# Patient Record
Sex: FEMALE | Race: BLACK OR AFRICAN AMERICAN | NOT HISPANIC OR LATINO | Employment: OTHER | URBAN - METROPOLITAN AREA
[De-identification: names, ages, dates, MRNs, and addresses within clinical notes are randomized per-mention and may not be internally consistent; named-entity substitution may affect disease eponyms.]

---

## 2017-05-03 ENCOUNTER — HOSPITAL ENCOUNTER (EMERGENCY)
Facility: HOSPITAL | Age: 22
Discharge: HOME | End: 2017-05-03
Attending: EMERGENCY MEDICINE | Admitting: EMERGENCY MEDICINE

## 2017-05-03 VITALS
BODY MASS INDEX: 23.37 KG/M2 | HEIGHT: 62 IN | SYSTOLIC BLOOD PRESSURE: 114 MMHG | DIASTOLIC BLOOD PRESSURE: 71 MMHG | HEART RATE: 80 BPM | OXYGEN SATURATION: 100 % | WEIGHT: 127 LBS | RESPIRATION RATE: 16 BRPM | TEMPERATURE: 98 F

## 2017-05-03 DIAGNOSIS — N63.10 LARGE MASS OF RIGHT BREAST: Primary | ICD-10-CM

## 2017-05-03 DIAGNOSIS — N64.4 BREAST TENDERNESS IN FEMALE: ICD-10-CM

## 2017-05-03 PROCEDURE — 99283 EMERGENCY DEPT VISIT LOW MDM: CPT

## 2017-05-08 ENCOUNTER — HOSPITAL ENCOUNTER (OUTPATIENT)
Dept: ULTRASOUND IMAGING | Facility: HOSPITAL | Age: 22
Discharge: HOME | End: 2017-05-08

## 2017-05-08 ENCOUNTER — HOSPITAL ENCOUNTER (OUTPATIENT)
Dept: MAMMOGRAPHY | Facility: HOSPITAL | Age: 22
Discharge: HOME | End: 2017-05-08

## 2017-05-08 ENCOUNTER — HOSPITAL ENCOUNTER (OUTPATIENT)
Dept: MAMMOGRAPHY | Facility: HOSPITAL | Age: 22
Discharge: HOME | End: 2017-05-08
Attending: SURGERY | Admitting: SURGERY

## 2017-05-08 ENCOUNTER — TRANSCRIBE ORDERS (OUTPATIENT)
Dept: MAMMOGRAPHY | Facility: HOSPITAL | Age: 22
End: 2017-05-08

## 2017-05-08 DIAGNOSIS — N63.10 BREAST MASS, RIGHT: ICD-10-CM

## 2017-05-08 DIAGNOSIS — N63.10 BREAST MASS, RIGHT: Primary | ICD-10-CM

## 2017-05-08 PROCEDURE — 19083 BX BREAST 1ST LESION US IMAG: CPT | Performed by: RADIOLOGY

## 2017-05-08 PROCEDURE — 76942 ECHO GUIDE FOR BIOPSY: CPT

## 2017-05-08 PROCEDURE — 88360 TUMOR IMMUNOHISTOCHEM/MANUAL: CPT | Performed by: SURGERY

## 2017-05-08 PROCEDURE — 38505 NEEDLE BIOPSY LYMPH NODES: CPT | Performed by: RADIOLOGY

## 2017-05-08 PROCEDURE — G0204 DX MAMMO INCL CAD BI: HCPCS

## 2017-05-08 PROCEDURE — 77062 BREAST TOMOSYNTHESIS BI: CPT | Performed by: RADIOLOGY

## 2017-05-08 PROCEDURE — G0279 TOMOSYNTHESIS, MAMMO: HCPCS

## 2017-05-08 PROCEDURE — 88305 TISSUE EXAM BY PATHOLOGIST: CPT | Performed by: SURGERY

## 2017-05-08 PROCEDURE — 88307 TISSUE EXAM BY PATHOLOGIST: CPT | Performed by: SURGERY

## 2017-05-08 PROCEDURE — 76641 ULTRASOUND BREAST COMPLETE: CPT

## 2017-05-08 PROCEDURE — 11100: CPT | Performed by: RADIOLOGY

## 2017-05-08 PROCEDURE — 76641 ULTRASOUND BREAST COMPLETE: CPT | Performed by: RADIOLOGY

## 2017-05-08 PROCEDURE — 88342 IMHCHEM/IMCYTCHM 1ST ANTB: CPT | Performed by: SURGERY

## 2017-05-08 PROCEDURE — 77066 DX MAMMO INCL CAD BI: CPT | Performed by: RADIOLOGY

## 2017-05-08 RX ORDER — LIDOCAINE HYDROCHLORIDE AND EPINEPHRINE 10; 10 MG/ML; UG/ML
5 INJECTION, SOLUTION INFILTRATION; PERINEURAL ONCE
Status: COMPLETED | OUTPATIENT
Start: 2017-05-08 | End: 2017-05-08

## 2017-05-08 RX ORDER — LIDOCAINE HYDROCHLORIDE 10 MG/ML
5 INJECTION, SOLUTION INFILTRATION; PERINEURAL ONCE
Status: COMPLETED | OUTPATIENT
Start: 2017-05-08 | End: 2017-05-08

## 2017-05-08 RX ADMIN — LIDOCAINE HYDROCHLORIDE 1 ML: 10 INJECTION, SOLUTION INFILTRATION; PERINEURAL at 16:57

## 2017-05-08 RX ADMIN — LIDOCAINE HYDROCHLORIDE,EPINEPHRINE BITARTRATE 4 ML: 10; .01 INJECTION, SOLUTION INFILTRATION; PERINEURAL at 16:53

## 2017-05-10 ENCOUNTER — TELEPHONE (OUTPATIENT)
Dept: MAMMOGRAPHY | Facility: HOSPITAL | Age: 22
End: 2017-05-10

## 2017-05-11 ENCOUNTER — TELEPHONE (OUTPATIENT)
Dept: MAMMOGRAPHY | Facility: HOSPITAL | Age: 22
End: 2017-05-11

## 2017-05-11 LAB
CYTO UR: NORMAL
LAB AP CASE REPORT: NORMAL
LAB AP CLINICAL INFORMATION: NORMAL
LAB AP DIAGNOSIS COMMENT: NORMAL
LAB AP SPECIAL STAINS: NORMAL
Lab: NORMAL
PATH REPORT.FINAL DX SPEC: NORMAL
PATH REPORT.GROSS SPEC: NORMAL

## 2017-05-12 PROBLEM — C50.911 MALIGNANT NEOPLASM OF RIGHT FEMALE BREAST (HCC): Status: ACTIVE | Noted: 2017-05-12

## 2017-05-15 ENCOUNTER — DOCUMENTATION (OUTPATIENT)
Dept: OTHER | Facility: HOSPITAL | Age: 22
End: 2017-05-15

## 2017-05-16 ENCOUNTER — DOCUMENTATION (OUTPATIENT)
Dept: SOCIAL WORK | Facility: HOSPITAL | Age: 22
End: 2017-05-16

## 2017-05-16 ENCOUNTER — EDUCATION (OUTPATIENT)
Dept: ONCOLOGY | Facility: HOSPITAL | Age: 22
End: 2017-05-16

## 2017-05-16 ENCOUNTER — TELEPHONE (OUTPATIENT)
Dept: ONCOLOGY | Facility: CLINIC | Age: 22
End: 2017-05-16

## 2017-05-16 ENCOUNTER — CONSULT (OUTPATIENT)
Dept: ONCOLOGY | Facility: CLINIC | Age: 22
End: 2017-05-16

## 2017-05-16 ENCOUNTER — TRANSCRIBE ORDERS (OUTPATIENT)
Dept: PREADMISSION TESTING | Facility: HOSPITAL | Age: 22
End: 2017-05-16

## 2017-05-16 ENCOUNTER — DOCUMENTATION (OUTPATIENT)
Dept: OTHER | Facility: HOSPITAL | Age: 22
End: 2017-05-16

## 2017-05-16 VITALS
HEART RATE: 87 BPM | HEIGHT: 62 IN | DIASTOLIC BLOOD PRESSURE: 68 MMHG | SYSTOLIC BLOOD PRESSURE: 113 MMHG | TEMPERATURE: 98.5 F | WEIGHT: 118.9 LBS | BODY MASS INDEX: 21.88 KG/M2 | RESPIRATION RATE: 16 BRPM

## 2017-05-16 DIAGNOSIS — C50.811 MALIGNANT NEOPLASM OF OVERLAPPING SITES OF RIGHT FEMALE BREAST (HCC): ICD-10-CM

## 2017-05-16 DIAGNOSIS — C50.811 MALIGNANT NEOPLASM OF OVERLAPPING SITES OF RIGHT FEMALE BREAST (HCC): Primary | ICD-10-CM

## 2017-05-16 DIAGNOSIS — I42.7 CHEMOTHERAPY INDUCED CARDIOMYOPATHY (HCC): Primary | ICD-10-CM

## 2017-05-16 DIAGNOSIS — T45.1X5A CHEMOTHERAPY INDUCED CARDIOMYOPATHY (HCC): Primary | ICD-10-CM

## 2017-05-16 PROCEDURE — 99205 OFFICE O/P NEW HI 60 MIN: CPT | Performed by: INTERNAL MEDICINE

## 2017-05-16 RX ORDER — LIDOCAINE AND PRILOCAINE 25; 25 MG/G; MG/G
CREAM TOPICAL AS NEEDED
Qty: 30 G | Refills: 2 | Status: SHIPPED | OUTPATIENT
Start: 2017-05-16 | End: 2017-10-31 | Stop reason: HOSPADM

## 2017-05-16 RX ORDER — PROCHLORPERAZINE MALEATE 10 MG
10 TABLET ORAL EVERY 6 HOURS PRN
Qty: 30 TABLET | Refills: 5 | Status: SHIPPED | OUTPATIENT
Start: 2017-05-16 | End: 2017-07-25 | Stop reason: ALTCHOICE

## 2017-05-16 RX ORDER — ONDANSETRON HYDROCHLORIDE 8 MG/1
8 TABLET, FILM COATED ORAL 3 TIMES DAILY PRN
Qty: 30 TABLET | Refills: 5 | Status: SHIPPED | OUTPATIENT
Start: 2017-05-16 | End: 2017-05-24 | Stop reason: CLARIF

## 2017-05-16 RX ORDER — DEXAMETHASONE 4 MG/1
TABLET ORAL
Qty: 12 TABLET | Refills: 5 | Status: SHIPPED | OUTPATIENT
Start: 2017-05-16 | End: 2017-10-13

## 2017-05-17 ENCOUNTER — TELEPHONE (OUTPATIENT)
Dept: ONCOLOGY | Facility: CLINIC | Age: 22
End: 2017-05-17

## 2017-05-17 ENCOUNTER — TRANSCRIBE ORDERS (OUTPATIENT)
Dept: ADMINISTRATIVE | Facility: HOSPITAL | Age: 22
End: 2017-05-17

## 2017-05-17 ENCOUNTER — TRANSCRIBE ORDERS (OUTPATIENT)
Dept: GENERAL RADIOLOGY | Facility: HOSPITAL | Age: 22
End: 2017-05-17

## 2017-05-17 DIAGNOSIS — C50.811 MALIGNANT NEOPLASM OF OVERLAPPING SITES OF RIGHT FEMALE BREAST (HCC): Primary | ICD-10-CM

## 2017-05-17 DIAGNOSIS — N63.0 LUMP OR MASS IN BREAST: Primary | ICD-10-CM

## 2017-05-18 ENCOUNTER — HOSPITAL ENCOUNTER (OUTPATIENT)
Dept: GENERAL RADIOLOGY | Facility: HOSPITAL | Age: 22
Discharge: HOME | End: 2017-05-18
Attending: SURGERY

## 2017-05-18 DIAGNOSIS — N63.0 LUMP OR MASS IN BREAST: ICD-10-CM

## 2017-05-18 PROCEDURE — 71010 HC CHEST PA OR AP: CPT

## 2017-05-22 ENCOUNTER — HOSPITAL ENCOUNTER (OUTPATIENT)
Dept: PET IMAGING | Facility: HOSPITAL | Age: 22
End: 2017-05-22
Attending: SURGERY

## 2017-05-22 ENCOUNTER — HOSPITAL ENCOUNTER (OUTPATIENT)
Dept: PET IMAGING | Facility: HOSPITAL | Age: 22
Discharge: HOME | End: 2017-05-22
Attending: SURGERY | Admitting: SURGERY

## 2017-05-22 ENCOUNTER — HOSPITAL ENCOUNTER (OUTPATIENT)
Dept: MRI IMAGING | Facility: HOSPITAL | Age: 22
Discharge: HOME | End: 2017-05-22
Attending: SURGERY

## 2017-05-22 DIAGNOSIS — C50.811 MALIGNANT NEOPLASM OF OVERLAPPING SITES OF RIGHT FEMALE BREAST (HCC): ICD-10-CM

## 2017-05-22 DIAGNOSIS — C50.811: ICD-10-CM

## 2017-05-22 PROCEDURE — A9577 INJ MULTIHANCE: HCPCS | Performed by: SURGERY

## 2017-05-22 PROCEDURE — C8908 MRI W/O FOL W/CONT, BREAST,: HCPCS

## 2017-05-22 PROCEDURE — 0159T HC MRI BREAST COMPUTER ANALYSIS: CPT

## 2017-05-22 PROCEDURE — 0 GADOBENATE DIMEGLUMINE 529 MG/ML SOLUTION: Performed by: SURGERY

## 2017-05-22 PROCEDURE — 0159T MRI BREAST BILATERAL W WO CONTRAST: CPT | Performed by: RADIOLOGY

## 2017-05-22 PROCEDURE — 77059 MRI BREAST BILATERAL W WO CONTRAST: CPT | Performed by: RADIOLOGY

## 2017-05-22 RX ADMIN — GADOBENATE DIMEGLUMINE 10 ML: 529 INJECTION, SOLUTION INTRAVENOUS at 12:41

## 2017-05-23 ENCOUNTER — HOSPITAL ENCOUNTER (OUTPATIENT)
Dept: PET IMAGING | Facility: HOSPITAL | Age: 22
End: 2017-05-23
Attending: SURGERY

## 2017-05-23 ENCOUNTER — APPOINTMENT (OUTPATIENT)
Dept: PET IMAGING | Facility: HOSPITAL | Age: 22
End: 2017-05-23
Attending: SURGERY

## 2017-05-23 ENCOUNTER — HOSPITAL ENCOUNTER (OUTPATIENT)
Dept: CARDIOLOGY | Facility: HOSPITAL | Age: 22
Discharge: HOME | End: 2017-05-23
Attending: INTERNAL MEDICINE | Admitting: INTERNAL MEDICINE

## 2017-05-23 VITALS
HEIGHT: 62 IN | DIASTOLIC BLOOD PRESSURE: 72 MMHG | BODY MASS INDEX: 21.71 KG/M2 | WEIGHT: 118 LBS | SYSTOLIC BLOOD PRESSURE: 123 MMHG

## 2017-05-23 DIAGNOSIS — I42.7 CHEMOTHERAPY INDUCED CARDIOMYOPATHY (HCC): ICD-10-CM

## 2017-05-23 DIAGNOSIS — T45.1X5A CHEMOTHERAPY INDUCED CARDIOMYOPATHY (HCC): ICD-10-CM

## 2017-05-23 LAB
BH CV ECHO MEAS - AO ROOT AREA (BSA CORRECTED): 1.4
BH CV ECHO MEAS - AO ROOT AREA: 3.8 CM^2
BH CV ECHO MEAS - AO ROOT DIAM: 2.2 CM
BH CV ECHO MEAS - BSA(HAYCOCK): 1.5 M^2
BH CV ECHO MEAS - BSA: 1.5 M^2
BH CV ECHO MEAS - BZI_BMI: 21.6 KILOGRAMS/M^2
BH CV ECHO MEAS - BZI_METRIC_HEIGHT: 157.5 CM
BH CV ECHO MEAS - BZI_METRIC_WEIGHT: 53.5 KG
BH CV ECHO MEAS - CONTRAST EF (2CH): 69.7 ML/M^2
BH CV ECHO MEAS - CONTRAST EF 4CH: 64.1 ML/M^2
BH CV ECHO MEAS - EDV(CUBED): 43.6 ML
BH CV ECHO MEAS - EDV(MOD-SP2): 33 ML
BH CV ECHO MEAS - EDV(MOD-SP4): 39 ML
BH CV ECHO MEAS - EDV(TEICH): 51.6 ML
BH CV ECHO MEAS - EF(CUBED): 72.5 %
BH CV ECHO MEAS - EF(MOD-SP2): 69.7 %
BH CV ECHO MEAS - EF(MOD-SP4): 64 %
BH CV ECHO MEAS - EF(TEICH): 65.2 %
BH CV ECHO MEAS - ESV(CUBED): 12 ML
BH CV ECHO MEAS - ESV(MOD-SP2): 10 ML
BH CV ECHO MEAS - ESV(MOD-SP4): 14 ML
BH CV ECHO MEAS - ESV(TEICH): 17.9 ML
BH CV ECHO MEAS - FS: 34.9 %
BH CV ECHO MEAS - IVS/LVPW: 0.99
BH CV ECHO MEAS - IVSD: 0.88 CM
BH CV ECHO MEAS - LA DIMENSION: 1.7 CM
BH CV ECHO MEAS - LA/AO: 0.77
BH CV ECHO MEAS - LAT PEAK E' VEL: 14.8 CM/SEC
BH CV ECHO MEAS - LV DIASTOLIC VOL/BSA (35-75): 25.5 ML/M^2
BH CV ECHO MEAS - LV MASS(C)D: 87.3 GRAMS
BH CV ECHO MEAS - LV MASS(C)DI: 57.1 GRAMS/M^2
BH CV ECHO MEAS - LV SYSTOLIC VOL/BSA (12-30): 9.2 ML/M^2
BH CV ECHO MEAS - LVIDD: 3.5 CM
BH CV ECHO MEAS - LVIDS: 2.3 CM
BH CV ECHO MEAS - LVLD AP2: 4.7 CM
BH CV ECHO MEAS - LVLD AP4: 5.3 CM
BH CV ECHO MEAS - LVLS AP2: 4.2 CM
BH CV ECHO MEAS - LVLS AP4: 5.1 CM
BH CV ECHO MEAS - LVPWD: 0.89 CM
BH CV ECHO MEAS - MED PEAK E' VEL: 9.6 CM/SEC
BH CV ECHO MEAS - MV A MAX VEL: 48.9 CM/SEC
BH CV ECHO MEAS - MV DEC TIME: 0.2 SEC
BH CV ECHO MEAS - MV E MAX VEL: 81.4 CM/SEC
BH CV ECHO MEAS - MV E/A: 1.7
BH CV ECHO MEAS - RAP SYSTOLE: 3 MMHG
BH CV ECHO MEAS - RVSP: 17.6 MMHG
BH CV ECHO MEAS - SI(CUBED): 20.7 ML/M^2
BH CV ECHO MEAS - SI(MOD-SP2): 15.1 ML/M^2
BH CV ECHO MEAS - SI(MOD-SP4): 16.4 ML/M^2
BH CV ECHO MEAS - SI(TEICH): 22 ML/M^2
BH CV ECHO MEAS - SV(CUBED): 31.6 ML
BH CV ECHO MEAS - SV(MOD-SP2): 23 ML
BH CV ECHO MEAS - SV(MOD-SP4): 25 ML
BH CV ECHO MEAS - SV(TEICH): 33.6 ML
BH CV ECHO MEAS - TAPSE (>1.6): 2.3 CM2
BH CV ECHO MEAS - TR MAX VEL: 191.3 CM/SEC
BH CV XLRA - TDI S': 12.9 CM/SEC
LV EF 2D ECHO EST: 60 %

## 2017-05-23 PROCEDURE — 93306 TTE W/DOPPLER COMPLETE: CPT | Performed by: INTERNAL MEDICINE

## 2017-05-23 PROCEDURE — 93306 TTE W/DOPPLER COMPLETE: CPT

## 2017-05-24 ENCOUNTER — TELEPHONE (OUTPATIENT)
Dept: MRI IMAGING | Facility: HOSPITAL | Age: 22
End: 2017-05-24

## 2017-05-24 ENCOUNTER — HOSPITAL ENCOUNTER (OUTPATIENT)
Dept: PET IMAGING | Facility: HOSPITAL | Age: 22
Discharge: HOME | End: 2017-05-24
Attending: SURGERY

## 2017-05-24 ENCOUNTER — HOSPITAL ENCOUNTER (OUTPATIENT)
Dept: PET IMAGING | Facility: HOSPITAL | Age: 22
Discharge: HOME | End: 2017-05-24
Attending: SURGERY | Admitting: SURGERY

## 2017-05-24 ENCOUNTER — DOCUMENTATION (OUTPATIENT)
Dept: OTHER | Facility: HOSPITAL | Age: 22
End: 2017-05-24

## 2017-05-24 ENCOUNTER — OFFICE VISIT (OUTPATIENT)
Dept: ONCOLOGY | Facility: CLINIC | Age: 22
End: 2017-05-24

## 2017-05-24 VITALS
WEIGHT: 120 LBS | BODY MASS INDEX: 22.08 KG/M2 | DIASTOLIC BLOOD PRESSURE: 69 MMHG | HEIGHT: 62 IN | RESPIRATION RATE: 16 BRPM | TEMPERATURE: 97.4 F | HEART RATE: 82 BPM | SYSTOLIC BLOOD PRESSURE: 110 MMHG

## 2017-05-24 DIAGNOSIS — C50.811 MALIGNANT NEOPLASM OF OVERLAPPING SITES OF RIGHT FEMALE BREAST (HCC): Primary | ICD-10-CM

## 2017-05-24 LAB — GLUCOSE BLDC GLUCOMTR-MCNC: 122 MG/DL (ref 70–130)

## 2017-05-24 PROCEDURE — 0 FLUDEOXYGLUCOSE F18 SOLUTION: Performed by: SURGERY

## 2017-05-24 PROCEDURE — 99214 OFFICE O/P EST MOD 30 MIN: CPT | Performed by: INTERNAL MEDICINE

## 2017-05-24 PROCEDURE — A9552 F18 FDG: HCPCS | Performed by: SURGERY

## 2017-05-24 PROCEDURE — 82962 GLUCOSE BLOOD TEST: CPT

## 2017-05-24 PROCEDURE — 78815 PET IMAGE W/CT SKULL-THIGH: CPT

## 2017-05-24 RX ORDER — PALONOSETRON 0.05 MG/ML
0.25 INJECTION, SOLUTION INTRAVENOUS ONCE
Status: CANCELLED | OUTPATIENT
Start: 2017-05-25

## 2017-05-24 RX ORDER — SODIUM CHLORIDE 9 MG/ML
250 INJECTION, SOLUTION INTRAVENOUS ONCE
Status: CANCELLED | OUTPATIENT
Start: 2017-05-25

## 2017-05-24 RX ORDER — ONDANSETRON 8 MG/1
8 TABLET, ORALLY DISINTEGRATING ORAL EVERY 8 HOURS PRN
Qty: 30 TABLET | Refills: 3 | Status: SHIPPED | OUTPATIENT
Start: 2017-05-24 | End: 2017-07-25 | Stop reason: SDUPTHER

## 2017-05-24 RX ADMIN — FLUDEOXYGLUCOSE F18 1 DOSE: 300 INJECTION INTRAVENOUS at 07:44

## 2017-05-25 ENCOUNTER — INFUSION (OUTPATIENT)
Dept: ONCOLOGY | Facility: HOSPITAL | Age: 22
End: 2017-05-25

## 2017-05-25 ENCOUNTER — DOCUMENTATION (OUTPATIENT)
Dept: SOCIAL WORK | Facility: HOSPITAL | Age: 22
End: 2017-05-25

## 2017-05-25 ENCOUNTER — DOCUMENTATION (OUTPATIENT)
Dept: NUTRITION | Facility: HOSPITAL | Age: 22
End: 2017-05-25

## 2017-05-25 ENCOUNTER — EDUCATION (OUTPATIENT)
Dept: ONCOLOGY | Facility: HOSPITAL | Age: 22
End: 2017-05-25

## 2017-05-25 VITALS
HEART RATE: 76 BPM | OXYGEN SATURATION: 95 % | DIASTOLIC BLOOD PRESSURE: 68 MMHG | BODY MASS INDEX: 22.08 KG/M2 | RESPIRATION RATE: 17 BRPM | SYSTOLIC BLOOD PRESSURE: 115 MMHG | WEIGHT: 120 LBS | TEMPERATURE: 98.2 F | HEIGHT: 62 IN

## 2017-05-25 DIAGNOSIS — C50.811 MALIGNANT NEOPLASM OF OVERLAPPING SITES OF RIGHT FEMALE BREAST (HCC): Primary | ICD-10-CM

## 2017-05-25 DIAGNOSIS — C50.811 MALIGNANT NEOPLASM OF OVERLAPPING SITES OF RIGHT FEMALE BREAST (HCC): ICD-10-CM

## 2017-05-25 LAB
ALBUMIN SERPL-MCNC: 4.2 G/DL (ref 3.2–4.8)
ALBUMIN/GLOB SERPL: 1.2 G/DL (ref 1.5–2.5)
ALP SERPL-CCNC: 80 U/L (ref 25–100)
ALT SERPL W P-5'-P-CCNC: 28 U/L (ref 7–40)
ANION GAP SERPL CALCULATED.3IONS-SCNC: 5 MMOL/L (ref 3–11)
AST SERPL-CCNC: 31 U/L (ref 0–33)
BILIRUB SERPL-MCNC: 0.4 MG/DL (ref 0.3–1.2)
BUN BLD-MCNC: 12 MG/DL (ref 9–23)
BUN/CREAT SERPL: 24 (ref 7–25)
CALCIUM SPEC-SCNC: 9.6 MG/DL (ref 8.7–10.4)
CHLORIDE SERPL-SCNC: 106 MMOL/L (ref 99–109)
CO2 SERPL-SCNC: 28 MMOL/L (ref 20–31)
CREAT BLD-MCNC: 0.5 MG/DL (ref 0.6–1.3)
ERYTHROCYTE [DISTWIDTH] IN BLOOD BY AUTOMATED COUNT: 12.9 % (ref 11.3–14.5)
GFR SERPL CREATININE-BSD FRML MDRD: >150 ML/MIN/1.73
GLOBULIN UR ELPH-MCNC: 3.6 GM/DL
GLUCOSE BLD-MCNC: 206 MG/DL (ref 70–100)
HCT VFR BLD AUTO: 40.6 % (ref 34.5–44)
HGB BLD-MCNC: 13.2 G/DL (ref 11.5–15.5)
LYMPHOCYTES # BLD AUTO: 1.1 10*3/MM3 (ref 0.6–4.8)
LYMPHOCYTES NFR BLD AUTO: 11.1 % (ref 24–44)
MCH RBC QN AUTO: 29 PG (ref 27–31)
MCHC RBC AUTO-ENTMCNC: 32.4 G/DL (ref 32–36)
MCV RBC AUTO: 89.5 FL (ref 80–99)
MONOCYTES # BLD AUTO: 0.3 10*3/MM3 (ref 0–1)
MONOCYTES NFR BLD AUTO: 3.2 % (ref 0–12)
NEUTROPHILS # BLD AUTO: 8.5 10*3/MM3 (ref 1.5–8.3)
NEUTROPHILS NFR BLD AUTO: 85.7 % (ref 41–71)
PLATELET # BLD AUTO: 241 10*3/MM3 (ref 150–450)
PMV BLD AUTO: 7.8 FL (ref 6–12)
POTASSIUM BLD-SCNC: 3.5 MMOL/L (ref 3.5–5.5)
PROT SERPL-MCNC: 7.8 G/DL (ref 5.7–8.2)
RBC # BLD AUTO: 4.53 10*6/MM3 (ref 3.89–5.14)
SODIUM BLD-SCNC: 139 MMOL/L (ref 132–146)
WBC NRBC COR # BLD: 9.9 10*3/MM3 (ref 3.5–10.8)

## 2017-05-25 PROCEDURE — 25010000002 PERTUZUMAB 420 MG/14ML SOLUTION 420 MG VIAL: Performed by: INTERNAL MEDICINE

## 2017-05-25 PROCEDURE — 25010000002 TRASTUZUMAB PER 10 MG: Performed by: INTERNAL MEDICINE

## 2017-05-25 PROCEDURE — 25010000002 PALONOSETRON PER 25 MCG: Performed by: INTERNAL MEDICINE

## 2017-05-25 PROCEDURE — 96375 TX/PRO/DX INJ NEW DRUG ADDON: CPT

## 2017-05-25 PROCEDURE — 96417 CHEMO IV INFUS EACH ADDL SEQ: CPT

## 2017-05-25 PROCEDURE — 96372 THER/PROPH/DIAG INJ SC/IM: CPT

## 2017-05-25 PROCEDURE — 80053 COMPREHEN METABOLIC PANEL: CPT

## 2017-05-25 PROCEDURE — 25010000002 HYDROMORPHONE PER 4 MG: Performed by: INTERNAL MEDICINE

## 2017-05-25 PROCEDURE — 25010000002 DIPHENHYDRAMINE PER 50 MG: Performed by: INTERNAL MEDICINE

## 2017-05-25 PROCEDURE — 25010000002 HEPARIN FLUSH (PORCINE) 100 UNIT/ML SOLUTION: Performed by: INTERNAL MEDICINE

## 2017-05-25 PROCEDURE — 36415 COLL VENOUS BLD VENIPUNCTURE: CPT

## 2017-05-25 PROCEDURE — 96377 APPLICATON ON-BODY INJECTOR: CPT

## 2017-05-25 PROCEDURE — 25010000002 DEXAMETHASONE PER 1 MG: Performed by: INTERNAL MEDICINE

## 2017-05-25 PROCEDURE — 85025 COMPLETE CBC W/AUTO DIFF WBC: CPT

## 2017-05-25 PROCEDURE — 25010000002 MORPHINE PER 10 MG: Performed by: INTERNAL MEDICINE

## 2017-05-25 PROCEDURE — 96415 CHEMO IV INFUSION ADDL HR: CPT

## 2017-05-25 PROCEDURE — 96413 CHEMO IV INFUSION 1 HR: CPT

## 2017-05-25 RX ORDER — DEXAMETHASONE SODIUM PHOSPHATE 4 MG/ML
10 INJECTION, SOLUTION INTRA-ARTICULAR; INTRALESIONAL; INTRAMUSCULAR; INTRAVENOUS; SOFT TISSUE ONCE
Status: COMPLETED | OUTPATIENT
Start: 2017-05-25 | End: 2017-05-25

## 2017-05-25 RX ORDER — SODIUM CHLORIDE 9 MG/ML
250 INJECTION, SOLUTION INTRAVENOUS ONCE
Status: COMPLETED | OUTPATIENT
Start: 2017-05-25 | End: 2017-05-25

## 2017-05-25 RX ORDER — SODIUM CHLORIDE 9 MG/ML
250 INJECTION, SOLUTION INTRAVENOUS ONCE
Status: CANCELLED | OUTPATIENT
Start: 2017-05-26

## 2017-05-25 RX ORDER — HYDROMORPHONE HCL 110MG/55ML
2 PATIENT CONTROLLED ANALGESIA SYRINGE INTRAVENOUS ONCE
Status: COMPLETED | OUTPATIENT
Start: 2017-05-25 | End: 2017-05-25

## 2017-05-25 RX ORDER — PALONOSETRON 0.05 MG/ML
0.25 INJECTION, SOLUTION INTRAVENOUS ONCE
Status: CANCELLED | OUTPATIENT
Start: 2017-05-26

## 2017-05-25 RX ORDER — PALONOSETRON 0.05 MG/ML
0.25 INJECTION, SOLUTION INTRAVENOUS ONCE
Status: COMPLETED | OUTPATIENT
Start: 2017-05-25 | End: 2017-05-25

## 2017-05-25 RX ORDER — DIPHENHYDRAMINE HYDROCHLORIDE 50 MG/ML
25 INJECTION INTRAMUSCULAR; INTRAVENOUS ONCE
Status: COMPLETED | OUTPATIENT
Start: 2017-05-25 | End: 2017-05-25

## 2017-05-25 RX ORDER — SODIUM CHLORIDE 0.9 % (FLUSH) 0.9 %
10 SYRINGE (ML) INJECTION AS NEEDED
Status: CANCELLED | OUTPATIENT
Start: 2017-05-25

## 2017-05-25 RX ORDER — MORPHINE SULFATE 4 MG/ML
4 INJECTION, SOLUTION INTRAMUSCULAR; INTRAVENOUS ONCE
Status: COMPLETED | OUTPATIENT
Start: 2017-05-25 | End: 2017-05-25

## 2017-05-25 RX ADMIN — DEXAMETHASONE SODIUM PHOSPHATE 10 MG: 4 INJECTION, SOLUTION INTRA-ARTICULAR; INTRALESIONAL; INTRAMUSCULAR; INTRAVENOUS; SOFT TISSUE at 14:50

## 2017-05-25 RX ADMIN — PERTUZUMAB 840 MG: 30 INJECTION, SOLUTION, CONCENTRATE INTRAVENOUS at 11:31

## 2017-05-25 RX ADMIN — TRASTUZUMAB 430 MG: KIT at 13:30

## 2017-05-25 RX ADMIN — SODIUM CHLORIDE 250 ML: 9 INJECTION, SOLUTION INTRAVENOUS at 11:25

## 2017-05-25 RX ADMIN — PALONOSETRON HYDROCHLORIDE 0.25 MG: 0.25 INJECTION INTRAVENOUS at 11:27

## 2017-05-25 RX ADMIN — DIPHENHYDRAMINE HYDROCHLORIDE 25 MG: 50 INJECTION INTRAMUSCULAR; INTRAVENOUS at 14:45

## 2017-05-25 RX ADMIN — HEPARIN 500 UNITS: 100 SYRINGE at 16:22

## 2017-05-25 RX ADMIN — MORPHINE SULFATE 4 MG: 4 INJECTION, SOLUTION INTRAMUSCULAR; INTRAVENOUS at 14:48

## 2017-05-25 RX ADMIN — HYDROMORPHONE HYDROCHLORIDE 2 MG: 2 INJECTION, SOLUTION INTRAMUSCULAR; INTRAVENOUS; SUBCUTANEOUS at 15:42

## 2017-05-26 ENCOUNTER — INFUSION (OUTPATIENT)
Dept: ONCOLOGY | Facility: HOSPITAL | Age: 22
End: 2017-05-26

## 2017-05-26 VITALS
WEIGHT: 121 LBS | HEART RATE: 76 BPM | DIASTOLIC BLOOD PRESSURE: 75 MMHG | RESPIRATION RATE: 16 BRPM | SYSTOLIC BLOOD PRESSURE: 118 MMHG | TEMPERATURE: 97.8 F | BODY MASS INDEX: 22.26 KG/M2 | HEIGHT: 62 IN

## 2017-05-26 DIAGNOSIS — C50.911 MALIGNANT NEOPLASM OF RIGHT FEMALE BREAST, UNSPECIFIED SITE OF BREAST: Primary | ICD-10-CM

## 2017-05-26 DIAGNOSIS — C50.811 MALIGNANT NEOPLASM OF OVERLAPPING SITES OF RIGHT FEMALE BREAST (HCC): Primary | ICD-10-CM

## 2017-05-26 PROCEDURE — 96402 CHEMO HORMON ANTINEOPL SQ/IM: CPT

## 2017-05-26 PROCEDURE — 96415 CHEMO IV INFUSION ADDL HR: CPT

## 2017-05-26 PROCEDURE — 96417 CHEMO IV INFUS EACH ADDL SEQ: CPT

## 2017-05-26 PROCEDURE — 25010000002 CARBOPLATIN PER 50 MG: Performed by: INTERNAL MEDICINE

## 2017-05-26 PROCEDURE — 96413 CHEMO IV INFUSION 1 HR: CPT

## 2017-05-26 PROCEDURE — 96375 TX/PRO/DX INJ NEW DRUG ADDON: CPT

## 2017-05-26 PROCEDURE — 96377 APPLICATON ON-BODY INJECTOR: CPT

## 2017-05-26 PROCEDURE — 25010000002 PEGFILGRASTIM 6 MG/0.6ML PREFILLED SYRINGE KIT: Performed by: INTERNAL MEDICINE

## 2017-05-26 PROCEDURE — 25010000002 DOCETAXEL 20 MG/ML CONCENTRATION 1 ML VIAL: Performed by: INTERNAL MEDICINE

## 2017-05-26 PROCEDURE — 25010000002 PALONOSETRON PER 25 MCG: Performed by: INTERNAL MEDICINE

## 2017-05-26 PROCEDURE — 25010000002 HEPARIN FLUSH (PORCINE) 100 UNIT/ML SOLUTION: Performed by: INTERNAL MEDICINE

## 2017-05-26 PROCEDURE — 25010000002 DOCETAXEL 20 MG/ML CONCENTRATION 4 ML VIAL: Performed by: INTERNAL MEDICINE

## 2017-05-26 PROCEDURE — 25010000002 LEUPROLIDE ACETATE (3 MONTH) PER 3.75 MG: Performed by: INTERNAL MEDICINE

## 2017-05-26 PROCEDURE — 96372 THER/PROPH/DIAG INJ SC/IM: CPT

## 2017-05-26 RX ORDER — SODIUM CHLORIDE 9 MG/ML
250 INJECTION, SOLUTION INTRAVENOUS ONCE
Status: COMPLETED | OUTPATIENT
Start: 2017-05-26 | End: 2017-05-26

## 2017-05-26 RX ORDER — ONDANSETRON 8 MG/1
8 TABLET, ORALLY DISINTEGRATING ORAL EVERY 8 HOURS PRN
Qty: 20 TABLET | Refills: 11 | Status: SHIPPED | OUTPATIENT
Start: 2017-05-26 | End: 2017-06-25

## 2017-05-26 RX ORDER — PALONOSETRON 0.05 MG/ML
0.25 INJECTION, SOLUTION INTRAVENOUS ONCE
Status: COMPLETED | OUTPATIENT
Start: 2017-05-26 | End: 2017-05-26

## 2017-05-26 RX ORDER — SODIUM CHLORIDE 0.9 % (FLUSH) 0.9 %
10 SYRINGE (ML) INJECTION AS NEEDED
Status: CANCELLED | OUTPATIENT
Start: 2017-05-26

## 2017-05-26 RX ADMIN — HEPARIN 500 UNITS: 100 SYRINGE at 12:29

## 2017-05-26 RX ADMIN — LEUPROLIDE ACETATE 11.25 MG: KIT at 10:17

## 2017-05-26 RX ADMIN — SODIUM CHLORIDE 250 ML: 9 INJECTION, SOLUTION INTRAVENOUS at 10:10

## 2017-05-26 RX ADMIN — DOCETAXEL 115 MG: 80 INJECTION, SOLUTION INTRAVENOUS at 10:33

## 2017-05-26 RX ADMIN — PEGFILGRASTIM 6 MG: KIT SUBCUTANEOUS at 12:28

## 2017-05-26 RX ADMIN — PALONOSETRON HYDROCHLORIDE 0.25 MG: 0.25 INJECTION INTRAVENOUS at 10:14

## 2017-05-26 RX ADMIN — CARBOPLATIN 900 MG: 10 INJECTION, SOLUTION INTRAVENOUS at 11:39

## 2017-05-29 ENCOUNTER — HOSPITAL ENCOUNTER (EMERGENCY)
Facility: HOSPITAL | Age: 22
Discharge: HOME | End: 2017-05-29
Attending: EMERGENCY MEDICINE | Admitting: EMERGENCY MEDICINE

## 2017-05-29 VITALS
SYSTOLIC BLOOD PRESSURE: 112 MMHG | HEART RATE: 76 BPM | WEIGHT: 120 LBS | HEIGHT: 62 IN | RESPIRATION RATE: 18 BRPM | OXYGEN SATURATION: 99 % | BODY MASS INDEX: 22.08 KG/M2 | DIASTOLIC BLOOD PRESSURE: 76 MMHG | TEMPERATURE: 98.1 F

## 2017-05-29 DIAGNOSIS — D72.829 LEUKOCYTOSIS, UNSPECIFIED TYPE: ICD-10-CM

## 2017-05-29 DIAGNOSIS — N39.0 URINARY TRACT INFECTION, SITE UNSPECIFIED: ICD-10-CM

## 2017-05-29 DIAGNOSIS — C50.911 MALIGNANT NEOPLASM OF RIGHT FEMALE BREAST, UNSPECIFIED SITE OF BREAST: ICD-10-CM

## 2017-05-29 DIAGNOSIS — R19.7 DIARRHEA, UNSPECIFIED TYPE: Primary | ICD-10-CM

## 2017-05-29 LAB
ALBUMIN SERPL-MCNC: 4.3 G/DL (ref 3.2–4.8)
ALBUMIN/GLOB SERPL: 1.2 G/DL (ref 1.5–2.5)
ALP SERPL-CCNC: 104 U/L (ref 25–100)
ALT SERPL W P-5'-P-CCNC: 51 U/L (ref 7–40)
ANION GAP SERPL CALCULATED.3IONS-SCNC: 5 MMOL/L (ref 3–11)
AST SERPL-CCNC: 38 U/L (ref 0–33)
B-HCG UR QL: NEGATIVE
BACTERIA UR QL AUTO: ABNORMAL /HPF
BASOPHILS # BLD MANUAL: 0 10*3/MM3 (ref 0–0.2)
BASOPHILS NFR BLD AUTO: 0 % (ref 0–1)
BILIRUB SERPL-MCNC: 0.5 MG/DL (ref 0.3–1.2)
BILIRUB UR QL STRIP: NEGATIVE
BUN BLD-MCNC: 8 MG/DL (ref 9–23)
BUN/CREAT SERPL: 16 (ref 7–25)
CALCIUM SPEC-SCNC: 9.3 MG/DL (ref 8.7–10.4)
CHLORIDE SERPL-SCNC: 103 MMOL/L (ref 99–109)
CLARITY UR: ABNORMAL
CO2 SERPL-SCNC: 29 MMOL/L (ref 20–31)
COLOR UR: YELLOW
CREAT BLD-MCNC: 0.5 MG/DL (ref 0.6–1.3)
D-LACTATE SERPL-SCNC: 1.1 MMOL/L (ref 0.5–2)
DEPRECATED RDW RBC AUTO: 42.4 FL (ref 37–54)
EOSINOPHIL # BLD MANUAL: 0 10*3/MM3 (ref 0.1–0.3)
EOSINOPHIL NFR BLD MANUAL: 0 % (ref 0–3)
ERYTHROCYTE [DISTWIDTH] IN BLOOD BY AUTOMATED COUNT: 12.5 % (ref 11.3–14.5)
GFR SERPL CREATININE-BSD FRML MDRD: >150 ML/MIN/1.73
GLOBULIN UR ELPH-MCNC: 3.7 GM/DL
GLUCOSE BLD-MCNC: 123 MG/DL (ref 70–100)
GLUCOSE UR STRIP-MCNC: NEGATIVE MG/DL
HCT VFR BLD AUTO: 47.1 % (ref 34.5–44)
HGB BLD-MCNC: 15.4 G/DL (ref 11.5–15.5)
HGB UR QL STRIP.AUTO: ABNORMAL
HOLD SPECIMEN: NORMAL
HOLD SPECIMEN: NORMAL
HYALINE CASTS UR QL AUTO: ABNORMAL /LPF
INTERNAL NEGATIVE CONTROL: NORMAL
INTERNAL POSITIVE CONTROL: NORMAL
KETONES UR QL STRIP: NEGATIVE
LEUKOCYTE ESTERASE UR QL STRIP.AUTO: ABNORMAL
LIPASE SERPL-CCNC: 52 U/L (ref 6–51)
LYMPHOCYTES # BLD MANUAL: 0.89 10*3/MM3 (ref 0.6–4.8)
LYMPHOCYTES NFR BLD MANUAL: 1 % (ref 0–12)
LYMPHOCYTES NFR BLD MANUAL: 3 % (ref 24–44)
Lab: NORMAL
MCH RBC QN AUTO: 30.2 PG (ref 27–31)
MCHC RBC AUTO-ENTMCNC: 32.7 G/DL (ref 32–36)
MCV RBC AUTO: 92.4 FL (ref 80–99)
METAMYELOCYTES NFR BLD MANUAL: 2 % (ref 0–0)
MONOCYTES # BLD AUTO: 0.3 10*3/MM3 (ref 0–1)
MYELOCYTES NFR BLD MANUAL: 2 % (ref 0–0)
NEUTROPHILS # BLD AUTO: 27.43 10*3/MM3 (ref 1.5–8.3)
NEUTROPHILS NFR BLD MANUAL: 83 % (ref 41–71)
NEUTS BAND NFR BLD MANUAL: 9 % (ref 0–5)
NITRITE UR QL STRIP: NEGATIVE
PH UR STRIP.AUTO: 7 [PH] (ref 5–8)
PLAT MORPH BLD: NORMAL
PLATELET # BLD AUTO: 193 10*3/MM3 (ref 150–450)
PMV BLD AUTO: 11.2 FL (ref 6–12)
POTASSIUM BLD-SCNC: 4 MMOL/L (ref 3.5–5.5)
PROCALCITONIN SERPL-MCNC: 0.13 NG/ML
PROT SERPL-MCNC: 8 G/DL (ref 5.7–8.2)
PROT UR QL STRIP: NEGATIVE
RBC # BLD AUTO: 5.1 10*6/MM3 (ref 3.89–5.14)
RBC # UR: ABNORMAL /HPF
RBC MORPH BLD: NORMAL
REF LAB TEST METHOD: ABNORMAL
SODIUM BLD-SCNC: 137 MMOL/L (ref 132–146)
SP GR UR STRIP: 1.02 (ref 1–1.03)
SQUAMOUS #/AREA URNS HPF: ABNORMAL /HPF
UROBILINOGEN UR QL STRIP: ABNORMAL
WBC MORPH BLD: NORMAL
WBC NRBC COR # BLD: 29.82 10*3/MM3 (ref 3.5–10.8)
WBC UR QL AUTO: ABNORMAL /HPF
WHOLE BLOOD HOLD SPECIMEN: NORMAL
WHOLE BLOOD HOLD SPECIMEN: NORMAL

## 2017-05-29 PROCEDURE — 25010000002 MORPHINE PER 10 MG: Performed by: EMERGENCY MEDICINE

## 2017-05-29 PROCEDURE — 83690 ASSAY OF LIPASE: CPT | Performed by: EMERGENCY MEDICINE

## 2017-05-29 PROCEDURE — 25010000003 CEFTRIAXONE PER 250 MG: Performed by: PHYSICIAN ASSISTANT

## 2017-05-29 PROCEDURE — 99284 EMERGENCY DEPT VISIT MOD MDM: CPT

## 2017-05-29 PROCEDURE — 81001 URINALYSIS AUTO W/SCOPE: CPT | Performed by: EMERGENCY MEDICINE

## 2017-05-29 PROCEDURE — 96375 TX/PRO/DX INJ NEW DRUG ADDON: CPT

## 2017-05-29 PROCEDURE — 80053 COMPREHEN METABOLIC PANEL: CPT | Performed by: EMERGENCY MEDICINE

## 2017-05-29 PROCEDURE — 96361 HYDRATE IV INFUSION ADD-ON: CPT

## 2017-05-29 PROCEDURE — 87086 URINE CULTURE/COLONY COUNT: CPT | Performed by: EMERGENCY MEDICINE

## 2017-05-29 PROCEDURE — 87040 BLOOD CULTURE FOR BACTERIA: CPT | Performed by: PHYSICIAN ASSISTANT

## 2017-05-29 PROCEDURE — 96365 THER/PROPH/DIAG IV INF INIT: CPT

## 2017-05-29 PROCEDURE — 36415 COLL VENOUS BLD VENIPUNCTURE: CPT

## 2017-05-29 PROCEDURE — 25010000002 ONDANSETRON PER 1 MG: Performed by: EMERGENCY MEDICINE

## 2017-05-29 PROCEDURE — 84145 PROCALCITONIN (PCT): CPT | Performed by: PHYSICIAN ASSISTANT

## 2017-05-29 PROCEDURE — 83605 ASSAY OF LACTIC ACID: CPT | Performed by: PHYSICIAN ASSISTANT

## 2017-05-29 PROCEDURE — 85025 COMPLETE CBC W/AUTO DIFF WBC: CPT | Performed by: EMERGENCY MEDICINE

## 2017-05-29 PROCEDURE — 85007 BL SMEAR W/DIFF WBC COUNT: CPT | Performed by: EMERGENCY MEDICINE

## 2017-05-29 RX ORDER — SODIUM CHLORIDE 0.9 % (FLUSH) 0.9 %
10 SYRINGE (ML) INJECTION AS NEEDED
Status: DISCONTINUED | OUTPATIENT
Start: 2017-05-29 | End: 2017-05-29 | Stop reason: HOSPADM

## 2017-05-29 RX ORDER — ONDANSETRON 2 MG/ML
4 INJECTION INTRAMUSCULAR; INTRAVENOUS ONCE
Status: COMPLETED | OUTPATIENT
Start: 2017-05-29 | End: 2017-05-29

## 2017-05-29 RX ORDER — CEFDINIR 300 MG/1
300 CAPSULE ORAL 2 TIMES DAILY
Qty: 20 CAPSULE | Refills: 0 | Status: SHIPPED | OUTPATIENT
Start: 2017-05-29 | End: 2017-06-08

## 2017-05-29 RX ORDER — MORPHINE SULFATE 4 MG/ML
4 INJECTION, SOLUTION INTRAMUSCULAR; INTRAVENOUS ONCE
Status: COMPLETED | OUTPATIENT
Start: 2017-05-29 | End: 2017-05-29

## 2017-05-29 RX ORDER — CEFTRIAXONE SODIUM 1 G/50ML
1 INJECTION, SOLUTION INTRAVENOUS ONCE
Status: COMPLETED | OUTPATIENT
Start: 2017-05-29 | End: 2017-05-29

## 2017-05-29 RX ADMIN — CEFTRIAXONE SODIUM 1 G: 1 INJECTION, SOLUTION INTRAVENOUS at 11:58

## 2017-05-29 RX ADMIN — SODIUM CHLORIDE 1000 ML: 9 INJECTION, SOLUTION INTRAVENOUS at 09:55

## 2017-05-29 RX ADMIN — ONDANSETRON 4 MG: 2 INJECTION INTRAMUSCULAR; INTRAVENOUS at 09:59

## 2017-05-29 RX ADMIN — SODIUM CHLORIDE 1000 ML: 9 INJECTION, SOLUTION INTRAVENOUS at 11:58

## 2017-05-29 RX ADMIN — MORPHINE SULFATE 4 MG: 4 INJECTION, SOLUTION INTRAMUSCULAR; INTRAVENOUS at 10:00

## 2017-05-31 ENCOUNTER — DOCUMENTATION (OUTPATIENT)
Dept: ONCOLOGY | Facility: CLINIC | Age: 22
End: 2017-05-31

## 2017-05-31 ENCOUNTER — TELEPHONE (OUTPATIENT)
Dept: ONCOLOGY | Facility: CLINIC | Age: 22
End: 2017-05-31

## 2017-05-31 ENCOUNTER — OFFICE VISIT (OUTPATIENT)
Dept: ONCOLOGY | Facility: CLINIC | Age: 22
End: 2017-05-31

## 2017-05-31 ENCOUNTER — INFUSION (OUTPATIENT)
Dept: ONCOLOGY | Facility: HOSPITAL | Age: 22
End: 2017-05-31

## 2017-05-31 VITALS
TEMPERATURE: 98.3 F | WEIGHT: 115 LBS | BODY MASS INDEX: 21.16 KG/M2 | HEIGHT: 62 IN | RESPIRATION RATE: 14 BRPM | DIASTOLIC BLOOD PRESSURE: 76 MMHG | SYSTOLIC BLOOD PRESSURE: 114 MMHG | HEART RATE: 96 BPM

## 2017-05-31 DIAGNOSIS — C50.811 MALIGNANT NEOPLASM OF OVERLAPPING SITES OF RIGHT FEMALE BREAST (HCC): ICD-10-CM

## 2017-05-31 DIAGNOSIS — C50.911 MALIGNANT NEOPLASM OF RIGHT FEMALE BREAST, UNSPECIFIED SITE OF BREAST: ICD-10-CM

## 2017-05-31 DIAGNOSIS — C50.911 MALIGNANT NEOPLASM OF RIGHT FEMALE BREAST, UNSPECIFIED SITE OF BREAST: Primary | ICD-10-CM

## 2017-05-31 LAB — BACTERIA SPEC AEROBE CULT: NORMAL

## 2017-05-31 PROCEDURE — 25010000002 DEXAMETHASONE PER 1 MG: Performed by: INTERNAL MEDICINE

## 2017-05-31 PROCEDURE — 96374 THER/PROPH/DIAG INJ IV PUSH: CPT

## 2017-05-31 PROCEDURE — 96361 HYDRATE IV INFUSION ADD-ON: CPT

## 2017-05-31 PROCEDURE — 96360 HYDRATION IV INFUSION INIT: CPT

## 2017-05-31 PROCEDURE — 25010000002 HEPARIN FLUSH (PORCINE) 100 UNIT/ML SOLUTION: Performed by: INTERNAL MEDICINE

## 2017-05-31 PROCEDURE — 96375 TX/PRO/DX INJ NEW DRUG ADDON: CPT

## 2017-05-31 PROCEDURE — 25010000002 ONDANSETRON PER 1 MG: Performed by: INTERNAL MEDICINE

## 2017-05-31 PROCEDURE — 99214 OFFICE O/P EST MOD 30 MIN: CPT | Performed by: INTERNAL MEDICINE

## 2017-05-31 RX ORDER — DIPHENHYDRAMINE, LIDOCAINE, NYSTATIN
KIT ORAL
Qty: 240 ML | Refills: 3 | Status: SHIPPED | OUTPATIENT
Start: 2017-05-31 | End: 2017-10-13

## 2017-05-31 RX ORDER — OLANZAPINE 10 MG/1
TABLET ORAL
Qty: 20 TABLET | Refills: 0 | Status: SHIPPED | OUTPATIENT
Start: 2017-05-31 | End: 2017-09-06 | Stop reason: SDUPTHER

## 2017-05-31 RX ORDER — SODIUM CHLORIDE 0.9 % (FLUSH) 0.9 %
10 SYRINGE (ML) INJECTION AS NEEDED
Status: CANCELLED | OUTPATIENT
Start: 2017-05-31

## 2017-05-31 RX ORDER — CEFDINIR 300 MG/1
300 CAPSULE ORAL 2 TIMES DAILY
Qty: 20 CAPSULE | Refills: 0 | Status: SHIPPED | OUTPATIENT
Start: 2017-05-31 | End: 2017-10-13

## 2017-05-31 RX ADMIN — SODIUM CHLORIDE 2000 ML: 9 INJECTION, SOLUTION INTRAVENOUS at 14:24

## 2017-05-31 RX ADMIN — DEXAMETHASONE SODIUM PHOSPHATE: 4 INJECTION, SOLUTION INTRAMUSCULAR; INTRAVENOUS at 14:54

## 2017-05-31 RX ADMIN — HEPARIN 500 UNITS: 100 SYRINGE at 16:43

## 2017-06-01 ENCOUNTER — TELEPHONE (OUTPATIENT)
Dept: ONCOLOGY | Facility: HOSPITAL | Age: 22
End: 2017-06-01

## 2017-06-01 NOTE — TELEPHONE ENCOUNTER
Attempted to reach patient today regarding distress screen completed on 5/25/17 in which patient reported distress of 8/10 with physical problems of appearance, diarrhea and eating marked. I reached the patients voicemail and left a message with my contact information.

## 2017-06-02 ENCOUNTER — TELEPHONE (OUTPATIENT)
Dept: ONCOLOGY | Facility: CLINIC | Age: 22
End: 2017-06-02

## 2017-06-02 ENCOUNTER — INFUSION (OUTPATIENT)
Dept: ONCOLOGY | Facility: HOSPITAL | Age: 22
End: 2017-06-02

## 2017-06-02 VITALS
WEIGHT: 112 LBS | SYSTOLIC BLOOD PRESSURE: 111 MMHG | HEIGHT: 62 IN | BODY MASS INDEX: 20.61 KG/M2 | DIASTOLIC BLOOD PRESSURE: 82 MMHG | RESPIRATION RATE: 16 BRPM | HEART RATE: 113 BPM | TEMPERATURE: 98 F

## 2017-06-02 DIAGNOSIS — C50.811 MALIGNANT NEOPLASM OF OVERLAPPING SITES OF RIGHT FEMALE BREAST (HCC): ICD-10-CM

## 2017-06-02 DIAGNOSIS — C50.911 MALIGNANT NEOPLASM OF RIGHT FEMALE BREAST, UNSPECIFIED SITE OF BREAST: Primary | ICD-10-CM

## 2017-06-02 DIAGNOSIS — C50.911 MALIGNANT NEOPLASM OF RIGHT FEMALE BREAST, UNSPECIFIED SITE OF BREAST: ICD-10-CM

## 2017-06-02 LAB
ALBUMIN SERPL-MCNC: 4.5 G/DL (ref 3.2–4.8)
ALBUMIN/GLOB SERPL: 1.2 G/DL (ref 1.5–2.5)
ALP SERPL-CCNC: 142 U/L (ref 25–100)
ALT SERPL W P-5'-P-CCNC: 35 U/L (ref 7–40)
ANION GAP SERPL CALCULATED.3IONS-SCNC: 5 MMOL/L (ref 3–11)
AST SERPL-CCNC: 32 U/L (ref 0–33)
BILIRUB SERPL-MCNC: 0.4 MG/DL (ref 0.3–1.2)
BUN BLD-MCNC: 13 MG/DL (ref 9–23)
BUN/CREAT SERPL: 18.6 (ref 7–25)
CALCIUM SPEC-SCNC: 9.6 MG/DL (ref 8.7–10.4)
CHLORIDE SERPL-SCNC: 100 MMOL/L (ref 99–109)
CO2 SERPL-SCNC: 32 MMOL/L (ref 20–31)
CREAT BLD-MCNC: 0.7 MG/DL (ref 0.6–1.3)
ERYTHROCYTE [DISTWIDTH] IN BLOOD BY AUTOMATED COUNT: 13.4 % (ref 11.3–14.5)
GFR SERPL CREATININE-BSD FRML MDRD: 127 ML/MIN/1.73
GLOBULIN UR ELPH-MCNC: 3.9 GM/DL
GLUCOSE BLD-MCNC: 99 MG/DL (ref 70–100)
HCT VFR BLD AUTO: 42.2 % (ref 34.5–44)
HGB BLD-MCNC: 13.6 G/DL (ref 11.5–15.5)
LYMPHOCYTES # BLD AUTO: 2 10*3/MM3 (ref 0.6–4.8)
LYMPHOCYTES NFR BLD AUTO: 16.2 % (ref 24–44)
MCH RBC QN AUTO: 28.9 PG (ref 27–31)
MCHC RBC AUTO-ENTMCNC: 32.2 G/DL (ref 32–36)
MCV RBC AUTO: 90 FL (ref 80–99)
MONOCYTES # BLD AUTO: 0.3 10*3/MM3 (ref 0–1)
MONOCYTES NFR BLD AUTO: 2.4 % (ref 0–12)
NEUTROPHILS # BLD AUTO: 9.9 10*3/MM3 (ref 1.5–8.3)
NEUTROPHILS NFR BLD AUTO: 81.4 % (ref 41–71)
PLATELET # BLD AUTO: 208 10*3/MM3 (ref 150–450)
PMV BLD AUTO: 8.5 FL (ref 6–12)
POTASSIUM BLD-SCNC: 3.6 MMOL/L (ref 3.5–5.5)
PROT SERPL-MCNC: 8.4 G/DL (ref 5.7–8.2)
RBC # BLD AUTO: 4.7 10*6/MM3 (ref 3.89–5.14)
SODIUM BLD-SCNC: 137 MMOL/L (ref 132–146)
WBC NRBC COR # BLD: 12.2 10*3/MM3 (ref 3.5–10.8)

## 2017-06-02 PROCEDURE — 25010000002 DEXAMETHASONE PER 1 MG: Performed by: INTERNAL MEDICINE

## 2017-06-02 PROCEDURE — 96361 HYDRATE IV INFUSION ADD-ON: CPT

## 2017-06-02 PROCEDURE — 25010000002 HEPARIN FLUSH (PORCINE) 100 UNIT/ML SOLUTION: Performed by: INTERNAL MEDICINE

## 2017-06-02 PROCEDURE — 85025 COMPLETE CBC W/AUTO DIFF WBC: CPT

## 2017-06-02 PROCEDURE — 25010000002 ONDANSETRON PER 1 MG: Performed by: INTERNAL MEDICINE

## 2017-06-02 PROCEDURE — 96374 THER/PROPH/DIAG INJ IV PUSH: CPT

## 2017-06-02 PROCEDURE — 80053 COMPREHEN METABOLIC PANEL: CPT

## 2017-06-02 RX ORDER — SODIUM CHLORIDE 0.9 % (FLUSH) 0.9 %
10 SYRINGE (ML) INJECTION AS NEEDED
Status: CANCELLED | OUTPATIENT
Start: 2017-06-02

## 2017-06-02 RX ORDER — DIPHENOXYLATE HYDROCHLORIDE AND ATROPINE SULFATE 2.5; .025 MG/1; MG/1
1 TABLET ORAL 4 TIMES DAILY PRN
Qty: 30 TABLET | Refills: 2 | OUTPATIENT
Start: 2017-06-02 | End: 2017-10-13

## 2017-06-02 RX ORDER — ACETAMINOPHEN 80 MG/1
650 TABLET, CHEWABLE ORAL ONCE
Status: CANCELLED
Start: 2017-06-02 | End: 2017-06-02

## 2017-06-02 RX ORDER — ACETAMINOPHEN 325 MG/1
650 TABLET ORAL ONCE
Status: COMPLETED | OUTPATIENT
Start: 2017-06-02 | End: 2017-06-02

## 2017-06-02 RX ADMIN — ACETAMINOPHEN 650 MG: 325 TABLET, FILM COATED ORAL at 13:28

## 2017-06-02 RX ADMIN — DEXAMETHASONE SODIUM PHOSPHATE: 4 INJECTION, SOLUTION INTRAMUSCULAR; INTRAVENOUS at 12:18

## 2017-06-02 RX ADMIN — SODIUM CHLORIDE 1000 ML: 9 INJECTION, SOLUTION INTRAVENOUS at 12:10

## 2017-06-02 RX ADMIN — HEPARIN 500 UNITS: 100 SYRINGE at 13:33

## 2017-06-02 NOTE — TELEPHONE ENCOUNTER
Patient is having heavy menstrual bleeding- reports 1 pad/her x8 days. I asked if she would be able to come into the infusion center for labs and IVF. She is going to call me back with a good time and I will schedule apt. She is still having nausea and diarrhea. She has been taking imodium the way I suggested. Gave instructions on lomotil and called it into her pharmacy.

## 2017-06-02 NOTE — TELEPHONE ENCOUNTER
----- Message from Fabiola Elliott sent at 6/2/2017  8:02 AM EDT -----  Regarding: WERNER-HEAVY BLEEDING  Contact: [phone]  Patient called she has been bleeding she thought it was a normal period but it has not stopped also has diarrhea. Please call.

## 2017-06-03 LAB
BACTERIA SPEC AEROBE CULT: NORMAL
BACTERIA SPEC AEROBE CULT: NORMAL

## 2017-06-09 ENCOUNTER — APPOINTMENT (OUTPATIENT)
Dept: CT IMAGING | Facility: HOSPITAL | Age: 22
End: 2017-06-09

## 2017-06-09 ENCOUNTER — HOSPITAL ENCOUNTER (EMERGENCY)
Facility: HOSPITAL | Age: 22
Discharge: HOME | End: 2017-06-10
Attending: EMERGENCY MEDICINE | Admitting: EMERGENCY MEDICINE

## 2017-06-09 DIAGNOSIS — N39.0 ACUTE UTI (URINARY TRACT INFECTION): ICD-10-CM

## 2017-06-09 DIAGNOSIS — C50.911 MALIGNANT NEOPLASM OF RIGHT FEMALE BREAST, UNSPECIFIED SITE OF BREAST: ICD-10-CM

## 2017-06-09 DIAGNOSIS — R11.2 NAUSEA, VOMITING, AND DIARRHEA: ICD-10-CM

## 2017-06-09 DIAGNOSIS — G43.009 MIGRAINE WITHOUT AURA AND WITHOUT STATUS MIGRAINOSUS, NOT INTRACTABLE: Primary | ICD-10-CM

## 2017-06-09 DIAGNOSIS — R73.03 PREDIABETES: Chronic | ICD-10-CM

## 2017-06-09 DIAGNOSIS — R19.7 NAUSEA, VOMITING, AND DIARRHEA: ICD-10-CM

## 2017-06-09 LAB
ALBUMIN SERPL-MCNC: 4.2 G/DL (ref 3.2–4.8)
ALBUMIN/GLOB SERPL: 1.3 G/DL (ref 1.5–2.5)
ALP SERPL-CCNC: 114 U/L (ref 25–100)
ALT SERPL W P-5'-P-CCNC: 27 U/L (ref 7–40)
ANION GAP SERPL CALCULATED.3IONS-SCNC: 6 MMOL/L (ref 3–11)
AST SERPL-CCNC: 31 U/L (ref 0–33)
B-HCG UR QL: NEGATIVE
BASOPHILS # BLD AUTO: 0.03 10*3/MM3 (ref 0–0.2)
BASOPHILS NFR BLD AUTO: 0.3 % (ref 0–1)
BILIRUB SERPL-MCNC: 0.2 MG/DL (ref 0.3–1.2)
BUN BLD-MCNC: 10 MG/DL (ref 9–23)
BUN/CREAT SERPL: 16.7 (ref 7–25)
CALCIUM SPEC-SCNC: 9.2 MG/DL (ref 8.7–10.4)
CHLORIDE SERPL-SCNC: 106 MMOL/L (ref 99–109)
CO2 SERPL-SCNC: 28 MMOL/L (ref 20–31)
CREAT BLD-MCNC: 0.6 MG/DL (ref 0.6–1.3)
DEPRECATED RDW RBC AUTO: 44.1 FL (ref 37–54)
EOSINOPHIL # BLD AUTO: 0.01 10*3/MM3 (ref 0.1–0.3)
EOSINOPHIL NFR BLD AUTO: 0.1 % (ref 0–3)
ERYTHROCYTE [DISTWIDTH] IN BLOOD BY AUTOMATED COUNT: 12.9 % (ref 11.3–14.5)
GFR SERPL CREATININE-BSD FRML MDRD: >150 ML/MIN/1.73
GLOBULIN UR ELPH-MCNC: 3.3 GM/DL
GLUCOSE BLD-MCNC: 129 MG/DL (ref 70–100)
HCT VFR BLD AUTO: 39.7 % (ref 34.5–44)
HGB BLD-MCNC: 12.6 G/DL (ref 11.5–15.5)
IMM GRANULOCYTES # BLD: 0.12 10*3/MM3 (ref 0–0.03)
IMM GRANULOCYTES NFR BLD: 1.2 % (ref 0–0.6)
INTERNAL NEGATIVE CONTROL: NORMAL
INTERNAL POSITIVE CONTROL: NORMAL
LIPASE SERPL-CCNC: 83 U/L (ref 6–51)
LYMPHOCYTES # BLD AUTO: 1.86 10*3/MM3 (ref 0.6–4.8)
LYMPHOCYTES NFR BLD AUTO: 18.2 % (ref 24–44)
Lab: NORMAL
MCH RBC QN AUTO: 29.7 PG (ref 27–31)
MCHC RBC AUTO-ENTMCNC: 31.7 G/DL (ref 32–36)
MCV RBC AUTO: 93.6 FL (ref 80–99)
MONOCYTES # BLD AUTO: 0.47 10*3/MM3 (ref 0–1)
MONOCYTES NFR BLD AUTO: 4.6 % (ref 0–12)
NEUTROPHILS # BLD AUTO: 7.75 10*3/MM3 (ref 1.5–8.3)
NEUTROPHILS NFR BLD AUTO: 75.6 % (ref 41–71)
PLATELET # BLD AUTO: 81 10*3/MM3 (ref 150–450)
PMV BLD AUTO: 9.2 FL (ref 6–12)
POTASSIUM BLD-SCNC: 3.6 MMOL/L (ref 3.5–5.5)
PROT SERPL-MCNC: 7.5 G/DL (ref 5.7–8.2)
RBC # BLD AUTO: 4.24 10*6/MM3 (ref 3.89–5.14)
SODIUM BLD-SCNC: 140 MMOL/L (ref 132–146)
WBC NRBC COR # BLD: 10.24 10*3/MM3 (ref 3.5–10.8)

## 2017-06-09 PROCEDURE — 83690 ASSAY OF LIPASE: CPT | Performed by: EMERGENCY MEDICINE

## 2017-06-09 PROCEDURE — 96361 HYDRATE IV INFUSION ADD-ON: CPT

## 2017-06-09 PROCEDURE — 87086 URINE CULTURE/COLONY COUNT: CPT | Performed by: EMERGENCY MEDICINE

## 2017-06-09 PROCEDURE — 99284 EMERGENCY DEPT VISIT MOD MDM: CPT

## 2017-06-09 PROCEDURE — 25010000002 DIPHENHYDRAMINE PER 50 MG: Performed by: EMERGENCY MEDICINE

## 2017-06-09 PROCEDURE — 81001 URINALYSIS AUTO W/SCOPE: CPT | Performed by: EMERGENCY MEDICINE

## 2017-06-09 PROCEDURE — 25010000002 KETOROLAC TROMETHAMINE PER 15 MG: Performed by: EMERGENCY MEDICINE

## 2017-06-09 PROCEDURE — 25010000002 METOCLOPRAMIDE PER 10 MG: Performed by: EMERGENCY MEDICINE

## 2017-06-09 PROCEDURE — 96367 TX/PROPH/DG ADDL SEQ IV INF: CPT

## 2017-06-09 PROCEDURE — 80053 COMPREHEN METABOLIC PANEL: CPT | Performed by: EMERGENCY MEDICINE

## 2017-06-09 PROCEDURE — 70450 CT HEAD/BRAIN W/O DYE: CPT

## 2017-06-09 PROCEDURE — 96375 TX/PRO/DX INJ NEW DRUG ADDON: CPT

## 2017-06-09 PROCEDURE — 96365 THER/PROPH/DIAG IV INF INIT: CPT

## 2017-06-09 PROCEDURE — 85025 COMPLETE CBC W/AUTO DIFF WBC: CPT | Performed by: EMERGENCY MEDICINE

## 2017-06-09 RX ORDER — KETOROLAC TROMETHAMINE 15 MG/ML
10 INJECTION, SOLUTION INTRAMUSCULAR; INTRAVENOUS ONCE
Status: COMPLETED | OUTPATIENT
Start: 2017-06-09 | End: 2017-06-09

## 2017-06-09 RX ORDER — VALPROATE SODIUM 100 MG/ML
2.5 INJECTION, SOLUTION INTRAVENOUS ONCE
Status: DISCONTINUED | OUTPATIENT
Start: 2017-06-09 | End: 2017-06-09 | Stop reason: SDUPTHER

## 2017-06-09 RX ORDER — DIPHENHYDRAMINE HYDROCHLORIDE 50 MG/ML
25 INJECTION INTRAMUSCULAR; INTRAVENOUS ONCE
Status: COMPLETED | OUTPATIENT
Start: 2017-06-09 | End: 2017-06-09

## 2017-06-09 RX ORDER — DICYCLOMINE HYDROCHLORIDE 10 MG/1
20 CAPSULE ORAL ONCE
Status: DISCONTINUED | OUTPATIENT
Start: 2017-06-09 | End: 2017-06-10 | Stop reason: HOSPADM

## 2017-06-09 RX ORDER — SODIUM CHLORIDE 0.9 % (FLUSH) 0.9 %
10 SYRINGE (ML) INJECTION AS NEEDED
Status: DISCONTINUED | OUTPATIENT
Start: 2017-06-09 | End: 2017-06-10 | Stop reason: HOSPADM

## 2017-06-09 RX ORDER — METOCLOPRAMIDE HYDROCHLORIDE 5 MG/ML
10 INJECTION INTRAMUSCULAR; INTRAVENOUS ONCE
Status: COMPLETED | OUTPATIENT
Start: 2017-06-09 | End: 2017-06-09

## 2017-06-09 RX ADMIN — METOCLOPRAMIDE 10 MG: 5 INJECTION, SOLUTION INTRAMUSCULAR; INTRAVENOUS at 22:54

## 2017-06-09 RX ADMIN — SODIUM CHLORIDE 1000 ML: 9 INJECTION, SOLUTION INTRAVENOUS at 22:51

## 2017-06-09 RX ADMIN — DIPHENHYDRAMINE HYDROCHLORIDE 25 MG: 50 INJECTION INTRAMUSCULAR; INTRAVENOUS at 22:52

## 2017-06-09 RX ADMIN — KETOROLAC TROMETHAMINE 10 MG: 15 INJECTION, SOLUTION INTRAMUSCULAR; INTRAVENOUS at 23:51

## 2017-06-10 VITALS
TEMPERATURE: 98 F | RESPIRATION RATE: 16 BRPM | SYSTOLIC BLOOD PRESSURE: 111 MMHG | DIASTOLIC BLOOD PRESSURE: 73 MMHG | HEART RATE: 80 BPM | OXYGEN SATURATION: 98 % | WEIGHT: 112 LBS | BODY MASS INDEX: 20.61 KG/M2 | HEIGHT: 62 IN

## 2017-06-10 LAB
BACTERIA UR QL AUTO: ABNORMAL /HPF
BILIRUB UR QL STRIP: NEGATIVE
CLARITY UR: ABNORMAL
COLOR UR: YELLOW
GLUCOSE UR STRIP-MCNC: NEGATIVE MG/DL
HGB UR QL STRIP.AUTO: NEGATIVE
HYALINE CASTS UR QL AUTO: ABNORMAL /LPF
KETONES UR QL STRIP: NEGATIVE
LEUKOCYTE ESTERASE UR QL STRIP.AUTO: ABNORMAL
NITRITE UR QL STRIP: NEGATIVE
PH UR STRIP.AUTO: 8 [PH] (ref 5–8)
PROT UR QL STRIP: NEGATIVE
RBC # UR: ABNORMAL /HPF
REF LAB TEST METHOD: ABNORMAL
SP GR UR STRIP: 1.02 (ref 1–1.03)
SQUAMOUS #/AREA URNS HPF: ABNORMAL /HPF
UROBILINOGEN UR QL STRIP: ABNORMAL
WBC UR QL AUTO: ABNORMAL /HPF

## 2017-06-10 PROCEDURE — 96367 TX/PROPH/DG ADDL SEQ IV INF: CPT

## 2017-06-10 PROCEDURE — 25010000003 CEFTRIAXONE PER 250 MG: Performed by: EMERGENCY MEDICINE

## 2017-06-10 PROCEDURE — 96365 THER/PROPH/DIAG IV INF INIT: CPT

## 2017-06-10 RX ORDER — CEFTRIAXONE SODIUM 1 G/50ML
1 INJECTION, SOLUTION INTRAVENOUS ONCE
Status: COMPLETED | OUTPATIENT
Start: 2017-06-10 | End: 2017-06-10

## 2017-06-10 RX ORDER — NITROFURANTOIN 25; 75 MG/1; MG/1
100 CAPSULE ORAL 2 TIMES DAILY
Qty: 14 CAPSULE | Refills: 0 | Status: SHIPPED | OUTPATIENT
Start: 2017-06-10 | End: 2017-10-13

## 2017-06-10 RX ADMIN — VALPROATE SODIUM 127 MG: 100 INJECTION, SOLUTION INTRAVENOUS at 00:17

## 2017-06-10 RX ADMIN — CEFTRIAXONE SODIUM 1 G: 1 INJECTION, SOLUTION INTRAVENOUS at 01:07

## 2017-06-10 NOTE — ED PROVIDER NOTES
Subjective   HPI Comments: Angie Sutherland is a 21 y.o. female with stage 3 breast cancer who presents to the ED w/ c/o HA. Less than 24 hours ago she suddenly developed a frontal region HA. She denies any radiation or tenderness with palpation but rates the pain at a 10/10. She has a hx of migraines but states her HA today is not similar to previous because it is more intense. She has had some N/V/D, but denies any abdominal, back, or neck pain, fever, blurred vision, or any other symptoms. She took a 5/325 Percocet today without improvement. She was recently diagnosed with breast CA and had her first chemotherapy session on 5/22. She reports nothing else acute at this time.    Patient is a 21 y.o. female presenting with headaches.   History provided by:  Patient and medical records  Headache   Pain location:  Frontal  Quality:  Unable to specify  Radiates to:  Does not radiate  Severity currently:  10/10  Severity at highest:  10/10  Onset quality:  Sudden  Timing:  Constant  Progression:  Worsening  Chronicity:  New  Similar to prior headaches: no    Relieved by:  Nothing  Worsened by:  Nothing  Ineffective treatments:  Prescription medications (Percocet)  Associated symptoms: diarrhea, nausea and vomiting    Associated symptoms: no back pain, no blurred vision, no fever, no neck pain and no visual change        Review of Systems   Constitutional: Negative for fever.   Eyes: Negative for blurred vision.   Gastrointestinal: Positive for diarrhea, nausea and vomiting.   Musculoskeletal: Negative for back pain and neck pain.   Neurological: Positive for headaches.   All other systems reviewed and are negative.      Past Medical History:   Diagnosis Date   • Cancer    • Gestational diabetes     diet controlled   • Migraine    • Pregnancy, incidental     3/2/2016: 14 weeks pregnant. She was seen in 2013 during pregnancy and received counselling and glucometer. She was lost to f/u but states baby had no complications -  weighed 7lb1oz       Allergies   Allergen Reactions   • No Known Drug Allergy        Past Surgical History:   Procedure Laterality Date   •  SECTION     •  SECTION N/A 2016    Procedure:  SECTION REPEAT;  Surgeon: Malika Munoz MD;  Location: [facility] LABOR DELIVERY;  Service:    • VENOUS ACCESS DEVICE (PORT) INSERTION  2017       Family History   Problem Relation Age of Onset   • Diabetes Mother    • Ovarian cancer Maternal Grandmother    • Diabetes Other        Social History     Social History   • Marital status: Single     Spouse name: N/A   • Number of children: N/A   • Years of education: N/A     Social History Main Topics   • Smoking status: Never Smoker   • Smokeless tobacco: Never Used   • Alcohol use Yes      Comment: occasional   • Drug use: No   • Sexual activity: Defer     Other Topics Concern   • None     Social History Narrative         Objective   Physical Exam   Constitutional: She is oriented to person, place, and time. She appears well-developed and well-nourished. No distress.   HENT:   Head: Normocephalic and atraumatic.   Nose: Nose normal.   Mouth/Throat: Oropharynx is clear and moist.   Eyes: Conjunctivae and EOM are normal. Pupils are equal, round, and reactive to light. No scleral icterus.   Neck: Normal range of motion. Neck supple.   Cardiovascular: Normal rate, regular rhythm, normal heart sounds and intact distal pulses.    Pulmonary/Chest: Effort normal and breath sounds normal. No respiratory distress. She has no wheezes.   Abdominal: Soft. Bowel sounds are normal. She exhibits no distension. There is no tenderness.   Musculoskeletal: Normal range of motion. She exhibits no edema.   Neurological: She is alert and oriented to person, place, and time.   Skin: Skin is warm and dry. She is not diaphoretic.   Psychiatric: She has a normal mood and affect. Her behavior is normal.   Nursing note and vitals reviewed.      Procedures         ED  Course  ED Course   Value Comment By Time    The patient reports her headache is much better and is resting completely.  Negative CT scan of the head.  Labs otherwise unremarkable thus far. Ricky Mercedes MD 06/10 0009   Leuk Esterase, UA: (!) Small (1+) (Reviewed) Ricky Mercedes MD 06/10 0010   WBC, UA: (!) Too Numerous to Count (Reviewed) Ricky Mercedes MD 06/10 0010                 Recent Results (from the past 24 hour(s))   Comprehensive Metabolic Panel    Collection Time: 06/09/17 10:48 PM   Result Value Ref Range    Glucose 129 (H) 70 - 100 mg/dL    BUN 10 9 - 23 mg/dL    Creatinine 0.60 0.60 - 1.30 mg/dL    Sodium 140 132 - 146 mmol/L    Potassium 3.6 3.5 - 5.5 mmol/L    Chloride 106 99 - 109 mmol/L    CO2 28.0 20.0 - 31.0 mmol/L    Calcium 9.2 8.7 - 10.4 mg/dL    Total Protein 7.5 5.7 - 8.2 g/dL    Albumin 4.20 3.20 - 4.80 g/dL    ALT (SGPT) 27 7 - 40 U/L    AST (SGOT) 31 0 - 33 U/L    Alkaline Phosphatase 114 (H) 25 - 100 U/L    Total Bilirubin 0.2 (L) 0.3 - 1.2 mg/dL    eGFR  African Amer >150 >60 mL/min/1.73    Globulin 3.3 gm/dL    A/G Ratio 1.3 (L) 1.5 - 2.5 g/dL    BUN/Creatinine Ratio 16.7 7.0 - 25.0    Anion Gap 6.0 3.0 - 11.0 mmol/L   Lipase    Collection Time: 06/09/17 10:48 PM   Result Value Ref Range    Lipase 83 (H) 6 - 51 U/L   CBC Auto Differential    Collection Time: 06/09/17 10:48 PM   Result Value Ref Range    WBC 10.24 3.50 - 10.80 10*3/mm3    RBC 4.24 3.89 - 5.14 10*6/mm3    Hemoglobin 12.6 11.5 - 15.5 g/dL    Hematocrit 39.7 34.5 - 44.0 %    MCV 93.6 80.0 - 99.0 fL    MCH 29.7 27.0 - 31.0 pg    MCHC 31.7 (L) 32.0 - 36.0 g/dL    RDW 12.9 11.3 - 14.5 %    RDW-SD 44.1 37.0 - 54.0 fl    MPV 9.2 6.0 - 12.0 fL    Platelets 81 (L) 150 - 450 10*3/mm3    Neutrophil % 75.6 (H) 41.0 - 71.0 %    Lymphocyte % 18.2 (L) 24.0 - 44.0 %    Monocyte % 4.6 0.0 - 12.0 %    Eosinophil % 0.1 0.0 - 3.0 %    Basophil % 0.3 0.0 - 1.0 %    Immature Grans % 1.2 (H) 0.0 - 0.6 %    Neutrophils,  Absolute 7.75 1.50 - 8.30 10*3/mm3    Lymphocytes, Absolute 1.86 0.60 - 4.80 10*3/mm3    Monocytes, Absolute 0.47 0.00 - 1.00 10*3/mm3    Eosinophils, Absolute 0.01 (L) 0.10 - 0.30 10*3/mm3    Basophils, Absolute 0.03 0.00 - 0.20 10*3/mm3    Immature Grans, Absolute 0.12 (H) 0.00 - 0.03 10*3/mm3   Urinalysis With / Culture If Indicated    Collection Time: 06/09/17 11:31 PM   Result Value Ref Range    Color, UA Yellow Yellow, Straw    Appearance, UA Cloudy (A) Clear    pH, UA 8.0 5.0 - 8.0    Specific Gravity, UA 1.023 1.001 - 1.030    Glucose, UA Negative Negative    Ketones, UA Negative Negative    Bilirubin, UA Negative Negative    Blood, UA Negative Negative    Protein, UA Negative Negative    Leuk Esterase, UA Small (1+) (A) Negative    Nitrite, UA Negative Negative    Urobilinogen, UA 0.2 E.U./dL 0.2 - 1.0 E.U./dL   Urinalysis, Microscopic Only    Collection Time: 06/09/17 11:31 PM   Result Value Ref Range    RBC, UA 0-2 None Seen, 0-2 /HPF    WBC, UA Too Numerous to Count (A) None Seen /HPF    Bacteria, UA None Seen None Seen, Trace /HPF    Squamous Epithelial Cells, UA 0-2 None Seen, 0-2 /HPF    Hyaline Casts, UA 21-30 0 - 6 /LPF    Methodology Automated Microscopy    POCT Pregnancy, urine    Collection Time: 06/09/17 11:35 PM   Result Value Ref Range    HCG, Urine, QL Negative Negative    Lot Number [id number]     Internal Positive Control Presumptive Positive     Internal Negative Control Presumptive Negative      Note: In addition to lab results from this visit, the labs listed above may include labs taken at another facility or during a different encounter within the last 24 hours. Please correlate lab times with ED admission and discharge times for further clarification of the services performed during this visit.    CT Head Without Contrast   Final Result   Abnormal      Normal head/brain CT.      THIS DOCUMENT HAS BEEN ELECTRONICALLY SIGNED BY ALIRIO MAZARIEGOS MD        Vitals:    06/09/17 7765 06/09/17  2330 06/10/17 0015 06/10/17 0145   BP: 117/63 113/66 113/76 111/73   Pulse:    80   Resp:    16   Temp:    98 °F (36.7 °C)   TempSrc:    Oral   SpO2: 99%   98%   Weight:       Height:         Medications   sodium chloride 0.9 % flush 10 mL (not administered)   dicyclomine (BENTYL) capsule 20 mg (20 mg Oral Not Given 6/9/17 2258)   sodium chloride 0.9 % bolus 1,000 mL (0 mL Intravenous Stopped 6/10/17 0107)   metoclopramide (REGLAN) injection 10 mg (10 mg Intravenous Given 6/9/17 2254)   diphenhydrAMINE (BENADRYL) injection 25 mg (25 mg Intravenous Given 6/9/17 2252)   ketorolac (TORADOL) injection 10 mg (10 mg Intravenous Given 6/9/17 2351)   valproate (DEPACON) 127 mg in sodium chloride 0.9 % 50 mL IVPB (0 mg Intravenous Stopped 6/10/17 0107)   cefTRIAXone (ROCEPHIN) IVPB 1 g (0 g Intravenous Stopped 6/10/17 0152)     ECG/EMG Results (last 24 hours)     ** No results found for the last 24 hours. **            MDM  Number of Diagnoses or Management Options  Acute UTI (urinary tract infection):   Malignant neoplasm of right female breast, unspecified site of breast:   Migraine without aura and without status migrainosus, not intractable:   Nausea, vomiting, and diarrhea:   Prediabetes:      Amount and/or Complexity of Data Reviewed  Clinical lab tests: reviewed  Tests in the radiology section of CPT®: reviewed  Review and summarize past medical records: yes  Independent visualization of images, tracings, or specimens: yes        Final diagnoses:   Migraine without aura and without status migrainosus, not intractable   Nausea, vomiting, and diarrhea   Acute UTI (urinary tract infection)   Malignant neoplasm of right female breast, unspecified site of breast   Prediabetes       Documentation assistance provided by lynette Castaneda.  Information recorded by the scribe was done at my direction and has been verified and validated by me.     Diandra Castaneda  06/09/17 2332       Ricky Mercedes MD  06/10/17  [id number]

## 2017-06-12 ENCOUNTER — TELEPHONE (OUTPATIENT)
Dept: ONCOLOGY | Facility: CLINIC | Age: 22
End: 2017-06-12

## 2017-06-12 LAB — BACTERIA SPEC AEROBE CULT: NORMAL

## 2017-06-12 NOTE — TELEPHONE ENCOUNTER
----- Message from Olinda Cazares sent at 6/12/2017 10:33 AM EDT -----  Regarding: WERNER      APPT DATE CORRECT?  Contact: [phone]  PLEASE CALL PATIENT AND LET HER KNOW IF THE 06 14 APPT'S ARE CORRECT.    SHE WAS TOLD TO DISREGARD THAT DATE.    PLEASE CALL ASAP...    I SEE 3 APPTS SCHEDULE FOR THAT DAY.

## 2017-06-12 NOTE — TELEPHONE ENCOUNTER
Spoke with patient. Reviewed all upcoming appointments. Also reminded her to start taking Zyprexa tomorrow.

## 2017-06-14 ENCOUNTER — OFFICE VISIT (OUTPATIENT)
Dept: ONCOLOGY | Facility: CLINIC | Age: 22
End: 2017-06-14

## 2017-06-14 ENCOUNTER — DOCUMENTATION (OUTPATIENT)
Dept: SOCIAL WORK | Facility: HOSPITAL | Age: 22
End: 2017-06-14

## 2017-06-14 ENCOUNTER — INFUSION (OUTPATIENT)
Dept: ONCOLOGY | Facility: HOSPITAL | Age: 22
End: 2017-06-14

## 2017-06-14 VITALS
DIASTOLIC BLOOD PRESSURE: 73 MMHG | TEMPERATURE: 97.3 F | RESPIRATION RATE: 16 BRPM | HEIGHT: 62 IN | WEIGHT: 120 LBS | HEART RATE: 80 BPM | BODY MASS INDEX: 22.08 KG/M2 | SYSTOLIC BLOOD PRESSURE: 109 MMHG

## 2017-06-14 DIAGNOSIS — C50.911 MALIGNANT NEOPLASM OF RIGHT FEMALE BREAST, UNSPECIFIED SITE OF BREAST: Primary | ICD-10-CM

## 2017-06-14 DIAGNOSIS — C50.811 MALIGNANT NEOPLASM OF OVERLAPPING SITES OF RIGHT FEMALE BREAST (HCC): Primary | ICD-10-CM

## 2017-06-14 DIAGNOSIS — C50.811 MALIGNANT NEOPLASM OF OVERLAPPING SITES OF RIGHT FEMALE BREAST (HCC): ICD-10-CM

## 2017-06-14 LAB
ALBUMIN SERPL-MCNC: 3.8 G/DL (ref 3.2–4.8)
ALBUMIN/GLOB SERPL: 1.3 G/DL (ref 1.5–2.5)
ALP SERPL-CCNC: 79 U/L (ref 25–100)
ALT SERPL W P-5'-P-CCNC: 30 U/L (ref 7–40)
ANION GAP SERPL CALCULATED.3IONS-SCNC: -1 MMOL/L (ref 3–11)
AST SERPL-CCNC: 25 U/L (ref 0–33)
BILIRUB SERPL-MCNC: 0.3 MG/DL (ref 0.3–1.2)
BUN BLD-MCNC: 11 MG/DL (ref 9–23)
BUN/CREAT SERPL: 18.3 (ref 7–25)
CALCIUM SPEC-SCNC: 9 MG/DL (ref 8.7–10.4)
CHLORIDE SERPL-SCNC: 110 MMOL/L (ref 99–109)
CO2 SERPL-SCNC: 33 MMOL/L (ref 20–31)
CREAT BLD-MCNC: 0.6 MG/DL (ref 0.6–1.3)
ERYTHROCYTE [DISTWIDTH] IN BLOOD BY AUTOMATED COUNT: 14.2 % (ref 11.3–14.5)
GFR SERPL CREATININE-BSD FRML MDRD: >150 ML/MIN/1.73
GLOBULIN UR ELPH-MCNC: 3 GM/DL
GLUCOSE BLD-MCNC: 113 MG/DL (ref 70–100)
HCT VFR BLD AUTO: 35.7 % (ref 34.5–44)
HGB BLD-MCNC: 11.4 G/DL (ref 11.5–15.5)
LYMPHOCYTES # BLD AUTO: 1.8 10*3/MM3 (ref 0.6–4.8)
LYMPHOCYTES NFR BLD AUTO: 51.9 % (ref 24–44)
MCH RBC QN AUTO: 28.7 PG (ref 27–31)
MCHC RBC AUTO-ENTMCNC: 31.8 G/DL (ref 32–36)
MCV RBC AUTO: 90.4 FL (ref 80–99)
MONOCYTES # BLD AUTO: 0.3 10*3/MM3 (ref 0–1)
MONOCYTES NFR BLD AUTO: 8.1 % (ref 0–12)
NEUTROPHILS # BLD AUTO: 1.4 10*3/MM3 (ref 1.5–8.3)
NEUTROPHILS NFR BLD AUTO: 40 % (ref 41–71)
PLATELET # BLD AUTO: 148 10*3/MM3 (ref 150–450)
PMV BLD AUTO: 6.9 FL (ref 6–12)
POTASSIUM BLD-SCNC: 3.9 MMOL/L (ref 3.5–5.5)
PROT SERPL-MCNC: 6.8 G/DL (ref 5.7–8.2)
RBC # BLD AUTO: 3.95 10*6/MM3 (ref 3.89–5.14)
SODIUM BLD-SCNC: 142 MMOL/L (ref 132–146)
WBC NRBC COR # BLD: 3.4 10*3/MM3 (ref 3.5–10.8)

## 2017-06-14 PROCEDURE — 25010000002 DEXAMETHASONE PER 1 MG: Performed by: INTERNAL MEDICINE

## 2017-06-14 PROCEDURE — 80053 COMPREHEN METABOLIC PANEL: CPT

## 2017-06-14 PROCEDURE — 25010000002 TRASTUZUMAB PER 10 MG: Performed by: INTERNAL MEDICINE

## 2017-06-14 PROCEDURE — 25010000002 HEPARIN FLUSH (PORCINE) 100 UNIT/ML SOLUTION: Performed by: INTERNAL MEDICINE

## 2017-06-14 PROCEDURE — 85025 COMPLETE CBC W/AUTO DIFF WBC: CPT

## 2017-06-14 PROCEDURE — 96413 CHEMO IV INFUSION 1 HR: CPT

## 2017-06-14 PROCEDURE — 96375 TX/PRO/DX INJ NEW DRUG ADDON: CPT

## 2017-06-14 PROCEDURE — 25010000002 DIPHENHYDRAMINE PER 50 MG: Performed by: INTERNAL MEDICINE

## 2017-06-14 PROCEDURE — 99214 OFFICE O/P EST MOD 30 MIN: CPT | Performed by: INTERNAL MEDICINE

## 2017-06-14 PROCEDURE — 36415 COLL VENOUS BLD VENIPUNCTURE: CPT

## 2017-06-14 PROCEDURE — 25010000002 LORAZEPAM PER 2 MG: Performed by: INTERNAL MEDICINE

## 2017-06-14 RX ORDER — SODIUM CHLORIDE 0.9 % (FLUSH) 0.9 %
10 SYRINGE (ML) INJECTION AS NEEDED
Status: DISCONTINUED | OUTPATIENT
Start: 2017-06-14 | End: 2017-06-14 | Stop reason: HOSPADM

## 2017-06-14 RX ORDER — DIPHENHYDRAMINE HYDROCHLORIDE 50 MG/ML
50 INJECTION INTRAMUSCULAR; INTRAVENOUS EVERY 6 HOURS PRN
Status: DISCONTINUED | OUTPATIENT
Start: 2017-06-14 | End: 2017-06-14 | Stop reason: HOSPADM

## 2017-06-14 RX ORDER — LORAZEPAM 2 MG/ML
1 INJECTION INTRAMUSCULAR ONCE
Status: CANCELLED | OUTPATIENT
Start: 2017-06-15 | End: 2017-06-15

## 2017-06-14 RX ORDER — DEXAMETHASONE SODIUM PHOSPHATE 4 MG/ML
20 INJECTION, SOLUTION INTRA-ARTICULAR; INTRALESIONAL; INTRAMUSCULAR; INTRAVENOUS; SOFT TISSUE ONCE
Status: CANCELLED
Start: 2017-06-15

## 2017-06-14 RX ORDER — SODIUM CHLORIDE 0.9 % (FLUSH) 0.9 %
10 SYRINGE (ML) INJECTION AS NEEDED
Status: CANCELLED | OUTPATIENT
Start: 2017-06-14

## 2017-06-14 RX ORDER — PALONOSETRON 0.05 MG/ML
0.25 INJECTION, SOLUTION INTRAVENOUS ONCE
Status: CANCELLED | OUTPATIENT
Start: 2017-06-16

## 2017-06-14 RX ORDER — LORAZEPAM 2 MG/ML
1 INJECTION INTRAMUSCULAR ONCE
Status: COMPLETED | OUTPATIENT
Start: 2017-06-14 | End: 2017-06-14

## 2017-06-14 RX ORDER — DIPHENHYDRAMINE HYDROCHLORIDE 50 MG/ML
50 INJECTION INTRAMUSCULAR; INTRAVENOUS EVERY 6 HOURS PRN
Status: CANCELLED | OUTPATIENT
Start: 2017-06-15

## 2017-06-14 RX ORDER — FAMOTIDINE 10 MG/ML
20 INJECTION, SOLUTION INTRAVENOUS ONCE AS NEEDED
Status: COMPLETED | OUTPATIENT
Start: 2017-06-14 | End: 2017-06-14

## 2017-06-14 RX ORDER — SODIUM CHLORIDE 9 MG/ML
250 INJECTION, SOLUTION INTRAVENOUS ONCE
Status: CANCELLED | OUTPATIENT
Start: 2017-06-16

## 2017-06-14 RX ADMIN — FAMOTIDINE 20 MG: 10 INJECTION, SOLUTION INTRAVENOUS at 11:15

## 2017-06-14 RX ADMIN — DIPHENHYDRAMINE HYDROCHLORIDE 50 MG: 50 INJECTION INTRAMUSCULAR; INTRAVENOUS at 11:28

## 2017-06-14 RX ADMIN — LORAZEPAM 1 MG: 2 INJECTION, SOLUTION INTRAMUSCULAR; INTRAVENOUS at 11:12

## 2017-06-14 RX ADMIN — DEXAMETHASONE SODIUM PHOSPHATE: 4 INJECTION, SOLUTION INTRAMUSCULAR; INTRAVENOUS at 11:35

## 2017-06-14 RX ADMIN — HEPARIN 500 UNITS: 100 SYRINGE at 12:35

## 2017-06-14 RX ADMIN — TRASTUZUMAB 320 MG: KIT at 11:56

## 2017-06-14 RX ADMIN — Medication 10 ML: at 12:35

## 2017-06-14 NOTE — PROGRESS NOTES
SW met with pt and her boyfriend during infusion to provide support and resources.  Pt states that she has been feeling fairly well over the past two weeks.  Pt is getting help with her children from her family.  SW completed pt application for [facility] financial assistance caty and faxed in on behalf of pt.  SW also invited pt to the next [employer] Look Good, Feel Better class.  SW provided support to pt and encouraged her to call SW with any concerns or needs.  SW available for ongoing support and resource needs.

## 2017-06-14 NOTE — PROGRESS NOTES
"      PROBLEM LIST:  1. [id number] (stage IIIB) invasive ductal carcinoma of the right breast, ER-, [place] weakly +, Her2 3+.  A) presented with pain and swelling of the right breast after childbirth. Biopsy 5/8/17 showed high grade IDC, Right axillary LN biopsy positive for metastatic node involvement. Skin biopsy showed involvement of the dermis with lymphatic space involvement.  PET/CT on 5/24/17 showed hypermetabolic activity in the breast and axillary nodes,  No distant spread of disease.  B) neoadjuvant chemotherapy with TCHP started on 5/25/17.  Infusion reaction consisting of severe leg pain with the first dose of herceptin.  2. Migraines  3. Pre-diabetes    Subjective     HISTORY OF PRESENT ILLNESS:   Angie Sutherland returns for follow-up.  She had a migraine over the weekend he went to the emergency room.  This is now improved but the medicines that she took made her feel drowsy.  The breast mass has continued to decrease in size.  Her major issues during the first round of treatment were nausea, vomiting, and diarrhea.  She also had the infusion reaction to Herceptin.  Today she will receive Herceptin only.    Past Medical History, Past Surgical History, Social History, Family History have been reviewed and are without significant changes except as mentioned.    Review of Systems   A comprehensive 14 point review of systems was performed and was negative except as mentioned.    Medications:  The current medication list was reviewed in the EMR    ALLERGIES:    Allergies   Allergen Reactions   • No Known Drug Allergy        Objective      /73 Comment: RUE  Pulse 80  Temp 97.3 °F (36.3 °C) (Temporal Artery )   Resp 16  Ht 62\" (157.5 cm)  Wt 120 lb (54.4 kg)  LMP 11/23/2015  BMI 21.95 kg/m2     Performance Status: 0    General: well appearing female in no acute distress  Neuro: alert and oriented  HEENT: sclera anicteric, oropharynx clear  Lymphatics: no cervical, supraclavicular Adenopathy  Breast: " The right breast mass is improved compared to the last visit it is approximately 5-6 cm across.  Right axillary adenopathy Is decreased to a single palpable node about 1.5 cm  Cardiovascular: regular rate and rhythm, no murmurs  Lungs: clear to auscultation bilaterally  Abdomen: soft, nontender, nondistended.  No palpable organomegaly  Extremeties: no lower extremity edema  Skin: no rashes, lesions, bruising, or petechiae  Psych: mood and affect appropriate          Assessment/Plan   Angie Sutherland is a 21 y.o. year old female with stage IIIB HER-2 positive breast cancer which is locally advanced.  She has received 1 cycle of TCH P.  She's had a dramatic improvement in the breast mass, however she had significant side effects from treatment.  Hopefully this cycle will be better tolerated with the addition of Zyprexa, Emend, and Lomotil.  We have set her up for IV fluids on day 3 and day 6 after treatment.    Infusion reaction: She will receive Herceptin only today, and the remainder of the treatment tomorrow.  We will start with a slower infusion rate and premedicate with steroids, antihistamines, and Ativan.    Follow up for cycle 3.                Visit time was 25 minutes, greater than 50% spent in counseling      Shadia Amos MD  [facility] Hematology and Oncology    6/14/2017          CC:

## 2017-06-15 ENCOUNTER — DOCUMENTATION (OUTPATIENT)
Dept: GENETICS | Facility: HOSPITAL | Age: 22
End: 2017-06-15

## 2017-06-15 ENCOUNTER — DOCUMENTATION (OUTPATIENT)
Dept: NUTRITION | Facility: HOSPITAL | Age: 22
End: 2017-06-15

## 2017-06-15 ENCOUNTER — INFUSION (OUTPATIENT)
Dept: ONCOLOGY | Facility: HOSPITAL | Age: 22
End: 2017-06-15

## 2017-06-15 VITALS
HEART RATE: 61 BPM | SYSTOLIC BLOOD PRESSURE: 148 MMHG | BODY MASS INDEX: 22.45 KG/M2 | HEIGHT: 62 IN | DIASTOLIC BLOOD PRESSURE: 94 MMHG | RESPIRATION RATE: 16 BRPM | TEMPERATURE: 97.8 F | WEIGHT: 122 LBS

## 2017-06-15 DIAGNOSIS — C50.811 MALIGNANT NEOPLASM OF OVERLAPPING SITES OF RIGHT FEMALE BREAST (HCC): Primary | ICD-10-CM

## 2017-06-15 DIAGNOSIS — C50.911 MALIGNANT NEOPLASM OF RIGHT FEMALE BREAST, UNSPECIFIED SITE OF BREAST: Primary | ICD-10-CM

## 2017-06-15 PROCEDURE — 25010000002 FOSAPREPITANT PER 1 MG: Performed by: INTERNAL MEDICINE

## 2017-06-15 PROCEDURE — 96377 APPLICATON ON-BODY INJECTOR: CPT

## 2017-06-15 PROCEDURE — 96413 CHEMO IV INFUSION 1 HR: CPT

## 2017-06-15 PROCEDURE — 96375 TX/PRO/DX INJ NEW DRUG ADDON: CPT

## 2017-06-15 PROCEDURE — 25010000002 PERTUZUMAB 420 MG/14ML SOLUTION 420 MG VIAL: Performed by: INTERNAL MEDICINE

## 2017-06-15 PROCEDURE — 25010000002 PALONOSETRON PER 25 MCG: Performed by: INTERNAL MEDICINE

## 2017-06-15 PROCEDURE — 25010000002 DOCETAXEL 20 MG/ML CONCENTRATION 1 ML VIAL: Performed by: INTERNAL MEDICINE

## 2017-06-15 PROCEDURE — 96417 CHEMO IV INFUS EACH ADDL SEQ: CPT

## 2017-06-15 PROCEDURE — 25010000002 HEPARIN FLUSH (PORCINE) 100 UNIT/ML SOLUTION: Performed by: INTERNAL MEDICINE

## 2017-06-15 PROCEDURE — 25010000002 CARBOPLATIN PER 50 MG: Performed by: INTERNAL MEDICINE

## 2017-06-15 PROCEDURE — 25010000002 DOCETAXEL 20 MG/ML CONCENTRATION 4 ML VIAL: Performed by: INTERNAL MEDICINE

## 2017-06-15 PROCEDURE — 96367 TX/PROPH/DG ADDL SEQ IV INF: CPT

## 2017-06-15 PROCEDURE — 25010000002 PEGFILGRASTIM 6 MG/0.6ML PREFILLED SYRINGE KIT: Performed by: INTERNAL MEDICINE

## 2017-06-15 RX ORDER — SODIUM CHLORIDE 0.9 % (FLUSH) 0.9 %
10 SYRINGE (ML) INJECTION AS NEEDED
Status: CANCELLED | OUTPATIENT
Start: 2017-06-15

## 2017-06-15 RX ORDER — MAGNESIUM HYDROXIDE/ALUMINUM HYDROXICE/SIMETHICONE 120; 1200; 1200 MG/30ML; MG/30ML; MG/30ML
20 SUSPENSION ORAL ONCE
Status: CANCELLED
Start: 2017-06-15 | End: 2017-06-15

## 2017-06-15 RX ORDER — PALONOSETRON 0.05 MG/ML
0.25 INJECTION, SOLUTION INTRAVENOUS ONCE
Status: COMPLETED | OUTPATIENT
Start: 2017-06-15 | End: 2017-06-15

## 2017-06-15 RX ORDER — SODIUM CHLORIDE 9 MG/ML
250 INJECTION, SOLUTION INTRAVENOUS ONCE
Status: COMPLETED | OUTPATIENT
Start: 2017-06-15 | End: 2017-06-15

## 2017-06-15 RX ADMIN — PALONOSETRON HYDROCHLORIDE 0.25 MG: 0.25 INJECTION INTRAVENOUS at 11:11

## 2017-06-15 RX ADMIN — SODIUM CHLORIDE 150 MG: 9 INJECTION, SOLUTION INTRAVENOUS at 10:38

## 2017-06-15 RX ADMIN — CARBOPLATIN 900 MG: 10 INJECTION, SOLUTION INTRAVENOUS at 13:20

## 2017-06-15 RX ADMIN — HEPARIN 500 UNITS: 100 SYRINGE at 15:10

## 2017-06-15 RX ADMIN — PERTUZUMAB 420 MG: 30 INJECTION, SOLUTION, CONCENTRATE INTRAVENOUS at 11:14

## 2017-06-15 RX ADMIN — PEGFILGRASTIM 6 MG: KIT SUBCUTANEOUS at 15:14

## 2017-06-15 RX ADMIN — SODIUM CHLORIDE 250 ML: 9 INJECTION, SOLUTION INTRAVENOUS at 10:38

## 2017-06-15 RX ADMIN — DOCETAXEL 115 MG: 20 INJECTION, SOLUTION INTRAVENOUS at 12:16

## 2017-06-15 NOTE — PROGRESS NOTES
"Oncology Nutrition Services    Patient Name:  Angie Sutherland  YOB: 1995  MRN: [id number]  Admit Date:  (Not on file)    Follow up with patient and her friend during her chemotherapy infusion appointment.  Patient complains of nausea, vomiting, diarrhea, mouth soreness and decreased appetite/oral intake after her 1st cycle of chemotherapy.  Note her weight has fluctuated throughout her 1st cycle.      Written diet materials provided (\"Managing Nausea and Vomiting\", \"Tips for Control of Diarrhea\", and \"Taste and Smell Changes\").  Discussed tips to aid with symptom management.  Encouraged her to be eating smaller more frequent snacks throughout the day and offered her some suggestions to try at home.  Also encouraged her to continue drinking at least 64 ounces of non-caffeinated clear liquids to aid with hydration.    Answered her questions and she voiced understanding of information discussed.  Encouraged her to call RD if further questions arise.  Will continue to monitor as needed during her treatment course.    Electronically signed by:  Migdalia Lemos RD  06/15/17 2:36 PM   "

## 2017-06-16 ENCOUNTER — INFUSION (OUTPATIENT)
Dept: ONCOLOGY | Facility: HOSPITAL | Age: 22
End: 2017-06-16

## 2017-06-16 VITALS
HEART RATE: 123 BPM | SYSTOLIC BLOOD PRESSURE: 113 MMHG | WEIGHT: 122 LBS | HEIGHT: 62 IN | RESPIRATION RATE: 16 BRPM | BODY MASS INDEX: 22.45 KG/M2 | DIASTOLIC BLOOD PRESSURE: 76 MMHG | TEMPERATURE: 98.7 F

## 2017-06-16 DIAGNOSIS — C50.811 MALIGNANT NEOPLASM OF OVERLAPPING SITES OF RIGHT FEMALE BREAST (HCC): Primary | ICD-10-CM

## 2017-06-16 DIAGNOSIS — C50.911 MALIGNANT NEOPLASM OF RIGHT FEMALE BREAST, UNSPECIFIED SITE OF BREAST: ICD-10-CM

## 2017-06-16 PROCEDURE — 96360 HYDRATION IV INFUSION INIT: CPT

## 2017-06-16 PROCEDURE — 25010000002 ONDANSETRON PER 1 MG: Performed by: INTERNAL MEDICINE

## 2017-06-16 PROCEDURE — 96375 TX/PRO/DX INJ NEW DRUG ADDON: CPT

## 2017-06-16 PROCEDURE — 96374 THER/PROPH/DIAG INJ IV PUSH: CPT

## 2017-06-16 PROCEDURE — 25010000002 HEPARIN FLUSH (PORCINE) 100 UNIT/ML SOLUTION: Performed by: INTERNAL MEDICINE

## 2017-06-16 PROCEDURE — 25010000002 DEXAMETHASONE PER 1 MG: Performed by: INTERNAL MEDICINE

## 2017-06-16 PROCEDURE — 96361 HYDRATE IV INFUSION ADD-ON: CPT

## 2017-06-16 RX ORDER — SODIUM CHLORIDE 0.9 % (FLUSH) 0.9 %
10 SYRINGE (ML) INJECTION AS NEEDED
Status: CANCELLED | OUTPATIENT
Start: 2017-06-16

## 2017-06-16 RX ADMIN — HEPARIN 500 UNITS: 100 SYRINGE at 17:21

## 2017-06-16 RX ADMIN — SODIUM CHLORIDE 2000 ML: 9 INJECTION, SOLUTION INTRAVENOUS at 15:20

## 2017-06-16 RX ADMIN — DEXAMETHASONE SODIUM PHOSPHATE: 4 INJECTION, SOLUTION INTRAMUSCULAR; INTRAVENOUS at 15:31

## 2017-06-16 NOTE — PROGRESS NOTES
Angie Sutherland is a 21-year old female who was seen for genetic counseling due to a personal history of breast cancer.  Ms. Sutherland was recently diagnosed with an ER negative, NV weakly positive, HER2/monty positive invasive ductal breast cancer at age 21.   She retains her uterus and ovaries.  Ms. Sutherland was interested in discussing her risk for a hereditary cancer syndrome, and decided to pursue genetic testing.   Ms. Sutherland opted to pursue comprehensive testing via the CancerNext panel ordered through [facility] which includes BRCA1/2 and 32 additional genes associated with an increased risk of breast cancer or other cancers (APC, KELLY, BARD1, BMPR1A, BRCA1, BRCA2, BRIP1, CDH1, CDK4, CDKN2A, CHEK2, EPCAM, GREM1, MLH1, MRE11A, MSH2, MSH6, MUTYH, NBN, NF1, PALB2, PMS2, POLD1, POLE, PTEN, RAD50, RAD51C, RAD51D, SMAD4, SMARCA4, STK11, and TP53). Results are expected in 2-3 weeks.    PERTINENT FAMILY HISTORY:  Mat. Grandmother: Colon cancer, 50s  Paternal family history is unknown    RISK ASSESSMENT:  Ms. Sutherland’s personal history of very early onset breast cancer raises the question of a hereditary cancer syndrome.  Ms. Sutherland meets NCCN guidelines criteria for BRCA1/2 testing based on her age at diagnosis.  We discussed BRCA1/2 testing as well as the option of pursuing a panel that would test for other genes known to impact cancer risk in addition to BRCA1/2.  This risk assessment is based on the family history information provided at the time of the appointment.  The assessment could change in the future should new information be obtained.    GENETIC COUNSELING: (25 minutes) We reviewed the family history information in detail.  Cases of breast cancer follow three general patterns: sporadic, familial, and hereditary.  While most cancer is sporadic, some cases appear to occur in family clusters.  These cases are said to be familial and account for 10-20% of breast cancer cases.  Familial cases may be  due to a combination of shared genes and environmental factors among family members.  In even fewer families, the cancer is said to be inherited, and the genes responsible for the cancer are known.      Family histories typical of hereditary cancer syndromes usually include multiple first- and second-degree relatives diagnosed with cancer types that define a syndrome.  These cases tend to be diagnosed at younger-than-expected ages and can be bilateral or multifocal.  The cancer in these families follows an autosomal dominant inheritance pattern, which indicates the likely presence of a mutation in a cancer susceptibility gene.  Children and siblings of an individual believed to carry this mutation have a 50% chance of inheriting that mutation, thereby inheriting the increased risk to develop cancer.  These mutations can be passed down from the maternal or the paternal lineage.    Hereditary breast cancer accounts for 5-10% of all cases of breast cancer.  A significant proportion of hereditary breast cancer can be attributed to mutations in the BRCA1 and BRCA2 genes.  Mutations in these genes confer an increased risk for breast cancer, ovarian cancer, male breast cancer, prostate cancer and pancreatic cancer.  Women with a BRCA1 or BRCA2 mutation who have already been diagnosed with breast cancer have a 40-60% lifetime risk of a second breast cancer, and up to a 44% risk of ovarian cancer.  We discussed the management recommendations for individuals found to carry a BRCA mutation.   We also specifically discussed Li-Fraumeni syndrome, which is a rare condition caused by mutations in the p53 gene.   These mutations can be de juan, so a negative family history does not rule out this condition.  Li-Fraumeni syndrome confers increased risk for breast cancer, soft tissue sarcoma, leukemia, osteosarcoma, melanoma, colon cancer, pancreatic cancer, adrenal cortex cancer and brain cancer.  44% of these patients have cancer by  age 30 and 78% of patients have cancer by age 50.  The National Comprehensive Cancer Network (NCCN) guidelines recommend that any woman diagnosed with breast cancer prior to age 35 undergo genetic testing for a p53 mutation.         There are other genes that are known to be associated with an increased risk for breast cancer.  Some of these genes have well defined cancer risks and established management guidelines.  Other genes that can be tested for have been more recently described, and there may be less data regarding the risks and therefore may not have established  management guidelines.  Ms. Sutherland opted to pursue testing through a panel that would evaluate multiple genes that have been associated with hereditary cancer risk.    GENETIC TESTING:  The risks, benefits and limitations of genetic testing and implications for clinical management following testing were reviewed.  DNA test results can influence decisions regarding screening, prevention and surgical management.  Genetic testing can have significant psychological implications for both individuals and families.  Also discussed was the possibility of employment and insurance discrimination based on genetic test results and the laws in place to prevent this.    We discussed panel testing, which would involve testing for BRCA1/2 in addition to 32 other cancer risk genes.   The benefits and limitations of genetic testing were discussed and Ms. Sutherland decided to pursue testing via the panel. The implications of a positive or negative test result were discussed. We discussed the possibility that, in some cases, genetic test results may be ambiguous due to the identification of a genetic variant. These variants may or may not be associated with an increased cancer risk.  Given her personal history, a negative test result would not eliminate all breast cancer risk to her relatives, although the risk would not be as high as it would with positive genetic  testing.      PLAN:  Results will be expected in 2-3 weeks and Ms. Sutherland will be contacted by telephone at that time.  Ms. Sutherland is welcome to contact us in the meantime at [phone] with any questions she may have.      Karyn Chin MS, [facility]  Certified Genetic Counselor

## 2017-06-19 ENCOUNTER — INFUSION (OUTPATIENT)
Dept: ONCOLOGY | Facility: HOSPITAL | Age: 22
End: 2017-06-19

## 2017-06-19 VITALS
HEIGHT: 62 IN | BODY MASS INDEX: 20.98 KG/M2 | RESPIRATION RATE: 16 BRPM | TEMPERATURE: 97.6 F | HEART RATE: 99 BPM | DIASTOLIC BLOOD PRESSURE: 82 MMHG | WEIGHT: 114 LBS | SYSTOLIC BLOOD PRESSURE: 114 MMHG

## 2017-06-19 DIAGNOSIS — C50.811 MALIGNANT NEOPLASM OF OVERLAPPING SITES OF RIGHT FEMALE BREAST (HCC): Primary | ICD-10-CM

## 2017-06-19 DIAGNOSIS — C50.911 MALIGNANT NEOPLASM OF RIGHT FEMALE BREAST, UNSPECIFIED SITE OF BREAST: ICD-10-CM

## 2017-06-19 PROCEDURE — 96374 THER/PROPH/DIAG INJ IV PUSH: CPT

## 2017-06-19 PROCEDURE — 96375 TX/PRO/DX INJ NEW DRUG ADDON: CPT

## 2017-06-19 PROCEDURE — 96361 HYDRATE IV INFUSION ADD-ON: CPT

## 2017-06-19 PROCEDURE — 25010000002 ONDANSETRON PER 1 MG: Performed by: INTERNAL MEDICINE

## 2017-06-19 PROCEDURE — 96360 HYDRATION IV INFUSION INIT: CPT

## 2017-06-19 PROCEDURE — 25010000002 HEPARIN FLUSH (PORCINE) 100 UNIT/ML SOLUTION: Performed by: INTERNAL MEDICINE

## 2017-06-19 PROCEDURE — 25010000002 DEXAMETHASONE PER 1 MG: Performed by: INTERNAL MEDICINE

## 2017-06-19 RX ORDER — SODIUM CHLORIDE 0.9 % (FLUSH) 0.9 %
10 SYRINGE (ML) INJECTION AS NEEDED
Status: CANCELLED | OUTPATIENT
Start: 2017-06-19

## 2017-06-19 RX ORDER — SODIUM CHLORIDE 0.9 % (FLUSH) 0.9 %
10 SYRINGE (ML) INJECTION AS NEEDED
Status: DISCONTINUED | OUTPATIENT
Start: 2017-06-19 | End: 2017-06-19 | Stop reason: HOSPADM

## 2017-06-19 RX ADMIN — Medication 10 ML: at 15:25

## 2017-06-19 RX ADMIN — HEPARIN 500 UNITS: 100 SYRINGE at 15:25

## 2017-06-19 RX ADMIN — DEXAMETHASONE SODIUM PHOSPHATE: 4 INJECTION, SOLUTION INTRAMUSCULAR; INTRAVENOUS at 13:23

## 2017-06-19 RX ADMIN — SODIUM CHLORIDE 2000 ML: 9 INJECTION, SOLUTION INTRAVENOUS at 13:14

## 2017-06-20 ENCOUNTER — TELEPHONE (OUTPATIENT)
Dept: ONCOLOGY | Facility: CLINIC | Age: 22
End: 2017-06-20

## 2017-06-20 RX ORDER — TRAMADOL HYDROCHLORIDE 50 MG/1
50 TABLET ORAL EVERY 6 HOURS PRN
Qty: 30 TABLET | Refills: 0 | OUTPATIENT
Start: 2017-06-20 | End: 2017-09-06 | Stop reason: SDUPTHER

## 2017-06-20 NOTE — TELEPHONE ENCOUNTER
Called and spoke with patient. Let her know I spoke with Dr Amos about her frequent headaches. Dr Amos would like for her to try tramadol 50mg q6 hours PRN headaches. This was phoned into [facility] pharmacy.

## 2017-06-27 ENCOUNTER — DOCUMENTATION (OUTPATIENT)
Dept: GENETICS | Facility: HOSPITAL | Age: 22
End: 2017-06-27

## 2017-06-30 ENCOUNTER — TELEPHONE (OUTPATIENT)
Dept: ONCOLOGY | Facility: CLINIC | Age: 22
End: 2017-06-30

## 2017-06-30 NOTE — TELEPHONE ENCOUNTER
Patient c/o cold symptoms. Suggested she try OTC decongestants and cold medications for now. Call me back if she develops a fever.

## 2017-06-30 NOTE — TELEPHONE ENCOUNTER
----- Message from Fabiola Elliott sent at 6/30/2017 10:41 AM EDT -----  Regarding: ROYAPT IS SICK  Contact: [phone]  Patient is sick very congested and and mucus is green what can she take over the counter or does she need an antibiotic? Please call.

## 2017-07-05 ENCOUNTER — OFFICE VISIT (OUTPATIENT)
Dept: ONCOLOGY | Facility: CLINIC | Age: 22
End: 2017-07-05

## 2017-07-05 ENCOUNTER — INFUSION (OUTPATIENT)
Dept: ONCOLOGY | Facility: HOSPITAL | Age: 22
End: 2017-07-05

## 2017-07-05 VITALS
TEMPERATURE: 97.8 F | RESPIRATION RATE: 16 BRPM | WEIGHT: 114 LBS | HEIGHT: 62 IN | HEART RATE: 141 BPM | DIASTOLIC BLOOD PRESSURE: 85 MMHG | SYSTOLIC BLOOD PRESSURE: 117 MMHG | BODY MASS INDEX: 20.98 KG/M2

## 2017-07-05 DIAGNOSIS — C50.811 MALIGNANT NEOPLASM OF OVERLAPPING SITES OF RIGHT FEMALE BREAST (HCC): ICD-10-CM

## 2017-07-05 DIAGNOSIS — C50.811 MALIGNANT NEOPLASM OF OVERLAPPING SITES OF RIGHT FEMALE BREAST (HCC): Primary | ICD-10-CM

## 2017-07-05 PROCEDURE — 25010000002 HEPARIN FLUSH (PORCINE) 100 UNIT/ML SOLUTION: Performed by: INTERNAL MEDICINE

## 2017-07-05 PROCEDURE — 99214 OFFICE O/P EST MOD 30 MIN: CPT | Performed by: INTERNAL MEDICINE

## 2017-07-05 PROCEDURE — 25010000002 DEXAMETHASONE PER 1 MG: Performed by: INTERNAL MEDICINE

## 2017-07-05 PROCEDURE — 25010000002 LORAZEPAM PER 2 MG: Performed by: INTERNAL MEDICINE

## 2017-07-05 PROCEDURE — 96367 TX/PROPH/DG ADDL SEQ IV INF: CPT

## 2017-07-05 PROCEDURE — 25010000002 DIPHENHYDRAMINE PER 50 MG: Performed by: INTERNAL MEDICINE

## 2017-07-05 PROCEDURE — 25010000002 TRASTUZUMAB PER 10 MG: Performed by: INTERNAL MEDICINE

## 2017-07-05 PROCEDURE — 96413 CHEMO IV INFUSION 1 HR: CPT

## 2017-07-05 PROCEDURE — 96375 TX/PRO/DX INJ NEW DRUG ADDON: CPT

## 2017-07-05 RX ORDER — LORAZEPAM 2 MG/ML
1 INJECTION INTRAMUSCULAR ONCE
Status: COMPLETED | OUTPATIENT
Start: 2017-07-05 | End: 2017-07-05

## 2017-07-05 RX ORDER — DIPHENHYDRAMINE HYDROCHLORIDE 50 MG/ML
50 INJECTION INTRAMUSCULAR; INTRAVENOUS EVERY 6 HOURS PRN
Status: CANCELLED | OUTPATIENT
Start: 2017-07-05

## 2017-07-05 RX ORDER — LORAZEPAM 2 MG/ML
1 INJECTION INTRAMUSCULAR ONCE
Status: CANCELLED | OUTPATIENT
Start: 2017-07-05 | End: 2017-07-05

## 2017-07-05 RX ORDER — FAMOTIDINE 10 MG/ML
20 INJECTION, SOLUTION INTRAVENOUS ONCE AS NEEDED
Status: COMPLETED | OUTPATIENT
Start: 2017-07-05 | End: 2017-07-05

## 2017-07-05 RX ORDER — SODIUM CHLORIDE 9 MG/ML
250 INJECTION, SOLUTION INTRAVENOUS ONCE
Status: CANCELLED | OUTPATIENT
Start: 2017-07-06

## 2017-07-05 RX ORDER — DIPHENHYDRAMINE HYDROCHLORIDE 50 MG/ML
50 INJECTION INTRAMUSCULAR; INTRAVENOUS EVERY 6 HOURS PRN
Status: DISCONTINUED | OUTPATIENT
Start: 2017-07-05 | End: 2017-07-05 | Stop reason: HOSPADM

## 2017-07-05 RX ORDER — SODIUM CHLORIDE 0.9 % (FLUSH) 0.9 %
10 SYRINGE (ML) INJECTION AS NEEDED
Status: CANCELLED | OUTPATIENT
Start: 2017-07-05

## 2017-07-05 RX ORDER — PALONOSETRON 0.05 MG/ML
0.25 INJECTION, SOLUTION INTRAVENOUS ONCE
Status: CANCELLED | OUTPATIENT
Start: 2017-07-06

## 2017-07-05 RX ORDER — DEXAMETHASONE SODIUM PHOSPHATE 4 MG/ML
10 INJECTION, SOLUTION INTRA-ARTICULAR; INTRALESIONAL; INTRAMUSCULAR; INTRAVENOUS; SOFT TISSUE ONCE
Status: CANCELLED
Start: 2017-07-05

## 2017-07-05 RX ADMIN — DIPHENHYDRAMINE HYDROCHLORIDE 50 MG: 50 INJECTION INTRAMUSCULAR; INTRAVENOUS at 13:03

## 2017-07-05 RX ADMIN — HEPARIN 500 UNITS: 100 SYRINGE at 14:09

## 2017-07-05 RX ADMIN — LORAZEPAM 1 MG: 2 INJECTION INTRAMUSCULAR; INTRAVENOUS at 12:45

## 2017-07-05 RX ADMIN — DEXAMETHASONE SODIUM PHOSPHATE 10 MG: 10 INJECTION INTRAMUSCULAR; INTRAVENOUS at 12:51

## 2017-07-05 RX ADMIN — TRASTUZUMAB 320 MG: 150 INJECTION, POWDER, LYOPHILIZED, FOR SOLUTION INTRAVENOUS at 13:24

## 2017-07-05 RX ADMIN — FAMOTIDINE 20 MG: 10 INJECTION INTRAVENOUS at 12:49

## 2017-07-05 NOTE — PROGRESS NOTES
PROBLEM LIST:  1. [id number] (stage IIIB) invasive ductal carcinoma of the right breast, ER-, [place] weakly +, Her2 3+.  A) presented with pain and swelling of the right breast after childbirth. Biopsy 5/8/17 showed high grade IDC, Right axillary LN biopsy positive for metastatic node involvement. Skin biopsy showed involvement of the dermis with lymphatic space involvement.  PET/CT on 5/24/17 showed hypermetabolic activity in the breast and axillary nodes,  No distant spread of disease.  B) neoadjuvant chemotherapy with TCHP started on 5/25/17.  Infusion reaction consisting of severe leg pain with the first dose of herceptin.  2. Migraines  3. Pre-diabetes    Subjective     HISTORY OF PRESENT ILLNESS:   Angie Sutherland returns for follow-up.  She says she had similar side effects with the second cycle.  Getting fluids a few days after treatment helped.  She is still having significant diarrhea.  She is taking Imodium but has not taken Lomotil.  She did not vomit but did have some nausea.  She also reports mouth sores and sensitivity of her tongue.  She has Magic mouthwash but doesn't really like using it.  Her breast mass is continuing to decrease in size.    Past Medical History, Past Surgical History, Social History, Family History have been reviewed and are without significant changes except as mentioned.    Review of Systems   A comprehensive 14 point review of systems was performed and was negative except as mentioned.    Medications:  The current medication list was reviewed in the EMR    ALLERGIES:    Allergies   Allergen Reactions   • No Known Drug Allergy        Objective      [facility] 11/23/2015     Performance Status: 0    General: well appearing female in no acute distress  Neuro: alert and oriented  HEENT: sclera anicteric, oropharynx clear  Lymphatics: no cervical, supraclavicular Adenopathy  Right axillary adenopathy Is decreased to a single palpable node about 1.0 cm  Cardiovascular: regular rate and  rhythm, no murmurs  Lungs: clear to auscultation bilaterally  Abdomen: soft, nontender, nondistended.  No palpable organomegaly  Extremeties: no lower extremity edema  Skin: no rashes, lesions, bruising, or petechiae  Psych: mood and affect appropriate          Assessment/Plan   Angie Sutherland is a 21 y.o. year old female with stage IIIB HER-2 positive breast cancer which is locally advanced.  She has received 2 cycle of TCH P. she continues to have significant improvement in her breast mass.  Her side effects continue to be significant as well.  Her vomiting has been controlled that she continues to have nausea.  For her diarrhea I recommended that she use both Imodium and Lomotil.  I also recommended saline and baking soda mouth rinses for oral mucositis.  We will see her back again in 3 weeks prior to cycle 4.  With additional premedication she has not had any further reactions to Herceptin.                  Visit time was 25 minutes, greater than 50% spent in counseling      Shadia Amos MD  [facility] Hematology and Oncology    7/5/2017          CC:

## 2017-07-06 ENCOUNTER — INFUSION (OUTPATIENT)
Dept: ONCOLOGY | Facility: HOSPITAL | Age: 22
End: 2017-07-06

## 2017-07-06 ENCOUNTER — DOCUMENTATION (OUTPATIENT)
Dept: SOCIAL WORK | Facility: HOSPITAL | Age: 22
End: 2017-07-06

## 2017-07-06 ENCOUNTER — DOCUMENTATION (OUTPATIENT)
Dept: NUTRITION | Facility: HOSPITAL | Age: 22
End: 2017-07-06

## 2017-07-06 VITALS
HEIGHT: 62 IN | SYSTOLIC BLOOD PRESSURE: 120 MMHG | WEIGHT: 115 LBS | BODY MASS INDEX: 21.16 KG/M2 | DIASTOLIC BLOOD PRESSURE: 75 MMHG | HEART RATE: 115 BPM | RESPIRATION RATE: 16 BRPM | TEMPERATURE: 97.8 F

## 2017-07-06 DIAGNOSIS — C50.811 MALIGNANT NEOPLASM OF OVERLAPPING SITES OF RIGHT FEMALE BREAST (HCC): Primary | ICD-10-CM

## 2017-07-06 DIAGNOSIS — C50.911 MALIGNANT NEOPLASM OF RIGHT FEMALE BREAST, UNSPECIFIED SITE OF BREAST: ICD-10-CM

## 2017-07-06 LAB
ALBUMIN SERPL-MCNC: 4 G/DL (ref 3.2–4.8)
ALBUMIN/GLOB SERPL: 1.3 G/DL (ref 1.5–2.5)
ALP SERPL-CCNC: 83 U/L (ref 25–100)
ALT SERPL W P-5'-P-CCNC: 32 U/L (ref 7–40)
ANION GAP SERPL CALCULATED.3IONS-SCNC: 13 MMOL/L (ref 3–11)
AST SERPL-CCNC: 24 U/L (ref 0–33)
BILIRUB SERPL-MCNC: 0.3 MG/DL (ref 0.3–1.2)
BUN BLD-MCNC: 16 MG/DL (ref 9–23)
BUN/CREAT SERPL: 26.7 (ref 7–25)
CALCIUM SPEC-SCNC: 9.2 MG/DL (ref 8.7–10.4)
CHLORIDE SERPL-SCNC: 104 MMOL/L (ref 99–109)
CO2 SERPL-SCNC: 24 MMOL/L (ref 20–31)
CREAT BLD-MCNC: 0.6 MG/DL (ref 0.6–1.3)
CREAT BLDA-MCNC: 0.6 MG/DL (ref 0.6–1.3)
ERYTHROCYTE [DISTWIDTH] IN BLOOD BY AUTOMATED COUNT: 17.3 % (ref 11.3–14.5)
GFR SERPL CREATININE-BSD FRML MDRD: >150 ML/MIN/1.73
GLOBULIN UR ELPH-MCNC: 3 GM/DL
GLUCOSE BLD-MCNC: 101 MG/DL (ref 70–100)
HCT VFR BLD AUTO: 31.8 % (ref 34.5–44)
HGB BLD-MCNC: 10.1 G/DL (ref 11.5–15.5)
LYMPHOCYTES # BLD AUTO: 2.4 10*3/MM3 (ref 0.6–4.8)
LYMPHOCYTES NFR BLD AUTO: 44.1 % (ref 24–44)
MCH RBC QN AUTO: 29.1 PG (ref 27–31)
MCHC RBC AUTO-ENTMCNC: 31.6 G/DL (ref 32–36)
MCV RBC AUTO: 91.9 FL (ref 80–99)
MONOCYTES # BLD AUTO: 0.3 10*3/MM3 (ref 0–1)
MONOCYTES NFR BLD AUTO: 5.3 % (ref 0–12)
NEUTROPHILS # BLD AUTO: 2.8 10*3/MM3 (ref 1.5–8.3)
NEUTROPHILS NFR BLD AUTO: 50.6 % (ref 41–71)
PLATELET # BLD AUTO: 109 10*3/MM3 (ref 150–450)
PMV BLD AUTO: 6.9 FL (ref 6–12)
POTASSIUM BLD-SCNC: 3.7 MMOL/L (ref 3.5–5.5)
PROT SERPL-MCNC: 7 G/DL (ref 5.7–8.2)
RBC # BLD AUTO: 3.46 10*6/MM3 (ref 3.89–5.14)
SODIUM BLD-SCNC: 141 MMOL/L (ref 132–146)
WBC NRBC COR # BLD: 5.5 10*3/MM3 (ref 3.5–10.8)

## 2017-07-06 PROCEDURE — 82565 ASSAY OF CREATININE: CPT

## 2017-07-06 PROCEDURE — 85025 COMPLETE CBC W/AUTO DIFF WBC: CPT | Performed by: INTERNAL MEDICINE

## 2017-07-06 PROCEDURE — 80053 COMPREHEN METABOLIC PANEL: CPT | Performed by: INTERNAL MEDICINE

## 2017-07-06 RX ORDER — SODIUM CHLORIDE 0.9 % (FLUSH) 0.9 %
10 SYRINGE (ML) INJECTION AS NEEDED
Status: DISCONTINUED | OUTPATIENT
Start: 2017-07-06 | End: 2017-07-06 | Stop reason: HOSPADM

## 2017-07-06 RX ORDER — SODIUM CHLORIDE 9 MG/ML
250 INJECTION, SOLUTION INTRAVENOUS ONCE
Status: COMPLETED | OUTPATIENT
Start: 2017-07-06 | End: 2017-07-06

## 2017-07-06 RX ORDER — SODIUM CHLORIDE 0.9 % (FLUSH) 0.9 %
10 SYRINGE (ML) INJECTION AS NEEDED
Status: CANCELLED | OUTPATIENT
Start: 2017-07-06

## 2017-07-06 RX ORDER — PALONOSETRON 0.05 MG/ML
0.25 INJECTION, SOLUTION INTRAVENOUS ONCE
Status: COMPLETED | OUTPATIENT
Start: 2017-07-06 | End: 2017-07-06

## 2017-07-06 RX ADMIN — PERTUZUMAB 420 MG: 30 INJECTION, SOLUTION, CONCENTRATE INTRAVENOUS at 11:53

## 2017-07-06 RX ADMIN — PEGFILGRASTIM 6 MG: KIT SUBCUTANEOUS at 14:05

## 2017-07-06 RX ADMIN — CARBOPLATIN 880 MG: 10 INJECTION, SOLUTION INTRAVENOUS at 14:02

## 2017-07-06 RX ADMIN — Medication 10 ML: at 14:44

## 2017-07-06 RX ADMIN — DOCETAXEL 115 MG: 80 INJECTION, SOLUTION INTRAVENOUS at 12:55

## 2017-07-06 RX ADMIN — HEPARIN 500 UNITS: 100 SYRINGE at 14:44

## 2017-07-06 RX ADMIN — PALONOSETRON HYDROCHLORIDE 0.25 MG: 0.25 INJECTION INTRAVENOUS at 11:25

## 2017-07-06 RX ADMIN — SODIUM CHLORIDE 150 MG: 9 INJECTION, SOLUTION INTRAVENOUS at 11:27

## 2017-07-06 RX ADMIN — SODIUM CHLORIDE 250 ML: 9 INJECTION, SOLUTION INTRAVENOUS at 11:26

## 2017-07-06 NOTE — PROGRESS NOTES
"Onc Nutrition    Patient Name:  Angie Sutherland  YOB: 1995  MRN: [id number]  Admit Date:  (Not on file)    Follow up visit with patient and her sister during her chemotherapy infusion appointment.  Patient reports having a difficult time with oral intake of foods.  She complains of nausea, diarrhea (note she is to add lomotil and continue with imodium), taste changes, and mouth/tongue soreness/sensitivity.  She tried Ensure Enlive and Boost Plus but did not like them.  Note her weight remained stable between cycle 2 and 3 at 114#.    Provided and reviewed written diet materials \"Taste and Smell Changes\" and \"Tips for Control of Diarrhea\".  Instructed her to use the baking soda/salt solution before meals and several times throughout the day to aid with taste changes and overall oral care.  Discussed different nutritional supplement options and their role in the diet and provided her with samples of Enu, Boost Breeze, and Ensure Clear for her to try at home.    Answered her questions and she voiced understanding of information discussed.  RD's contact information provided and encouraged her to call if further nutritional questions or concerns arise.  Will continue to monitor during her treatment course.  RD available to assist prn.    Electronically signed by:  Migdalia Lemos RD  07/06/17 2:42 PM   "

## 2017-07-06 NOTE — PROGRESS NOTES
MAVIS met with pt and her sister during infusion to provide support and assistance with her Social Security disability application.  Pt has an appointment on 7/11 at the Social Security office.  SW will help pt by completing the necessary paperwork and requesting pt medical records to accompany her application.  Pt states that she is doing well at this time.  Her sister has been helping take care of her children.  MAVIS will research other possible options for financial assistance for pt.  SW provided support and will continue to assist with support and resource needs.

## 2017-07-07 ENCOUNTER — INFUSION (OUTPATIENT)
Dept: ONCOLOGY | Facility: HOSPITAL | Age: 22
End: 2017-07-07

## 2017-07-07 VITALS
BODY MASS INDEX: 21.9 KG/M2 | WEIGHT: 119 LBS | SYSTOLIC BLOOD PRESSURE: 105 MMHG | HEIGHT: 62 IN | HEART RATE: 117 BPM | RESPIRATION RATE: 16 BRPM | DIASTOLIC BLOOD PRESSURE: 66 MMHG | TEMPERATURE: 97.7 F

## 2017-07-07 DIAGNOSIS — C50.911 MALIGNANT NEOPLASM OF RIGHT FEMALE BREAST, UNSPECIFIED SITE OF BREAST: Primary | ICD-10-CM

## 2017-07-07 DIAGNOSIS — C50.811 MALIGNANT NEOPLASM OF OVERLAPPING SITES OF RIGHT FEMALE BREAST (HCC): ICD-10-CM

## 2017-07-07 PROCEDURE — 25010000002 DEXAMETHASONE PER 1 MG: Performed by: INTERNAL MEDICINE

## 2017-07-07 PROCEDURE — 96375 TX/PRO/DX INJ NEW DRUG ADDON: CPT

## 2017-07-07 PROCEDURE — 96360 HYDRATION IV INFUSION INIT: CPT

## 2017-07-07 PROCEDURE — 96361 HYDRATE IV INFUSION ADD-ON: CPT

## 2017-07-07 PROCEDURE — 25010000002 HEPARIN FLUSH (PORCINE) 100 UNIT/ML SOLUTION: Performed by: INTERNAL MEDICINE

## 2017-07-07 PROCEDURE — 25010000002 ONDANSETRON PER 1 MG: Performed by: INTERNAL MEDICINE

## 2017-07-07 RX ORDER — SODIUM CHLORIDE 0.9 % (FLUSH) 0.9 %
10 SYRINGE (ML) INJECTION AS NEEDED
Status: DISCONTINUED | OUTPATIENT
Start: 2017-07-07 | End: 2017-07-07 | Stop reason: HOSPADM

## 2017-07-07 RX ORDER — SODIUM CHLORIDE 0.9 % (FLUSH) 0.9 %
10 SYRINGE (ML) INJECTION AS NEEDED
Status: CANCELLED | OUTPATIENT
Start: 2017-07-07

## 2017-07-07 RX ADMIN — Medication 10 ML: at 17:29

## 2017-07-07 RX ADMIN — HEPARIN 500 UNITS: 100 SYRINGE at 17:29

## 2017-07-07 RX ADMIN — SODIUM CHLORIDE 2000 ML: 9 INJECTION, SOLUTION INTRAVENOUS at 15:27

## 2017-07-07 RX ADMIN — DEXAMETHASONE SODIUM PHOSPHATE: 4 INJECTION, SOLUTION INTRAMUSCULAR; INTRAVENOUS at 15:37

## 2017-07-10 ENCOUNTER — APPOINTMENT (OUTPATIENT)
Dept: ONCOLOGY | Facility: HOSPITAL | Age: 22
End: 2017-07-10

## 2017-07-10 ENCOUNTER — TELEPHONE (OUTPATIENT)
Dept: ONCOLOGY | Facility: CLINIC | Age: 22
End: 2017-07-10

## 2017-07-10 ENCOUNTER — INFUSION (OUTPATIENT)
Dept: ONCOLOGY | Facility: HOSPITAL | Age: 22
End: 2017-07-10

## 2017-07-10 VITALS
DIASTOLIC BLOOD PRESSURE: 69 MMHG | WEIGHT: 113 LBS | RESPIRATION RATE: 16 BRPM | TEMPERATURE: 97.4 F | BODY MASS INDEX: 20.8 KG/M2 | SYSTOLIC BLOOD PRESSURE: 116 MMHG | HEIGHT: 62 IN | HEART RATE: 144 BPM

## 2017-07-10 DIAGNOSIS — C50.911 MALIGNANT NEOPLASM OF RIGHT FEMALE BREAST, UNSPECIFIED SITE OF BREAST: ICD-10-CM

## 2017-07-10 DIAGNOSIS — C50.811 MALIGNANT NEOPLASM OF OVERLAPPING SITES OF RIGHT FEMALE BREAST (HCC): Primary | ICD-10-CM

## 2017-07-10 PROCEDURE — 25010000002 HEPARIN FLUSH (PORCINE) 100 UNIT/ML SOLUTION: Performed by: INTERNAL MEDICINE

## 2017-07-10 PROCEDURE — 96360 HYDRATION IV INFUSION INIT: CPT

## 2017-07-10 PROCEDURE — 96361 HYDRATE IV INFUSION ADD-ON: CPT

## 2017-07-10 PROCEDURE — 25010000002 DEXAMETHASONE PER 1 MG: Performed by: INTERNAL MEDICINE

## 2017-07-10 PROCEDURE — 25010000002 ONDANSETRON PER 1 MG: Performed by: INTERNAL MEDICINE

## 2017-07-10 PROCEDURE — 96374 THER/PROPH/DIAG INJ IV PUSH: CPT

## 2017-07-10 RX ORDER — SODIUM CHLORIDE 0.9 % (FLUSH) 0.9 %
10 SYRINGE (ML) INJECTION AS NEEDED
Status: CANCELLED | OUTPATIENT
Start: 2017-07-10

## 2017-07-10 RX ORDER — SODIUM CHLORIDE 0.9 % (FLUSH) 0.9 %
10 SYRINGE (ML) INJECTION AS NEEDED
Status: DISCONTINUED | OUTPATIENT
Start: 2017-07-10 | End: 2017-07-10 | Stop reason: HOSPADM

## 2017-07-10 RX ADMIN — HEPARIN 500 UNITS: 100 SYRINGE at 14:36

## 2017-07-10 RX ADMIN — DEXAMETHASONE SODIUM PHOSPHATE: 4 INJECTION, SOLUTION INTRAMUSCULAR; INTRAVENOUS at 12:32

## 2017-07-10 RX ADMIN — SODIUM CHLORIDE 2000 ML: 9 INJECTION, SOLUTION INTRAVENOUS at 13:31

## 2017-07-10 RX ADMIN — SODIUM CHLORIDE 2000 ML: 9 INJECTION, SOLUTION INTRAVENOUS at 12:30

## 2017-07-10 RX ADMIN — Medication 10 ML: at 14:35

## 2017-07-10 NOTE — TELEPHONE ENCOUNTER
Patient told her infusion nurse PRN zofran wasn't helping with nausea. I tried to call her and left a VM. Wanted to remind patient to use compazine PRN for nausea and alternate that with the zofran. I also wanted to make sure she was taking decadron appropriately still. Asked that she call me back.

## 2017-07-24 DIAGNOSIS — C50.911 MALIGNANT NEOPLASM OF RIGHT FEMALE BREAST, UNSPECIFIED SITE OF BREAST: ICD-10-CM

## 2017-07-25 ENCOUNTER — OFFICE VISIT (OUTPATIENT)
Dept: ONCOLOGY | Facility: CLINIC | Age: 22
End: 2017-07-25

## 2017-07-25 ENCOUNTER — INFUSION (OUTPATIENT)
Dept: ONCOLOGY | Facility: HOSPITAL | Age: 22
End: 2017-07-25

## 2017-07-25 ENCOUNTER — DOCUMENTATION (OUTPATIENT)
Dept: SOCIAL WORK | Facility: HOSPITAL | Age: 22
End: 2017-07-25

## 2017-07-25 ENCOUNTER — DOCUMENTATION (OUTPATIENT)
Dept: NUTRITION | Facility: HOSPITAL | Age: 22
End: 2017-07-25

## 2017-07-25 VITALS
WEIGHT: 113 LBS | DIASTOLIC BLOOD PRESSURE: 76 MMHG | SYSTOLIC BLOOD PRESSURE: 116 MMHG | TEMPERATURE: 97.8 F | RESPIRATION RATE: 14 BRPM | BODY MASS INDEX: 20.8 KG/M2 | HEIGHT: 62 IN | HEART RATE: 88 BPM

## 2017-07-25 DIAGNOSIS — C50.811 MALIGNANT NEOPLASM OF OVERLAPPING SITES OF RIGHT FEMALE BREAST (HCC): Primary | ICD-10-CM

## 2017-07-25 DIAGNOSIS — C50.811 MALIGNANT NEOPLASM OF OVERLAPPING SITES OF RIGHT FEMALE BREAST (HCC): ICD-10-CM

## 2017-07-25 LAB
ALBUMIN SERPL-MCNC: 4.1 G/DL (ref 3.2–4.8)
ALBUMIN/GLOB SERPL: 1.2 G/DL (ref 1.5–2.5)
ALP SERPL-CCNC: 94 U/L (ref 25–100)
ALT SERPL W P-5'-P-CCNC: 29 U/L (ref 7–40)
ANION GAP SERPL CALCULATED.3IONS-SCNC: 5 MMOL/L (ref 3–11)
AST SERPL-CCNC: 26 U/L (ref 0–33)
BILIRUB SERPL-MCNC: 0.4 MG/DL (ref 0.3–1.2)
BUN BLD-MCNC: <5 MG/DL (ref 9–23)
BUN/CREAT SERPL: ABNORMAL (ref 7–25)
CALCIUM SPEC-SCNC: 9.3 MG/DL (ref 8.7–10.4)
CHLORIDE SERPL-SCNC: 103 MMOL/L (ref 99–109)
CO2 SERPL-SCNC: 30 MMOL/L (ref 20–31)
CREAT BLD-MCNC: 0.6 MG/DL (ref 0.6–1.3)
ERYTHROCYTE [DISTWIDTH] IN BLOOD BY AUTOMATED COUNT: 19.8 % (ref 11.3–14.5)
GFR SERPL CREATININE-BSD FRML MDRD: >150 ML/MIN/1.73
GLOBULIN UR ELPH-MCNC: 3.5 GM/DL
GLUCOSE BLD-MCNC: 118 MG/DL (ref 70–100)
HCT VFR BLD AUTO: 37.5 % (ref 34.5–44)
HGB BLD-MCNC: 12 G/DL (ref 11.5–15.5)
LYMPHOCYTES # BLD AUTO: 1.6 10*3/MM3 (ref 0.6–4.8)
LYMPHOCYTES NFR BLD AUTO: 51.6 % (ref 24–44)
MCH RBC QN AUTO: 30.5 PG (ref 27–31)
MCHC RBC AUTO-ENTMCNC: 31.9 G/DL (ref 32–36)
MCV RBC AUTO: 95.6 FL (ref 80–99)
MONOCYTES # BLD AUTO: 0.2 10*3/MM3 (ref 0–1)
MONOCYTES NFR BLD AUTO: 6.5 % (ref 0–12)
NEUTROPHILS # BLD AUTO: 1.3 10*3/MM3 (ref 1.5–8.3)
NEUTROPHILS NFR BLD AUTO: 41.9 % (ref 41–71)
PLATELET # BLD AUTO: 189 10*3/MM3 (ref 150–450)
PMV BLD AUTO: 6.4 FL (ref 6–12)
POTASSIUM BLD-SCNC: 3.8 MMOL/L (ref 3.5–5.5)
PROT SERPL-MCNC: 7.6 G/DL (ref 5.7–8.2)
RBC # BLD AUTO: 3.93 10*6/MM3 (ref 3.89–5.14)
SODIUM BLD-SCNC: 138 MMOL/L (ref 132–146)
WBC NRBC COR # BLD: 3.1 10*3/MM3 (ref 3.5–10.8)

## 2017-07-25 PROCEDURE — 25010000002 TRASTUZUMAB PER 10 MG: Performed by: NURSE PRACTITIONER

## 2017-07-25 PROCEDURE — 96375 TX/PRO/DX INJ NEW DRUG ADDON: CPT

## 2017-07-25 PROCEDURE — 80053 COMPREHEN METABOLIC PANEL: CPT

## 2017-07-25 PROCEDURE — 85025 COMPLETE CBC W/AUTO DIFF WBC: CPT

## 2017-07-25 PROCEDURE — 99214 OFFICE O/P EST MOD 30 MIN: CPT | Performed by: NURSE PRACTITIONER

## 2017-07-25 PROCEDURE — 96413 CHEMO IV INFUSION 1 HR: CPT

## 2017-07-25 PROCEDURE — 25010000002 DIPHENHYDRAMINE PER 50 MG: Performed by: NURSE PRACTITIONER

## 2017-07-25 PROCEDURE — 25010000002 DEXAMETHASONE PER 1 MG: Performed by: NURSE PRACTITIONER

## 2017-07-25 PROCEDURE — 25010000002 HEPARIN FLUSH (PORCINE) 100 UNIT/ML SOLUTION: Performed by: INTERNAL MEDICINE

## 2017-07-25 RX ORDER — SODIUM CHLORIDE 0.9 % (FLUSH) 0.9 %
10 SYRINGE (ML) INJECTION AS NEEDED
Status: DISCONTINUED | OUTPATIENT
Start: 2017-07-25 | End: 2017-07-25 | Stop reason: HOSPADM

## 2017-07-25 RX ORDER — DEXAMETHASONE SODIUM PHOSPHATE 4 MG/ML
10 INJECTION, SOLUTION INTRA-ARTICULAR; INTRALESIONAL; INTRAMUSCULAR; INTRAVENOUS; SOFT TISSUE ONCE
Status: CANCELLED
Start: 2017-07-25

## 2017-07-25 RX ORDER — PALONOSETRON 0.05 MG/ML
0.25 INJECTION, SOLUTION INTRAVENOUS ONCE
Status: CANCELLED | OUTPATIENT
Start: 2017-07-26

## 2017-07-25 RX ORDER — PROMETHAZINE HYDROCHLORIDE 25 MG/1
25 TABLET ORAL EVERY 6 HOURS PRN
Qty: 45 TABLET | Refills: 5 | Status: SHIPPED | OUTPATIENT
Start: 2017-07-25 | End: 2017-10-13

## 2017-07-25 RX ORDER — SODIUM CHLORIDE 9 MG/ML
250 INJECTION, SOLUTION INTRAVENOUS ONCE
Status: CANCELLED | OUTPATIENT
Start: 2017-07-26

## 2017-07-25 RX ORDER — DIPHENHYDRAMINE HYDROCHLORIDE 50 MG/ML
50 INJECTION INTRAMUSCULAR; INTRAVENOUS EVERY 6 HOURS PRN
Status: CANCELLED | OUTPATIENT
Start: 2017-07-25

## 2017-07-25 RX ORDER — SODIUM CHLORIDE 0.9 % (FLUSH) 0.9 %
10 SYRINGE (ML) INJECTION AS NEEDED
Status: CANCELLED | OUTPATIENT
Start: 2017-07-25

## 2017-07-25 RX ORDER — ONDANSETRON 8 MG/1
8 TABLET, ORALLY DISINTEGRATING ORAL EVERY 8 HOURS PRN
Qty: 30 TABLET | Refills: 3 | Status: SHIPPED | OUTPATIENT
Start: 2017-07-25 | End: 2017-10-13

## 2017-07-25 RX ADMIN — DIPHENHYDRAMINE HYDROCHLORIDE 50 MG: 50 INJECTION INTRAMUSCULAR; INTRAVENOUS at 11:08

## 2017-07-25 RX ADMIN — HEPARIN 500 UNITS: 100 SYRINGE at 12:24

## 2017-07-25 RX ADMIN — FAMOTIDINE 20 MG: 20 INJECTION, SOLUTION INTRAVENOUS at 11:00

## 2017-07-25 RX ADMIN — TRASTUZUMAB 320 MG: 150 INJECTION, POWDER, LYOPHILIZED, FOR SOLUTION INTRAVENOUS at 11:28

## 2017-07-25 RX ADMIN — Medication 10 ML: at 12:24

## 2017-07-25 RX ADMIN — DEXAMETHASONE SODIUM PHOSPHATE 10 MG: 10 INJECTION INTRAMUSCULAR; INTRAVENOUS at 11:09

## 2017-07-25 NOTE — PROGRESS NOTES
"      PROBLEM LIST:  1. [id number] (stage IIIB) invasive ductal carcinoma of the right breast, ER-, [place] weakly +, Her2 3+.  A) presented with pain and swelling of the right breast after childbirth. Biopsy 5/8/17 showed high grade IDC, Right axillary LN biopsy positive for metastatic node involvement. Skin biopsy showed involvement of the dermis with lymphatic space involvement.  PET/CT on 5/24/17 showed hypermetabolic activity in the breast and axillary nodes,  No distant spread of disease.  B) neoadjuvant chemotherapy with TCHP started on 5/25/17.  Infusion reaction consisting of severe leg pain with the first dose of herceptin.  2. Migraines  3. Pre-diabetes    Subjective     HISTORY OF PRESENT ILLNESS:   Angie Sutherland returns for follow-up.  She says she had similar side effects with the second cycle.  Getting fluids a few days after treatment helped.  She is still having diarrhea but  has improved somewhat since taking the lomotil and Imodium.  She did not vomit but continues to have significant nausea and is requesting an alternative.  Her throat has improved but she is still have some sensitivity of her tongue.  She has Magic mouthwash but doesn't really like using it.  She did not realize she could use it for her sore tongue.   Her breast mass is continuing to decrease in size.    Past Medical History, Past Surgical History, Social History, Family History have been reviewed and are without significant changes except as mentioned.    Review of Systems   A comprehensive 14 point review of systems was performed and was negative except as mentioned.    Medications:  The current medication list was reviewed in the EMR    ALLERGIES:    Allergies   Allergen Reactions   • No Known Drug Allergy        Objective      /76  Pulse 88  Temp 97.8 °F (36.6 °C)  Resp 14  Ht 62\" (157.5 cm)  Wt 113 lb (51.3 kg)  LMP 11/23/2015  BMI 20.67 kg/m2     Performance Status: 0    General: well appearing female in no " acute distress  Neuro: alert and oriented  HEENT: sclera anicteric, oropharynx clear  Lymphatics: no cervical, supraclavicular Adenopathy  Right axillary adenopathy Is decreased to a single palpable node about 1.0 cm  Cardiovascular: regular rate and rhythm, no murmurs  Lungs: clear to auscultation bilaterally  Abdomen: soft, nontender, nondistended.  No palpable organomegaly  Extremeties: no lower extremity edema  Skin: no rashes, lesions, bruising, or petechiae  Psych: mood and affect appropriate          Assessment/Plan   Angie Sutherland is a 21 y.o. year old female with stage IIIB HER-2 positive breast cancer which is locally advanced.  She has received 3 cycles of TCH P. She continues to have significant improvement in her breast mass.  Her side effects continue to be significant as well.  Her vomiting has been controlled but she continues to have nausea.  For her diarrhea she will continue to both Imodium and Lomotil.  I also recommended saline and baking soda mouth rinses for oral mucositis.  She will also try to use her magic mouthwash in a swish and spit to help with her sore tongue. For her nausea we will stop her compazine and try phenergan.  She has used that previously and it has helped.  She will alternate phenergan and Zofran.  I refilled her Zofran today.   We will see her back again in 3 weeks prior to cycle 5.  With additional premedication she has not had any further reactions to Herceptin.            Visit time was 25 minutes, greater than 50% spent in counseling      UYEN Dumas  [facility] Hematology and Oncology    7/25/2017          CC:

## 2017-07-25 NOTE — PROGRESS NOTES
Onc Nutrition    Patient Name:  Angie Sutherland  YOB: 1995  MRN: [id number]  Admit Date:  (Not on file)    Follow up visit with patient and her friend during her chemotherapy infusion appointment.  She reports her oral intake has improved recently stating she is eating more because she knows she needs the nutrition.  She denies new nutritional complaints and reports her nausea medicines have been changed and she will start using the MMW as a swish and spit to aid with mouth tenderness.  Note her weight remains stable at 113#.    Encouraged her to continue with her increased oral intake.  She denies other nutritional questions or concerns at this time.  Encouraged her to call RD if questions arise.  She voiced understanding of information discussed.  Will monitor as needed for nutrition education/intervention.    Electronically signed by:  Migdalia Lemos RD  07/25/17 3:28 PM

## 2017-07-25 NOTE — PROGRESS NOTES
SW met with pt during infusion to provide support and resources.  Pt brought in several financial assistance applications for SW to complete and process.  SW will complete these today.  Pt states that she is doing fairly well, but that she is not able to get as much rest as she needs.  Pt states that she is still being supported and that her kids are currently in .  SW encouraged pt to call with future needs or concerns.  SW available for ongoing support and resource needs.  Pt states that she is long-term through her treatments and she is happy about this.  SW available to pt as needed.

## 2017-07-26 ENCOUNTER — INFUSION (OUTPATIENT)
Dept: ONCOLOGY | Facility: HOSPITAL | Age: 22
End: 2017-07-26

## 2017-07-26 VITALS
HEIGHT: 62 IN | TEMPERATURE: 97.3 F | SYSTOLIC BLOOD PRESSURE: 115 MMHG | BODY MASS INDEX: 20.98 KG/M2 | HEART RATE: 113 BPM | DIASTOLIC BLOOD PRESSURE: 61 MMHG | RESPIRATION RATE: 14 BRPM | WEIGHT: 114 LBS

## 2017-07-26 DIAGNOSIS — C50.811 MALIGNANT NEOPLASM OF OVERLAPPING SITES OF RIGHT FEMALE BREAST (HCC): Primary | ICD-10-CM

## 2017-07-26 PROCEDURE — 25010000002 HEPARIN FLUSH (PORCINE) 100 UNIT/ML SOLUTION: Performed by: INTERNAL MEDICINE

## 2017-07-26 PROCEDURE — 25010000002 DOCETAXEL 20 MG/ML CONCENTRATION 4 ML VIAL: Performed by: NURSE PRACTITIONER

## 2017-07-26 PROCEDURE — 96375 TX/PRO/DX INJ NEW DRUG ADDON: CPT

## 2017-07-26 PROCEDURE — 96377 APPLICATON ON-BODY INJECTOR: CPT

## 2017-07-26 PROCEDURE — 25010000002 PALONOSETRON PER 25 MCG: Performed by: NURSE PRACTITIONER

## 2017-07-26 PROCEDURE — 96367 TX/PROPH/DG ADDL SEQ IV INF: CPT

## 2017-07-26 PROCEDURE — 25010000002 FOSAPREPITANT PER 1 MG: Performed by: NURSE PRACTITIONER

## 2017-07-26 PROCEDURE — 96417 CHEMO IV INFUS EACH ADDL SEQ: CPT

## 2017-07-26 PROCEDURE — 96415 CHEMO IV INFUSION ADDL HR: CPT

## 2017-07-26 PROCEDURE — 25010000002 PEGFILGRASTIM 6 MG/0.6ML PREFILLED SYRINGE KIT: Performed by: NURSE PRACTITIONER

## 2017-07-26 PROCEDURE — 96413 CHEMO IV INFUSION 1 HR: CPT

## 2017-07-26 PROCEDURE — 25010000002 PERTUZUMAB 420 MG/14ML SOLUTION 420 MG VIAL: Performed by: NURSE PRACTITIONER

## 2017-07-26 PROCEDURE — 25010000002 CARBOPLATIN PER 50 MG: Performed by: NURSE PRACTITIONER

## 2017-07-26 PROCEDURE — 25010000002 DOCETAXEL 20 MG/ML CONCENTRATION 1 ML VIAL: Performed by: NURSE PRACTITIONER

## 2017-07-26 RX ORDER — SODIUM CHLORIDE 9 MG/ML
250 INJECTION, SOLUTION INTRAVENOUS ONCE
Status: COMPLETED | OUTPATIENT
Start: 2017-07-26 | End: 2017-07-26

## 2017-07-26 RX ORDER — SODIUM CHLORIDE 0.9 % (FLUSH) 0.9 %
10 SYRINGE (ML) INJECTION AS NEEDED
Status: CANCELLED | OUTPATIENT
Start: 2017-07-26

## 2017-07-26 RX ORDER — PALONOSETRON 0.05 MG/ML
0.25 INJECTION, SOLUTION INTRAVENOUS ONCE
Status: COMPLETED | OUTPATIENT
Start: 2017-07-26 | End: 2017-07-26

## 2017-07-26 RX ORDER — SODIUM CHLORIDE 0.9 % (FLUSH) 0.9 %
10 SYRINGE (ML) INJECTION AS NEEDED
Status: DISCONTINUED | OUTPATIENT
Start: 2017-07-26 | End: 2017-07-26 | Stop reason: HOSPADM

## 2017-07-26 RX ADMIN — HEPARIN 500 UNITS: 100 SYRINGE at 14:22

## 2017-07-26 RX ADMIN — Medication 10 ML: at 14:22

## 2017-07-26 RX ADMIN — PERTUZUMAB 420 MG: 30 INJECTION, SOLUTION, CONCENTRATE INTRAVENOUS at 11:23

## 2017-07-26 RX ADMIN — SODIUM CHLORIDE 250 ML: 9 INJECTION, SOLUTION INTRAVENOUS at 11:22

## 2017-07-26 RX ADMIN — PEGFILGRASTIM 6 MG: KIT SUBCUTANEOUS at 13:40

## 2017-07-26 RX ADMIN — CARBOPLATIN 650 MG: 10 INJECTION, SOLUTION INTRAVENOUS at 13:35

## 2017-07-26 RX ADMIN — SODIUM CHLORIDE 150 MG: 9 INJECTION, SOLUTION INTRAVENOUS at 10:55

## 2017-07-26 RX ADMIN — DOCETAXEL 115 MG: 80 INJECTION, SOLUTION INTRAVENOUS at 12:30

## 2017-07-26 RX ADMIN — PALONOSETRON HYDROCHLORIDE 0.25 MG: 0.25 INJECTION INTRAVENOUS at 10:55

## 2017-07-26 NOTE — PROGRESS NOTES
7/25/17 result ANC = 1.3; Telephone order from Dr. Myers to decrease carboplatin by 25% for 7/26/17    Thanks.    Edgar Maddox [facility]  7/26/2017  10:52 AM

## 2017-07-27 ENCOUNTER — APPOINTMENT (OUTPATIENT)
Dept: ONCOLOGY | Facility: HOSPITAL | Age: 22
End: 2017-07-27

## 2017-07-28 ENCOUNTER — INFUSION (OUTPATIENT)
Dept: ONCOLOGY | Facility: HOSPITAL | Age: 22
End: 2017-07-28

## 2017-07-28 VITALS
HEART RATE: 143 BPM | RESPIRATION RATE: 14 BRPM | TEMPERATURE: 98 F | DIASTOLIC BLOOD PRESSURE: 58 MMHG | HEIGHT: 62 IN | WEIGHT: 115 LBS | BODY MASS INDEX: 21.16 KG/M2 | SYSTOLIC BLOOD PRESSURE: 123 MMHG

## 2017-07-28 DIAGNOSIS — C50.911 MALIGNANT NEOPLASM OF RIGHT FEMALE BREAST, UNSPECIFIED SITE OF BREAST: Primary | ICD-10-CM

## 2017-07-28 DIAGNOSIS — C50.811 MALIGNANT NEOPLASM OF OVERLAPPING SITES OF RIGHT FEMALE BREAST (HCC): ICD-10-CM

## 2017-07-28 PROCEDURE — 25010000002 ONDANSETRON PER 1 MG: Performed by: INTERNAL MEDICINE

## 2017-07-28 PROCEDURE — 25010000002 DEXAMETHASONE PER 1 MG: Performed by: INTERNAL MEDICINE

## 2017-07-28 PROCEDURE — 96368 THER/DIAG CONCURRENT INF: CPT

## 2017-07-28 PROCEDURE — 96361 HYDRATE IV INFUSION ADD-ON: CPT

## 2017-07-28 PROCEDURE — 25010000002 HEPARIN FLUSH (PORCINE) 100 UNIT/ML SOLUTION: Performed by: INTERNAL MEDICINE

## 2017-07-28 PROCEDURE — 96360 HYDRATION IV INFUSION INIT: CPT

## 2017-07-28 PROCEDURE — 96374 THER/PROPH/DIAG INJ IV PUSH: CPT

## 2017-07-28 RX ORDER — SODIUM CHLORIDE 0.9 % (FLUSH) 0.9 %
10 SYRINGE (ML) INJECTION AS NEEDED
Status: CANCELLED | OUTPATIENT
Start: 2017-07-28

## 2017-07-28 RX ADMIN — SODIUM CHLORIDE 1000 ML: 9 INJECTION, SOLUTION INTRAVENOUS at 15:44

## 2017-07-28 RX ADMIN — HEPARIN 500 UNITS: 100 SYRINGE at 17:53

## 2017-07-28 RX ADMIN — DEXAMETHASONE SODIUM PHOSPHATE: 4 INJECTION, SOLUTION INTRAMUSCULAR; INTRAVENOUS at 15:44

## 2017-07-31 ENCOUNTER — INFUSION (OUTPATIENT)
Dept: ONCOLOGY | Facility: HOSPITAL | Age: 22
End: 2017-07-31

## 2017-07-31 VITALS
WEIGHT: 112 LBS | DIASTOLIC BLOOD PRESSURE: 73 MMHG | HEART RATE: 123 BPM | BODY MASS INDEX: 20.49 KG/M2 | TEMPERATURE: 97.4 F | RESPIRATION RATE: 16 BRPM | SYSTOLIC BLOOD PRESSURE: 106 MMHG

## 2017-07-31 DIAGNOSIS — C50.911 MALIGNANT NEOPLASM OF RIGHT FEMALE BREAST, UNSPECIFIED SITE OF BREAST: Primary | ICD-10-CM

## 2017-07-31 DIAGNOSIS — C50.811 MALIGNANT NEOPLASM OF OVERLAPPING SITES OF RIGHT FEMALE BREAST (HCC): ICD-10-CM

## 2017-07-31 PROCEDURE — 25010000002 DEXAMETHASONE PER 1 MG: Performed by: INTERNAL MEDICINE

## 2017-07-31 PROCEDURE — 96366 THER/PROPH/DIAG IV INF ADDON: CPT

## 2017-07-31 PROCEDURE — 25010000002 HEPARIN FLUSH (PORCINE) 100 UNIT/ML SOLUTION: Performed by: INTERNAL MEDICINE

## 2017-07-31 PROCEDURE — 96365 THER/PROPH/DIAG IV INF INIT: CPT

## 2017-07-31 PROCEDURE — 25010000002 ONDANSETRON PER 1 MG: Performed by: INTERNAL MEDICINE

## 2017-07-31 PROCEDURE — 96374 THER/PROPH/DIAG INJ IV PUSH: CPT

## 2017-07-31 PROCEDURE — 96361 HYDRATE IV INFUSION ADD-ON: CPT

## 2017-07-31 PROCEDURE — 96375 TX/PRO/DX INJ NEW DRUG ADDON: CPT

## 2017-07-31 RX ORDER — SODIUM CHLORIDE 0.9 % (FLUSH) 0.9 %
10 SYRINGE (ML) INJECTION AS NEEDED
Status: CANCELLED | OUTPATIENT
Start: 2017-07-31

## 2017-07-31 RX ADMIN — DEXAMETHASONE SODIUM PHOSPHATE: 4 INJECTION, SOLUTION INTRAMUSCULAR; INTRAVENOUS at 14:03

## 2017-07-31 RX ADMIN — HEPARIN 500 UNITS: 100 SYRINGE at 16:04

## 2017-07-31 RX ADMIN — SODIUM CHLORIDE 2000 ML: 9 INJECTION, SOLUTION INTRAVENOUS at 13:59

## 2017-08-02 ENCOUNTER — TELEPHONE (OUTPATIENT)
Dept: ONCOLOGY | Facility: CLINIC | Age: 22
End: 2017-08-02

## 2017-08-02 NOTE — TELEPHONE ENCOUNTER
Spoke with patient. She c/o pain in her low back that wraps around her hips. The pain does not shoot anywhere and she does not have any neurological issues. Recommended she try 600-800mg ibuprofen every 8 hours PRN. Let me know if this does not help.

## 2017-08-02 NOTE — TELEPHONE ENCOUNTER
"----- Message from Marialuisa Culver MA sent at 8/2/2017  8:14 AM EDT -----  Regarding: Bette- back and hip pain  Contact: [phone]  Pt c.o severe lower back and hip pain that began last night and is still present this morning. Pt states when she is laying down she feels a throbbing pain in her lower back that radiates to both hips. When pt is sitting upright, or walking around she feels a severe cramping pain. She ranks the pain as \"6 or 7\" on a scale of 1-10.     Please call pt to discuss.   "

## 2017-08-09 ENCOUNTER — TELEPHONE (OUTPATIENT)
Dept: SOCIAL WORK | Facility: HOSPITAL | Age: 22
End: 2017-08-09

## 2017-08-09 NOTE — TELEPHONE ENCOUNTER
SW completed and faxed in application to [employer], an organization that provides goody bags for cancer patients.  SW available for ongoing support and resource needs.

## 2017-08-16 ENCOUNTER — OFFICE VISIT (OUTPATIENT)
Dept: ONCOLOGY | Facility: CLINIC | Age: 22
End: 2017-08-16

## 2017-08-16 ENCOUNTER — DOCUMENTATION (OUTPATIENT)
Dept: NUTRITION | Facility: HOSPITAL | Age: 22
End: 2017-08-16

## 2017-08-16 ENCOUNTER — INFUSION (OUTPATIENT)
Dept: ONCOLOGY | Facility: HOSPITAL | Age: 22
End: 2017-08-16

## 2017-08-16 VITALS
WEIGHT: 116 LBS | HEIGHT: 62 IN | RESPIRATION RATE: 16 BRPM | SYSTOLIC BLOOD PRESSURE: 111 MMHG | BODY MASS INDEX: 21.35 KG/M2 | DIASTOLIC BLOOD PRESSURE: 70 MMHG | TEMPERATURE: 97.2 F | HEART RATE: 88 BPM

## 2017-08-16 DIAGNOSIS — C50.911 MALIGNANT NEOPLASM OF RIGHT FEMALE BREAST, UNSPECIFIED SITE OF BREAST: Primary | ICD-10-CM

## 2017-08-16 DIAGNOSIS — C50.811 MALIGNANT NEOPLASM OF OVERLAPPING SITES OF RIGHT FEMALE BREAST (HCC): ICD-10-CM

## 2017-08-16 DIAGNOSIS — C50.811 MALIGNANT NEOPLASM OF OVERLAPPING SITES OF RIGHT FEMALE BREAST (HCC): Primary | ICD-10-CM

## 2017-08-16 LAB
ALBUMIN SERPL-MCNC: 4 G/DL (ref 3.2–4.8)
ALBUMIN/GLOB SERPL: 1.3 G/DL (ref 1.5–2.5)
ALP SERPL-CCNC: 83 U/L (ref 25–100)
ALT SERPL W P-5'-P-CCNC: 26 U/L (ref 7–40)
ANION GAP SERPL CALCULATED.3IONS-SCNC: 3 MMOL/L (ref 3–11)
AST SERPL-CCNC: 24 U/L (ref 0–33)
BILIRUB SERPL-MCNC: 0.4 MG/DL (ref 0.3–1.2)
BUN BLD-MCNC: 13 MG/DL (ref 9–23)
BUN/CREAT SERPL: 21.7 (ref 7–25)
CALCIUM SPEC-SCNC: 8.9 MG/DL (ref 8.7–10.4)
CHLORIDE SERPL-SCNC: 105 MMOL/L (ref 99–109)
CO2 SERPL-SCNC: 29 MMOL/L (ref 20–31)
CREAT BLD-MCNC: 0.6 MG/DL (ref 0.6–1.3)
ERYTHROCYTE [DISTWIDTH] IN BLOOD BY AUTOMATED COUNT: 19.7 % (ref 11.3–14.5)
GFR SERPL CREATININE-BSD FRML MDRD: 150 ML/MIN/1.73
GLOBULIN UR ELPH-MCNC: 3.2 GM/DL
GLUCOSE BLD-MCNC: 128 MG/DL (ref 70–100)
HCT VFR BLD AUTO: 35.1 % (ref 34.5–44)
HGB BLD-MCNC: 11.3 G/DL (ref 11.5–15.5)
LYMPHOCYTES # BLD AUTO: 1.3 10*3/MM3 (ref 0.6–4.8)
LYMPHOCYTES NFR BLD AUTO: 42.1 % (ref 24–44)
MCH RBC QN AUTO: 31.4 PG (ref 27–31)
MCHC RBC AUTO-ENTMCNC: 32.1 G/DL (ref 32–36)
MCV RBC AUTO: 97.8 FL (ref 80–99)
MONOCYTES # BLD AUTO: 0.2 10*3/MM3 (ref 0–1)
MONOCYTES NFR BLD AUTO: 5.7 % (ref 0–12)
NEUTROPHILS # BLD AUTO: 1.6 10*3/MM3 (ref 1.5–8.3)
NEUTROPHILS NFR BLD AUTO: 52.2 % (ref 41–71)
PLATELET # BLD AUTO: 116 10*3/MM3 (ref 150–450)
PMV BLD AUTO: 6.7 FL (ref 6–12)
POTASSIUM BLD-SCNC: 3.6 MMOL/L (ref 3.5–5.5)
PROT SERPL-MCNC: 7.2 G/DL (ref 5.7–8.2)
RBC # BLD AUTO: 3.59 10*6/MM3 (ref 3.89–5.14)
SODIUM BLD-SCNC: 137 MMOL/L (ref 132–146)
WBC NRBC COR # BLD: 3.1 10*3/MM3 (ref 3.5–10.8)

## 2017-08-16 PROCEDURE — 85025 COMPLETE CBC W/AUTO DIFF WBC: CPT

## 2017-08-16 PROCEDURE — 96413 CHEMO IV INFUSION 1 HR: CPT

## 2017-08-16 PROCEDURE — 25010000002 HEPARIN FLUSH (PORCINE) 100 UNIT/ML SOLUTION: Performed by: INTERNAL MEDICINE

## 2017-08-16 PROCEDURE — 80053 COMPREHEN METABOLIC PANEL: CPT

## 2017-08-16 PROCEDURE — 25010000002 LORAZEPAM PER 2 MG: Performed by: INTERNAL MEDICINE

## 2017-08-16 PROCEDURE — 25010000002 DIPHENHYDRAMINE PER 50 MG: Performed by: INTERNAL MEDICINE

## 2017-08-16 PROCEDURE — 25010000002 DEXAMETHASONE PER 1 MG: Performed by: INTERNAL MEDICINE

## 2017-08-16 PROCEDURE — 99214 OFFICE O/P EST MOD 30 MIN: CPT | Performed by: INTERNAL MEDICINE

## 2017-08-16 PROCEDURE — 25010000002 TRASTUZUMAB PER 10 MG: Performed by: INTERNAL MEDICINE

## 2017-08-16 PROCEDURE — 96372 THER/PROPH/DIAG INJ SC/IM: CPT

## 2017-08-16 PROCEDURE — 96375 TX/PRO/DX INJ NEW DRUG ADDON: CPT

## 2017-08-16 RX ORDER — DEXAMETHASONE SODIUM PHOSPHATE 4 MG/ML
10 INJECTION, SOLUTION INTRA-ARTICULAR; INTRALESIONAL; INTRAMUSCULAR; INTRAVENOUS; SOFT TISSUE ONCE
Status: COMPLETED | OUTPATIENT
Start: 2017-08-16 | End: 2017-08-16

## 2017-08-16 RX ORDER — LORAZEPAM 2 MG/ML
1 INJECTION INTRAMUSCULAR ONCE
Status: CANCELLED | OUTPATIENT
Start: 2017-08-16 | End: 2017-08-16

## 2017-08-16 RX ORDER — PALONOSETRON 0.05 MG/ML
0.25 INJECTION, SOLUTION INTRAVENOUS ONCE
Status: CANCELLED | OUTPATIENT
Start: 2017-08-17

## 2017-08-16 RX ORDER — FAMOTIDINE 10 MG/ML
20 INJECTION, SOLUTION INTRAVENOUS ONCE AS NEEDED
Status: COMPLETED | OUTPATIENT
Start: 2017-08-16 | End: 2017-08-16

## 2017-08-16 RX ORDER — SODIUM CHLORIDE 9 MG/ML
250 INJECTION, SOLUTION INTRAVENOUS ONCE
Status: CANCELLED | OUTPATIENT
Start: 2017-08-17

## 2017-08-16 RX ORDER — DIPHENHYDRAMINE HYDROCHLORIDE 50 MG/ML
50 INJECTION INTRAMUSCULAR; INTRAVENOUS EVERY 6 HOURS PRN
Status: CANCELLED | OUTPATIENT
Start: 2017-08-16

## 2017-08-16 RX ORDER — LORAZEPAM 2 MG/ML
1 INJECTION INTRAMUSCULAR ONCE
Status: DISCONTINUED | OUTPATIENT
Start: 2017-08-16 | End: 2017-08-16 | Stop reason: HOSPADM

## 2017-08-16 RX ORDER — SODIUM CHLORIDE 0.9 % (FLUSH) 0.9 %
10 SYRINGE (ML) INJECTION AS NEEDED
Status: DISCONTINUED | OUTPATIENT
Start: 2017-08-16 | End: 2017-08-16 | Stop reason: HOSPADM

## 2017-08-16 RX ORDER — SODIUM CHLORIDE 0.9 % (FLUSH) 0.9 %
10 SYRINGE (ML) INJECTION AS NEEDED
Status: CANCELLED | OUTPATIENT
Start: 2017-08-16

## 2017-08-16 RX ORDER — DEXAMETHASONE SODIUM PHOSPHATE 4 MG/ML
10 INJECTION, SOLUTION INTRA-ARTICULAR; INTRALESIONAL; INTRAMUSCULAR; INTRAVENOUS; SOFT TISSUE ONCE
Status: CANCELLED
Start: 2017-08-16

## 2017-08-16 RX ADMIN — FAMOTIDINE 20 MG: 10 INJECTION, SOLUTION INTRAVENOUS at 09:59

## 2017-08-16 RX ADMIN — Medication 10 ML: at 11:21

## 2017-08-16 RX ADMIN — TRASTUZUMAB 320 MG: 150 INJECTION, POWDER, LYOPHILIZED, FOR SOLUTION INTRAVENOUS at 10:31

## 2017-08-16 RX ADMIN — DIPHENHYDRAMINE HYDROCHLORIDE: 50 INJECTION INTRAMUSCULAR; INTRAVENOUS at 10:14

## 2017-08-16 RX ADMIN — HEPARIN 500 UNITS: 100 SYRINGE at 11:21

## 2017-08-16 RX ADMIN — DEXAMETHASONE SODIUM PHOSPHATE 10 MG: 4 INJECTION, SOLUTION INTRA-ARTICULAR; INTRALESIONAL; INTRAMUSCULAR; INTRAVENOUS; SOFT TISSUE at 10:02

## 2017-08-16 NOTE — PROGRESS NOTES
"      PROBLEM LIST:  1. [id number] (stage IIIB) invasive ductal carcinoma of the right breast, ER-, [place] weakly +, Her2 3+.  A) presented with pain and swelling of the right breast after childbirth. Biopsy 5/8/17 showed high grade IDC, Right axillary LN biopsy positive for metastatic node involvement. Skin biopsy showed involvement of the dermis with lymphatic space involvement.  PET/CT on 5/24/17 showed hypermetabolic activity in the breast and axillary nodes,  No distant spread of disease.  B) neoadjuvant chemotherapy with TCHP started on 5/25/17.  Infusion reaction consisting of severe leg pain with the first dose of herceptin.  TAxotere dose reduced to 80% on cycle 5 for neuropathy.  2. Migraines  3. Pre-diabetes    Subjective     HISTORY OF PRESENT ILLNESS:   Angie Sutherland returns for follow-up after 4 cycles of chemotherapy.  She says that the Phenergan is working for her nausea.  She is usually able to manage the diarrhea with Imodium and Lomotil.  She is not currently having any back pain.  She has noticed some darkening of the skin around the bottoms of her feet, and the bottom of her feet feel sore.  She has had some increased pigmentation on her palms and some pain in her fingertips.    Past Medical History, Past Surgical History, Social History, Family History have been reviewed and are without significant changes except as mentioned.    Review of Systems   A comprehensive 14 point review of systems was performed and was negative except as mentioned.    Medications:  The current medication list was reviewed in the EMR    ALLERGIES:    Allergies   Allergen Reactions   • No Known Drug Allergy        Objective      /70 Comment: RUE  Pulse 88  Temp 97.2 °F (36.2 °C) (Temporal Artery )   Resp 16  Ht 62\" (157.5 cm)  Wt 116 lb (52.6 kg)  LMP 11/23/2015  BMI 21.22 kg/m2     Performance Status: 0    General: well appearing female in no acute distress  Neuro: alert and oriented  HEENT: sclera " anicteric, oropharynx clear  Lymphatics: no cervical, supraclavicular Adenopathy  No palpable axillary adenopathy  Breast: Right breast is examined.  There is no palpable mass remaining.  Cardiovascular: regular rate and rhythm, no murmurs  Lungs: clear to auscultation bilaterally  Abdomen: soft, nontender, nondistended.  No palpable organomegaly  Extremeties: no lower extremity edema  Skin: no rashes, lesions, bruising, or petechiae  Psych: mood and affect appropriate          Assessment/Plan   Angie Sutherland is a 22 y.o. year old female with stage IIIB HER-2 positive breast cancer who returns for follow-up on [facility] P.  She will receive cycle 5 out of 6 planned cycles today.  She is having expected side effects of nausea and diarrhea which she is managing fairly well.  She has had an excellent response with no remaining palpable tumor in the breast.  She has also developed some hyperpigmentation of the hands and feet with some peeling of the skin.  Along with this she has some symptoms concerning for progressive neuropathy.  Because of this I'm going to reduce her Taxotere dose by 20% today.  We will see how the symptoms are when she returns for the next visit.    We discussed that Dr. Pederson will not be at the hospital in when she is ready to have surgery.  We will need to make sure she has follow-up with a new surgeon to discuss her surgical options.  She did say today she is interested in seeing if it is possible to have something less than a mastectomy.    Follow-up in 3 weeks for cycle #6.        Visit time was 25 minutes, greater than 50% spent in counseling      Shadia Amos MD  [facility] Hematology and Oncology    8/16/2017          CC:

## 2017-08-17 ENCOUNTER — INFUSION (OUTPATIENT)
Dept: ONCOLOGY | Facility: HOSPITAL | Age: 22
End: 2017-08-17

## 2017-08-17 VITALS
TEMPERATURE: 97.7 F | HEART RATE: 101 BPM | WEIGHT: 118 LBS | HEIGHT: 62 IN | RESPIRATION RATE: 16 BRPM | SYSTOLIC BLOOD PRESSURE: 105 MMHG | DIASTOLIC BLOOD PRESSURE: 68 MMHG | BODY MASS INDEX: 21.71 KG/M2

## 2017-08-17 DIAGNOSIS — C50.811 MALIGNANT NEOPLASM OF OVERLAPPING SITES OF RIGHT FEMALE BREAST (HCC): Primary | ICD-10-CM

## 2017-08-17 PROCEDURE — 25010000002 PEGFILGRASTIM 6 MG/0.6ML PREFILLED SYRINGE KIT: Performed by: INTERNAL MEDICINE

## 2017-08-17 PROCEDURE — 25010000002 FOSAPREPITANT PER 1 MG: Performed by: INTERNAL MEDICINE

## 2017-08-17 PROCEDURE — 25010000002 PALONOSETRON PER 25 MCG: Performed by: INTERNAL MEDICINE

## 2017-08-17 PROCEDURE — 25010000002 PERTUZUMAB 420 MG/14ML SOLUTION 420 MG VIAL: Performed by: INTERNAL MEDICINE

## 2017-08-17 PROCEDURE — 25010000002 CARBOPLATIN PER 50 MG: Performed by: INTERNAL MEDICINE

## 2017-08-17 PROCEDURE — 96402 CHEMO HORMON ANTINEOPL SQ/IM: CPT

## 2017-08-17 PROCEDURE — 25010000002 LEUPROLIDE ACETATE (3 MONTH) PER 7.5 MG: Performed by: INTERNAL MEDICINE

## 2017-08-17 PROCEDURE — 25010000002 DOCETAXEL 20 MG/ML CONCENTRATION 4 ML VIAL: Performed by: INTERNAL MEDICINE

## 2017-08-17 PROCEDURE — 96413 CHEMO IV INFUSION 1 HR: CPT

## 2017-08-17 PROCEDURE — 25010000002 DOCETAXEL 20 MG/ML CONCENTRATION 1 ML VIAL: Performed by: INTERNAL MEDICINE

## 2017-08-17 PROCEDURE — 96417 CHEMO IV INFUS EACH ADDL SEQ: CPT

## 2017-08-17 PROCEDURE — 96367 TX/PROPH/DG ADDL SEQ IV INF: CPT

## 2017-08-17 PROCEDURE — 96377 APPLICATON ON-BODY INJECTOR: CPT

## 2017-08-17 PROCEDURE — 96415 CHEMO IV INFUSION ADDL HR: CPT

## 2017-08-17 PROCEDURE — 96375 TX/PRO/DX INJ NEW DRUG ADDON: CPT

## 2017-08-17 PROCEDURE — 25010000002 HEPARIN FLUSH (PORCINE) 100 UNIT/ML SOLUTION: Performed by: INTERNAL MEDICINE

## 2017-08-17 RX ORDER — SODIUM CHLORIDE 0.9 % (FLUSH) 0.9 %
10 SYRINGE (ML) INJECTION AS NEEDED
Status: DISCONTINUED | OUTPATIENT
Start: 2017-08-17 | End: 2017-08-17 | Stop reason: HOSPADM

## 2017-08-17 RX ORDER — PALONOSETRON 0.05 MG/ML
0.25 INJECTION, SOLUTION INTRAVENOUS ONCE
Status: COMPLETED | OUTPATIENT
Start: 2017-08-17 | End: 2017-08-17

## 2017-08-17 RX ORDER — SODIUM CHLORIDE 9 MG/ML
250 INJECTION, SOLUTION INTRAVENOUS ONCE
Status: COMPLETED | OUTPATIENT
Start: 2017-08-17 | End: 2017-08-17

## 2017-08-17 RX ORDER — SODIUM CHLORIDE 0.9 % (FLUSH) 0.9 %
10 SYRINGE (ML) INJECTION AS NEEDED
Status: CANCELLED | OUTPATIENT
Start: 2017-08-17

## 2017-08-17 RX ADMIN — PERTUZUMAB 420 MG: 30 INJECTION, SOLUTION, CONCENTRATE INTRAVENOUS at 09:56

## 2017-08-17 RX ADMIN — CARBOPLATIN 890 MG: 10 INJECTION, SOLUTION INTRAVENOUS at 12:20

## 2017-08-17 RX ADMIN — DOCETAXEL 90 MG: 80 INJECTION, SOLUTION INTRAVENOUS at 11:06

## 2017-08-17 RX ADMIN — PALONOSETRON HYDROCHLORIDE 0.25 MG: 0.25 INJECTION INTRAVENOUS at 09:20

## 2017-08-17 RX ADMIN — LEUPROLIDE ACETATE 11.25 MG: KIT at 13:11

## 2017-08-17 RX ADMIN — SODIUM CHLORIDE 150 MG: 9 INJECTION, SOLUTION INTRAVENOUS at 09:23

## 2017-08-17 RX ADMIN — SODIUM CHLORIDE 250 ML: 9 INJECTION, SOLUTION INTRAVENOUS at 09:18

## 2017-08-17 RX ADMIN — Medication 10 ML: at 13:02

## 2017-08-17 RX ADMIN — HEPARIN 500 UNITS: 100 SYRINGE at 13:03

## 2017-08-17 RX ADMIN — PEGFILGRASTIM 6 MG: KIT SUBCUTANEOUS at 13:05

## 2017-08-17 NOTE — PROGRESS NOTES
Oncology Nutrition    Patient Name:  Angie Sutherland  YOB: 1995  MRN: [id number]  Admit Date:  (Not on file)    Follow up with patient during her chemotherapy infusion appointment.  She reports her nutrition impact symptoms have been well managed and states she feels like her appetite/oral intake has been better recently.  She denies nutritional questions or concerns at this time.  Encouraged her to call RD if questions arise.  She voiced understanding of information discussed.  Will follow up as indicated.  RD available to assist prn.    Electronically signed by:  Migdalia Lemos RD  08/17/17 9:52 AM

## 2017-08-18 ENCOUNTER — INFUSION (OUTPATIENT)
Dept: ONCOLOGY | Facility: HOSPITAL | Age: 22
End: 2017-08-18

## 2017-08-18 VITALS
TEMPERATURE: 97.8 F | SYSTOLIC BLOOD PRESSURE: 108 MMHG | HEART RATE: 118 BPM | RESPIRATION RATE: 16 BRPM | DIASTOLIC BLOOD PRESSURE: 70 MMHG

## 2017-08-18 DIAGNOSIS — C50.911 MALIGNANT NEOPLASM OF RIGHT FEMALE BREAST, UNSPECIFIED SITE OF BREAST: ICD-10-CM

## 2017-08-18 DIAGNOSIS — C50.911 MALIGNANT NEOPLASM OF RIGHT FEMALE BREAST, UNSPECIFIED SITE OF BREAST: Primary | ICD-10-CM

## 2017-08-18 DIAGNOSIS — C50.811 MALIGNANT NEOPLASM OF OVERLAPPING SITES OF RIGHT FEMALE BREAST (HCC): ICD-10-CM

## 2017-08-18 PROCEDURE — 96376 TX/PRO/DX INJ SAME DRUG ADON: CPT

## 2017-08-18 PROCEDURE — 25010000002 HEPARIN FLUSH (PORCINE) 100 UNIT/ML SOLUTION: Performed by: INTERNAL MEDICINE

## 2017-08-18 PROCEDURE — 96374 THER/PROPH/DIAG INJ IV PUSH: CPT

## 2017-08-18 PROCEDURE — 96360 HYDRATION IV INFUSION INIT: CPT

## 2017-08-18 PROCEDURE — 96361 HYDRATE IV INFUSION ADD-ON: CPT

## 2017-08-18 PROCEDURE — 25010000002 DEXAMETHASONE PER 1 MG: Performed by: INTERNAL MEDICINE

## 2017-08-18 PROCEDURE — 25010000002 ONDANSETRON PER 1 MG: Performed by: INTERNAL MEDICINE

## 2017-08-18 PROCEDURE — 96375 TX/PRO/DX INJ NEW DRUG ADDON: CPT

## 2017-08-18 RX ORDER — SODIUM CHLORIDE 9 MG/ML
2000 INJECTION, SOLUTION INTRAVENOUS ONCE
Status: CANCELLED | OUTPATIENT
Start: 2017-09-22

## 2017-08-18 RX ORDER — SODIUM CHLORIDE 9 MG/ML
2000 INJECTION, SOLUTION INTRAVENOUS ONCE
Status: CANCELLED
Start: 2017-08-18 | End: 2017-08-18

## 2017-08-18 RX ORDER — SODIUM CHLORIDE 0.9 % (FLUSH) 0.9 %
10 SYRINGE (ML) INJECTION AS NEEDED
Status: CANCELLED | OUTPATIENT
Start: 2017-08-18

## 2017-08-18 RX ORDER — SODIUM CHLORIDE 9 MG/ML
2000 INJECTION, SOLUTION INTRAVENOUS ONCE
Status: CANCELLED | OUTPATIENT
Start: 2017-09-15

## 2017-08-18 RX ORDER — SODIUM CHLORIDE 9 MG/ML
2000 INJECTION, SOLUTION INTRAVENOUS ONCE
Status: CANCELLED | OUTPATIENT
Start: 2017-08-21

## 2017-08-18 RX ORDER — SODIUM CHLORIDE 9 MG/ML
2000 INJECTION, SOLUTION INTRAVENOUS ONCE
Status: COMPLETED | OUTPATIENT
Start: 2017-08-18 | End: 2017-08-18

## 2017-08-18 RX ORDER — SODIUM CHLORIDE 0.9 % (FLUSH) 0.9 %
10 SYRINGE (ML) INJECTION AS NEEDED
Status: DISCONTINUED | OUTPATIENT
Start: 2017-08-18 | End: 2017-08-18 | Stop reason: HOSPADM

## 2017-08-18 RX ADMIN — Medication 10 ML: at 12:08

## 2017-08-18 RX ADMIN — DEXAMETHASONE SODIUM PHOSPHATE: 4 INJECTION, SOLUTION INTRAMUSCULAR; INTRAVENOUS at 10:46

## 2017-08-18 RX ADMIN — HEPARIN 500 UNITS: 100 SYRINGE at 12:08

## 2017-08-18 RX ADMIN — SODIUM CHLORIDE 2000 ML: 9 INJECTION, SOLUTION INTRAVENOUS at 10:08

## 2017-08-21 ENCOUNTER — INFUSION (OUTPATIENT)
Dept: ONCOLOGY | Facility: HOSPITAL | Age: 22
End: 2017-08-21

## 2017-08-21 VITALS
TEMPERATURE: 98 F | HEART RATE: 131 BPM | RESPIRATION RATE: 16 BRPM | DIASTOLIC BLOOD PRESSURE: 71 MMHG | SYSTOLIC BLOOD PRESSURE: 115 MMHG

## 2017-08-21 DIAGNOSIS — C50.911 MALIGNANT NEOPLASM OF RIGHT FEMALE BREAST, UNSPECIFIED SITE OF BREAST: Primary | ICD-10-CM

## 2017-08-21 DIAGNOSIS — C50.811 MALIGNANT NEOPLASM OF OVERLAPPING SITES OF RIGHT FEMALE BREAST (HCC): ICD-10-CM

## 2017-08-21 PROCEDURE — 96360 HYDRATION IV INFUSION INIT: CPT

## 2017-08-21 PROCEDURE — 96365 THER/PROPH/DIAG IV INF INIT: CPT

## 2017-08-21 PROCEDURE — 25010000002 HEPARIN FLUSH (PORCINE) 100 UNIT/ML SOLUTION: Performed by: INTERNAL MEDICINE

## 2017-08-21 PROCEDURE — 25010000002 DEXAMETHASONE PER 1 MG: Performed by: INTERNAL MEDICINE

## 2017-08-21 PROCEDURE — 25010000002 ONDANSETRON PER 1 MG: Performed by: INTERNAL MEDICINE

## 2017-08-21 PROCEDURE — 96361 HYDRATE IV INFUSION ADD-ON: CPT

## 2017-08-21 PROCEDURE — 96375 TX/PRO/DX INJ NEW DRUG ADDON: CPT

## 2017-08-21 RX ORDER — SODIUM CHLORIDE 9 MG/ML
2000 INJECTION, SOLUTION INTRAVENOUS ONCE
Status: COMPLETED | OUTPATIENT
Start: 2017-08-21 | End: 2017-08-21

## 2017-08-21 RX ORDER — SODIUM CHLORIDE 0.9 % (FLUSH) 0.9 %
10 SYRINGE (ML) INJECTION AS NEEDED
Status: CANCELLED | OUTPATIENT
Start: 2017-08-21

## 2017-08-21 RX ADMIN — DEXAMETHASONE SODIUM PHOSPHATE: 4 INJECTION, SOLUTION INTRAMUSCULAR; INTRAVENOUS at 10:43

## 2017-08-21 RX ADMIN — SODIUM CHLORIDE 2000 ML: 9 INJECTION, SOLUTION INTRAVENOUS at 10:38

## 2017-08-21 RX ADMIN — HEPARIN 500 UNITS: 100 SYRINGE at 13:05

## 2017-08-30 ENCOUNTER — HOSPITAL ENCOUNTER (OUTPATIENT)
Dept: CARDIOLOGY | Facility: HOSPITAL | Age: 22
Discharge: HOME | End: 2017-08-30
Attending: INTERNAL MEDICINE | Admitting: INTERNAL MEDICINE

## 2017-08-30 DIAGNOSIS — C50.911 MALIGNANT NEOPLASM OF RIGHT FEMALE BREAST, UNSPECIFIED SITE OF BREAST: ICD-10-CM

## 2017-08-30 LAB
BH CV ECHO MEAS - AO ROOT AREA (BSA CORRECTED): 1.5
BH CV ECHO MEAS - AO ROOT AREA: 4 CM^2
BH CV ECHO MEAS - AO ROOT DIAM: 2.2 CM
BH CV ECHO MEAS - BSA(HAYCOCK): 1.5 M^2
BH CV ECHO MEAS - BSA: 1.5 M^2
BH CV ECHO MEAS - BZI_BMI: 21.6 KILOGRAMS/M^2
BH CV ECHO MEAS - BZI_METRIC_HEIGHT: 157.5 CM
BH CV ECHO MEAS - BZI_METRIC_WEIGHT: 53.5 KG
BH CV ECHO MEAS - CONTRAST EF (2CH): 62.9 ML/M^2
BH CV ECHO MEAS - CONTRAST EF 4CH: 58.2 ML/M^2
BH CV ECHO MEAS - EDV(CUBED): 51 ML
BH CV ECHO MEAS - EDV(MOD-SP2): 97 ML
BH CV ECHO MEAS - EDV(MOD-SP4): 55 ML
BH CV ECHO MEAS - EDV(TEICH): 58.5 ML
BH CV ECHO MEAS - EF(CUBED): 53.5 %
BH CV ECHO MEAS - EF(MOD-SP2): 62.9 %
BH CV ECHO MEAS - EF(MOD-SP4): 58.2 %
BH CV ECHO MEAS - EF(TEICH): 46.1 %
BH CV ECHO MEAS - ESV(CUBED): 23.7 ML
BH CV ECHO MEAS - ESV(MOD-SP2): 36 ML
BH CV ECHO MEAS - ESV(MOD-SP4): 23 ML
BH CV ECHO MEAS - ESV(TEICH): 31.5 ML
BH CV ECHO MEAS - FS: 22.5 %
BH CV ECHO MEAS - IVS/LVPW: 0.86
BH CV ECHO MEAS - IVSD: 0.7 CM
BH CV ECHO MEAS - LA DIMENSION: 2.1 CM
BH CV ECHO MEAS - LA/AO: 0.95
BH CV ECHO MEAS - LAT PEAK E' VEL: 21.3 CM/SEC
BH CV ECHO MEAS - LV DIASTOLIC VOL/BSA (35-75): 36 ML/M^2
BH CV ECHO MEAS - LV MASS(C)D: 77 GRAMS
BH CV ECHO MEAS - LV MASS(C)DI: 50.4 GRAMS/M^2
BH CV ECHO MEAS - LV MAX PG: 2.1 MMHG
BH CV ECHO MEAS - LV MEAN PG: 1 MMHG
BH CV ECHO MEAS - LV SYSTOLIC VOL/BSA (12-30): 15.1 ML/M^2
BH CV ECHO MEAS - LV V1 MAX: 73.1 CM/SEC
BH CV ECHO MEAS - LV V1 MEAN: 46.6 CM/SEC
BH CV ECHO MEAS - LV V1 VTI: 15.5 CM
BH CV ECHO MEAS - LVIDD: 3.7 CM
BH CV ECHO MEAS - LVIDS: 2.9 CM
BH CV ECHO MEAS - LVLD AP2: 8.3 CM
BH CV ECHO MEAS - LVLD AP4: 6.9 CM
BH CV ECHO MEAS - LVLS AP2: 6.7 CM
BH CV ECHO MEAS - LVLS AP4: 5.6 CM
BH CV ECHO MEAS - LVOT AREA (M): 2.8 CM^2
BH CV ECHO MEAS - LVOT AREA: 2.9 CM^2
BH CV ECHO MEAS - LVOT DIAM: 1.9 CM
BH CV ECHO MEAS - LVPWD: 0.81 CM
BH CV ECHO MEAS - MED PEAK E' VEL: 10.8 CM/SEC
BH CV ECHO MEAS - MV A MAX VEL: 58.2 CM/SEC
BH CV ECHO MEAS - MV E MAX VEL: 83.4 CM/SEC
BH CV ECHO MEAS - MV E/A: 1.4
BH CV ECHO MEAS - PA ACC SLOPE: 405 CM/SEC^2
BH CV ECHO MEAS - PA ACC TIME: 0.11 SEC
BH CV ECHO MEAS - PA PR(ACCEL): 29.1 MMHG
BH CV ECHO MEAS - RAP SYSTOLE: 3 MMHG
BH CV ECHO MEAS - RVSP: 18.5 MMHG
BH CV ECHO MEAS - SI(CUBED): 17.9 ML/M^2
BH CV ECHO MEAS - SI(LVOT): 28.9 ML/M^2
BH CV ECHO MEAS - SI(MOD-SP2): 39.9 ML/M^2
BH CV ECHO MEAS - SI(MOD-SP4): 20.9 ML/M^2
BH CV ECHO MEAS - SI(TEICH): 17.7 ML/M^2
BH CV ECHO MEAS - SV(CUBED): 27.3 ML
BH CV ECHO MEAS - SV(LVOT): 44.2 ML
BH CV ECHO MEAS - SV(MOD-SP2): 61 ML
BH CV ECHO MEAS - SV(MOD-SP4): 32 ML
BH CV ECHO MEAS - SV(TEICH): 27 ML
BH CV ECHO MEAS - TAPSE (>1.6): 1.3 CM2
BH CV ECHO MEAS - TR MAX VEL: 196.5 CM/SEC
BH CV VAS BP LEFT ARM: NORMAL MMHG
BH CV XLRA - RV BASE: 3.1 CM
BH CV XLRA - RV LENGTH: 6.2 CM
BH CV XLRA - RV MID: 2.3 CM
BH CV XLRA - TDI S': 9.6 CM/SEC
E/E' RATIO: 6
LEFT ATRIUM VOLUME INDEX: 7.8 ML/M2
LV EF 2D ECHO EST: 60 %

## 2017-08-30 PROCEDURE — 93306 TTE W/DOPPLER COMPLETE: CPT

## 2017-08-30 PROCEDURE — 93306 TTE W/DOPPLER COMPLETE: CPT | Performed by: INTERNAL MEDICINE

## 2017-08-30 PROCEDURE — 0399T HC MYOCARDL STRAIN IMAG QUAN ASSMT PER SESS: CPT

## 2017-09-06 ENCOUNTER — INFUSION (OUTPATIENT)
Dept: ONCOLOGY | Facility: HOSPITAL | Age: 22
End: 2017-09-06

## 2017-09-06 ENCOUNTER — OFFICE VISIT (OUTPATIENT)
Dept: ONCOLOGY | Facility: CLINIC | Age: 22
End: 2017-09-06

## 2017-09-06 ENCOUNTER — DOCUMENTATION (OUTPATIENT)
Dept: ONCOLOGY | Facility: CLINIC | Age: 22
End: 2017-09-06

## 2017-09-06 ENCOUNTER — DOCUMENTATION (OUTPATIENT)
Dept: SOCIAL WORK | Facility: HOSPITAL | Age: 22
End: 2017-09-06

## 2017-09-06 VITALS
HEART RATE: 86 BPM | SYSTOLIC BLOOD PRESSURE: 107 MMHG | TEMPERATURE: 97.7 F | DIASTOLIC BLOOD PRESSURE: 76 MMHG | RESPIRATION RATE: 16 BRPM | WEIGHT: 117 LBS | HEIGHT: 62 IN | BODY MASS INDEX: 21.53 KG/M2

## 2017-09-06 DIAGNOSIS — C50.911 MALIGNANT NEOPLASM OF RIGHT FEMALE BREAST, UNSPECIFIED SITE OF BREAST: ICD-10-CM

## 2017-09-06 DIAGNOSIS — C50.911 MALIGNANT NEOPLASM OF RIGHT FEMALE BREAST, UNSPECIFIED SITE OF BREAST: Primary | ICD-10-CM

## 2017-09-06 DIAGNOSIS — C50.811 MALIGNANT NEOPLASM OF OVERLAPPING SITES OF RIGHT FEMALE BREAST (HCC): Primary | ICD-10-CM

## 2017-09-06 DIAGNOSIS — C50.811 MALIGNANT NEOPLASM OF OVERLAPPING SITES OF RIGHT FEMALE BREAST (HCC): ICD-10-CM

## 2017-09-06 LAB
ALBUMIN SERPL-MCNC: 3.9 G/DL (ref 3.2–4.8)
ALBUMIN/GLOB SERPL: 1.3 G/DL (ref 1.5–2.5)
ALP SERPL-CCNC: 87 U/L (ref 25–100)
ALT SERPL W P-5'-P-CCNC: 34 U/L (ref 7–40)
ANION GAP SERPL CALCULATED.3IONS-SCNC: 2 MMOL/L (ref 3–11)
AST SERPL-CCNC: 31 U/L (ref 0–33)
BILIRUB SERPL-MCNC: 0.5 MG/DL (ref 0.3–1.2)
BUN BLD-MCNC: 9 MG/DL (ref 9–23)
BUN/CREAT SERPL: 15 (ref 7–25)
CALCIUM SPEC-SCNC: 8.8 MG/DL (ref 8.7–10.4)
CHLORIDE SERPL-SCNC: 108 MMOL/L (ref 99–109)
CO2 SERPL-SCNC: 29 MMOL/L (ref 20–31)
CREAT BLD-MCNC: 0.6 MG/DL (ref 0.6–1.3)
ERYTHROCYTE [DISTWIDTH] IN BLOOD BY AUTOMATED COUNT: 16.5 % (ref 11.3–14.5)
ERYTHROCYTE [DISTWIDTH] IN BLOOD BY AUTOMATED COUNT: 16.8 % (ref 11.3–14.5)
GFR SERPL CREATININE-BSD FRML MDRD: 150 ML/MIN/1.73
GLOBULIN UR ELPH-MCNC: 3 GM/DL
GLUCOSE BLD-MCNC: 112 MG/DL (ref 70–100)
HCT VFR BLD AUTO: 31.9 % (ref 34.5–44)
HCT VFR BLD AUTO: 32.4 % (ref 34.5–44)
HGB BLD-MCNC: 10.4 G/DL (ref 11.5–15.5)
HGB BLD-MCNC: 10.5 G/DL (ref 11.5–15.5)
LYMPHOCYTES # BLD AUTO: 1.1 10*3/MM3 (ref 0.6–4.8)
LYMPHOCYTES # BLD AUTO: 1.2 10*3/MM3 (ref 0.6–4.8)
LYMPHOCYTES NFR BLD AUTO: 45.6 % (ref 24–44)
LYMPHOCYTES NFR BLD AUTO: 46.6 % (ref 24–44)
MCH RBC QN AUTO: 33.1 PG (ref 27–31)
MCH RBC QN AUTO: 33.5 PG (ref 27–31)
MCHC RBC AUTO-ENTMCNC: 32.2 G/DL (ref 32–36)
MCHC RBC AUTO-ENTMCNC: 32.9 G/DL (ref 32–36)
MCV RBC AUTO: 101.8 FL (ref 80–99)
MCV RBC AUTO: 102.8 FL (ref 80–99)
MONOCYTES # BLD AUTO: 0.1 10*3/MM3 (ref 0–1)
MONOCYTES # BLD AUTO: 0.1 10*3/MM3 (ref 0–1)
MONOCYTES NFR BLD AUTO: 3.1 % (ref 0–12)
MONOCYTES NFR BLD AUTO: 3.8 % (ref 0–12)
NEUTROPHILS # BLD AUTO: 1.2 10*3/MM3 (ref 1.5–8.3)
NEUTROPHILS # BLD AUTO: 1.4 10*3/MM3 (ref 1.5–8.3)
NEUTROPHILS NFR BLD AUTO: 50.3 % (ref 41–71)
NEUTROPHILS NFR BLD AUTO: 50.6 % (ref 41–71)
PLATELET # BLD AUTO: 17 10*3/MM3 (ref 150–450)
PLATELET # BLD AUTO: 18 10*3/MM3 (ref 150–450)
PMV BLD AUTO: 7.4 FL (ref 6–12)
PMV BLD AUTO: 7.8 FL (ref 6–12)
POTASSIUM BLD-SCNC: 3.7 MMOL/L (ref 3.5–5.5)
PROT SERPL-MCNC: 6.9 G/DL (ref 5.7–8.2)
RBC # BLD AUTO: 3.13 10*6/MM3 (ref 3.89–5.14)
RBC # BLD AUTO: 3.16 10*6/MM3 (ref 3.89–5.14)
SODIUM BLD-SCNC: 139 MMOL/L (ref 132–146)
WBC NRBC COR # BLD: 2.3 10*3/MM3 (ref 3.5–10.8)
WBC NRBC COR # BLD: 2.7 10*3/MM3 (ref 3.5–10.8)

## 2017-09-06 PROCEDURE — 96523 IRRIG DRUG DELIVERY DEVICE: CPT

## 2017-09-06 PROCEDURE — 85025 COMPLETE CBC W/AUTO DIFF WBC: CPT

## 2017-09-06 PROCEDURE — 36591 DRAW BLOOD OFF VENOUS DEVICE: CPT

## 2017-09-06 PROCEDURE — 80053 COMPREHEN METABOLIC PANEL: CPT

## 2017-09-06 PROCEDURE — 25010000002 HEPARIN FLUSH (PORCINE) 100 UNIT/ML SOLUTION: Performed by: INTERNAL MEDICINE

## 2017-09-06 PROCEDURE — 99214 OFFICE O/P EST MOD 30 MIN: CPT | Performed by: INTERNAL MEDICINE

## 2017-09-06 RX ORDER — TRAMADOL HYDROCHLORIDE 50 MG/1
50 TABLET ORAL EVERY 6 HOURS PRN
Qty: 30 TABLET | Refills: 0 | OUTPATIENT
Start: 2017-09-06 | End: 2017-10-13

## 2017-09-06 RX ORDER — LORAZEPAM 2 MG/ML
1 INJECTION INTRAMUSCULAR ONCE
Status: DISCONTINUED | OUTPATIENT
Start: 2017-09-06 | End: 2017-09-06 | Stop reason: HOSPADM

## 2017-09-06 RX ORDER — DEXAMETHASONE SODIUM PHOSPHATE 4 MG/ML
10 INJECTION, SOLUTION INTRA-ARTICULAR; INTRALESIONAL; INTRAMUSCULAR; INTRAVENOUS; SOFT TISSUE ONCE
Status: CANCELLED
Start: 2017-09-06

## 2017-09-06 RX ORDER — PALONOSETRON 0.05 MG/ML
0.25 INJECTION, SOLUTION INTRAVENOUS ONCE
Status: CANCELLED | OUTPATIENT
Start: 2017-09-14

## 2017-09-06 RX ORDER — DIPHENHYDRAMINE HYDROCHLORIDE 50 MG/ML
50 INJECTION INTRAMUSCULAR; INTRAVENOUS EVERY 6 HOURS PRN
Status: DISCONTINUED | OUTPATIENT
Start: 2017-09-06 | End: 2017-09-06 | Stop reason: HOSPADM

## 2017-09-06 RX ORDER — SODIUM CHLORIDE 9 MG/ML
250 INJECTION, SOLUTION INTRAVENOUS ONCE
Status: CANCELLED | OUTPATIENT
Start: 2017-09-14

## 2017-09-06 RX ORDER — OLANZAPINE 10 MG/1
TABLET ORAL
Qty: 20 TABLET | Refills: 0 | Status: SHIPPED | OUTPATIENT
Start: 2017-09-06 | End: 2017-10-13

## 2017-09-06 RX ORDER — LORAZEPAM 2 MG/ML
1 INJECTION INTRAMUSCULAR ONCE
Status: CANCELLED | OUTPATIENT
Start: 2017-09-13

## 2017-09-06 RX ORDER — DIPHENHYDRAMINE HYDROCHLORIDE 50 MG/ML
50 INJECTION INTRAMUSCULAR; INTRAVENOUS EVERY 6 HOURS PRN
Status: CANCELLED | OUTPATIENT
Start: 2017-09-13

## 2017-09-06 RX ORDER — DIPHENHYDRAMINE HYDROCHLORIDE 50 MG/ML
50 INJECTION INTRAMUSCULAR; INTRAVENOUS EVERY 6 HOURS PRN
Status: CANCELLED | OUTPATIENT
Start: 2017-09-06

## 2017-09-06 RX ORDER — SODIUM CHLORIDE 0.9 % (FLUSH) 0.9 %
10 SYRINGE (ML) INJECTION AS NEEDED
Status: DISCONTINUED | OUTPATIENT
Start: 2017-09-06 | End: 2017-09-06 | Stop reason: HOSPADM

## 2017-09-06 RX ORDER — LORAZEPAM 2 MG/ML
1 INJECTION INTRAMUSCULAR ONCE
Status: CANCELLED | OUTPATIENT
Start: 2017-09-06 | End: 2017-09-06

## 2017-09-06 RX ORDER — SODIUM CHLORIDE 0.9 % (FLUSH) 0.9 %
10 SYRINGE (ML) INJECTION AS NEEDED
Status: CANCELLED | OUTPATIENT
Start: 2017-09-06

## 2017-09-06 RX ADMIN — Medication 10 ML: at 12:27

## 2017-09-06 RX ADMIN — HEPARIN 500 UNITS: 100 SYRINGE at 12:29

## 2017-09-06 NOTE — PROGRESS NOTES
Met with patient in infusion today. Told her that her platelet count is low at 18K today, and Dr. Amos would like to postpone treatment for one week to give those counts a chance to recover. Explained that platelets help your blood to clot, so a low plt count puts her at increased risk for bleeding. I asked that she give me a call if she noticed any petechia, bruising, bleeding, black/ tarry stools, blood in stool or urine. Also suggested that she avoid NSAIDs and blood thinners. Patient verbalized understanding.     I also asked her to give me a call if she needed help with a ride for a next appt and will make sure she get appropriate resources.

## 2017-09-06 NOTE — PROGRESS NOTES
SW met with pt during infusion to provide support and resources.  SW mentioned resources for transportation and  needs and offered to refer pt if interested.  Pt is interested in transportation.  SW referred pt to [facility] for future appointment needs.  SW provided support to pt and encouraged her to call with any future needs or concerns.  SW available for ongoing support and resource needs.

## 2017-09-06 NOTE — PROGRESS NOTES
"      PROBLEM LIST:  1. [id number] (stage IIIB) invasive ductal carcinoma of the right breast, ER-, [place] weakly +, Her2 3+.  A) presented with pain and swelling of the right breast after childbirth. Biopsy 5/8/17 showed high grade IDC, Right axillary LN biopsy positive for metastatic node involvement. Skin biopsy showed involvement of the dermis with lymphatic space involvement.  PET/CT on 5/24/17 showed hypermetabolic activity in the breast and axillary nodes,  No distant spread of disease.  B) neoadjuvant chemotherapy with TCHP started on 5/25/17.  Infusion reaction consisting of severe leg pain with the first dose of herceptin.  Taxotere dose reduced to 80% on cycle 5 for neuropathy.  2. Migraines  3. Pre-diabetes    Subjective     HISTORY OF PRESENT ILLNESS:   Angie Sutherland returns for follow-up after 5 cycles of chemotherapy.  She says she's feeling okay.  She continues to have nausea and diarrhea but is able to manage these pretty well with her medications.  She reports some increased pain in her fingers and the bottoms of her feet.  She also has an occasional cramp in her upper abdomen that seems to be brought on by bending over.  She can relieve it by repositioning.    Past Medical History, Past Surgical History, Social History, Family History have been reviewed and are without significant changes except as mentioned.    Review of Systems   A comprehensive 14 point review of systems was performed and was negative except as mentioned.    Medications:  The current medication list was reviewed in the EMR    ALLERGIES:    Allergies   Allergen Reactions   • No Known Drug Allergy        Objective      /76 Comment: RUE  Pulse 86  Temp 97.7 °F (36.5 °C) (Temporal Artery )   Resp 16  Ht 62\" (157.5 cm)  Wt 117 lb (53.1 kg)  LMP 11/23/2015  BMI 21.4 kg/m2     Performance Status: 0    General: well appearing female in no acute distress  Neuro: alert and oriented  HEENT: sclera anicteric, oropharynx " clear  Lymphatics: no cervical, supraclavicular Adenopathy  No palpable axillary adenopathy  Cardiovascular: regular rate and rhythm, no murmurs  Lungs: clear to auscultation bilaterally  Abdomen: soft, nontender, nondistended.  No palpable organomegaly  Extremeties: no lower extremity edema  Skin: no rashes, lesions, bruising, or petechiae  Psych: mood and affect appropriate    Echo on 8/30/17 showed an EF of 60%.  No other abnormalities      Assessment/Plan   Angie Sutherland is a 22 y.o. year old female with stage IIIB HER-2 positive breast cancer who returns for follow-up on [facility] P.  She will receive cycle 6 out of 6 planned cycles today.  Her taxotere was reduced by 20% with the last treatment but she has had some worsening of her hand and foot pain consistent with progressive neuropathy.  I will reduce her Taxotere to 50% for this final cycle.  We will plan to continue with targeted therapy alone with both Herceptin and Perjeta for the completion of one year of treatment.    We will set her up to see Dr. Carlo Paulino to discuss surgery.  We discussed that mastectomy would be the typical surgery in this situation.  She will need adjuvant radiation after surgery as well.            Visit time was 25 minutes, greater than 50% spent in counseling      Shadia Amos MD  [facility] Hematology and Oncology    9/6/2017          CC:

## 2017-09-13 ENCOUNTER — INFUSION (OUTPATIENT)
Dept: ONCOLOGY | Facility: HOSPITAL | Age: 22
End: 2017-09-13

## 2017-09-13 VITALS
WEIGHT: 118 LBS | TEMPERATURE: 97.8 F | BODY MASS INDEX: 21.71 KG/M2 | DIASTOLIC BLOOD PRESSURE: 73 MMHG | HEART RATE: 126 BPM | HEIGHT: 62 IN | RESPIRATION RATE: 16 BRPM | SYSTOLIC BLOOD PRESSURE: 118 MMHG

## 2017-09-13 DIAGNOSIS — C50.811 MALIGNANT NEOPLASM OF OVERLAPPING SITES OF RIGHT FEMALE BREAST (HCC): ICD-10-CM

## 2017-09-13 DIAGNOSIS — C50.911 MALIGNANT NEOPLASM OF RIGHT FEMALE BREAST, UNSPECIFIED SITE OF BREAST: Primary | ICD-10-CM

## 2017-09-13 LAB
ERYTHROCYTE [DISTWIDTH] IN BLOOD BY AUTOMATED COUNT: 16.2 % (ref 11.3–14.5)
HCT VFR BLD AUTO: 32.1 % (ref 34.5–44)
HGB BLD-MCNC: 10.5 G/DL (ref 11.5–15.5)
LYMPHOCYTES # BLD AUTO: 1.5 10*3/MM3 (ref 0.6–4.8)
LYMPHOCYTES NFR BLD AUTO: 50.2 % (ref 24–44)
MCH RBC QN AUTO: 34.2 PG (ref 27–31)
MCHC RBC AUTO-ENTMCNC: 32.7 G/DL (ref 32–36)
MCV RBC AUTO: 104.5 FL (ref 80–99)
MONOCYTES # BLD AUTO: 0.2 10*3/MM3 (ref 0–1)
MONOCYTES NFR BLD AUTO: 5.3 % (ref 0–12)
NEUTROPHILS # BLD AUTO: 1.3 10*3/MM3 (ref 1.5–8.3)
NEUTROPHILS NFR BLD AUTO: 44.5 % (ref 41–71)
PLATELET # BLD AUTO: 112 10*3/MM3 (ref 150–450)
PMV BLD AUTO: 6.8 FL (ref 6–12)
RBC # BLD AUTO: 3.07 10*6/MM3 (ref 3.89–5.14)
WBC NRBC COR # BLD: 3 10*3/MM3 (ref 3.5–10.8)

## 2017-09-13 PROCEDURE — 96413 CHEMO IV INFUSION 1 HR: CPT

## 2017-09-13 PROCEDURE — 25010000002 DIPHENHYDRAMINE PER 50 MG: Performed by: INTERNAL MEDICINE

## 2017-09-13 PROCEDURE — 25010000002 TRASTUZUMAB PER 10 MG: Performed by: INTERNAL MEDICINE

## 2017-09-13 PROCEDURE — 96366 THER/PROPH/DIAG IV INF ADDON: CPT

## 2017-09-13 PROCEDURE — 96367 TX/PROPH/DG ADDL SEQ IV INF: CPT

## 2017-09-13 PROCEDURE — 25010000002 HEPARIN FLUSH (PORCINE) 100 UNIT/ML SOLUTION: Performed by: INTERNAL MEDICINE

## 2017-09-13 PROCEDURE — 96375 TX/PRO/DX INJ NEW DRUG ADDON: CPT

## 2017-09-13 PROCEDURE — 85025 COMPLETE CBC W/AUTO DIFF WBC: CPT

## 2017-09-13 PROCEDURE — 25010000002 DEXAMETHASONE PER 1 MG: Performed by: INTERNAL MEDICINE

## 2017-09-13 RX ORDER — DIPHENHYDRAMINE HYDROCHLORIDE 50 MG/ML
50 INJECTION INTRAMUSCULAR; INTRAVENOUS EVERY 6 HOURS PRN
Status: DISCONTINUED | OUTPATIENT
Start: 2017-09-13 | End: 2017-09-13 | Stop reason: HOSPADM

## 2017-09-13 RX ORDER — SODIUM CHLORIDE 0.9 % (FLUSH) 0.9 %
10 SYRINGE (ML) INJECTION AS NEEDED
Status: CANCELLED | OUTPATIENT
Start: 2017-09-13

## 2017-09-13 RX ORDER — LORAZEPAM 2 MG/ML
1 INJECTION INTRAMUSCULAR ONCE
Status: DISCONTINUED | OUTPATIENT
Start: 2017-09-13 | End: 2017-09-13 | Stop reason: HOSPADM

## 2017-09-13 RX ADMIN — TRASTUZUMAB 320 MG: 150 INJECTION, POWDER, LYOPHILIZED, FOR SOLUTION INTRAVENOUS at 14:49

## 2017-09-13 RX ADMIN — DEXAMETHASONE SODIUM PHOSPHATE: 10 INJECTION INTRAMUSCULAR; INTRAVENOUS at 14:24

## 2017-09-13 RX ADMIN — FAMOTIDINE 20 MG: 10 INJECTION, SOLUTION INTRAVENOUS at 14:22

## 2017-09-13 RX ADMIN — DIPHENHYDRAMINE HYDROCHLORIDE 50 MG: 50 INJECTION INTRAMUSCULAR; INTRAVENOUS at 14:20

## 2017-09-13 RX ADMIN — HEPARIN 500 UNITS: 100 SYRINGE at 15:38

## 2017-09-14 ENCOUNTER — INFUSION (OUTPATIENT)
Dept: ONCOLOGY | Facility: HOSPITAL | Age: 22
End: 2017-09-14

## 2017-09-14 VITALS
TEMPERATURE: 97.8 F | WEIGHT: 119 LBS | SYSTOLIC BLOOD PRESSURE: 117 MMHG | DIASTOLIC BLOOD PRESSURE: 88 MMHG | RESPIRATION RATE: 16 BRPM | HEART RATE: 110 BPM | HEIGHT: 62 IN | BODY MASS INDEX: 21.9 KG/M2

## 2017-09-14 DIAGNOSIS — C50.811 MALIGNANT NEOPLASM OF OVERLAPPING SITES OF RIGHT FEMALE BREAST (HCC): ICD-10-CM

## 2017-09-14 DIAGNOSIS — C50.911 MALIGNANT NEOPLASM OF RIGHT FEMALE BREAST, UNSPECIFIED SITE OF BREAST: Primary | ICD-10-CM

## 2017-09-14 LAB
ALBUMIN SERPL-MCNC: 4.1 G/DL (ref 3.2–4.8)
ALBUMIN/GLOB SERPL: 1.3 G/DL (ref 1.5–2.5)
ALP SERPL-CCNC: 85 U/L (ref 25–100)
ALT SERPL W P-5'-P-CCNC: 28 U/L (ref 7–40)
ANION GAP SERPL CALCULATED.3IONS-SCNC: -1 MMOL/L (ref 3–11)
AST SERPL-CCNC: 28 U/L (ref 0–33)
BILIRUB SERPL-MCNC: 0.3 MG/DL (ref 0.3–1.2)
BUN BLD-MCNC: 10 MG/DL (ref 9–23)
BUN/CREAT SERPL: 16.7 (ref 7–25)
CALCIUM SPEC-SCNC: 8.6 MG/DL (ref 8.7–10.4)
CHLORIDE SERPL-SCNC: 107 MMOL/L (ref 99–109)
CO2 SERPL-SCNC: 32 MMOL/L (ref 20–31)
CREAT BLD-MCNC: 0.6 MG/DL (ref 0.6–1.3)
CREAT BLDA-MCNC: 0.6 MG/DL (ref 0.6–1.3)
ERYTHROCYTE [DISTWIDTH] IN BLOOD BY AUTOMATED COUNT: 16.2 % (ref 11.3–14.5)
GFR SERPL CREATININE-BSD FRML MDRD: 150 ML/MIN/1.73
GLOBULIN UR ELPH-MCNC: 3.2 GM/DL
GLUCOSE BLD-MCNC: 105 MG/DL (ref 70–100)
HCT VFR BLD AUTO: 32.3 % (ref 34.5–44)
HGB BLD-MCNC: 10.4 G/DL (ref 11.5–15.5)
LYMPHOCYTES # BLD AUTO: 1.7 10*3/MM3 (ref 0.6–4.8)
LYMPHOCYTES NFR BLD AUTO: 49.5 % (ref 24–44)
MCH RBC QN AUTO: 33.5 PG (ref 27–31)
MCHC RBC AUTO-ENTMCNC: 32.2 G/DL (ref 32–36)
MCV RBC AUTO: 104.2 FL (ref 80–99)
MONOCYTES # BLD AUTO: 0.3 10*3/MM3 (ref 0–1)
MONOCYTES NFR BLD AUTO: 7.6 % (ref 0–12)
NEUTROPHILS # BLD AUTO: 1.5 10*3/MM3 (ref 1.5–8.3)
NEUTROPHILS NFR BLD AUTO: 42.9 % (ref 41–71)
PLATELET # BLD AUTO: 151 10*3/MM3 (ref 150–450)
PMV BLD AUTO: 6.6 FL (ref 6–12)
POTASSIUM BLD-SCNC: 3.4 MMOL/L (ref 3.5–5.5)
PROT SERPL-MCNC: 7.3 G/DL (ref 5.7–8.2)
RBC # BLD AUTO: 3.1 10*6/MM3 (ref 3.89–5.14)
SODIUM BLD-SCNC: 138 MMOL/L (ref 132–146)
WBC NRBC COR # BLD: 3.4 10*3/MM3 (ref 3.5–10.8)

## 2017-09-14 PROCEDURE — 96377 APPLICATON ON-BODY INJECTOR: CPT

## 2017-09-14 PROCEDURE — 25010000002 HEPARIN FLUSH (PORCINE) 100 UNIT/ML SOLUTION: Performed by: INTERNAL MEDICINE

## 2017-09-14 PROCEDURE — 82565 ASSAY OF CREATININE: CPT

## 2017-09-14 PROCEDURE — 80053 COMPREHEN METABOLIC PANEL: CPT

## 2017-09-14 PROCEDURE — 36415 COLL VENOUS BLD VENIPUNCTURE: CPT

## 2017-09-14 PROCEDURE — 96413 CHEMO IV INFUSION 1 HR: CPT

## 2017-09-14 PROCEDURE — 85025 COMPLETE CBC W/AUTO DIFF WBC: CPT

## 2017-09-14 PROCEDURE — 25010000002 FOSAPREPITANT PER 1 MG: Performed by: INTERNAL MEDICINE

## 2017-09-14 PROCEDURE — 96417 CHEMO IV INFUS EACH ADDL SEQ: CPT

## 2017-09-14 PROCEDURE — 25010000002 PEGFILGRASTIM 6 MG/0.6ML PREFILLED SYRINGE KIT: Performed by: INTERNAL MEDICINE

## 2017-09-14 PROCEDURE — 96375 TX/PRO/DX INJ NEW DRUG ADDON: CPT

## 2017-09-14 PROCEDURE — 25010000002 PERTUZUMAB 420 MG/14ML SOLUTION 420 MG VIAL: Performed by: INTERNAL MEDICINE

## 2017-09-14 PROCEDURE — 25010000002 CARBOPLATIN PER 50 MG: Performed by: INTERNAL MEDICINE

## 2017-09-14 PROCEDURE — 96367 TX/PROPH/DG ADDL SEQ IV INF: CPT

## 2017-09-14 PROCEDURE — 25010000002 PALONOSETRON PER 25 MCG: Performed by: INTERNAL MEDICINE

## 2017-09-14 PROCEDURE — 25010000002 DOCETAXEL 20 MG/ML CONCENTRATION 1 ML VIAL: Performed by: INTERNAL MEDICINE

## 2017-09-14 PROCEDURE — 96411 CHEMO IV PUSH ADDL DRUG: CPT

## 2017-09-14 RX ORDER — PALONOSETRON 0.05 MG/ML
0.25 INJECTION, SOLUTION INTRAVENOUS ONCE
Status: COMPLETED | OUTPATIENT
Start: 2017-09-14 | End: 2017-09-14

## 2017-09-14 RX ORDER — SODIUM CHLORIDE 0.9 % (FLUSH) 0.9 %
10 SYRINGE (ML) INJECTION AS NEEDED
Status: CANCELLED | OUTPATIENT
Start: 2017-09-14

## 2017-09-14 RX ORDER — SODIUM CHLORIDE 0.9 % (FLUSH) 0.9 %
10 SYRINGE (ML) INJECTION AS NEEDED
Status: DISCONTINUED | OUTPATIENT
Start: 2017-09-14 | End: 2017-09-14 | Stop reason: HOSPADM

## 2017-09-14 RX ORDER — SODIUM CHLORIDE 9 MG/ML
250 INJECTION, SOLUTION INTRAVENOUS ONCE
Status: COMPLETED | OUTPATIENT
Start: 2017-09-14 | End: 2017-09-14

## 2017-09-14 RX ADMIN — PALONOSETRON HYDROCHLORIDE 0.25 MG: 0.25 INJECTION INTRAVENOUS at 10:36

## 2017-09-14 RX ADMIN — SODIUM CHLORIDE 150 MG: 9 INJECTION, SOLUTION INTRAVENOUS at 10:39

## 2017-09-14 RX ADMIN — DOCETAXEL 55 MG: 20 INJECTION, SOLUTION INTRAVENOUS at 12:26

## 2017-09-14 RX ADMIN — HEPARIN 500 UNITS: 100 SYRINGE at 14:42

## 2017-09-14 RX ADMIN — CARBOPLATIN 900 MG: 10 INJECTION, SOLUTION INTRAVENOUS at 13:34

## 2017-09-14 RX ADMIN — PEGFILGRASTIM 6 MG: KIT SUBCUTANEOUS at 14:23

## 2017-09-14 RX ADMIN — PERTUZUMAB 420 MG: 30 INJECTION, SOLUTION, CONCENTRATE INTRAVENOUS at 11:14

## 2017-09-14 RX ADMIN — Medication 10 ML: at 14:42

## 2017-09-14 RX ADMIN — SODIUM CHLORIDE 250 ML: 9 INJECTION, SOLUTION INTRAVENOUS at 10:33

## 2017-09-15 ENCOUNTER — INFUSION (OUTPATIENT)
Dept: ONCOLOGY | Facility: HOSPITAL | Age: 22
End: 2017-09-15

## 2017-09-15 VITALS
DIASTOLIC BLOOD PRESSURE: 73 MMHG | HEIGHT: 62 IN | BODY MASS INDEX: 22.26 KG/M2 | TEMPERATURE: 98.2 F | SYSTOLIC BLOOD PRESSURE: 114 MMHG | WEIGHT: 121 LBS | HEART RATE: 108 BPM | RESPIRATION RATE: 16 BRPM

## 2017-09-15 DIAGNOSIS — C50.911 MALIGNANT NEOPLASM OF RIGHT FEMALE BREAST, UNSPECIFIED SITE OF BREAST: Primary | ICD-10-CM

## 2017-09-15 DIAGNOSIS — C50.811 MALIGNANT NEOPLASM OF OVERLAPPING SITES OF RIGHT FEMALE BREAST (HCC): ICD-10-CM

## 2017-09-15 PROCEDURE — 25010000002 HEPARIN FLUSH (PORCINE) 100 UNIT/ML SOLUTION: Performed by: INTERNAL MEDICINE

## 2017-09-15 PROCEDURE — 96360 HYDRATION IV INFUSION INIT: CPT

## 2017-09-15 PROCEDURE — 96361 HYDRATE IV INFUSION ADD-ON: CPT

## 2017-09-15 RX ORDER — SODIUM CHLORIDE 0.9 % (FLUSH) 0.9 %
10 SYRINGE (ML) INJECTION AS NEEDED
Status: CANCELLED | OUTPATIENT
Start: 2017-09-15

## 2017-09-15 RX ORDER — SODIUM CHLORIDE 9 MG/ML
2000 INJECTION, SOLUTION INTRAVENOUS ONCE
Status: COMPLETED | OUTPATIENT
Start: 2017-09-15 | End: 2017-09-15

## 2017-09-15 RX ORDER — SODIUM CHLORIDE 0.9 % (FLUSH) 0.9 %
10 SYRINGE (ML) INJECTION AS NEEDED
Status: DISCONTINUED | OUTPATIENT
Start: 2017-09-15 | End: 2017-09-15 | Stop reason: HOSPADM

## 2017-09-15 RX ADMIN — Medication 10 ML: at 15:10

## 2017-09-15 RX ADMIN — HEPARIN 500 UNITS: 100 SYRINGE at 15:10

## 2017-09-15 RX ADMIN — SODIUM CHLORIDE 2000 ML: 9 INJECTION, SOLUTION INTRAVENOUS at 13:03

## 2017-09-20 ENCOUNTER — DOCUMENTATION (OUTPATIENT)
Dept: OTHER | Facility: HOSPITAL | Age: 22
End: 2017-09-20

## 2017-09-20 NOTE — PROGRESS NOTES
Saw patient today with Dr. Paulino at the office r/t her surgical decision. Patient has decided to have a double mastectomy with reconstruction. The procedure was explained thoroughly to pt and her mother. Both pt and her mom expressed gratitude that Dr. Paulino explained everything so well. Patient will see Dr. Delia Covarrubias on the October 26 th about reconstruction. Patient will move her appointment that day to have her wisdom teethe extracted. Dr. Paulino advised patient to still have the teeth removed before her mastectomy. Pt verbalizes understanding. Patient will call KONSTANTIN Contreras RN with any additional questions or concerns. SC

## 2017-09-29 ENCOUNTER — TELEPHONE (OUTPATIENT)
Dept: ONCOLOGY | Facility: CLINIC | Age: 22
End: 2017-09-29

## 2017-09-29 NOTE — TELEPHONE ENCOUNTER
----- Message from Keith Banegas sent at 9/29/2017 11:53 AM EDT -----  Regarding: Bette, wisdom teeth surgery approval needed  Contact: [phone]  Angie called stating that she needs you to call [facility] in [facility] and tell them that the patient can get oral surgery.     [phone]

## 2017-09-29 NOTE — TELEPHONE ENCOUNTER
I called [facility] for [facility]. Spoke with Vanessa who connected me to two different lines, the second was the nurses station where I left a VM giving clearance for Angie to have wisdom teeth extracted.

## 2017-10-03 ENCOUNTER — TELEPHONE (OUTPATIENT)
Dept: ONCOLOGY | Facility: CLINIC | Age: 22
End: 2017-10-03

## 2017-10-03 NOTE — TELEPHONE ENCOUNTER
1) I called [facility] ([phone])- spoke with medical records, and let them know we have not yet received the clearance form. They will be faxing another to us.     2) Spoke with Angie. Let her know I have adjusted her apt calender. She will start herceptin-perjeta this Thursday Oct 5th at 8:30am. Her next treatment would be due Oct 26th. She will be fine to have double mastectomy on 10/16.

## 2017-10-03 NOTE — TELEPHONE ENCOUNTER
----- Message from Abril Serrano sent at 10/3/2017  8:31 AM EDT -----  Regarding: [facility] - DENTAL SURGERY FORM  Contact: [phone]  PATIENT IS HAVING HER WISDOM TAKEN OUT, AND SHE IS NEEDING A FORM COMPLETED TO HAVE THE SURGERY.     HER DENTIST [facility] HAD SENT OVER A FORM TO BE COMPLETED LAST THURSDAY AND THEY NEED IT BACK AS SOON AS POSSIBLE. SHE WANTED TO HAVE THIS DONE BEFORE HER MASTECTOMY ON 10/18/17.

## 2017-10-04 DIAGNOSIS — C50.911 MALIGNANT NEOPLASM OF RIGHT FEMALE BREAST, UNSPECIFIED ESTROGEN RECEPTOR STATUS, UNSPECIFIED SITE OF BREAST (HCC): ICD-10-CM

## 2017-10-04 RX ORDER — SODIUM CHLORIDE 9 MG/ML
250 INJECTION, SOLUTION INTRAVENOUS ONCE
Status: CANCELLED | OUTPATIENT
Start: 2017-10-19

## 2017-10-04 RX ORDER — FAMOTIDINE 10 MG/ML
20 INJECTION, SOLUTION INTRAVENOUS ONCE
Status: CANCELLED
Start: 2017-10-19 | End: 2017-10-04

## 2017-10-04 RX ORDER — LORAZEPAM 2 MG/ML
1 INJECTION INTRAMUSCULAR ONCE
Status: CANCELLED | OUTPATIENT
Start: 2017-10-19 | End: 2017-10-04

## 2017-10-05 ENCOUNTER — INFUSION (OUTPATIENT)
Dept: ONCOLOGY | Facility: HOSPITAL | Age: 22
End: 2017-10-05

## 2017-10-05 VITALS
TEMPERATURE: 97.3 F | HEART RATE: 113 BPM | BODY MASS INDEX: 22.26 KG/M2 | RESPIRATION RATE: 16 BRPM | DIASTOLIC BLOOD PRESSURE: 68 MMHG | HEIGHT: 62 IN | WEIGHT: 121 LBS | SYSTOLIC BLOOD PRESSURE: 111 MMHG

## 2017-10-05 DIAGNOSIS — C50.811 MALIGNANT NEOPLASM OF OVERLAPPING SITES OF RIGHT FEMALE BREAST, UNSPECIFIED ESTROGEN RECEPTOR STATUS (HCC): ICD-10-CM

## 2017-10-05 DIAGNOSIS — C50.911 MALIGNANT NEOPLASM OF RIGHT FEMALE BREAST, UNSPECIFIED ESTROGEN RECEPTOR STATUS, UNSPECIFIED SITE OF BREAST (HCC): Primary | ICD-10-CM

## 2017-10-05 LAB
ALBUMIN SERPL-MCNC: 4 G/DL (ref 3.2–4.8)
ALBUMIN/GLOB SERPL: 1.2 G/DL (ref 1.5–2.5)
ALP SERPL-CCNC: 89 U/L (ref 25–100)
ALT SERPL W P-5'-P-CCNC: 28 U/L (ref 7–40)
ANION GAP SERPL CALCULATED.3IONS-SCNC: 11 MMOL/L (ref 3–11)
AST SERPL-CCNC: 23 U/L (ref 0–33)
BILIRUB SERPL-MCNC: 0.4 MG/DL (ref 0.3–1.2)
BUN BLD-MCNC: 10 MG/DL (ref 9–23)
BUN/CREAT SERPL: 14.3 (ref 7–25)
CALCIUM SPEC-SCNC: 9.2 MG/DL (ref 8.7–10.4)
CHLORIDE SERPL-SCNC: 106 MMOL/L (ref 99–109)
CO2 SERPL-SCNC: 26 MMOL/L (ref 20–31)
CREAT BLD-MCNC: 0.7 MG/DL (ref 0.6–1.3)
ERYTHROCYTE [DISTWIDTH] IN BLOOD BY AUTOMATED COUNT: 16.1 % (ref 11.3–14.5)
GFR SERPL CREATININE-BSD FRML MDRD: 126 ML/MIN/1.73
GLOBULIN UR ELPH-MCNC: 3.3 GM/DL
GLUCOSE BLD-MCNC: 165 MG/DL (ref 70–100)
HCT VFR BLD AUTO: 35 % (ref 34.5–44)
HGB BLD-MCNC: 11.2 G/DL (ref 11.5–15.5)
LYMPHOCYTES # BLD AUTO: 1.4 10*3/MM3 (ref 0.6–4.8)
LYMPHOCYTES NFR BLD AUTO: 48.5 % (ref 24–44)
MCH RBC QN AUTO: 33.4 PG (ref 27–31)
MCHC RBC AUTO-ENTMCNC: 32 G/DL (ref 32–36)
MCV RBC AUTO: 104.4 FL (ref 80–99)
MONOCYTES # BLD AUTO: 0.1 10*3/MM3 (ref 0–1)
MONOCYTES NFR BLD AUTO: 5.1 % (ref 0–12)
NEUTROPHILS # BLD AUTO: 1.3 10*3/MM3 (ref 1.5–8.3)
NEUTROPHILS NFR BLD AUTO: 46.4 % (ref 41–71)
PLATELET # BLD AUTO: 41 10*3/MM3 (ref 150–450)
PMV BLD AUTO: 7 FL (ref 6–12)
POTASSIUM BLD-SCNC: 3.5 MMOL/L (ref 3.5–5.5)
PROT SERPL-MCNC: 7.3 G/DL (ref 5.7–8.2)
RBC # BLD AUTO: 3.35 10*6/MM3 (ref 3.89–5.14)
SODIUM BLD-SCNC: 143 MMOL/L (ref 132–146)
WBC NRBC COR # BLD: 2.8 10*3/MM3 (ref 3.5–10.8)

## 2017-10-05 PROCEDURE — 25010000002 HEPARIN FLUSH (PORCINE) 100 UNIT/ML SOLUTION: Performed by: INTERNAL MEDICINE

## 2017-10-05 PROCEDURE — 36591 DRAW BLOOD OFF VENOUS DEVICE: CPT

## 2017-10-05 PROCEDURE — 85025 COMPLETE CBC W/AUTO DIFF WBC: CPT

## 2017-10-05 PROCEDURE — 80053 COMPREHEN METABOLIC PANEL: CPT

## 2017-10-05 RX ORDER — SODIUM CHLORIDE 0.9 % (FLUSH) 0.9 %
10 SYRINGE (ML) INJECTION AS NEEDED
Status: DISCONTINUED | OUTPATIENT
Start: 2017-10-05 | End: 2017-10-05 | Stop reason: HOSPADM

## 2017-10-05 RX ORDER — SODIUM CHLORIDE 0.9 % (FLUSH) 0.9 %
10 SYRINGE (ML) INJECTION AS NEEDED
Status: CANCELLED | OUTPATIENT
Start: 2017-10-05

## 2017-10-05 RX ADMIN — HEPARIN 500 UNITS: 100 SYRINGE at 11:18

## 2017-10-05 RX ADMIN — Medication 10 ML: at 11:17

## 2017-10-11 ENCOUNTER — INFUSION (OUTPATIENT)
Dept: ONCOLOGY | Facility: HOSPITAL | Age: 22
End: 2017-10-11

## 2017-10-11 ENCOUNTER — DOCUMENTATION (OUTPATIENT)
Dept: SOCIAL WORK | Facility: HOSPITAL | Age: 22
End: 2017-10-11

## 2017-10-11 VITALS
DIASTOLIC BLOOD PRESSURE: 59 MMHG | RESPIRATION RATE: 16 BRPM | HEIGHT: 62 IN | WEIGHT: 118 LBS | BODY MASS INDEX: 21.71 KG/M2 | TEMPERATURE: 97.8 F | SYSTOLIC BLOOD PRESSURE: 108 MMHG

## 2017-10-11 DIAGNOSIS — C50.811 MALIGNANT NEOPLASM OF OVERLAPPING SITES OF RIGHT FEMALE BREAST, UNSPECIFIED ESTROGEN RECEPTOR STATUS (HCC): Primary | ICD-10-CM

## 2017-10-11 LAB
ERYTHROCYTE [DISTWIDTH] IN BLOOD BY AUTOMATED COUNT: 17.1 % (ref 11.3–14.5)
HCT VFR BLD AUTO: 35 % (ref 34.5–44)
HGB BLD-MCNC: 11 G/DL (ref 11.5–15.5)
LYMPHOCYTES # BLD AUTO: 1.5 10*3/MM3 (ref 0.6–4.8)
LYMPHOCYTES NFR BLD AUTO: 45.8 % (ref 24–44)
MCH RBC QN AUTO: 33.4 PG (ref 27–31)
MCHC RBC AUTO-ENTMCNC: 31.5 G/DL (ref 32–36)
MCV RBC AUTO: 105.7 FL (ref 80–99)
MONOCYTES # BLD AUTO: 0.3 10*3/MM3 (ref 0–1)
MONOCYTES NFR BLD AUTO: 8.3 % (ref 0–12)
NEUTROPHILS # BLD AUTO: 1.5 10*3/MM3 (ref 1.5–8.3)
NEUTROPHILS NFR BLD AUTO: 45.9 % (ref 41–71)
PLATELET # BLD AUTO: 46 10*3/MM3 (ref 150–450)
PMV BLD AUTO: 8 FL (ref 6–12)
RBC # BLD AUTO: 3.31 10*6/MM3 (ref 3.89–5.14)
WBC NRBC COR # BLD: 3.2 10*3/MM3 (ref 3.5–10.8)

## 2017-10-11 PROCEDURE — 36591 DRAW BLOOD OFF VENOUS DEVICE: CPT

## 2017-10-11 PROCEDURE — 85025 COMPLETE CBC W/AUTO DIFF WBC: CPT

## 2017-10-11 PROCEDURE — 25010000002 HEPARIN FLUSH (PORCINE) 100 UNIT/ML SOLUTION: Performed by: INTERNAL MEDICINE

## 2017-10-11 RX ORDER — SODIUM CHLORIDE 0.9 % (FLUSH) 0.9 %
10 SYRINGE (ML) INJECTION AS NEEDED
Status: DISCONTINUED | OUTPATIENT
Start: 2017-10-11 | End: 2017-10-11 | Stop reason: HOSPADM

## 2017-10-11 RX ORDER — SODIUM CHLORIDE 0.9 % (FLUSH) 0.9 %
10 SYRINGE (ML) INJECTION AS NEEDED
Status: CANCELLED | OUTPATIENT
Start: 2017-10-11

## 2017-10-11 RX ADMIN — Medication 10 ML: at 13:28

## 2017-10-11 RX ADMIN — HEPARIN 500 UNITS: 100 SYRINGE at 13:28

## 2017-10-11 NOTE — PROGRESS NOTES
SW met with pt to provide support.  Pt states that she is doing well and that she is preparing to have her wisdom teeth removed tomorrow and also have her double mastectomy next week.  Pt has lined up childcare arrangements for her two children while she is recovering.  SW provided support to pt and will continue to provide support and resources as needed.

## 2017-10-13 ENCOUNTER — APPOINTMENT (OUTPATIENT)
Dept: PREADMISSION TESTING | Facility: HOSPITAL | Age: 22
End: 2017-10-13

## 2017-10-13 VITALS — WEIGHT: 122 LBS | HEIGHT: 62 IN | BODY MASS INDEX: 22.45 KG/M2

## 2017-10-13 LAB
ALBUMIN SERPL-MCNC: 4.2 G/DL (ref 3.2–4.8)
ALBUMIN/GLOB SERPL: 1.3 G/DL (ref 1.5–2.5)
ALP SERPL-CCNC: 86 U/L (ref 25–100)
ALT SERPL W P-5'-P-CCNC: 20 U/L (ref 7–40)
ANION GAP SERPL CALCULATED.3IONS-SCNC: 7 MMOL/L (ref 3–11)
AST SERPL-CCNC: 24 U/L (ref 0–33)
BILIRUB SERPL-MCNC: 0.5 MG/DL (ref 0.3–1.2)
BUN BLD-MCNC: 17 MG/DL (ref 9–23)
BUN/CREAT SERPL: 21.3 (ref 7–25)
CALCIUM SPEC-SCNC: 8.9 MG/DL (ref 8.7–10.4)
CHLORIDE SERPL-SCNC: 104 MMOL/L (ref 99–109)
CO2 SERPL-SCNC: 27 MMOL/L (ref 20–31)
CREAT BLD-MCNC: 0.8 MG/DL (ref 0.6–1.3)
DEPRECATED RDW RBC AUTO: 55.2 FL (ref 37–54)
ERYTHROCYTE [DISTWIDTH] IN BLOOD BY AUTOMATED COUNT: 14.3 % (ref 11.3–14.5)
GFR SERPL CREATININE-BSD FRML MDRD: 108 ML/MIN/1.73
GLOBULIN UR ELPH-MCNC: 3.3 GM/DL
GLUCOSE BLD-MCNC: 101 MG/DL (ref 70–100)
HCT VFR BLD AUTO: 38.1 % (ref 34.5–44)
HGB BLD-MCNC: 12.3 G/DL (ref 11.5–15.5)
MCH RBC QN AUTO: 34 PG (ref 27–31)
MCHC RBC AUTO-ENTMCNC: 32.3 G/DL (ref 32–36)
MCV RBC AUTO: 105.2 FL (ref 80–99)
PLATELET # BLD AUTO: 52 10*3/MM3 (ref 150–450)
PMV BLD AUTO: 10.1 FL (ref 6–12)
POTASSIUM BLD-SCNC: 3.5 MMOL/L (ref 3.5–5.5)
PROT SERPL-MCNC: 7.5 G/DL (ref 5.7–8.2)
RBC # BLD AUTO: 3.62 10*6/MM3 (ref 3.89–5.14)
SODIUM BLD-SCNC: 138 MMOL/L (ref 132–146)
WBC NRBC COR # BLD: 2.48 10*3/MM3 (ref 3.5–10.8)

## 2017-10-13 PROCEDURE — 80053 COMPREHEN METABOLIC PANEL: CPT | Performed by: SURGERY

## 2017-10-13 PROCEDURE — 85027 COMPLETE CBC AUTOMATED: CPT | Performed by: SURGERY

## 2017-10-13 PROCEDURE — 36415 COLL VENOUS BLD VENIPUNCTURE: CPT

## 2017-10-19 ENCOUNTER — INFUSION (OUTPATIENT)
Dept: ONCOLOGY | Facility: HOSPITAL | Age: 22
End: 2017-10-19

## 2017-10-19 VITALS
RESPIRATION RATE: 16 BRPM | TEMPERATURE: 97.2 F | DIASTOLIC BLOOD PRESSURE: 70 MMHG | SYSTOLIC BLOOD PRESSURE: 107 MMHG | HEART RATE: 110 BPM | HEIGHT: 62 IN | BODY MASS INDEX: 22.26 KG/M2 | WEIGHT: 121 LBS

## 2017-10-19 DIAGNOSIS — C50.011 MALIGNANT NEOPLASM INVOLVING BOTH NIPPLE AND AREOLA OF RIGHT BREAST IN FEMALE, UNSPECIFIED ESTROGEN RECEPTOR STATUS (HCC): ICD-10-CM

## 2017-10-19 DIAGNOSIS — C50.811 MALIGNANT NEOPLASM OF OVERLAPPING SITES OF RIGHT FEMALE BREAST, UNSPECIFIED ESTROGEN RECEPTOR STATUS (HCC): Primary | ICD-10-CM

## 2017-10-19 DIAGNOSIS — C50.911 MALIGNANT NEOPLASM OF RIGHT FEMALE BREAST, UNSPECIFIED ESTROGEN RECEPTOR STATUS, UNSPECIFIED SITE OF BREAST (HCC): ICD-10-CM

## 2017-10-19 LAB
ERYTHROCYTE [DISTWIDTH] IN BLOOD BY AUTOMATED COUNT: 14.7 % (ref 11.3–14.5)
HCT VFR BLD AUTO: 36.1 % (ref 34.5–44)
HGB BLD-MCNC: 11.4 G/DL (ref 11.5–15.5)
LYMPHOCYTES # BLD AUTO: 1.7 10*3/MM3 (ref 0.6–4.8)
LYMPHOCYTES NFR BLD AUTO: 58 % (ref 24–44)
MCH RBC QN AUTO: 33.2 PG (ref 27–31)
MCHC RBC AUTO-ENTMCNC: 31.6 G/DL (ref 32–36)
MCV RBC AUTO: 105.2 FL (ref 80–99)
MONOCYTES # BLD AUTO: 0.2 10*3/MM3 (ref 0–1)
MONOCYTES NFR BLD AUTO: 6.6 % (ref 0–12)
NEUTROPHILS # BLD AUTO: 1.1 10*3/MM3 (ref 1.5–8.3)
NEUTROPHILS NFR BLD AUTO: 35.4 % (ref 41–71)
PLATELET # BLD AUTO: 103 10*3/MM3 (ref 150–450)
PMV BLD AUTO: 6.2 FL (ref 6–12)
RBC # BLD AUTO: 3.43 10*6/MM3 (ref 3.89–5.14)
WBC NRBC COR # BLD: 3 10*3/MM3 (ref 3.5–10.8)

## 2017-10-19 PROCEDURE — 25010000002 TRASTUZUMAB PER 10 MG: Performed by: INTERNAL MEDICINE

## 2017-10-19 PROCEDURE — 85025 COMPLETE CBC W/AUTO DIFF WBC: CPT

## 2017-10-19 PROCEDURE — 25010000002 HEPARIN FLUSH (PORCINE) 100 UNIT/ML SOLUTION: Performed by: INTERNAL MEDICINE

## 2017-10-19 PROCEDURE — 25010000002 DEXAMETHASONE PER 1 MG: Performed by: INTERNAL MEDICINE

## 2017-10-19 PROCEDURE — 25010000002 PERTUZUMAB 420 MG/14ML SOLUTION 420 MG VIAL: Performed by: INTERNAL MEDICINE

## 2017-10-19 PROCEDURE — 25010000002 DIPHENHYDRAMINE PER 50 MG: Performed by: INTERNAL MEDICINE

## 2017-10-19 PROCEDURE — 96375 TX/PRO/DX INJ NEW DRUG ADDON: CPT

## 2017-10-19 PROCEDURE — 96413 CHEMO IV INFUSION 1 HR: CPT

## 2017-10-19 PROCEDURE — 96417 CHEMO IV INFUS EACH ADDL SEQ: CPT

## 2017-10-19 RX ORDER — SODIUM CHLORIDE 0.9 % (FLUSH) 0.9 %
10 SYRINGE (ML) INJECTION AS NEEDED
Status: CANCELLED | OUTPATIENT
Start: 2017-10-19

## 2017-10-19 RX ORDER — SODIUM CHLORIDE 9 MG/ML
250 INJECTION, SOLUTION INTRAVENOUS ONCE
Status: COMPLETED | OUTPATIENT
Start: 2017-10-19 | End: 2017-10-19

## 2017-10-19 RX ORDER — LORAZEPAM 2 MG/ML
1 INJECTION INTRAMUSCULAR ONCE
Status: DISCONTINUED | OUTPATIENT
Start: 2017-10-19 | End: 2017-10-19 | Stop reason: HOSPADM

## 2017-10-19 RX ORDER — SODIUM CHLORIDE 0.9 % (FLUSH) 0.9 %
10 SYRINGE (ML) INJECTION AS NEEDED
Status: DISCONTINUED | OUTPATIENT
Start: 2017-10-19 | End: 2017-10-19 | Stop reason: HOSPADM

## 2017-10-19 RX ORDER — FAMOTIDINE 10 MG/ML
20 INJECTION, SOLUTION INTRAVENOUS ONCE
Status: COMPLETED | OUTPATIENT
Start: 2017-10-19 | End: 2017-10-19

## 2017-10-19 RX ADMIN — PERTUZUMAB 420 MG: 30 INJECTION, SOLUTION, CONCENTRATE INTRAVENOUS at 12:57

## 2017-10-19 RX ADMIN — FAMOTIDINE 20 MG: 10 INJECTION, SOLUTION INTRAVENOUS at 12:24

## 2017-10-19 RX ADMIN — HEPARIN 500 UNITS: 100 SYRINGE at 15:19

## 2017-10-19 RX ADMIN — TRASTUZUMAB 320 MG: 150 INJECTION, POWDER, LYOPHILIZED, FOR SOLUTION INTRAVENOUS at 14:39

## 2017-10-19 RX ADMIN — DEXAMETHASONE SODIUM PHOSPHATE 10 MG: 10 INJECTION INTRAMUSCULAR; INTRAVENOUS at 12:31

## 2017-10-19 RX ADMIN — DIPHENHYDRAMINE HYDROCHLORIDE 50 MG: 50 INJECTION INTRAMUSCULAR; INTRAVENOUS at 12:31

## 2017-10-19 RX ADMIN — Medication 10 ML: at 15:19

## 2017-10-19 RX ADMIN — SODIUM CHLORIDE 250 ML: 9 INJECTION, SOLUTION INTRAVENOUS at 12:57

## 2017-10-24 ENCOUNTER — TRANSCRIBE ORDERS (OUTPATIENT)
Dept: ADMINISTRATIVE | Facility: HOSPITAL | Age: 22
End: 2017-10-24

## 2017-10-24 DIAGNOSIS — C50.811 MALIGNANT NEOPLASM OF OVERLAPPING SITES OF RIGHT FEMALE BREAST, UNSPECIFIED ESTROGEN RECEPTOR STATUS (HCC): Primary | ICD-10-CM

## 2017-10-30 ENCOUNTER — HOSPITAL ENCOUNTER (OUTPATIENT)
Dept: NUCLEAR MEDICINE | Facility: HOSPITAL | Age: 22
Discharge: HOME | End: 2017-10-30
Attending: SURGERY

## 2017-10-30 ENCOUNTER — HOSPITAL ENCOUNTER (OUTPATIENT)
Facility: HOSPITAL | Age: 22
Setting detail: OBSERVATION
Discharge: HOME | End: 2017-10-31
Attending: SURGERY | Admitting: SURGERY

## 2017-10-30 ENCOUNTER — ANESTHESIA EVENT (OUTPATIENT)
Dept: PERIOP | Facility: HOSPITAL | Age: 22
End: 2017-10-30

## 2017-10-30 ENCOUNTER — ANESTHESIA (OUTPATIENT)
Dept: PERIOP | Facility: HOSPITAL | Age: 22
End: 2017-10-30

## 2017-10-30 DIAGNOSIS — Z85.3 HISTORY OF RIGHT BREAST CANCER: ICD-10-CM

## 2017-10-30 DIAGNOSIS — C50.811 MALIGNANT NEOPLASM OF OVERLAPPING SITES OF RIGHT FEMALE BREAST, UNSPECIFIED ESTROGEN RECEPTOR STATUS (HCC): ICD-10-CM

## 2017-10-30 PROBLEM — C50.919 BREAST CANCER (HCC): Status: ACTIVE | Noted: 2017-10-30

## 2017-10-30 LAB
DEPRECATED RDW RBC AUTO: 48.2 FL (ref 37–54)
ERYTHROCYTE [DISTWIDTH] IN BLOOD BY AUTOMATED COUNT: 12.8 % (ref 11.3–14.5)
HCT VFR BLD AUTO: 38.9 % (ref 34.5–44)
HGB BLD-MCNC: 12.8 G/DL (ref 11.5–15.5)
MCH RBC QN AUTO: 34 PG (ref 27–31)
MCHC RBC AUTO-ENTMCNC: 32.9 G/DL (ref 32–36)
MCV RBC AUTO: 103.5 FL (ref 80–99)
PLATELET # BLD AUTO: 165 10*3/MM3 (ref 150–450)
PMV BLD AUTO: 9.5 FL (ref 6–12)
RBC # BLD AUTO: 3.76 10*6/MM3 (ref 3.89–5.14)
WBC NRBC COR # BLD: 3.48 10*3/MM3 (ref 3.5–10.8)

## 2017-10-30 PROCEDURE — 25010000003 CEFAZOLIN IN DEXTROSE 2-4 GM/100ML-% SOLUTION: Performed by: SURGERY

## 2017-10-30 PROCEDURE — 88307 TISSUE EXAM BY PATHOLOGIST: CPT | Performed by: SURGERY

## 2017-10-30 PROCEDURE — 25010000002 ONDANSETRON PER 1 MG: Performed by: NURSE ANESTHETIST, CERTIFIED REGISTERED

## 2017-10-30 PROCEDURE — G0378 HOSPITAL OBSERVATION PER HR: HCPCS

## 2017-10-30 PROCEDURE — 85027 COMPLETE CBC AUTOMATED: CPT | Performed by: FAMILY MEDICINE

## 2017-10-30 PROCEDURE — 25010000003 CEFAZOLIN IN DEXTROSE 2-4 GM/100ML-% SOLUTION: Performed by: FAMILY MEDICINE

## 2017-10-30 PROCEDURE — 25010000002 HYDROMORPHONE PER 4 MG: Performed by: FAMILY MEDICINE

## 2017-10-30 PROCEDURE — 88332 PATH CONSLTJ SURG EA ADD BLK: CPT | Performed by: SURGERY

## 2017-10-30 PROCEDURE — C1789 PROSTHESIS, BREAST, IMP: HCPCS | Performed by: SURGERY

## 2017-10-30 PROCEDURE — 88331 PATH CONSLTJ SURG 1 BLK 1SPC: CPT | Performed by: SURGERY

## 2017-10-30 PROCEDURE — 25010000002 PROPOFOL 10 MG/ML EMULSION: Performed by: NURSE ANESTHETIST, CERTIFIED REGISTERED

## 2017-10-30 PROCEDURE — 25010000002 HYDROMORPHONE PER 4 MG: Performed by: NURSE ANESTHETIST, CERTIFIED REGISTERED

## 2017-10-30 PROCEDURE — 25010000002 SUCCINYLCHOLINE PER 20 MG: Performed by: NURSE ANESTHETIST, CERTIFIED REGISTERED

## 2017-10-30 PROCEDURE — 88342 IMHCHEM/IMCYTCHM 1ST ANTB: CPT | Performed by: SURGERY

## 2017-10-30 PROCEDURE — 25010000003 CEFAZOLIN 1 GM/50ML SOLUTION: Performed by: NURSE ANESTHETIST, CERTIFIED REGISTERED

## 2017-10-30 PROCEDURE — 25010000002 FENTANYL CITRATE (PF) 100 MCG/2ML SOLUTION: Performed by: NURSE ANESTHETIST, CERTIFIED REGISTERED

## 2017-10-30 PROCEDURE — 25010000002 DEXAMETHASONE PER 1 MG: Performed by: NURSE ANESTHETIST, CERTIFIED REGISTERED

## 2017-10-30 DEVICE — IMPLANTABLE DEVICE: Type: IMPLANTABLE DEVICE | Site: BREAST | Status: FUNCTIONAL

## 2017-10-30 DEVICE — GRFT TISS ALLODERM RTM 16X20CM 2.4MM/THK .4MM: Type: IMPLANTABLE DEVICE | Site: BREAST | Status: FUNCTIONAL

## 2017-10-30 RX ORDER — CARISOPRODOL 350 MG/1
350 TABLET ORAL EVERY 8 HOURS PRN
Status: DISCONTINUED | OUTPATIENT
Start: 2017-10-30 | End: 2017-10-31 | Stop reason: HOSPADM

## 2017-10-30 RX ORDER — MAGNESIUM HYDROXIDE 1200 MG/15ML
LIQUID ORAL AS NEEDED
Status: DISCONTINUED | OUTPATIENT
Start: 2017-10-30 | End: 2017-10-30 | Stop reason: HOSPADM

## 2017-10-30 RX ORDER — OXYCODONE HYDROCHLORIDE 5 MG/1
5 TABLET ORAL EVERY 4 HOURS PRN
Status: CANCELLED | OUTPATIENT
Start: 2017-10-30 | End: 2017-11-09

## 2017-10-30 RX ORDER — PROPOFOL 10 MG/ML
VIAL (ML) INTRAVENOUS AS NEEDED
Status: DISCONTINUED | OUTPATIENT
Start: 2017-10-30 | End: 2017-10-30 | Stop reason: SURG

## 2017-10-30 RX ORDER — CEFAZOLIN SODIUM 2 G/100ML
2 INJECTION, SOLUTION INTRAVENOUS EVERY 8 HOURS
Status: COMPLETED | OUTPATIENT
Start: 2017-10-30 | End: 2017-10-31

## 2017-10-30 RX ORDER — FENTANYL CITRATE 50 UG/ML
INJECTION, SOLUTION INTRAMUSCULAR; INTRAVENOUS AS NEEDED
Status: DISCONTINUED | OUTPATIENT
Start: 2017-10-30 | End: 2017-10-30 | Stop reason: SURG

## 2017-10-30 RX ORDER — DIPHENHYDRAMINE HYDROCHLORIDE 50 MG/ML
12.5 INJECTION INTRAMUSCULAR; INTRAVENOUS EVERY 6 HOURS PRN
Status: CANCELLED | OUTPATIENT
Start: 2017-10-30

## 2017-10-30 RX ORDER — HYDROMORPHONE HYDROCHLORIDE 1 MG/ML
0.5 INJECTION, SOLUTION INTRAMUSCULAR; INTRAVENOUS; SUBCUTANEOUS
Status: CANCELLED | OUTPATIENT
Start: 2017-10-30 | End: 2017-11-09

## 2017-10-30 RX ORDER — OXYCODONE HYDROCHLORIDE AND ACETAMINOPHEN 5; 325 MG/1; MG/1
1 TABLET ORAL ONCE AS NEEDED
Status: DISCONTINUED | OUTPATIENT
Start: 2017-10-30 | End: 2017-10-30 | Stop reason: HOSPADM

## 2017-10-30 RX ORDER — HYDROMORPHONE HYDROCHLORIDE 1 MG/ML
0.5 INJECTION, SOLUTION INTRAMUSCULAR; INTRAVENOUS; SUBCUTANEOUS
Status: DISCONTINUED | OUTPATIENT
Start: 2017-10-30 | End: 2017-10-31 | Stop reason: HOSPADM

## 2017-10-30 RX ORDER — NALOXONE HCL 0.4 MG/ML
0.1 VIAL (ML) INJECTION
Status: DISCONTINUED | OUTPATIENT
Start: 2017-10-30 | End: 2017-10-31 | Stop reason: HOSPADM

## 2017-10-30 RX ORDER — PROMETHAZINE HYDROCHLORIDE 25 MG/ML
6.25 INJECTION, SOLUTION INTRAMUSCULAR; INTRAVENOUS EVERY 6 HOURS PRN
Status: DISCONTINUED | OUTPATIENT
Start: 2017-10-30 | End: 2017-10-31 | Stop reason: HOSPADM

## 2017-10-30 RX ORDER — PROPOFOL 10 MG/ML
VIAL (ML) INTRAVENOUS CONTINUOUS PRN
Status: DISCONTINUED | OUTPATIENT
Start: 2017-10-30 | End: 2017-10-30 | Stop reason: SURG

## 2017-10-30 RX ORDER — CARISOPRODOL 350 MG/1
350 TABLET ORAL EVERY 8 HOURS PRN
Status: CANCELLED | OUTPATIENT
Start: 2017-10-30 | End: 2017-11-09

## 2017-10-30 RX ORDER — MIDAZOLAM HYDROCHLORIDE 1 MG/ML
2 INJECTION INTRAMUSCULAR; INTRAVENOUS ONCE
Status: DISCONTINUED | OUTPATIENT
Start: 2017-10-30 | End: 2017-10-31 | Stop reason: HOSPADM

## 2017-10-30 RX ORDER — SODIUM CHLORIDE 9 MG/ML
50 INJECTION, SOLUTION INTRAVENOUS CONTINUOUS
Status: CANCELLED | OUTPATIENT
Start: 2017-10-30

## 2017-10-30 RX ORDER — ONDANSETRON 2 MG/ML
4 INJECTION INTRAMUSCULAR; INTRAVENOUS EVERY 6 HOURS PRN
Status: CANCELLED | OUTPATIENT
Start: 2017-10-30

## 2017-10-30 RX ORDER — CEFAZOLIN SODIUM 2 G/100ML
2 INJECTION, SOLUTION INTRAVENOUS ONCE
Status: COMPLETED | OUTPATIENT
Start: 2017-10-30 | End: 2017-10-30

## 2017-10-30 RX ORDER — SODIUM CHLORIDE 9 MG/ML
50 INJECTION, SOLUTION INTRAVENOUS CONTINUOUS
Status: DISCONTINUED | OUTPATIENT
Start: 2017-10-30 | End: 2017-10-31 | Stop reason: HOSPADM

## 2017-10-30 RX ORDER — DIPHENHYDRAMINE HYDROCHLORIDE 50 MG/ML
12.5 INJECTION INTRAMUSCULAR; INTRAVENOUS EVERY 6 HOURS PRN
Status: DISCONTINUED | OUTPATIENT
Start: 2017-10-30 | End: 2017-10-31 | Stop reason: HOSPADM

## 2017-10-30 RX ORDER — LIDOCAINE HYDROCHLORIDE 10 MG/ML
0.5 INJECTION, SOLUTION EPIDURAL; INFILTRATION; INTRACAUDAL; PERINEURAL ONCE AS NEEDED
Status: COMPLETED | OUTPATIENT
Start: 2017-10-30 | End: 2017-10-30

## 2017-10-30 RX ORDER — IBUPROFEN 600 MG/1
600 TABLET ORAL ONCE AS NEEDED
Status: DISCONTINUED | OUTPATIENT
Start: 2017-10-30 | End: 2017-10-30 | Stop reason: HOSPADM

## 2017-10-30 RX ORDER — IBUPROFEN 400 MG/1
400 TABLET ORAL EVERY 6 HOURS PRN
Status: DISCONTINUED | OUTPATIENT
Start: 2017-10-30 | End: 2017-10-31 | Stop reason: HOSPADM

## 2017-10-30 RX ORDER — TRAMADOL HYDROCHLORIDE 50 MG/1
50 TABLET ORAL EVERY 6 HOURS PRN
Status: DISCONTINUED | OUTPATIENT
Start: 2017-10-30 | End: 2017-10-31 | Stop reason: HOSPADM

## 2017-10-30 RX ORDER — SODIUM CHLORIDE 0.9 % (FLUSH) 0.9 %
1-10 SYRINGE (ML) INJECTION AS NEEDED
Status: DISCONTINUED | OUTPATIENT
Start: 2017-10-30 | End: 2017-10-30 | Stop reason: HOSPADM

## 2017-10-30 RX ORDER — ONDANSETRON 2 MG/ML
4 INJECTION INTRAMUSCULAR; INTRAVENOUS EVERY 6 HOURS PRN
Status: DISCONTINUED | OUTPATIENT
Start: 2017-10-30 | End: 2017-10-31 | Stop reason: HOSPADM

## 2017-10-30 RX ORDER — ACETAMINOPHEN 500 MG
1000 TABLET ORAL EVERY 6 HOURS
Status: CANCELLED | OUTPATIENT
Start: 2017-10-30 | End: 2017-11-01

## 2017-10-30 RX ORDER — SUCCINYLCHOLINE CHLORIDE 20 MG/ML
INJECTION INTRAMUSCULAR; INTRAVENOUS AS NEEDED
Status: DISCONTINUED | OUTPATIENT
Start: 2017-10-30 | End: 2017-10-30 | Stop reason: SURG

## 2017-10-30 RX ORDER — FAMOTIDINE 20 MG/1
20 TABLET, FILM COATED ORAL ONCE
Status: COMPLETED | OUTPATIENT
Start: 2017-10-30 | End: 2017-10-30

## 2017-10-30 RX ORDER — DEXAMETHASONE SODIUM PHOSPHATE 4 MG/ML
INJECTION, SOLUTION INTRA-ARTICULAR; INTRALESIONAL; INTRAMUSCULAR; INTRAVENOUS; SOFT TISSUE AS NEEDED
Status: DISCONTINUED | OUTPATIENT
Start: 2017-10-30 | End: 2017-10-30 | Stop reason: SURG

## 2017-10-30 RX ORDER — HYDROMORPHONE HYDROCHLORIDE 1 MG/ML
0.5 INJECTION, SOLUTION INTRAMUSCULAR; INTRAVENOUS; SUBCUTANEOUS
Status: DISCONTINUED | OUTPATIENT
Start: 2017-10-30 | End: 2017-10-30 | Stop reason: HOSPADM

## 2017-10-30 RX ORDER — ACETAMINOPHEN 325 MG/1
650 TABLET ORAL ONCE AS NEEDED
Status: DISCONTINUED | OUTPATIENT
Start: 2017-10-30 | End: 2017-10-30 | Stop reason: HOSPADM

## 2017-10-30 RX ORDER — CEFAZOLIN SODIUM 2 G/100ML
2 INJECTION, SOLUTION INTRAVENOUS EVERY 8 HOURS
Status: DISCONTINUED | OUTPATIENT
Start: 2017-10-31 | End: 2017-10-30

## 2017-10-30 RX ORDER — ROCURONIUM BROMIDE 10 MG/ML
INJECTION, SOLUTION INTRAVENOUS AS NEEDED
Status: DISCONTINUED | OUTPATIENT
Start: 2017-10-30 | End: 2017-10-30 | Stop reason: SURG

## 2017-10-30 RX ORDER — OXYCODONE HYDROCHLORIDE 5 MG/1
5 TABLET ORAL EVERY 4 HOURS PRN
Status: DISCONTINUED | OUTPATIENT
Start: 2017-10-30 | End: 2017-10-31 | Stop reason: HOSPADM

## 2017-10-30 RX ORDER — LIDOCAINE HYDROCHLORIDE 40 MG/ML
SOLUTION TOPICAL AS NEEDED
Status: DISCONTINUED | OUTPATIENT
Start: 2017-10-30 | End: 2017-10-30 | Stop reason: SURG

## 2017-10-30 RX ORDER — FAMOTIDINE 10 MG/ML
20 INJECTION, SOLUTION INTRAVENOUS ONCE
Status: CANCELLED | OUTPATIENT
Start: 2017-10-30 | End: 2017-10-30

## 2017-10-30 RX ORDER — TRAMADOL HYDROCHLORIDE 50 MG/1
50 TABLET ORAL EVERY 6 HOURS PRN
Status: CANCELLED | OUTPATIENT
Start: 2017-10-30 | End: 2017-11-09

## 2017-10-30 RX ORDER — IBUPROFEN 400 MG/1
400 TABLET ORAL EVERY 6 HOURS PRN
Status: CANCELLED | OUTPATIENT
Start: 2017-10-30

## 2017-10-30 RX ORDER — FENTANYL CITRATE 50 UG/ML
50 INJECTION, SOLUTION INTRAMUSCULAR; INTRAVENOUS
Status: DISCONTINUED | OUTPATIENT
Start: 2017-10-30 | End: 2017-10-30 | Stop reason: HOSPADM

## 2017-10-30 RX ORDER — NALOXONE HCL 0.4 MG/ML
0.1 VIAL (ML) INJECTION
Status: CANCELLED | OUTPATIENT
Start: 2017-10-30

## 2017-10-30 RX ORDER — LIDOCAINE HYDROCHLORIDE 10 MG/ML
INJECTION, SOLUTION EPIDURAL; INFILTRATION; INTRACAUDAL; PERINEURAL AS NEEDED
Status: DISCONTINUED | OUTPATIENT
Start: 2017-10-30 | End: 2017-10-30 | Stop reason: SURG

## 2017-10-30 RX ORDER — CEFAZOLIN SODIUM 2 G/100ML
2 INJECTION, SOLUTION INTRAVENOUS EVERY 8 HOURS
Status: CANCELLED | OUTPATIENT
Start: 2017-10-30

## 2017-10-30 RX ORDER — PROMETHAZINE HYDROCHLORIDE 25 MG/ML
6.25 INJECTION, SOLUTION INTRAMUSCULAR; INTRAVENOUS EVERY 6 HOURS PRN
Status: CANCELLED | OUTPATIENT
Start: 2017-10-30

## 2017-10-30 RX ORDER — ACETAMINOPHEN 500 MG
1000 TABLET ORAL EVERY 6 HOURS
Status: DISCONTINUED | OUTPATIENT
Start: 2017-10-30 | End: 2017-10-31 | Stop reason: HOSPADM

## 2017-10-30 RX ORDER — ACETAMINOPHEN 650 MG/1
650 SUPPOSITORY RECTAL ONCE AS NEEDED
Status: DISCONTINUED | OUTPATIENT
Start: 2017-10-30 | End: 2017-10-30 | Stop reason: HOSPADM

## 2017-10-30 RX ORDER — SODIUM CHLORIDE, SODIUM LACTATE, POTASSIUM CHLORIDE, CALCIUM CHLORIDE 600; 310; 30; 20 MG/100ML; MG/100ML; MG/100ML; MG/100ML
9 INJECTION, SOLUTION INTRAVENOUS CONTINUOUS
Status: DISCONTINUED | OUTPATIENT
Start: 2017-10-30 | End: 2017-10-31 | Stop reason: HOSPADM

## 2017-10-30 RX ORDER — ONDANSETRON 2 MG/ML
INJECTION INTRAMUSCULAR; INTRAVENOUS AS NEEDED
Status: DISCONTINUED | OUTPATIENT
Start: 2017-10-30 | End: 2017-10-30 | Stop reason: SURG

## 2017-10-30 RX ADMIN — CEFAZOLIN SODIUM 2 G: 2 INJECTION, SOLUTION INTRAVENOUS at 21:55

## 2017-10-30 RX ADMIN — SUCCINYLCHOLINE CHLORIDE 140 MG: 20 INJECTION, SOLUTION INTRAMUSCULAR; INTRAVENOUS at 11:25

## 2017-10-30 RX ADMIN — TECHNETIUM TC 99M SULFUR COLLOID 1 DOSE: KIT at 11:25

## 2017-10-30 RX ADMIN — LIDOCAINE HYDROCHLORIDE 50 MG: 10 INJECTION, SOLUTION EPIDURAL; INFILTRATION; INTRACAUDAL; PERINEURAL at 11:25

## 2017-10-30 RX ADMIN — PROPOFOL 140 MCG/KG/MIN: 10 INJECTION, EMULSION INTRAVENOUS at 11:25

## 2017-10-30 RX ADMIN — HYDROMORPHONE HYDROCHLORIDE 0.5 MG: 1 INJECTION, SOLUTION INTRAMUSCULAR; INTRAVENOUS; SUBCUTANEOUS at 17:10

## 2017-10-30 RX ADMIN — LIDOCAINE HYDROCHLORIDE 1 EACH: 40 SOLUTION TOPICAL at 11:29

## 2017-10-30 RX ADMIN — IBUPROFEN 400 MG: 400 TABLET ORAL at 20:22

## 2017-10-30 RX ADMIN — LIDOCAINE HYDROCHLORIDE 0.5 ML: 10 INJECTION, SOLUTION EPIDURAL; INFILTRATION; INTRACAUDAL; PERINEURAL at 09:39

## 2017-10-30 RX ADMIN — CEFAZOLIN SODIUM 1 G: 1 INJECTION, SOLUTION INTRAVENOUS at 14:14

## 2017-10-30 RX ADMIN — ROCURONIUM BROMIDE 10 MG: 10 INJECTION INTRAVENOUS at 11:25

## 2017-10-30 RX ADMIN — DEXAMETHASONE SODIUM PHOSPHATE 8 MG: 4 INJECTION, SOLUTION INTRAMUSCULAR; INTRAVENOUS at 14:06

## 2017-10-30 RX ADMIN — FENTANYL CITRATE 100 MCG: 50 INJECTION, SOLUTION INTRAMUSCULAR; INTRAVENOUS at 11:25

## 2017-10-30 RX ADMIN — HYDROMORPHONE HYDROCHLORIDE 0.5 MG: 1 INJECTION, SOLUTION INTRAMUSCULAR; INTRAVENOUS; SUBCUTANEOUS at 23:20

## 2017-10-30 RX ADMIN — OXYCODONE HYDROCHLORIDE 5 MG: 5 TABLET ORAL at 19:33

## 2017-10-30 RX ADMIN — SODIUM CHLORIDE 50 ML/HR: 9 INJECTION, SOLUTION INTRAVENOUS at 18:30

## 2017-10-30 RX ADMIN — SODIUM CHLORIDE, POTASSIUM CHLORIDE, SODIUM LACTATE AND CALCIUM CHLORIDE 9 ML/HR: 600; 310; 30; 20 INJECTION, SOLUTION INTRAVENOUS at 09:41

## 2017-10-30 RX ADMIN — FENTANYL CITRATE 25 MCG: 50 INJECTION, SOLUTION INTRAMUSCULAR; INTRAVENOUS at 15:30

## 2017-10-30 RX ADMIN — PROPOFOL 70 MG: 10 INJECTION, EMULSION INTRAVENOUS at 11:45

## 2017-10-30 RX ADMIN — FAMOTIDINE 20 MG: 20 TABLET, FILM COATED ORAL at 09:39

## 2017-10-30 RX ADMIN — ONDANSETRON 4 MG: 2 INJECTION INTRAMUSCULAR; INTRAVENOUS at 16:02

## 2017-10-30 RX ADMIN — HYDROMORPHONE HYDROCHLORIDE 0.5 MG: 1 INJECTION, SOLUTION INTRAMUSCULAR; INTRAVENOUS; SUBCUTANEOUS at 20:22

## 2017-10-30 RX ADMIN — SODIUM CHLORIDE, POTASSIUM CHLORIDE, SODIUM LACTATE AND CALCIUM CHLORIDE: 600; 310; 30; 20 INJECTION, SOLUTION INTRAVENOUS at 13:53

## 2017-10-30 RX ADMIN — TRAMADOL HYDROCHLORIDE 50 MG: 50 TABLET, COATED ORAL at 21:45

## 2017-10-30 RX ADMIN — CEFAZOLIN SODIUM 2 G: 2 INJECTION, SOLUTION INTRAVENOUS at 11:20

## 2017-10-30 RX ADMIN — FENTANYL CITRATE 25 MCG: 50 INJECTION, SOLUTION INTRAMUSCULAR; INTRAVENOUS at 15:54

## 2017-10-30 RX ADMIN — HYDROMORPHONE HYDROCHLORIDE 0.5 MG: 1 INJECTION, SOLUTION INTRAMUSCULAR; INTRAVENOUS; SUBCUTANEOUS at 18:30

## 2017-10-30 RX ADMIN — ACETAMINOPHEN 1000 MG: 500 TABLET ORAL at 19:33

## 2017-10-30 RX ADMIN — PROPOFOL 200 MG: 10 INJECTION, EMULSION INTRAVENOUS at 11:25

## 2017-10-30 RX ADMIN — FENTANYL CITRATE 50 MCG: 50 INJECTION, SOLUTION INTRAMUSCULAR; INTRAVENOUS at 16:17

## 2017-10-30 NOTE — ANESTHESIA PREPROCEDURE EVALUATION
Anesthesia Evaluation     Patient summary reviewed and Nursing notes reviewed   history of anesthetic complications:  NPO Solid Status: > 8 hours  NPO Liquid Status: > 8 hours     Airway   Mallampati: II  TM distance: >3 FB  Neck ROM: full  no difficulty expected  Dental      Pulmonary - negative pulmonary ROS and normal exam   Cardiovascular - negative cardio ROS and normal exam        Neuro/Psych  (+) headaches,    GI/Hepatic/Renal/Endo - negative ROS     Musculoskeletal     Abdominal    Substance History      OB/GYN          Other      history of cancer                                    Anesthesia Plan    ASA 2     general   (Pecs blocks)  intravenous induction   Anesthetic plan and risks discussed with patient.    Plan discussed with CRNA.

## 2017-10-30 NOTE — ANESTHESIA POSTPROCEDURE EVALUATION
Patient: Angie Sutherland    Procedure Summary     Date Anesthesia Start Anesthesia Stop Room / Location    10/30/17 1120 1641  [facility] / [facility]       Procedure Diagnosis Surgeon Provider    BREAST MASTECTOMY BILATERAL WITH RIGHT SENTINEL NODE BIOPSY (Bilateral Breast); BREAST RECONSTRUCTION WITH ALLODERM, BREAST TISSUE EXPANDER INSERTION BILATERAL (Bilateral Breast) No diagnosis on file. Carlo Paulino MD; MD Lloyd Walker MD          Anesthesia Type: general  Last vitals  BP   105/72 (10/30/17 0934)   Temp   97.4 °F (36.3 °C) (10/30/17 0934)   Pulse   84 (10/30/17 0934)   Resp   18 (10/30/17 0934)     SpO2   100 % (10/30/17 0934)     Post Anesthesia Care and Evaluation    Patient location during evaluation: PACU  Level of consciousness: sleepy but conscious  Pain score: 0  Pain management: adequate  Airway patency: patent  Anesthetic complications: No anesthetic complications  PONV Status: none  Cardiovascular status: hemodynamically stable and acceptable  Respiratory status: nonlabored ventilation, acceptable and nasal cannula  Hydration status: acceptable

## 2017-10-30 NOTE — ANESTHESIA PROCEDURE NOTES
Peripheral Block    Patient location during procedure: OR  Start time: 10/30/2017 11:26 AM  Stop time: 10/30/2017 11:33 AM  Reason for block: at surgeon's request  Performed by  Anesthesiologist: JODI BERMUDEZ  Preanesthetic Checklist  Completed: patient identified, site marked, surgical consent, pre-op evaluation, timeout performed, IV checked, risks and benefits discussed and monitors and equipment checked  Prep:  Sterile barriers:cap, gloves, gown and mask  Prep: ChloraPrep  Patient monitoring: blood pressure monitoring, continuous pulse oximetry and EKG  Procedure    Guidance:ultrasound guided and landmark technique  Images:still images not obtained    Laterality:Bilateral  Block Type:PECS I and PECS II  Injection Technique:single-shot  Needle Type:short-bevel  Needle Gauge:20 G    Medications  Preservative Free Saline:10ml  Comment:Block Injection:  Total volume of LA divided between Right and Left sided blocks       Adjuncts:   Buprenorphine 0.30 mcg ,Decadron 4 mg PSF  Local Injected:bupivacaine 0.25% Local Amount Injected:60mL  Post Assessment  Injection Assessment: negative aspiration for heme and incremental injection  Patient Tolerance:comfortable throughout block  Complications:no  Additional Notes  The pt. Was placed in the Supine Position and GA was induced     The insertion site was prepped with CHG and Ultrasound guidance with In-Plane techniquewas  a 4inch BBraun 360 degree echogenic needle was visualized.  Normal Saline PSF was  utilized for hydrodissection of tissue. PECS 1 Block- Pectoralis Major and Minor where identified and LA was injected between PMM and PmM at the level of the 3rd Rib(10ml),  PECS 2-  Pectoralis Minor and Serratus muscle where identified and the needle was advanced laterally in-plane with the 4th rib as a backstop, pleura was monitored.  LA was injected between SA and PmM at the level of 4th rib( 20ml).  LA injection spread was visualized, injection was incremental  1-5ml, normal or low injection pressure, no intravascular injection, no pneumothorax appreciated.  Thank You.

## 2017-10-31 VITALS
DIASTOLIC BLOOD PRESSURE: 58 MMHG | OXYGEN SATURATION: 100 % | SYSTOLIC BLOOD PRESSURE: 108 MMHG | BODY MASS INDEX: 22.26 KG/M2 | WEIGHT: 121 LBS | HEIGHT: 62 IN | RESPIRATION RATE: 16 BRPM | HEART RATE: 93 BPM | TEMPERATURE: 98.7 F

## 2017-10-31 PROCEDURE — 25010000003 CEFAZOLIN IN DEXTROSE 2-4 GM/100ML-% SOLUTION: Performed by: SURGERY

## 2017-10-31 PROCEDURE — G0378 HOSPITAL OBSERVATION PER HR: HCPCS

## 2017-10-31 PROCEDURE — 25010000002 HYDROMORPHONE PER 4 MG: Performed by: FAMILY MEDICINE

## 2017-10-31 RX ORDER — CARISOPRODOL 350 MG/1
350 TABLET ORAL 3 TIMES DAILY PRN
Qty: 35 TABLET | Refills: 0 | Status: SHIPPED | OUTPATIENT
Start: 2017-10-31 | End: 2019-01-14 | Stop reason: HOSPADM

## 2017-10-31 RX ORDER — ONDANSETRON 4 MG/1
4 TABLET, FILM COATED ORAL DAILY PRN
Qty: 10 TABLET | Refills: 0 | Status: SHIPPED | OUTPATIENT
Start: 2017-10-31 | End: 2018-09-18

## 2017-10-31 RX ORDER — IBUPROFEN 400 MG/1
400 TABLET ORAL EVERY 6 HOURS PRN
Qty: 35 TABLET | Refills: 0 | Status: SHIPPED | OUTPATIENT
Start: 2017-10-31 | End: 2019-01-14 | Stop reason: HOSPADM

## 2017-10-31 RX ORDER — TRAMADOL HYDROCHLORIDE 50 MG/1
50 TABLET ORAL EVERY 6 HOURS PRN
Qty: 35 TABLET | Refills: 0 | Status: SHIPPED | OUTPATIENT
Start: 2017-10-31 | End: 2017-12-20 | Stop reason: SDUPTHER

## 2017-10-31 RX ORDER — ACETAMINOPHEN 500 MG
500 TABLET ORAL EVERY 6 HOURS PRN
Qty: 30 TABLET | Refills: 0 | Status: SHIPPED | OUTPATIENT
Start: 2017-10-31 | End: 2019-01-14 | Stop reason: HOSPADM

## 2017-10-31 RX ORDER — OXYCODONE HYDROCHLORIDE 5 MG/1
5 TABLET ORAL EVERY 6 HOURS PRN
Qty: 35 TABLET | Refills: 0 | Status: SHIPPED | OUTPATIENT
Start: 2017-10-31 | End: 2018-08-13

## 2017-10-31 RX ADMIN — OXYCODONE HYDROCHLORIDE 5 MG: 5 TABLET ORAL at 06:41

## 2017-10-31 RX ADMIN — OXYCODONE HYDROCHLORIDE 5 MG: 5 TABLET ORAL at 00:54

## 2017-10-31 RX ADMIN — HYDROMORPHONE HYDROCHLORIDE 0.5 MG: 1 INJECTION, SOLUTION INTRAMUSCULAR; INTRAVENOUS; SUBCUTANEOUS at 08:27

## 2017-10-31 RX ADMIN — CEFAZOLIN SODIUM 2 G: 2 INJECTION, SOLUTION INTRAVENOUS at 05:06

## 2017-10-31 RX ADMIN — TRAMADOL HYDROCHLORIDE 50 MG: 50 TABLET, COATED ORAL at 03:56

## 2017-10-31 RX ADMIN — ACETAMINOPHEN 1000 MG: 500 TABLET ORAL at 06:41

## 2017-10-31 RX ADMIN — CARISOPRODOL 350 MG: 350 TABLET ORAL at 05:06

## 2017-10-31 RX ADMIN — IBUPROFEN 400 MG: 400 TABLET ORAL at 03:56

## 2017-10-31 RX ADMIN — ACETAMINOPHEN 1000 MG: 500 TABLET ORAL at 00:54

## 2017-11-02 LAB
CYTO UR: NORMAL
LAB AP CASE REPORT: NORMAL
LAB AP CLINICAL INFORMATION: NORMAL
LAB AP SPECIAL STAINS: NORMAL
Lab: NORMAL
Lab: NORMAL
PATH REPORT.FINAL DX SPEC: NORMAL
PATH REPORT.GROSS SPEC: NORMAL

## 2017-11-08 ENCOUNTER — OFFICE VISIT (OUTPATIENT)
Dept: ONCOLOGY | Facility: CLINIC | Age: 22
End: 2017-11-08

## 2017-11-08 ENCOUNTER — INFUSION (OUTPATIENT)
Dept: ONCOLOGY | Facility: HOSPITAL | Age: 22
End: 2017-11-08

## 2017-11-08 ENCOUNTER — DOCUMENTATION (OUTPATIENT)
Dept: SOCIAL WORK | Facility: HOSPITAL | Age: 22
End: 2017-11-08

## 2017-11-08 VITALS
TEMPERATURE: 97.8 F | SYSTOLIC BLOOD PRESSURE: 94 MMHG | DIASTOLIC BLOOD PRESSURE: 55 MMHG | BODY MASS INDEX: 21.53 KG/M2 | HEART RATE: 100 BPM | RESPIRATION RATE: 16 BRPM | WEIGHT: 117 LBS | HEIGHT: 62 IN

## 2017-11-08 DIAGNOSIS — T45.1X5A CHEMOTHERAPY INDUCED CARDIOMYOPATHY (HCC): Primary | ICD-10-CM

## 2017-11-08 DIAGNOSIS — C50.911 MALIGNANT NEOPLASM OF RIGHT FEMALE BREAST, UNSPECIFIED ESTROGEN RECEPTOR STATUS, UNSPECIFIED SITE OF BREAST (HCC): ICD-10-CM

## 2017-11-08 DIAGNOSIS — C50.811 MALIGNANT NEOPLASM OF OVERLAPPING SITES OF RIGHT FEMALE BREAST, UNSPECIFIED ESTROGEN RECEPTOR STATUS (HCC): Primary | ICD-10-CM

## 2017-11-08 DIAGNOSIS — I42.7 CHEMOTHERAPY INDUCED CARDIOMYOPATHY (HCC): Primary | ICD-10-CM

## 2017-11-08 LAB
ALBUMIN SERPL-MCNC: 3.6 G/DL (ref 3.2–4.8)
ALBUMIN/GLOB SERPL: 1.3 G/DL (ref 1.5–2.5)
ALP SERPL-CCNC: 61 U/L (ref 25–100)
ALT SERPL W P-5'-P-CCNC: 15 U/L (ref 7–40)
ANION GAP SERPL CALCULATED.3IONS-SCNC: 1 MMOL/L (ref 3–11)
AST SERPL-CCNC: 22 U/L (ref 0–33)
BILIRUB SERPL-MCNC: 0.2 MG/DL (ref 0.3–1.2)
BUN BLD-MCNC: 13 MG/DL (ref 9–23)
BUN/CREAT SERPL: 21.7 (ref 7–25)
CALCIUM SPEC-SCNC: 8.3 MG/DL (ref 8.7–10.4)
CHLORIDE SERPL-SCNC: 104 MMOL/L (ref 99–109)
CO2 SERPL-SCNC: 32 MMOL/L (ref 20–31)
CREAT BLD-MCNC: 0.6 MG/DL (ref 0.6–1.3)
ERYTHROCYTE [DISTWIDTH] IN BLOOD BY AUTOMATED COUNT: 12.5 % (ref 11.3–14.5)
GFR SERPL CREATININE-BSD FRML MDRD: 150 ML/MIN/1.73
GLOBULIN UR ELPH-MCNC: 2.7 GM/DL
GLUCOSE BLD-MCNC: 92 MG/DL (ref 70–100)
HCT VFR BLD AUTO: 33 % (ref 34.5–44)
HGB BLD-MCNC: 10.6 G/DL (ref 11.5–15.5)
LYMPHOCYTES # BLD AUTO: 1.5 10*3/MM3 (ref 0.6–4.8)
LYMPHOCYTES NFR BLD AUTO: 48.8 % (ref 24–44)
MCH RBC QN AUTO: 33 PG (ref 27–31)
MCHC RBC AUTO-ENTMCNC: 32 G/DL (ref 32–36)
MCV RBC AUTO: 103.1 FL (ref 80–99)
MONOCYTES # BLD AUTO: 0.2 10*3/MM3 (ref 0–1)
MONOCYTES NFR BLD AUTO: 5.8 % (ref 0–12)
NEUTROPHILS # BLD AUTO: 1.4 10*3/MM3 (ref 1.5–8.3)
NEUTROPHILS NFR BLD AUTO: 45.4 % (ref 41–71)
PLATELET # BLD AUTO: 293 10*3/MM3 (ref 150–450)
PMV BLD AUTO: 6.2 FL (ref 6–12)
POTASSIUM BLD-SCNC: 4.1 MMOL/L (ref 3.5–5.5)
PROT SERPL-MCNC: 6.3 G/DL (ref 5.7–8.2)
RBC # BLD AUTO: 3.2 10*6/MM3 (ref 3.89–5.14)
SODIUM BLD-SCNC: 137 MMOL/L (ref 132–146)
WBC NRBC COR # BLD: 3 10*3/MM3 (ref 3.5–10.8)

## 2017-11-08 PROCEDURE — 96417 CHEMO IV INFUS EACH ADDL SEQ: CPT

## 2017-11-08 PROCEDURE — 25010000002 HEPARIN FLUSH (PORCINE) 100 UNIT/ML SOLUTION: Performed by: INTERNAL MEDICINE

## 2017-11-08 PROCEDURE — 80053 COMPREHEN METABOLIC PANEL: CPT

## 2017-11-08 PROCEDURE — 25010000002 PERTUZUMAB 420 MG/14ML SOLUTION 420 MG VIAL: Performed by: INTERNAL MEDICINE

## 2017-11-08 PROCEDURE — 96375 TX/PRO/DX INJ NEW DRUG ADDON: CPT

## 2017-11-08 PROCEDURE — 96365 THER/PROPH/DIAG IV INF INIT: CPT

## 2017-11-08 PROCEDURE — 96374 THER/PROPH/DIAG INJ IV PUSH: CPT

## 2017-11-08 PROCEDURE — 36591 DRAW BLOOD OFF VENOUS DEVICE: CPT

## 2017-11-08 PROCEDURE — 96413 CHEMO IV INFUSION 1 HR: CPT

## 2017-11-08 PROCEDURE — 25010000002 DIPHENHYDRAMINE PER 50 MG: Performed by: INTERNAL MEDICINE

## 2017-11-08 PROCEDURE — 85025 COMPLETE CBC W/AUTO DIFF WBC: CPT

## 2017-11-08 PROCEDURE — 96367 TX/PROPH/DG ADDL SEQ IV INF: CPT

## 2017-11-08 PROCEDURE — 25010000002 DEXAMETHASONE PER 1 MG: Performed by: INTERNAL MEDICINE

## 2017-11-08 PROCEDURE — 99214 OFFICE O/P EST MOD 30 MIN: CPT | Performed by: INTERNAL MEDICINE

## 2017-11-08 PROCEDURE — 25010000002 TRASTUZUMAB PER 10 MG: Performed by: INTERNAL MEDICINE

## 2017-11-08 RX ORDER — SODIUM CHLORIDE 9 MG/ML
250 INJECTION, SOLUTION INTRAVENOUS ONCE
Status: COMPLETED | OUTPATIENT
Start: 2017-11-08 | End: 2017-11-08

## 2017-11-08 RX ORDER — FAMOTIDINE 10 MG/ML
20 INJECTION, SOLUTION INTRAVENOUS ONCE
Status: CANCELLED
Start: 2017-11-08 | End: 2017-11-08

## 2017-11-08 RX ORDER — LORAZEPAM 2 MG/ML
1 INJECTION INTRAMUSCULAR ONCE
Status: CANCELLED | OUTPATIENT
Start: 2017-11-29 | End: 2017-11-21

## 2017-11-08 RX ORDER — SODIUM CHLORIDE 0.9 % (FLUSH) 0.9 %
10 SYRINGE (ML) INJECTION AS NEEDED
Status: CANCELLED | OUTPATIENT
Start: 2017-11-08

## 2017-11-08 RX ORDER — FAMOTIDINE 10 MG/ML
20 INJECTION, SOLUTION INTRAVENOUS ONCE
Status: COMPLETED | OUTPATIENT
Start: 2017-11-08 | End: 2017-11-08

## 2017-11-08 RX ORDER — LORAZEPAM 2 MG/ML
1 INJECTION INTRAMUSCULAR ONCE
Status: CANCELLED | OUTPATIENT
Start: 2017-11-08 | End: 2017-11-08

## 2017-11-08 RX ORDER — SODIUM CHLORIDE 9 MG/ML
250 INJECTION, SOLUTION INTRAVENOUS ONCE
Status: CANCELLED | OUTPATIENT
Start: 2017-11-08

## 2017-11-08 RX ORDER — LORAZEPAM 2 MG/ML
1 INJECTION INTRAMUSCULAR ONCE
Status: DISCONTINUED | OUTPATIENT
Start: 2017-11-08 | End: 2017-11-08 | Stop reason: HOSPADM

## 2017-11-08 RX ORDER — SODIUM CHLORIDE 0.9 % (FLUSH) 0.9 %
10 SYRINGE (ML) INJECTION AS NEEDED
Status: DISCONTINUED | OUTPATIENT
Start: 2017-11-08 | End: 2017-11-08 | Stop reason: HOSPADM

## 2017-11-08 RX ORDER — SODIUM CHLORIDE 9 MG/ML
250 INJECTION, SOLUTION INTRAVENOUS ONCE
Status: CANCELLED | OUTPATIENT
Start: 2017-11-29

## 2017-11-08 RX ORDER — FAMOTIDINE 10 MG/ML
20 INJECTION, SOLUTION INTRAVENOUS ONCE
Status: CANCELLED
Start: 2017-11-29 | End: 2017-11-21

## 2017-11-08 RX ADMIN — PERTUZUMAB 420 MG: 30 INJECTION, SOLUTION, CONCENTRATE INTRAVENOUS at 13:02

## 2017-11-08 RX ADMIN — DIPHENHYDRAMINE HYDROCHLORIDE 50 MG: 50 INJECTION INTRAMUSCULAR; INTRAVENOUS at 12:26

## 2017-11-08 RX ADMIN — HEPARIN 500 UNITS: 100 SYRINGE at 14:44

## 2017-11-08 RX ADMIN — DEXAMETHASONE SODIUM PHOSPHATE 10 MG: 10 INJECTION INTRAMUSCULAR; INTRAVENOUS at 12:30

## 2017-11-08 RX ADMIN — SODIUM CHLORIDE 250 ML: 9 INJECTION, SOLUTION INTRAVENOUS at 12:23

## 2017-11-08 RX ADMIN — TRASTUZUMAB 320 MG: 150 INJECTION, POWDER, LYOPHILIZED, FOR SOLUTION INTRAVENOUS at 14:08

## 2017-11-08 RX ADMIN — FAMOTIDINE 20 MG: 10 INJECTION, SOLUTION INTRAVENOUS at 12:23

## 2017-11-08 RX ADMIN — Medication 10 ML: at 14:44

## 2017-11-08 NOTE — PROGRESS NOTES
"      PROBLEM LIST:  1. [id number] (stage IIIB) invasive ductal carcinoma of the right breast, ER-, [place] weakly +, Her2 3+.  A) presented with pain and swelling of the right breast after childbirth. Biopsy 5/8/17 showed high grade IDC, Right axillary LN biopsy positive for metastatic node involvement. Skin biopsy showed involvement of the dermis with lymphatic space involvement.  PET/CT on 5/24/17 showed hypermetabolic activity in the breast and axillary nodes,  No distant spread of disease.  B) neoadjuvant chemotherapy with TCHP started on 5/25/17.  Infusion reaction consisting of severe leg pain with the first dose of herceptin.  Taxotere dose reduced to 80% on cycle 5 for neuropathy.  C) bilateral mastectomy on 10/30/17.  Pathology showed multifocal < 1 mm high grade IDC with focal lymphovascular invasion.  4 sentinel lymph nodes with no malignancy.   [id number] (Stage yIA)  2. Migraines  3. Pre-diabetes    Subjective     HISTORY OF PRESENT ILLNESS:   Angie Sutherland returns for follow-up after her bilateral mastectomy.  She is doing pretty well.  Her surgery was fairly uneventful.  She is planning for bilateral implant reconstruction.  She will start radiation in the near future.  She reports she is having trouble sleeping.  She has not tried taking anything over-the-counter.     Past Medical History, Past Surgical History, Social History, Family History have been reviewed and are without significant changes except as mentioned.    Review of Systems   A comprehensive 14 point review of systems was performed and was negative except as mentioned.    Medications:  The current medication list was reviewed in the EMR    ALLERGIES:    Allergies   Allergen Reactions   • No Known Drug Allergy        Objective      BP 94/55 Comment: LUE  Pulse 100  Temp 97.8 °F (36.6 °C) (Temporal Artery )   Resp 16  Ht 62\" (157.5 cm)  Wt 117 lb (53.1 kg)  LMP 11/23/2015  BMI 21.4 kg/m2     Performance Status: 0    General: well " appearing female in no acute distress  Neuro: alert and oriented  HEENT: sclera anicteric, oropharynx clear  Lymphatics: no cervical, supraclavicular Adenopathy  No palpable axillary adenopathy  Cardiovascular: regular rate and rhythm, no murmurs  Lungs: clear to auscultation bilaterally  Abdomen: soft, nontender, nondistended.  No palpable organomegaly  Extremeties: no lower extremity edema  Skin: no rashes, lesions, bruising, or petechiae  Psych: mood and affect appropriate    Labs are reviewed.  Notable for mild leukopenia      Assessment/Plan   Angie Sutherland is a 22 y.o. year old female with stage IIIB HER-2 positive breast cancer who returns for follow-up.  She had an excellent response to neoadjuvant chemotherapy with only microscopic foci of disease remaining.  However I am worried about her risk of CNS recurrence.  For now we will continue with Herceptin and Perjeta to complete 1 year of treatment.  After that we can discuss extended adjuvant therapy with neratinib.  There is data for benefit of this drug in this situation, and it does have CNS penetration which may be helpful.    Will order repeat echo for cardiac monitoring    Sleep -  Recommended she start by trying OTC sleep aids or melantoning.    F/u 6 weeks.          Visit time was 25 minutes, greater than 50% spent in counseling      Shadia Amos MD  [facility]    11/8/2017          CC:

## 2017-11-08 NOTE — PROGRESS NOTES
SW met with pt prior to her infusion to provide support.  Pt states that she is feeling well today and that her mastectomy surgery went well.  Pt is staying with her mother during her recuperation.  Pt inquired about counseling services.  SW referred pt to Cyndee Soto NP-BC and also Scarlett Ruiz, psychologist.  SW provided support to pt and will continue to provide support and resources as needed.

## 2017-11-15 ENCOUNTER — OFFICE VISIT (OUTPATIENT)
Dept: RADIATION ONCOLOGY | Facility: HOSPITAL | Age: 22
End: 2017-11-15

## 2017-11-15 ENCOUNTER — HOSPITAL ENCOUNTER (OUTPATIENT)
Dept: RADIATION ONCOLOGY | Facility: HOSPITAL | Age: 22
Setting detail: RADIATION/ONCOLOGY SERIES
Discharge: HOME | End: 2017-11-15

## 2017-11-15 VITALS
DIASTOLIC BLOOD PRESSURE: 59 MMHG | BODY MASS INDEX: 21.46 KG/M2 | SYSTOLIC BLOOD PRESSURE: 104 MMHG | RESPIRATION RATE: 16 BRPM | WEIGHT: 116.6 LBS | TEMPERATURE: 97.9 F | HEART RATE: 83 BPM | HEIGHT: 62 IN

## 2017-11-15 DIAGNOSIS — C50.811 MALIGNANT NEOPLASM OF OVERLAPPING SITES OF RIGHT BREAST IN FEMALE, ESTROGEN RECEPTOR POSITIVE (HCC): Primary | ICD-10-CM

## 2017-11-15 DIAGNOSIS — Z17.0 MALIGNANT NEOPLASM OF OVERLAPPING SITES OF RIGHT BREAST IN FEMALE, ESTROGEN RECEPTOR POSITIVE (HCC): Primary | ICD-10-CM

## 2017-11-15 PROCEDURE — G0463 HOSPITAL OUTPT CLINIC VISIT: HCPCS

## 2017-11-15 NOTE — PROGRESS NOTES
CONSULTATION NOTE    NAME:      Angie Sutherland  :                                                          1995  DATE OF CONSULTATION:                       11/15/2017   REQUESTING PHYSICIAN:                   Carlo Paulino MD  REASON FOR CONSULTATION:           Malignant neoplasm of right female breast  Staging form: Breast, AJCC V7,  Clinical: Stage IIIB (T4b, N2a, M0)   - Pathologic stage from 11/15/2017 after bilateral mastectomies: Stage IA (yT1mic(m), N0, cM0)             BRIEF HISTORY:  Angie Sutherland  is a very pleasant 22 y.o. female  with  [id number] (stage IIIB) invasive ductal carcinoma of the right breast, ER-, [place] weakly +, Her2 3+.  She presented with pain and swelling of the right breast after childbirth. Biopsy 17 showed high grade invasive ductal carcinoma and right axillary lymph node biopsy ws positive for metastatic disease. A skin biopsy showed involvement of the dermis with lymphatic space involvement.  PET/CT on 17 showed hypermetabolic activity in the breast and axillary nodes with no distant spread of disease.  MRI bilateral breasts showed tumor involving the right superior breast from approximately 1 to 9:00.  She had abnormal skin enhancement and thickening located in the far superior right 11 1:00 region.  There is no definite abnormal pectoralis muscle or chest wall enhancement.  She had matted, bulky right axillary lymphadenopathy involving levels 1, 2 and 3.  She underwent neoadjuvant chemotherapy with TCHP started on 17.  The taxotere dose was reduced to 80% on cycle 5 for neuropathy. She then underwent bilateral mastectomy and tissue expander placement on 10/30/17.  Pathology showed multifocal < 1 mm high grade IDC with focal lymphovascular invasion.  4 sentinel lymph nodes with no malignancy.   [id number] (Stage yIA)  She has completed chemotherapy and is here to discuss postoperative radiotherapy.  She is still healing from surgery.  She has no  "complaints.    Allergies   Allergen Reactions   • No Known Drug Allergy        Social History   Substance Use Topics   • Smoking status: Never Smoker   • Smokeless tobacco: Never Used   • Alcohol use Yes      Comment: occasional       Past Medical History:   Diagnosis Date   • Cancer    • Migraine    • Pregnancy, incidental     3/2/2016: 14 weeks pregnant. She was seen in 2013 during pregnancy and received counselling and glucometer. She was lost to f/u but states baby had no complications - weighed 7lb1oz   • Spinal headache        family history includes Diabetes in her mother and other; Ovarian cancer in her maternal grandmother.     Past Surgical History:   Procedure Laterality Date   • BREAST RECONSTRUCTION, BREAST TISSUE EXPANDER INSERTION Bilateral 10/30/2017    Procedure: BREAST RECONSTRUCTION WITH ALLODERM, BREAST TISSUE EXPANDER INSERTION BILATERAL;  Surgeon: Delia Covarrubias MD;  Location:  [facility];  Service:    •  SECTION     •  SECTION N/A 2016    Procedure:  SECTION REPEAT;  Surgeon: Malika Munoz MD;  Location:  [facility];  Service:    • MASTECTOMY W/ SENTINEL NODE BIOPSY Bilateral 10/30/2017    Procedure: BREAST MASTECTOMY BILATERAL WITH RIGHT SENTINEL NODE BIOPSY;  Surgeon: Carlo Paulino MD;  Location:  [facility];  Service:    • VENOUS ACCESS DEVICE (PORT) INSERTION  2017        Review of Systems   All other systems reviewed and are negative.          Objective   VITAL SIGNS:   Vitals:    11/15/17 0927   BP: 104/59   Pulse: 83   Resp: 16   Temp: 97.9 °F (36.6 °C)   Weight: 116 lb 9.6 oz (52.9 kg)   Height: 62\" (157.5 cm)   PainSc: 0-No pain        KPS       90%    Physical Exam   Constitutional: She appears well-developed and well-nourished.   Eyes: EOM are normal.   Neck: Neck supple.   Cardiovascular: Normal rate, regular rhythm and normal heart sounds.    Pulmonary/Chest: Effort normal and breath sounds normal.   Abdominal: Soft.   Nursing " note and vitals reviewed.  Examination of the breast revealed the breasts are surgically absent.  She has tissue expanders in place that are symmetric.  She is still healing from surgery with dressings dry and intact.  She has no axillary adenopathy bilaterally.           The following portions of the patient's history were reviewed and updated as appropriate: allergies, current medications, past family history, past medical history, past social history, past surgical history and problem list.    Assessment      IMPRESSION:  Malignant neoplasm of right female breast    Staging form: Breast, AJCC V7    - Clinical: Stage IIIB (T4b, N2a, M0)       RECOMMENDATIONS:  Based on her prechemotherapy stage of cancer I recommend postoperative radiotherapy to the right chest wall and regional lymphatics.  I will include the internal mammary nodes because she is at high risk for recurrent disease.  The pros and cons, risks and benefits of treatment were discussed with Angie and informed consent obtained.  Her friend was with her from the support group.  An appointment was given to her to return in about 2 weeks for treatment planning.  I anticipate 50.4 Gray in 28 fractions to the right chest wall and regional lymphatics.  Thank you very much for letting me participate in her care .    Marily Mckinley MD      Errors in dictation may reflect use of voice recognition software and not all errors in transcription may have been detected prior to signing.

## 2017-11-22 ENCOUNTER — OFFICE VISIT (OUTPATIENT)
Dept: PSYCHIATRY | Facility: CLINIC | Age: 22
End: 2017-11-22

## 2017-11-22 DIAGNOSIS — F41.1 GENERALIZED ANXIETY DISORDER: ICD-10-CM

## 2017-11-22 DIAGNOSIS — F33.2 SEVERE EPISODE OF RECURRENT MAJOR DEPRESSIVE DISORDER, WITHOUT PSYCHOTIC FEATURES (HCC): Primary | ICD-10-CM

## 2017-11-22 DIAGNOSIS — F51.05 INSOMNIA DUE TO MENTAL CONDITION: ICD-10-CM

## 2017-11-22 PROCEDURE — 90792 PSYCH DIAG EVAL W/MED SRVCS: CPT | Performed by: NURSE PRACTITIONER

## 2017-11-22 RX ORDER — ESCITALOPRAM OXALATE 10 MG/1
10 TABLET ORAL DAILY
Qty: 30 TABLET | Refills: 1 | Status: SHIPPED | OUTPATIENT
Start: 2017-11-22 | End: 2018-03-14

## 2017-11-22 RX ORDER — TRAZODONE HYDROCHLORIDE 50 MG/1
TABLET ORAL
Qty: 30 TABLET | Refills: 1 | Status: SHIPPED | OUTPATIENT
Start: 2017-11-22 | End: 2019-01-09
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
## 2017-11-27 ENCOUNTER — TELEPHONE (OUTPATIENT)
Dept: ONCOLOGY | Facility: CLINIC | Age: 22
End: 2017-11-27

## 2017-11-27 NOTE — TELEPHONE ENCOUNTER
Advised patient to go home due to parents are sick in their home. Educated on proper hygiene and keeping hands clean and away from eyes and mouth.   Patient verbalized understanding.

## 2017-11-27 NOTE — TELEPHONE ENCOUNTER
----- Message from Fabiola Elliott sent at 11/27/2017  2:29 PM EST -----  Regarding: WERNER-QUESTION  Contact: [phone]  Patient is staying with her mom and stepdad right now while she is going through chemo and radiation both are sick her mom has the flu and stepdad bronchitis is it okay for her to stay there?

## 2017-11-28 ENCOUNTER — HOSPITAL ENCOUNTER (OUTPATIENT)
Dept: CARDIOLOGY | Facility: HOSPITAL | Age: 22
Discharge: HOME | End: 2017-11-28
Attending: INTERNAL MEDICINE | Admitting: INTERNAL MEDICINE

## 2017-11-28 DIAGNOSIS — T45.1X5A CHEMOTHERAPY INDUCED CARDIOMYOPATHY (HCC): ICD-10-CM

## 2017-11-28 DIAGNOSIS — I42.7 CHEMOTHERAPY INDUCED CARDIOMYOPATHY (HCC): ICD-10-CM

## 2017-11-28 LAB
BH CV ECHO MEAS - AO ROOT AREA (BSA CORRECTED): 1.4
BH CV ECHO MEAS - AO ROOT AREA: 3.5 CM^2
BH CV ECHO MEAS - AO ROOT DIAM: 2.1 CM
BH CV ECHO MEAS - BSA(HAYCOCK): 1.5 M^2
BH CV ECHO MEAS - BSA: 1.5 M^2
BH CV ECHO MEAS - BZI_BMI: 21.4 KILOGRAMS/M^2
BH CV ECHO MEAS - BZI_METRIC_HEIGHT: 157.5 CM
BH CV ECHO MEAS - BZI_METRIC_WEIGHT: 53.1 KG
BH CV ECHO MEAS - CONTRAST EF (2CH): 34.9 ML/M^2
BH CV ECHO MEAS - CONTRAST EF 4CH: 51.9 ML/M^2
BH CV ECHO MEAS - EDV(CUBED): 47.6 ML
BH CV ECHO MEAS - EDV(MOD-SP2): 63 ML
BH CV ECHO MEAS - EDV(MOD-SP4): 79 ML
BH CV ECHO MEAS - EDV(TEICH): 55.3 ML
BH CV ECHO MEAS - EF(CUBED): 67.4 %
BH CV ECHO MEAS - EF(MOD-SP2): 34.9 %
BH CV ECHO MEAS - EF(MOD-SP4): 51.9 %
BH CV ECHO MEAS - EF(TEICH): 59.9 %
BH CV ECHO MEAS - ESV(CUBED): 15.5 ML
BH CV ECHO MEAS - ESV(MOD-SP2): 41 ML
BH CV ECHO MEAS - ESV(MOD-SP4): 38 ML
BH CV ECHO MEAS - ESV(TEICH): 22.2 ML
BH CV ECHO MEAS - FS: 31.2 %
BH CV ECHO MEAS - IVS/LVPW: 1.2
BH CV ECHO MEAS - IVSD: 0.8 CM
BH CV ECHO MEAS - LA DIMENSION: 2.5 CM
BH CV ECHO MEAS - LA/AO: 1.2
BH CV ECHO MEAS - LAT PEAK E' VEL: 15.2 CM/SEC
BH CV ECHO MEAS - LV DIASTOLIC VOL/BSA (35-75): 51.9 ML/M^2
BH CV ECHO MEAS - LV MASS(C)D: 67 GRAMS
BH CV ECHO MEAS - LV MASS(C)DI: 44 GRAMS/M^2
BH CV ECHO MEAS - LV SYSTOLIC VOL/BSA (12-30): 25 ML/M^2
BH CV ECHO MEAS - LVIDD: 3.6 CM
BH CV ECHO MEAS - LVIDS: 2.4 CM
BH CV ECHO MEAS - LVLD AP2: 6.9 CM
BH CV ECHO MEAS - LVLD AP4: 7.9 CM
BH CV ECHO MEAS - LVLS AP2: 6.2 CM
BH CV ECHO MEAS - LVLS AP4: 6.2 CM
BH CV ECHO MEAS - LVPWD: 0.8 CM
BH CV ECHO MEAS - MED PEAK E' VEL: 8.5 CM/SEC
BH CV ECHO MEAS - PA ACC SLOPE: 252.9 CM/SEC^2
BH CV ECHO MEAS - PA ACC TIME: 0.16 SEC
BH CV ECHO MEAS - PA PR(ACCEL): 6.1 MMHG
BH CV ECHO MEAS - RAP SYSTOLE: 3 MMHG
BH CV ECHO MEAS - RVSP: 20 MMHG
BH CV ECHO MEAS - SI(CUBED): 21.1 ML/M^2
BH CV ECHO MEAS - SI(MOD-SP2): 14.5 ML/M^2
BH CV ECHO MEAS - SI(MOD-SP4): 26.9 ML/M^2
BH CV ECHO MEAS - SI(TEICH): 21.8 ML/M^2
BH CV ECHO MEAS - SV(CUBED): 32.1 ML
BH CV ECHO MEAS - SV(MOD-SP2): 22 ML
BH CV ECHO MEAS - SV(MOD-SP4): 41 ML
BH CV ECHO MEAS - SV(TEICH): 33.1 ML
BH CV ECHO MEAS - TAPSE (>1.6): 1.4 CM2
BH CV ECHO MEAS - TR MAX VEL: 170.2 CM/SEC
BH CV VAS BP RIGHT ARM: NORMAL MMHG
BH CV XLRA - TDI S': 9.2 CM/SEC
LV EF 2D ECHO EST: 65 %
MAXIMAL PREDICTED HEART RATE: 197 BPM
STRESS TARGET HR: 167 BPM

## 2017-11-28 PROCEDURE — 93306 TTE W/DOPPLER COMPLETE: CPT | Performed by: INTERNAL MEDICINE

## 2017-11-28 PROCEDURE — 25010000002 SULFUR HEXAFLUORIDE MICROSPH 60.7-25 MG RECONSTITUTED SUSPENSION: Performed by: INTERNAL MEDICINE

## 2017-11-28 PROCEDURE — 93306 TTE W/DOPPLER COMPLETE: CPT

## 2017-11-28 RX ADMIN — SULFUR HEXAFLUORIDE 2 ML: KIT at 10:45

## 2017-11-29 ENCOUNTER — DOCUMENTATION (OUTPATIENT)
Dept: SOCIAL WORK | Facility: HOSPITAL | Age: 22
End: 2017-11-29

## 2017-11-29 ENCOUNTER — INFUSION (OUTPATIENT)
Dept: ONCOLOGY | Facility: HOSPITAL | Age: 22
End: 2017-11-29

## 2017-11-29 VITALS
HEART RATE: 121 BPM | WEIGHT: 119 LBS | DIASTOLIC BLOOD PRESSURE: 73 MMHG | RESPIRATION RATE: 16 BRPM | SYSTOLIC BLOOD PRESSURE: 100 MMHG | BODY MASS INDEX: 21.9 KG/M2 | TEMPERATURE: 97.3 F | HEIGHT: 62 IN

## 2017-11-29 DIAGNOSIS — Z17.0 MALIGNANT NEOPLASM OF OVERLAPPING SITES OF RIGHT BREAST IN FEMALE, ESTROGEN RECEPTOR POSITIVE (HCC): ICD-10-CM

## 2017-11-29 DIAGNOSIS — C50.911 MALIGNANT NEOPLASM OF RIGHT FEMALE BREAST, UNSPECIFIED ESTROGEN RECEPTOR STATUS, UNSPECIFIED SITE OF BREAST (HCC): Primary | ICD-10-CM

## 2017-11-29 DIAGNOSIS — C50.811 MALIGNANT NEOPLASM OF OVERLAPPING SITES OF RIGHT BREAST IN FEMALE, ESTROGEN RECEPTOR POSITIVE (HCC): ICD-10-CM

## 2017-11-29 LAB
ALBUMIN SERPL-MCNC: 4.1 G/DL (ref 3.2–4.8)
ALBUMIN/GLOB SERPL: 1.3 G/DL (ref 1.5–2.5)
ALP SERPL-CCNC: 77 U/L (ref 25–100)
ALT SERPL W P-5'-P-CCNC: 19 U/L (ref 7–40)
ANION GAP SERPL CALCULATED.3IONS-SCNC: 6 MMOL/L (ref 3–11)
AST SERPL-CCNC: 25 U/L (ref 0–33)
BILIRUB SERPL-MCNC: 0.2 MG/DL (ref 0.3–1.2)
BUN BLD-MCNC: 18 MG/DL (ref 9–23)
BUN/CREAT SERPL: 22.5 (ref 7–25)
CALCIUM SPEC-SCNC: 8.8 MG/DL (ref 8.7–10.4)
CHLORIDE SERPL-SCNC: 101 MMOL/L (ref 99–109)
CO2 SERPL-SCNC: 29 MMOL/L (ref 20–31)
CREAT BLD-MCNC: 0.8 MG/DL (ref 0.6–1.3)
ERYTHROCYTE [DISTWIDTH] IN BLOOD BY AUTOMATED COUNT: 13 % (ref 11.3–14.5)
GFR SERPL CREATININE-BSD FRML MDRD: 108 ML/MIN/1.73
GLOBULIN UR ELPH-MCNC: 3.2 GM/DL
GLUCOSE BLD-MCNC: 114 MG/DL (ref 70–100)
HCT VFR BLD AUTO: 38.7 % (ref 34.5–44)
HGB BLD-MCNC: 12.6 G/DL (ref 11.5–15.5)
LYMPHOCYTES # BLD AUTO: 1.3 10*3/MM3 (ref 0.6–4.8)
LYMPHOCYTES NFR BLD AUTO: 32 % (ref 24–44)
MCH RBC QN AUTO: 32.3 PG (ref 27–31)
MCHC RBC AUTO-ENTMCNC: 32.5 G/DL (ref 32–36)
MCV RBC AUTO: 99.5 FL (ref 80–99)
MONOCYTES # BLD AUTO: 0.1 10*3/MM3 (ref 0–1)
MONOCYTES NFR BLD AUTO: 3.3 % (ref 0–12)
NEUTROPHILS # BLD AUTO: 2.6 10*3/MM3 (ref 1.5–8.3)
NEUTROPHILS NFR BLD AUTO: 64.7 % (ref 41–71)
PLATELET # BLD AUTO: 182 10*3/MM3 (ref 150–450)
PMV BLD AUTO: 7.2 FL (ref 6–12)
POTASSIUM BLD-SCNC: 3.9 MMOL/L (ref 3.5–5.5)
PROT SERPL-MCNC: 7.3 G/DL (ref 5.7–8.2)
RBC # BLD AUTO: 3.89 10*6/MM3 (ref 3.89–5.14)
SODIUM BLD-SCNC: 136 MMOL/L (ref 132–146)
WBC NRBC COR # BLD: 4 10*3/MM3 (ref 3.5–10.8)

## 2017-11-29 PROCEDURE — 96413 CHEMO IV INFUSION 1 HR: CPT

## 2017-11-29 PROCEDURE — 96367 TX/PROPH/DG ADDL SEQ IV INF: CPT

## 2017-11-29 PROCEDURE — 36591 DRAW BLOOD OFF VENOUS DEVICE: CPT

## 2017-11-29 PROCEDURE — 96375 TX/PRO/DX INJ NEW DRUG ADDON: CPT

## 2017-11-29 PROCEDURE — 25010000002 PERTUZUMAB 420 MG/14ML SOLUTION 420 MG VIAL: Performed by: INTERNAL MEDICINE

## 2017-11-29 PROCEDURE — 85025 COMPLETE CBC W/AUTO DIFF WBC: CPT

## 2017-11-29 PROCEDURE — 96374 THER/PROPH/DIAG INJ IV PUSH: CPT

## 2017-11-29 PROCEDURE — 25010000002 HEPARIN FLUSH (PORCINE) 100 UNIT/ML SOLUTION: Performed by: INTERNAL MEDICINE

## 2017-11-29 PROCEDURE — 25010000002 TRASTUZUMAB PER 10 MG: Performed by: INTERNAL MEDICINE

## 2017-11-29 PROCEDURE — 36415 COLL VENOUS BLD VENIPUNCTURE: CPT

## 2017-11-29 PROCEDURE — 96417 CHEMO IV INFUS EACH ADDL SEQ: CPT

## 2017-11-29 PROCEDURE — 25010000002 DIPHENHYDRAMINE PER 50 MG: Performed by: INTERNAL MEDICINE

## 2017-11-29 PROCEDURE — 80053 COMPREHEN METABOLIC PANEL: CPT

## 2017-11-29 PROCEDURE — 25010000002 DEXAMETHASONE PER 1 MG: Performed by: INTERNAL MEDICINE

## 2017-11-29 RX ORDER — SODIUM CHLORIDE 0.9 % (FLUSH) 0.9 %
10 SYRINGE (ML) INJECTION AS NEEDED
Status: CANCELLED | OUTPATIENT
Start: 2017-11-29

## 2017-11-29 RX ORDER — LORAZEPAM 2 MG/ML
1 INJECTION INTRAMUSCULAR ONCE
Status: DISCONTINUED | OUTPATIENT
Start: 2017-11-29 | End: 2017-11-29

## 2017-11-29 RX ORDER — SODIUM CHLORIDE 9 MG/ML
250 INJECTION, SOLUTION INTRAVENOUS ONCE
Status: COMPLETED | OUTPATIENT
Start: 2017-11-29 | End: 2017-11-29

## 2017-11-29 RX ORDER — FAMOTIDINE 10 MG/ML
20 INJECTION, SOLUTION INTRAVENOUS ONCE
Status: COMPLETED | OUTPATIENT
Start: 2017-11-29 | End: 2017-11-29

## 2017-11-29 RX ADMIN — DIPHENHYDRAMINE HYDROCHLORIDE 50 MG: 50 INJECTION INTRAMUSCULAR; INTRAVENOUS at 11:46

## 2017-11-29 RX ADMIN — FAMOTIDINE 20 MG: 10 INJECTION, SOLUTION INTRAVENOUS at 11:48

## 2017-11-29 RX ADMIN — HEPARIN 500 UNITS: 100 SYRINGE at 14:36

## 2017-11-29 RX ADMIN — SODIUM CHLORIDE 250 ML: 9 INJECTION, SOLUTION INTRAVENOUS at 11:16

## 2017-11-29 RX ADMIN — TRASTUZUMAB 320 MG: 150 INJECTION, POWDER, LYOPHILIZED, FOR SOLUTION INTRAVENOUS at 13:07

## 2017-11-29 RX ADMIN — PERTUZUMAB 420 MG: 30 INJECTION, SOLUTION, CONCENTRATE INTRAVENOUS at 11:52

## 2017-11-29 RX ADMIN — DEXAMETHASONE SODIUM PHOSPHATE 10 MG: 10 INJECTION INTRAMUSCULAR; INTRAVENOUS at 11:19

## 2017-11-29 NOTE — PROGRESS NOTES
SW met with pt during infusion to provide support.  Pt states that she is doing fairly well.  Her mother and children all have the flu currently.  Pt saw Cyndee Soto last week and was started on antidepressant medication.  SW provided support and encouraged pt to call with any needs.  SW available for ongoing support and resource needs.

## 2017-12-06 ENCOUNTER — HOSPITAL ENCOUNTER (OUTPATIENT)
Dept: RADIATION ONCOLOGY | Facility: HOSPITAL | Age: 22
Discharge: HOME | End: 2017-12-06

## 2017-12-06 ENCOUNTER — HOSPITAL ENCOUNTER (OUTPATIENT)
Dept: RADIATION ONCOLOGY | Facility: HOSPITAL | Age: 22
Setting detail: RADIATION/ONCOLOGY SERIES
Discharge: HOME | End: 2017-12-06

## 2017-12-06 PROCEDURE — 77280 THER RAD SIMULAJ FIELD SMPL: CPT | Performed by: RADIOLOGY

## 2017-12-06 PROCEDURE — 77290 THER RAD SIMULAJ FIELD CPLX: CPT | Performed by: RADIOLOGY

## 2017-12-06 PROCEDURE — 77332 RADIATION TREATMENT AID(S): CPT | Performed by: RADIOLOGY

## 2017-12-12 ENCOUNTER — HOSPITAL ENCOUNTER (OUTPATIENT)
Dept: RADIATION ONCOLOGY | Facility: HOSPITAL | Age: 22
Discharge: HOME | End: 2017-12-12

## 2017-12-12 PROCEDURE — 77290 THER RAD SIMULAJ FIELD CPLX: CPT | Performed by: RADIOLOGY

## 2017-12-12 PROCEDURE — 77334 RADIATION TREATMENT AID(S): CPT | Performed by: RADIOLOGY

## 2017-12-13 PROCEDURE — 77300 RADIATION THERAPY DOSE PLAN: CPT | Performed by: RADIOLOGY

## 2017-12-13 PROCEDURE — 77295 3-D RADIOTHERAPY PLAN: CPT | Performed by: RADIOLOGY

## 2017-12-13 PROCEDURE — 77334 RADIATION TREATMENT AID(S): CPT | Performed by: RADIOLOGY

## 2017-12-15 ENCOUNTER — HOSPITAL ENCOUNTER (OUTPATIENT)
Dept: RADIATION ONCOLOGY | Facility: HOSPITAL | Age: 22
Discharge: HOME | End: 2017-12-15

## 2017-12-15 PROCEDURE — 77280 THER RAD SIMULAJ FIELD SMPL: CPT | Performed by: RADIOLOGY

## 2017-12-18 ENCOUNTER — HOSPITAL ENCOUNTER (OUTPATIENT)
Dept: RADIATION ONCOLOGY | Facility: HOSPITAL | Age: 22
Discharge: HOME | End: 2017-12-18

## 2017-12-18 PROCEDURE — 77412 RADIATION TX DELIVERY LVL 3: CPT | Performed by: RADIOLOGY

## 2017-12-18 PROCEDURE — 77336 RADIATION PHYSICS CONSULT: CPT | Performed by: RADIOLOGY

## 2017-12-19 ENCOUNTER — HOSPITAL ENCOUNTER (OUTPATIENT)
Dept: RADIATION ONCOLOGY | Facility: HOSPITAL | Age: 22
Discharge: HOME | End: 2017-12-19

## 2017-12-19 VITALS — BODY MASS INDEX: 22.35 KG/M2 | WEIGHT: 122.2 LBS

## 2017-12-19 PROCEDURE — 77412 RADIATION TX DELIVERY LVL 3: CPT | Performed by: RADIOLOGY

## 2017-12-20 ENCOUNTER — INFUSION (OUTPATIENT)
Dept: ONCOLOGY | Facility: HOSPITAL | Age: 22
End: 2017-12-20

## 2017-12-20 ENCOUNTER — OFFICE VISIT (OUTPATIENT)
Dept: ONCOLOGY | Facility: CLINIC | Age: 22
End: 2017-12-20

## 2017-12-20 ENCOUNTER — HOSPITAL ENCOUNTER (OUTPATIENT)
Dept: RADIATION ONCOLOGY | Facility: HOSPITAL | Age: 22
Discharge: HOME | End: 2017-12-20

## 2017-12-20 VITALS
BODY MASS INDEX: 21.9 KG/M2 | WEIGHT: 119 LBS | DIASTOLIC BLOOD PRESSURE: 58 MMHG | RESPIRATION RATE: 16 BRPM | HEART RATE: 88 BPM | TEMPERATURE: 97.6 F | SYSTOLIC BLOOD PRESSURE: 108 MMHG | HEIGHT: 62 IN

## 2017-12-20 DIAGNOSIS — C50.811 MALIGNANT NEOPLASM OF OVERLAPPING SITES OF RIGHT FEMALE BREAST, UNSPECIFIED ESTROGEN RECEPTOR STATUS (HCC): ICD-10-CM

## 2017-12-20 DIAGNOSIS — C50.011 MALIGNANT NEOPLASM INVOLVING BOTH NIPPLE AND AREOLA OF RIGHT BREAST IN FEMALE, UNSPECIFIED ESTROGEN RECEPTOR STATUS (HCC): Primary | ICD-10-CM

## 2017-12-20 LAB
ALBUMIN SERPL-MCNC: 4.2 G/DL (ref 3.2–4.8)
ALBUMIN/GLOB SERPL: 1.2 G/DL (ref 1.5–2.5)
ALP SERPL-CCNC: 78 U/L (ref 25–100)
ALT SERPL W P-5'-P-CCNC: 26 U/L (ref 7–40)
ANION GAP SERPL CALCULATED.3IONS-SCNC: 6 MMOL/L (ref 3–11)
AST SERPL-CCNC: 31 U/L (ref 0–33)
BILIRUB SERPL-MCNC: 0.4 MG/DL (ref 0.3–1.2)
BUN BLD-MCNC: 14 MG/DL (ref 9–23)
BUN/CREAT SERPL: 20 (ref 7–25)
CALCIUM SPEC-SCNC: 9.1 MG/DL (ref 8.7–10.4)
CHLORIDE SERPL-SCNC: 104 MMOL/L (ref 99–109)
CO2 SERPL-SCNC: 31 MMOL/L (ref 20–31)
CREAT BLD-MCNC: 0.7 MG/DL (ref 0.6–1.3)
ERYTHROCYTE [DISTWIDTH] IN BLOOD BY AUTOMATED COUNT: 13.6 % (ref 11.3–14.5)
GFR SERPL CREATININE-BSD FRML MDRD: 126 ML/MIN/1.73
GLOBULIN UR ELPH-MCNC: 3.4 GM/DL
GLUCOSE BLD-MCNC: 126 MG/DL (ref 70–100)
HCT VFR BLD AUTO: 41.9 % (ref 34.5–44)
HGB BLD-MCNC: 13.3 G/DL (ref 11.5–15.5)
LYMPHOCYTES # BLD AUTO: 1.4 10*3/MM3 (ref 0.6–4.8)
LYMPHOCYTES NFR BLD AUTO: 41.8 % (ref 24–44)
MCH RBC QN AUTO: 31 PG (ref 27–31)
MCHC RBC AUTO-ENTMCNC: 31.8 G/DL (ref 32–36)
MCV RBC AUTO: 97.4 FL (ref 80–99)
MONOCYTES # BLD AUTO: 0.2 10*3/MM3 (ref 0–1)
MONOCYTES NFR BLD AUTO: 5.2 % (ref 0–12)
NEUTROPHILS # BLD AUTO: 1.8 10*3/MM3 (ref 1.5–8.3)
NEUTROPHILS NFR BLD AUTO: 53 % (ref 41–71)
PLATELET # BLD AUTO: 158 10*3/MM3 (ref 150–450)
PMV BLD AUTO: 7.5 FL (ref 6–12)
POTASSIUM BLD-SCNC: 4.1 MMOL/L (ref 3.5–5.5)
PROT SERPL-MCNC: 7.6 G/DL (ref 5.7–8.2)
RBC # BLD AUTO: 4.3 10*6/MM3 (ref 3.89–5.14)
SODIUM BLD-SCNC: 141 MMOL/L (ref 132–146)
WBC NRBC COR # BLD: 3.4 10*3/MM3 (ref 3.5–10.8)

## 2017-12-20 PROCEDURE — 25010000002 HEPARIN FLUSH (PORCINE) 100 UNIT/ML SOLUTION: Performed by: INTERNAL MEDICINE

## 2017-12-20 PROCEDURE — 85025 COMPLETE CBC W/AUTO DIFF WBC: CPT

## 2017-12-20 PROCEDURE — 77412 RADIATION TX DELIVERY LVL 3: CPT | Performed by: RADIOLOGY

## 2017-12-20 PROCEDURE — 96375 TX/PRO/DX INJ NEW DRUG ADDON: CPT

## 2017-12-20 PROCEDURE — 25010000002 DIPHENHYDRAMINE PER 50 MG: Performed by: INTERNAL MEDICINE

## 2017-12-20 PROCEDURE — 80053 COMPREHEN METABOLIC PANEL: CPT

## 2017-12-20 PROCEDURE — 99214 OFFICE O/P EST MOD 30 MIN: CPT | Performed by: INTERNAL MEDICINE

## 2017-12-20 PROCEDURE — 25010000002 TRASTUZUMAB PER 10 MG: Performed by: INTERNAL MEDICINE

## 2017-12-20 PROCEDURE — 96413 CHEMO IV INFUSION 1 HR: CPT

## 2017-12-20 RX ORDER — TRAMADOL HYDROCHLORIDE 50 MG/1
50 TABLET ORAL EVERY 6 HOURS PRN
Qty: 50 TABLET | Refills: 0 | OUTPATIENT
Start: 2017-12-20 | End: 2018-01-24

## 2017-12-20 RX ORDER — SODIUM CHLORIDE 0.9 % (FLUSH) 0.9 %
10 SYRINGE (ML) INJECTION AS NEEDED
Status: CANCELLED | OUTPATIENT
Start: 2017-12-20

## 2017-12-20 RX ORDER — SODIUM CHLORIDE 0.9 % (FLUSH) 0.9 %
10 SYRINGE (ML) INJECTION AS NEEDED
Status: DISCONTINUED | OUTPATIENT
Start: 2017-12-20 | End: 2017-12-20 | Stop reason: HOSPADM

## 2017-12-20 RX ORDER — FAMOTIDINE 10 MG/ML
20 INJECTION, SOLUTION INTRAVENOUS ONCE
Status: COMPLETED | OUTPATIENT
Start: 2017-12-20 | End: 2017-12-20

## 2017-12-20 RX ORDER — FAMOTIDINE 10 MG/ML
20 INJECTION, SOLUTION INTRAVENOUS ONCE
Status: CANCELLED | OUTPATIENT
Start: 2017-12-20

## 2017-12-20 RX ADMIN — SODIUM CHLORIDE 250 ML: 0.9 INJECTION, SOLUTION INTRAVENOUS at 12:44

## 2017-12-20 RX ADMIN — DIPHENHYDRAMINE HYDROCHLORIDE 50 MG: 50 INJECTION INTRAMUSCULAR; INTRAVENOUS at 12:43

## 2017-12-20 RX ADMIN — TRASTUZUMAB 320 MG: 150 INJECTION, POWDER, LYOPHILIZED, FOR SOLUTION INTRAVENOUS at 13:10

## 2017-12-20 RX ADMIN — FAMOTIDINE 20 MG: 10 INJECTION, SOLUTION INTRAVENOUS at 12:40

## 2017-12-20 RX ADMIN — HEPARIN 500 UNITS: 100 SYRINGE at 14:04

## 2017-12-20 RX ADMIN — Medication 10 ML: at 14:04

## 2017-12-20 NOTE — PROGRESS NOTES
"      PROBLEM LIST:  1. [id number] (stage IIIB) invasive ductal carcinoma of the right breast, ER-, [place] weakly +, Her2 3+.  A) presented with pain and swelling of the right breast after childbirth. Biopsy 5/8/17 showed high grade IDC, Right axillary LN biopsy positive for metastatic node involvement. Skin biopsy showed involvement of the dermis with lymphatic space involvement.  PET/CT on 5/24/17 showed hypermetabolic activity in the breast and axillary nodes,  No distant spread of disease.  B) neoadjuvant chemotherapy with TCHP started on 5/25/17.  Infusion reaction consisting of severe leg pain with the first dose of herceptin.  Taxotere dose reduced to 80% on cycle 5 for neuropathy.  C) bilateral mastectomy on 10/30/17.  Pathology showed multifocal < 1 mm high grade IDC with focal lymphovascular invasion.  4 sentinel lymph nodes with no malignancy.   [id number] (Stage yIA)  2. Migraines  3. Pre-diabetes    Subjective     HISTORY OF PRESENT ILLNESS:   Angie Sutherland returns for follow-up.  She is about 3 days into her radiation treatment.  She says she is doing okay.  She still feels tired.  She is been having headaches.  She has had these for several months and had been using tramadol to control it.  She has run out of tramadol.  She says the headaches are most days and she does have some associated nausea with it.  She otherwise reports some diarrhea which is intermittent.    Past Medical History, Past Surgical History, Social History, Family History have been reviewed and are without significant changes except as mentioned.    Review of Systems   A comprehensive 14 point review of systems was performed and was negative except as mentioned.    Medications:  The current medication list was reviewed in the EMR    ALLERGIES:    Allergies   Allergen Reactions   • No Known Drug Allergy        Objective      /58  Pulse 88  Temp 97.6 °F (36.4 °C)  Resp 16  Ht 157.5 cm (62\")  Wt 54 kg (119 lb)  LMP 11/23/2015 "  BMI 21.77 kg/m2     Performance Status: 0    General: well appearing female in no acute distress  Neuro: alert and oriented  HEENT: sclera anicteric, oropharynx clear  Lymphatics: no cervical, supraclavicular Adenopathy  No palpable axillary adenopathy  Chest: Status post bilateral mastectomy with tissue expanders in place.  No palpable masses  Cardiovascular: regular rate and rhythm, no murmurs  Lungs: clear to auscultation bilaterally  Abdomen: soft, nontender, nondistended.  No palpable organomegaly  Extremeties: no lower extremity edema  Skin: no rashes, lesions, bruising, or petechiae  Psych: mood and affect appropriate          Assessment/Plan   Angie Sutherland is a 22 y.o. year old female with stage IIIB HER-2 positive breast cancer who returns for follow-up.  She will continue with Herceptin maintenance to complete 1 year of treatment.  We will discontinue Perjeta as it was denied by her insurance company.  Although the aphinity trial did show a statistically significant benefit with adjuvant Perjeta, the absolute benefit was relatively small.  As she is having diarrhea and rash related to Perjeta I think it is reasonable to discontinue it at this time.  She is currently undergoing radiation therapy.  We will discuss adding tamoxifen once radiation is complete.  Her tumor was only weakly ER positive but I think we can still consider this.    She will be due for repeat echocardiogram at the end of February.    Headaches: These have been chronic but are bothering her more recently.  I will do an MRI of the brain to rule out brain metastases.    F/u 6 weeks.          Visit time was 25 minutes, greater than 50% spent in counseling      Shadia Amos MD  [facility]    12/20/2017          CC:

## 2017-12-21 ENCOUNTER — HOSPITAL ENCOUNTER (OUTPATIENT)
Dept: RADIATION ONCOLOGY | Facility: HOSPITAL | Age: 22
Discharge: HOME | End: 2017-12-21

## 2017-12-21 PROCEDURE — 77412 RADIATION TX DELIVERY LVL 3: CPT | Performed by: RADIOLOGY

## 2017-12-22 ENCOUNTER — HOSPITAL ENCOUNTER (OUTPATIENT)
Dept: RADIATION ONCOLOGY | Facility: HOSPITAL | Age: 22
Discharge: HOME | End: 2017-12-22

## 2017-12-22 PROCEDURE — 77417 THER RADIOLOGY PORT IMAGE(S): CPT | Performed by: RADIOLOGY

## 2017-12-22 PROCEDURE — 77412 RADIATION TX DELIVERY LVL 3: CPT | Performed by: RADIOLOGY

## 2017-12-26 ENCOUNTER — HOSPITAL ENCOUNTER (OUTPATIENT)
Dept: RADIATION ONCOLOGY | Facility: HOSPITAL | Age: 22
Discharge: HOME | End: 2017-12-26

## 2017-12-26 PROCEDURE — 77412 RADIATION TX DELIVERY LVL 3: CPT | Performed by: RADIOLOGY

## 2017-12-26 PROCEDURE — 77336 RADIATION PHYSICS CONSULT: CPT | Performed by: RADIOLOGY

## 2017-12-27 ENCOUNTER — HOSPITAL ENCOUNTER (OUTPATIENT)
Dept: RADIATION ONCOLOGY | Facility: HOSPITAL | Age: 22
Discharge: HOME | End: 2017-12-27

## 2017-12-27 VITALS — WEIGHT: 121.2 LBS | BODY MASS INDEX: 22.17 KG/M2

## 2017-12-27 PROCEDURE — 77412 RADIATION TX DELIVERY LVL 3: CPT | Performed by: RADIOLOGY

## 2017-12-28 ENCOUNTER — HOSPITAL ENCOUNTER (OUTPATIENT)
Dept: RADIATION ONCOLOGY | Facility: HOSPITAL | Age: 22
Discharge: HOME | End: 2017-12-28

## 2017-12-28 PROCEDURE — 77412 RADIATION TX DELIVERY LVL 3: CPT | Performed by: RADIOLOGY

## 2017-12-29 ENCOUNTER — HOSPITAL ENCOUNTER (OUTPATIENT)
Dept: RADIATION ONCOLOGY | Facility: HOSPITAL | Age: 22
Discharge: HOME | End: 2017-12-29

## 2017-12-29 PROCEDURE — 77412 RADIATION TX DELIVERY LVL 3: CPT | Performed by: RADIOLOGY

## 2017-12-29 PROCEDURE — 77417 THER RADIOLOGY PORT IMAGE(S): CPT | Performed by: RADIOLOGY

## 2018-01-02 ENCOUNTER — HOSPITAL ENCOUNTER (OUTPATIENT)
Dept: RADIATION ONCOLOGY | Facility: HOSPITAL | Age: 23
Discharge: HOME | End: 2018-01-02

## 2018-01-02 ENCOUNTER — HOSPITAL ENCOUNTER (OUTPATIENT)
Dept: RADIATION ONCOLOGY | Facility: HOSPITAL | Age: 23
Setting detail: RADIATION/ONCOLOGY SERIES
Discharge: HOME | End: 2018-01-02

## 2018-01-02 VITALS — WEIGHT: 123.8 LBS | BODY MASS INDEX: 22.64 KG/M2

## 2018-01-02 PROCEDURE — 77412 RADIATION TX DELIVERY LVL 3: CPT | Performed by: RADIOLOGY

## 2018-01-03 ENCOUNTER — HOSPITAL ENCOUNTER (OUTPATIENT)
Dept: RADIATION ONCOLOGY | Facility: HOSPITAL | Age: 23
Discharge: HOME | End: 2018-01-03

## 2018-01-03 PROCEDURE — 77336 RADIATION PHYSICS CONSULT: CPT | Performed by: RADIOLOGY

## 2018-01-03 PROCEDURE — 77412 RADIATION TX DELIVERY LVL 3: CPT | Performed by: RADIOLOGY

## 2018-01-04 ENCOUNTER — HOSPITAL ENCOUNTER (OUTPATIENT)
Dept: RADIATION ONCOLOGY | Facility: HOSPITAL | Age: 23
Discharge: HOME | End: 2018-01-04

## 2018-01-04 PROCEDURE — 77412 RADIATION TX DELIVERY LVL 3: CPT | Performed by: RADIOLOGY

## 2018-01-05 ENCOUNTER — HOSPITAL ENCOUNTER (OUTPATIENT)
Dept: RADIATION ONCOLOGY | Facility: HOSPITAL | Age: 23
Discharge: HOME | End: 2018-01-05

## 2018-01-05 PROCEDURE — 77412 RADIATION TX DELIVERY LVL 3: CPT | Performed by: RADIOLOGY

## 2018-01-08 ENCOUNTER — HOSPITAL ENCOUNTER (OUTPATIENT)
Dept: RADIATION ONCOLOGY | Facility: HOSPITAL | Age: 23
Discharge: HOME | End: 2018-01-08

## 2018-01-08 PROCEDURE — 77412 RADIATION TX DELIVERY LVL 3: CPT | Performed by: RADIOLOGY

## 2018-01-08 PROCEDURE — 77417 THER RADIOLOGY PORT IMAGE(S): CPT | Performed by: RADIOLOGY

## 2018-01-09 ENCOUNTER — HOSPITAL ENCOUNTER (OUTPATIENT)
Dept: RADIATION ONCOLOGY | Facility: HOSPITAL | Age: 23
Discharge: HOME | End: 2018-01-09

## 2018-01-09 VITALS — WEIGHT: 121.9 LBS | BODY MASS INDEX: 22.3 KG/M2

## 2018-01-09 PROCEDURE — 77412 RADIATION TX DELIVERY LVL 3: CPT | Performed by: RADIOLOGY

## 2018-01-10 ENCOUNTER — HOSPITAL ENCOUNTER (OUTPATIENT)
Dept: RADIATION ONCOLOGY | Facility: HOSPITAL | Age: 23
Discharge: HOME | End: 2018-01-10

## 2018-01-10 PROCEDURE — 77412 RADIATION TX DELIVERY LVL 3: CPT | Performed by: RADIOLOGY

## 2018-01-10 PROCEDURE — 77336 RADIATION PHYSICS CONSULT: CPT | Performed by: RADIOLOGY

## 2018-01-11 ENCOUNTER — HOSPITAL ENCOUNTER (OUTPATIENT)
Dept: RADIATION ONCOLOGY | Facility: HOSPITAL | Age: 23
Discharge: HOME | End: 2018-01-11

## 2018-01-11 ENCOUNTER — DOCUMENTATION (OUTPATIENT)
Dept: OTHER | Facility: HOSPITAL | Age: 23
End: 2018-01-11

## 2018-01-11 PROCEDURE — 77412 RADIATION TX DELIVERY LVL 3: CPT | Performed by: RADIOLOGY

## 2018-01-11 NOTE — PROGRESS NOTES
I notified Dr Amos that the MRI of the brain that she had ordered for patient 12/20/17 was not done - patient states she thought is was supposed to be in January. Dr. Amos asked for it to be rescheduled - When I spoke again to patient she pointed out that the expanders she had in had a magnet in them - I called and talked to Sandie with Dr. Concha Covarrubias - she spoke to Dr Covarrubias and patients with tissue expanders cannot get MRI without risk of complications due to the magnetic content - I notified patient and Dr. Amos.

## 2018-01-12 ENCOUNTER — HOSPITAL ENCOUNTER (OUTPATIENT)
Dept: RADIATION ONCOLOGY | Facility: HOSPITAL | Age: 23
Discharge: HOME | End: 2018-01-12

## 2018-01-12 DIAGNOSIS — C50.811 MALIGNANT NEOPLASM OF OVERLAPPING SITES OF RIGHT BREAST IN FEMALE, ESTROGEN RECEPTOR POSITIVE (HCC): ICD-10-CM

## 2018-01-12 DIAGNOSIS — Z17.0 MALIGNANT NEOPLASM OF OVERLAPPING SITES OF RIGHT BREAST IN FEMALE, ESTROGEN RECEPTOR POSITIVE (HCC): ICD-10-CM

## 2018-01-12 DIAGNOSIS — R51.9 NONINTRACTABLE HEADACHE, UNSPECIFIED CHRONICITY PATTERN, UNSPECIFIED HEADACHE TYPE: Primary | ICD-10-CM

## 2018-01-12 PROCEDURE — 77412 RADIATION TX DELIVERY LVL 3: CPT | Performed by: RADIOLOGY

## 2018-01-15 ENCOUNTER — HOSPITAL ENCOUNTER (OUTPATIENT)
Dept: RADIATION ONCOLOGY | Facility: HOSPITAL | Age: 23
Discharge: HOME | End: 2018-01-15

## 2018-01-15 DIAGNOSIS — C50.011 MALIGNANT NEOPLASM OF NIPPLE OF RIGHT BREAST IN FEMALE, UNSPECIFIED ESTROGEN RECEPTOR STATUS (HCC): Primary | ICD-10-CM

## 2018-01-15 PROCEDURE — 77412 RADIATION TX DELIVERY LVL 3: CPT | Performed by: RADIOLOGY

## 2018-01-15 PROCEDURE — 77417 THER RADIOLOGY PORT IMAGE(S): CPT | Performed by: RADIOLOGY

## 2018-01-16 ENCOUNTER — HOSPITAL ENCOUNTER (OUTPATIENT)
Dept: RADIATION ONCOLOGY | Facility: HOSPITAL | Age: 23
Discharge: HOME | End: 2018-01-16

## 2018-01-16 ENCOUNTER — APPOINTMENT (OUTPATIENT)
Dept: MRI IMAGING | Facility: HOSPITAL | Age: 23
End: 2018-01-16
Attending: INTERNAL MEDICINE

## 2018-01-16 ENCOUNTER — DOCUMENTATION (OUTPATIENT)
Dept: NUTRITION | Facility: HOSPITAL | Age: 23
End: 2018-01-16

## 2018-01-16 VITALS — BODY MASS INDEX: 22.44 KG/M2 | WEIGHT: 122.7 LBS

## 2018-01-16 PROCEDURE — 77412 RADIATION TX DELIVERY LVL 3: CPT | Performed by: RADIOLOGY

## 2018-01-16 NOTE — PROGRESS NOTES
[facility] Nutrition    Weight 121.9 lbs / weight stable    Patient doing well; 20/27 radiation treatments completed; chemotherapy completed.  Patient denies any nutritional impact symptoms other than fatigue.  Appetite good;  states that with diagnosis and treatment she is finding herself focusing on expanding the variety of foods that she is choosing to eat and doing daily physical exercise regimen.  Discussed the importance of lifestyle changes for healthier diet and physical activity for prevention of recurrence.

## 2018-01-17 ENCOUNTER — HOSPITAL ENCOUNTER (OUTPATIENT)
Dept: RADIATION ONCOLOGY | Facility: HOSPITAL | Age: 23
Discharge: HOME | End: 2018-01-17

## 2018-01-17 PROCEDURE — 77336 RADIATION PHYSICS CONSULT: CPT | Performed by: RADIOLOGY

## 2018-01-17 PROCEDURE — 77412 RADIATION TX DELIVERY LVL 3: CPT | Performed by: RADIOLOGY

## 2018-01-18 ENCOUNTER — HOSPITAL ENCOUNTER (OUTPATIENT)
Dept: RADIATION ONCOLOGY | Facility: HOSPITAL | Age: 23
Discharge: HOME | End: 2018-01-18

## 2018-01-18 ENCOUNTER — APPOINTMENT (OUTPATIENT)
Dept: CT IMAGING | Facility: HOSPITAL | Age: 23
End: 2018-01-18
Attending: INTERNAL MEDICINE

## 2018-01-18 ENCOUNTER — HOSPITAL ENCOUNTER (OUTPATIENT)
Dept: CT IMAGING | Facility: HOSPITAL | Age: 23
Discharge: HOME | End: 2018-01-18
Attending: INTERNAL MEDICINE | Admitting: INTERNAL MEDICINE

## 2018-01-18 DIAGNOSIS — C50.011 MALIGNANT NEOPLASM OF NIPPLE OF RIGHT BREAST IN FEMALE, UNSPECIFIED ESTROGEN RECEPTOR STATUS (HCC): ICD-10-CM

## 2018-01-18 PROCEDURE — 77412 RADIATION TX DELIVERY LVL 3: CPT | Performed by: RADIOLOGY

## 2018-01-18 PROCEDURE — 0 IOPAMIDOL PER 1 ML: Performed by: INTERNAL MEDICINE

## 2018-01-18 PROCEDURE — 70470 CT HEAD/BRAIN W/O & W/DYE: CPT

## 2018-01-18 RX ADMIN — IOPAMIDOL 50 ML: 755 INJECTION, SOLUTION INTRAVENOUS at 11:44

## 2018-01-19 ENCOUNTER — HOSPITAL ENCOUNTER (OUTPATIENT)
Dept: RADIATION ONCOLOGY | Facility: HOSPITAL | Age: 23
Discharge: HOME | End: 2018-01-19

## 2018-01-19 ENCOUNTER — TELEPHONE (OUTPATIENT)
Dept: ONCOLOGY | Facility: CLINIC | Age: 23
End: 2018-01-19

## 2018-01-19 PROCEDURE — 77412 RADIATION TX DELIVERY LVL 3: CPT | Performed by: RADIOLOGY

## 2018-01-22 ENCOUNTER — HOSPITAL ENCOUNTER (OUTPATIENT)
Dept: RADIATION ONCOLOGY | Facility: HOSPITAL | Age: 23
Discharge: HOME | End: 2018-01-22

## 2018-01-22 VITALS — WEIGHT: 122 LBS | BODY MASS INDEX: 22.31 KG/M2

## 2018-01-22 PROCEDURE — 77412 RADIATION TX DELIVERY LVL 3: CPT | Performed by: RADIOLOGY

## 2018-01-23 ENCOUNTER — HOSPITAL ENCOUNTER (OUTPATIENT)
Dept: RADIATION ONCOLOGY | Facility: HOSPITAL | Age: 23
Discharge: HOME | End: 2018-01-23

## 2018-01-23 PROCEDURE — 77412 RADIATION TX DELIVERY LVL 3: CPT | Performed by: RADIOLOGY

## 2018-01-23 NOTE — THERAPY DISCHARGE NOTE
COMPLETION NOTE    PATIENT:   Angie Sutherland  :    1995  COMPLETION DATE:    2018  DIAGNOSIS:    Malignant neoplasm of right female breast  Staging form: Breast, AJCC V7,  Clinical: Stage IIIB (T4b, N2a, M0)   - Pathologic stage from 11/15/2017 after bilateral mastectomies: Stage IA (yT1mic(m), N0, cM0)        Subjective      BRIEF HISTORY:  Angie Sutherland  is a very pleasant 22 y.o. female  with  [id number] (stage IIIB) invasive ductal carcinoma of the right breast, ER-, [place] weakly +, Her2 3+.  She presented with pain and swelling of the right breast after childbirth. Biopsy 17 showed high grade invasive ductal carcinoma and right axillary lymph node biopsy ws positive for metastatic disease. A skin biopsy showed involvement of the dermis with lymphatic space involvement.  PET/CT on 17 showed hypermetabolic activity in the breast and axillary nodes with no distant spread of disease.  MRI bilateral breasts showed tumor involving the right superior breast from approximately 1 to 9:00.  She had abnormal skin enhancement and thickening located in the far superior right 11 1:00 region.  There was no definite abnormal pectoralis muscle or chest wall enhancement.  She had matted, bulky right axillary lymphadenopathy involving levels 1, 2 and 3.  She underwent neoadjuvant chemotherapy with TCHP started on 17.  The taxotere dose was reduced to 80% on cycle 5 for neuropathy. She then underwent bilateral mastectomy and tissue expander placement on 10/30/17.  Pathology showed multifocal < 1 mm high grade IDC with focal lymphovascular invasion.  4 sentinel lymph nodes with no malignancy.   [id number] (Stage yIA)  She has completed chemotherapy and received radiotherapy in our department as follows:    TREATMENT COURSE:   -2018 The right chest wall received 50 Gy in 25 fractions with 6 MV photons  -2018 The right supraclavicular region received 44 Gy in 22 fractions with 6 MV  photons      TOLERANCE:   Ms. Sutherland was tolerating her treatments well until she developed moderate erythema of the skin that was painful.  We recommended silvadene/lidocaine and Aquaphor.  She had intermittent right rib/arm pain that resolved with OTC antiinflammatories.  She had mild fatigue      DISPOSITION:  At the completion of therapy an appointment was made for her to return on 02/26/2018 at 2:30PM.  She knows to call if she has any problems sooner.        Marily Mckinley MD    Errors in dictation may reflect use of voice recognition software and not all errors in transcription may have been detected prior to signing.

## 2018-01-25 ENCOUNTER — TELEPHONE (OUTPATIENT)
Dept: ONCOLOGY | Facility: CLINIC | Age: 23
End: 2018-01-25

## 2018-01-25 NOTE — TELEPHONE ENCOUNTER
Called and spoke with patient. She reports a mild pain on the right side of her chest. The pain does not radiate anywhere. When I asked if she had any other symptoms such as shortness of breath she said she gets SOB when she does too much or goes outside in the cold. She said her skin has been irritated at this site from the radiation. The pain is in between her ribs. She denies cough. Has not taken any medication. I told her to try some tylenol or motrin and rest, then see if pain improved. If pain gets worse or radiates, or she has any other worrisome symptom with it she should go to the ER and be evaluated. I asked her to call me back tomorrow if she was still hurting.

## 2018-01-25 NOTE — TELEPHONE ENCOUNTER
----- Message from Marialuisa Culver MA sent at 1/25/2018  3:18 PM EST -----  Regarding: WERNER- SHARP PAIN IN CHEST  Contact: [phone]  Pt c.o sharp, constant pain in middle of her chest (more so on R side) that just started a few hours ago. Pt is concerned.  She states she finished her radiation on Tuesday.    Please call to discuss.

## 2018-01-26 ENCOUNTER — TELEPHONE (OUTPATIENT)
Dept: ONCOLOGY | Facility: CLINIC | Age: 23
End: 2018-01-26

## 2018-01-26 NOTE — TELEPHONE ENCOUNTER
Called patient re: chest pain.  She reports moist desquamation of skin on the chest wall over the radiated area.  This is the location of her pain.  She reports a fairly constant sharp pain in this area, not pleuritic in nature.  She has tried taking ibuprofen 400 mg without relief.  She is concerned that the ointment is making her skin peel.    It is most likely that her pain is a side effect of radiation treatment.  Reassured her that it will get better with time.  Recommended that she continue using topical lidocaine, as the skin peeling is from the radiation treatment but not from the ointment.  Recommended that she try 600-800 mg ibuprofen and/or tylenol 1000 mg for pain.  She can call back with further concerns.

## 2018-01-26 NOTE — TELEPHONE ENCOUNTER
----- Message from Afia Babcock MA sent at 1/26/2018 12:58 PM EST -----  Regarding: WERNER- PAIN  Contact: [phone]  Patient called stating that she is experiencing sharp right chest pain post completing radiation treatment on Tuesday, 1/23/2018.    She was told to take Advil or Aleve for pain but to call office if pain hadn't improved.    Currently on a scale of 1-10, pain is a 7.

## 2018-01-31 ENCOUNTER — INFUSION (OUTPATIENT)
Dept: ONCOLOGY | Facility: HOSPITAL | Age: 23
End: 2018-01-31

## 2018-01-31 ENCOUNTER — TELEPHONE (OUTPATIENT)
Dept: SOCIAL WORK | Facility: HOSPITAL | Age: 23
End: 2018-01-31

## 2018-01-31 ENCOUNTER — OFFICE VISIT (OUTPATIENT)
Dept: ONCOLOGY | Facility: CLINIC | Age: 23
End: 2018-01-31

## 2018-01-31 VITALS
HEIGHT: 61 IN | TEMPERATURE: 97.5 F | BODY MASS INDEX: 23.41 KG/M2 | DIASTOLIC BLOOD PRESSURE: 57 MMHG | HEART RATE: 94 BPM | SYSTOLIC BLOOD PRESSURE: 115 MMHG | WEIGHT: 124 LBS | RESPIRATION RATE: 14 BRPM

## 2018-01-31 DIAGNOSIS — I42.7 CHEMOTHERAPY INDUCED CARDIOMYOPATHY (HCC): ICD-10-CM

## 2018-01-31 DIAGNOSIS — T45.1X5A CHEMOTHERAPY INDUCED CARDIOMYOPATHY (HCC): ICD-10-CM

## 2018-01-31 DIAGNOSIS — C50.811 MALIGNANT NEOPLASM OF OVERLAPPING SITES OF RIGHT FEMALE BREAST, UNSPECIFIED ESTROGEN RECEPTOR STATUS (HCC): ICD-10-CM

## 2018-01-31 DIAGNOSIS — C50.011 MALIGNANT NEOPLASM INVOLVING BOTH NIPPLE AND AREOLA OF RIGHT BREAST IN FEMALE, UNSPECIFIED ESTROGEN RECEPTOR STATUS (HCC): Primary | ICD-10-CM

## 2018-01-31 DIAGNOSIS — C50.011 MALIGNANT NEOPLASM OF NIPPLE OF RIGHT BREAST IN FEMALE, UNSPECIFIED ESTROGEN RECEPTOR STATUS (HCC): Primary | ICD-10-CM

## 2018-01-31 DIAGNOSIS — C50.011 MALIGNANT NEOPLASM INVOLVING BOTH NIPPLE AND AREOLA OF RIGHT BREAST IN FEMALE, UNSPECIFIED ESTROGEN RECEPTOR STATUS (HCC): ICD-10-CM

## 2018-01-31 LAB
ALBUMIN SERPL-MCNC: 4 G/DL (ref 3.2–4.8)
ALBUMIN/GLOB SERPL: 1.1 G/DL (ref 1.5–2.5)
ALP SERPL-CCNC: 69 U/L (ref 25–100)
ALT SERPL W P-5'-P-CCNC: 23 U/L (ref 7–40)
ANION GAP SERPL CALCULATED.3IONS-SCNC: 5 MMOL/L (ref 3–11)
AST SERPL-CCNC: 27 U/L (ref 0–33)
BILIRUB SERPL-MCNC: 0.5 MG/DL (ref 0.3–1.2)
BUN BLD-MCNC: 11 MG/DL (ref 9–23)
BUN/CREAT SERPL: 15.7 (ref 7–25)
CALCIUM SPEC-SCNC: 8.9 MG/DL (ref 8.7–10.4)
CHLORIDE SERPL-SCNC: 102 MMOL/L (ref 99–109)
CO2 SERPL-SCNC: 30 MMOL/L (ref 20–31)
CREAT BLD-MCNC: 0.7 MG/DL (ref 0.6–1.3)
ERYTHROCYTE [DISTWIDTH] IN BLOOD BY AUTOMATED COUNT: 13 % (ref 11.3–14.5)
GFR SERPL CREATININE-BSD FRML MDRD: 126 ML/MIN/1.73
GLOBULIN UR ELPH-MCNC: 3.5 GM/DL
GLUCOSE BLD-MCNC: 130 MG/DL (ref 70–100)
HCT VFR BLD AUTO: 41.1 % (ref 34.5–44)
HGB BLD-MCNC: 13.1 G/DL (ref 11.5–15.5)
LYMPHOCYTES # BLD AUTO: 0.7 10*3/MM3 (ref 0.6–4.8)
LYMPHOCYTES NFR BLD AUTO: 24.6 % (ref 24–44)
MCH RBC QN AUTO: 29.5 PG (ref 27–31)
MCHC RBC AUTO-ENTMCNC: 31.9 G/DL (ref 32–36)
MCV RBC AUTO: 92.4 FL (ref 80–99)
MONOCYTES # BLD AUTO: 0.1 10*3/MM3 (ref 0–1)
MONOCYTES NFR BLD AUTO: 3.7 % (ref 0–12)
NEUTROPHILS # BLD AUTO: 1.9 10*3/MM3 (ref 1.5–8.3)
NEUTROPHILS NFR BLD AUTO: 71.7 % (ref 41–71)
PLATELET # BLD AUTO: 203 10*3/MM3 (ref 150–450)
PMV BLD AUTO: 6.8 FL (ref 6–12)
POTASSIUM BLD-SCNC: 3.6 MMOL/L (ref 3.5–5.5)
PROT SERPL-MCNC: 7.5 G/DL (ref 5.7–8.2)
RBC # BLD AUTO: 4.45 10*6/MM3 (ref 3.89–5.14)
SODIUM BLD-SCNC: 137 MMOL/L (ref 132–146)
WBC NRBC COR # BLD: 2.7 10*3/MM3 (ref 3.5–10.8)

## 2018-01-31 PROCEDURE — 80053 COMPREHEN METABOLIC PANEL: CPT

## 2018-01-31 PROCEDURE — 99214 OFFICE O/P EST MOD 30 MIN: CPT | Performed by: INTERNAL MEDICINE

## 2018-01-31 PROCEDURE — 25010000002 HEPARIN FLUSH (PORCINE) 100 UNIT/ML SOLUTION: Performed by: INTERNAL MEDICINE

## 2018-01-31 PROCEDURE — 25010000002 DIPHENHYDRAMINE PER 50 MG: Performed by: INTERNAL MEDICINE

## 2018-01-31 PROCEDURE — 96413 CHEMO IV INFUSION 1 HR: CPT

## 2018-01-31 PROCEDURE — 85025 COMPLETE CBC W/AUTO DIFF WBC: CPT

## 2018-01-31 PROCEDURE — 96375 TX/PRO/DX INJ NEW DRUG ADDON: CPT

## 2018-01-31 PROCEDURE — 25010000002 TRASTUZUMAB PER 10 MG: Performed by: INTERNAL MEDICINE

## 2018-01-31 RX ORDER — FAMOTIDINE 10 MG/ML
20 INJECTION, SOLUTION INTRAVENOUS ONCE
Status: CANCELLED | OUTPATIENT
Start: 2018-01-31

## 2018-01-31 RX ORDER — SODIUM CHLORIDE 0.9 % (FLUSH) 0.9 %
10 SYRINGE (ML) INJECTION AS NEEDED
Status: DISCONTINUED | OUTPATIENT
Start: 2018-01-31 | End: 2018-01-31 | Stop reason: HOSPADM

## 2018-01-31 RX ORDER — FAMOTIDINE 10 MG/ML
20 INJECTION, SOLUTION INTRAVENOUS ONCE
Status: COMPLETED | OUTPATIENT
Start: 2018-01-31 | End: 2018-01-31

## 2018-01-31 RX ORDER — SUMATRIPTAN 50 MG/1
TABLET, FILM COATED ORAL
Qty: 10 TABLET | Refills: 3 | Status: SHIPPED | OUTPATIENT
Start: 2018-01-31 | End: 2018-03-14 | Stop reason: SDUPTHER

## 2018-01-31 RX ORDER — SODIUM CHLORIDE 0.9 % (FLUSH) 0.9 %
10 SYRINGE (ML) INJECTION AS NEEDED
Status: CANCELLED | OUTPATIENT
Start: 2018-01-31

## 2018-01-31 RX ORDER — FAMOTIDINE 10 MG/ML
20 INJECTION, SOLUTION INTRAVENOUS ONCE
Status: CANCELLED | OUTPATIENT
Start: 2018-02-21

## 2018-01-31 RX ORDER — TAMOXIFEN CITRATE 20 MG/1
20 TABLET ORAL DAILY
Qty: 30 TABLET | Refills: 11 | Status: SHIPPED | OUTPATIENT
Start: 2018-01-31 | End: 2018-03-14

## 2018-01-31 RX ADMIN — Medication 10 ML: at 13:54

## 2018-01-31 RX ADMIN — TRASTUZUMAB 320 MG: 150 INJECTION, POWDER, LYOPHILIZED, FOR SOLUTION INTRAVENOUS at 12:27

## 2018-01-31 RX ADMIN — DIPHENHYDRAMINE HYDROCHLORIDE 50 MG: 50 INJECTION INTRAMUSCULAR; INTRAVENOUS at 12:09

## 2018-01-31 RX ADMIN — HEPARIN 500 UNITS: 100 SYRINGE at 13:54

## 2018-01-31 RX ADMIN — FAMOTIDINE 20 MG: 10 INJECTION, SOLUTION INTRAVENOUS at 12:09

## 2018-01-31 NOTE — TELEPHONE ENCOUNTER
SW assisted pt by providing documentation for her cancer insurance policy.  SW available for ongoing support and resource needs.

## 2018-01-31 NOTE — PROGRESS NOTES
PROBLEM LIST:  1. [id number] (stage IIIB) invasive ductal carcinoma of the right breast, ER-, [place] weakly +, Her2 3+.  A) presented with pain and swelling of the right breast after childbirth. Biopsy 5/8/17 showed high grade IDC, Right axillary LN biopsy positive for metastatic node involvement. Skin biopsy showed involvement of the dermis with lymphatic space involvement.  PET/CT on 5/24/17 showed hypermetabolic activity in the breast and axillary nodes,  No distant spread of disease.  B) neoadjuvant chemotherapy with TCHP started on 5/25/17.  Infusion reaction consisting of severe leg pain with the first dose of herceptin.  Taxotere dose reduced to 80% on cycle 5 for neuropathy.  C) bilateral mastectomy on 10/30/17.  Pathology showed multifocal < 1 mm high grade IDC with focal lymphovascular invasion.  4 sentinel lymph nodes with no malignancy.   [id number] (Stage yIA)  D) radiation therapy completed on 1/23/18.  Tamoxifen started February 2018.  2. Migraines  3. Pre-diabetes    Subjective     HISTORY OF PRESENT ILLNESS:   Angie Sutherland returns for follow-up. She finished radiation about a week ago.  She initially was having a lot of chest wall pain but this has subsided somewhat.  She is having peeling of the skin.  She otherwise is feeling well.  She does continue to have headaches on an almost daily basis.  They are associated with nausea and a tightness that seems to go over the entire top of her head.  She was having headaches prior to her breast cancer diagnosis but has had a few since her treatment that has been quite severe.      Past Medical History, Past Surgical History, Social History, Family History have been reviewed and are without significant changes except as mentioned.    Review of Systems   A comprehensive 14 point review of systems was performed and was negative except as mentioned.    Medications:  The current medication list was reviewed in the EMR    ALLERGIES:    Allergies   Allergen  "Reactions   • No Known Drug Allergy        Objective      Temp 97.5 °F (36.4 °C)  Resp 14  Ht 154.9 cm (61\")  Wt 56.2 kg (124 lb)  LMP 11/23/2015  BMI 23.43 kg/m2     Performance Status: 0    General: well appearing female in no acute distress  Neuro: alert and oriented  HEENT: sclera anicteric, oropharynx clear  Lymphatics: no cervical, supraclavicular Adenopathy  No palpable axillary adenopathy  Chest: Status post bilateral mastectomy with tissue expanders in place.  No palpable masses  Cardiovascular: regular rate and rhythm, no murmurs  Lungs: clear to auscultation bilaterally  Abdomen: soft, nontender, nondistended.  No palpable organomegaly  Extremeties: no lower extremity edema  Skin: no rashes, lesions, bruising, or petechiae  Psych: mood and affect appropriate          Assessment/Plan   Angie Sutherland is a 22 y.o. year old female with stage IIIB HER-2 positive breast cancer who returns for follow-up.   She continues on herceptin and has now completed radiation treatment.      We will plan to start tamoxifen now.  Potential side effects of tamoxifen treatment were discussed including an increased risk of blood clots, and increased risk of uterine cancer, hot flashes, and sleep or mood disturbance. If she does not tolerate this medication well, I would have a low threshold to stop as her tumor was ER negative and only weakly [place] positive.      She will be due for repeat echocardiogram at the end of February.  This will be ordered today.    Headaches: CT head unremarkable.  We were unable to do an MRI due to her tissue expanders.  We will try imitrex and if not effective we will discuss having her see neurology.    F/u 6 weeks.          Visit time was 25 minutes, greater than 50% spent in counseling      Shadia Amos MD  [facility] Hematology and Oncology    1/31/2018          CC:          "

## 2018-02-21 ENCOUNTER — HOSPITAL ENCOUNTER (OUTPATIENT)
Dept: CARDIOLOGY | Facility: HOSPITAL | Age: 23
Discharge: HOME | End: 2018-02-21
Attending: INTERNAL MEDICINE | Admitting: INTERNAL MEDICINE

## 2018-02-21 ENCOUNTER — INFUSION (OUTPATIENT)
Dept: ONCOLOGY | Facility: HOSPITAL | Age: 23
End: 2018-02-21

## 2018-02-21 VITALS
TEMPERATURE: 97.3 F | HEART RATE: 95 BPM | DIASTOLIC BLOOD PRESSURE: 67 MMHG | BODY MASS INDEX: 23.03 KG/M2 | RESPIRATION RATE: 16 BRPM | SYSTOLIC BLOOD PRESSURE: 111 MMHG | HEIGHT: 61 IN | WEIGHT: 122 LBS

## 2018-02-21 DIAGNOSIS — C50.011 MALIGNANT NEOPLASM INVOLVING BOTH NIPPLE AND AREOLA OF RIGHT BREAST IN FEMALE, UNSPECIFIED ESTROGEN RECEPTOR STATUS (HCC): Primary | ICD-10-CM

## 2018-02-21 DIAGNOSIS — I42.7 CHEMOTHERAPY INDUCED CARDIOMYOPATHY (HCC): ICD-10-CM

## 2018-02-21 DIAGNOSIS — C50.811 MALIGNANT NEOPLASM OF OVERLAPPING SITES OF RIGHT FEMALE BREAST, UNSPECIFIED ESTROGEN RECEPTOR STATUS (HCC): ICD-10-CM

## 2018-02-21 DIAGNOSIS — T45.1X5A CHEMOTHERAPY INDUCED CARDIOMYOPATHY (HCC): ICD-10-CM

## 2018-02-21 DIAGNOSIS — C50.011 MALIGNANT NEOPLASM OF NIPPLE OF RIGHT BREAST IN FEMALE, UNSPECIFIED ESTROGEN RECEPTOR STATUS (HCC): ICD-10-CM

## 2018-02-21 DIAGNOSIS — C50.011 MALIGNANT NEOPLASM INVOLVING BOTH NIPPLE AND AREOLA OF RIGHT BREAST IN FEMALE, UNSPECIFIED ESTROGEN RECEPTOR STATUS (HCC): ICD-10-CM

## 2018-02-21 LAB
ALBUMIN SERPL-MCNC: 4.1 G/DL (ref 3.2–4.8)
ALBUMIN/GLOB SERPL: 1.4 G/DL (ref 1.5–2.5)
ALP SERPL-CCNC: 76 U/L (ref 25–100)
ALT SERPL W P-5'-P-CCNC: 20 U/L (ref 7–40)
ANION GAP SERPL CALCULATED.3IONS-SCNC: 4 MMOL/L (ref 3–11)
AST SERPL-CCNC: 24 U/L (ref 0–33)
BH CV ECHO MEAS - AO ROOT AREA (BSA CORRECTED): 1.6
BH CV ECHO MEAS - AO ROOT AREA: 4.7 CM^2
BH CV ECHO MEAS - AO ROOT DIAM: 2.4 CM
BH CV ECHO MEAS - BSA(HAYCOCK): 1.6 M^2
BH CV ECHO MEAS - BSA: 1.5 M^2
BH CV ECHO MEAS - BZI_BMI: 23.4 KILOGRAMS/M^2
BH CV ECHO MEAS - BZI_METRIC_HEIGHT: 154.9 CM
BH CV ECHO MEAS - BZI_METRIC_WEIGHT: 56.2 KG
BH CV ECHO MEAS - CONTRAST EF (2CH): 50 ML/M^2
BH CV ECHO MEAS - CONTRAST EF 4CH: 52.3 ML/M^2
BH CV ECHO MEAS - EDV(CUBED): 51.8 ML
BH CV ECHO MEAS - EDV(MOD-SP2): 56 ML
BH CV ECHO MEAS - EDV(MOD-SP4): 65 ML
BH CV ECHO MEAS - EDV(TEICH): 59.2 ML
BH CV ECHO MEAS - EF(CUBED): 54.7 %
BH CV ECHO MEAS - EF(MOD-SP2): 50 %
BH CV ECHO MEAS - EF(MOD-SP4): 52.3 %
BH CV ECHO MEAS - EF(TEICH): 47.2 %
BH CV ECHO MEAS - ESV(CUBED): 23.5 ML
BH CV ECHO MEAS - ESV(MOD-SP2): 28 ML
BH CV ECHO MEAS - ESV(MOD-SP4): 31 ML
BH CV ECHO MEAS - ESV(TEICH): 31.2 ML
BH CV ECHO MEAS - FS: 23.2 %
BH CV ECHO MEAS - IVS/LVPW: 0.9
BH CV ECHO MEAS - IVSD: 0.66 CM
BH CV ECHO MEAS - LA DIMENSION: 1.6 CM
BH CV ECHO MEAS - LA/AO: 0.65
BH CV ECHO MEAS - LAT PEAK E' VEL: 14 CM/SEC
BH CV ECHO MEAS - LV DIASTOLIC VOL/BSA (35-75): 42.2 ML/M^2
BH CV ECHO MEAS - LV MASS(C)D: 69.5 GRAMS
BH CV ECHO MEAS - LV MASS(C)DI: 45.1 GRAMS/M^2
BH CV ECHO MEAS - LV MAX PG: 2.1 MMHG
BH CV ECHO MEAS - LV MEAN PG: 1.1 MMHG
BH CV ECHO MEAS - LV SYSTOLIC VOL/BSA (12-30): 20.1 ML/M^2
BH CV ECHO MEAS - LV V1 MAX: 73.1 CM/SEC
BH CV ECHO MEAS - LV V1 MEAN: 48.8 CM/SEC
BH CV ECHO MEAS - LV V1 VTI: 15.3 CM
BH CV ECHO MEAS - LVIDD: 3.7 CM
BH CV ECHO MEAS - LVIDS: 2.9 CM
BH CV ECHO MEAS - LVLD AP2: 7.1 CM
BH CV ECHO MEAS - LVLD AP4: 7.2 CM
BH CV ECHO MEAS - LVLS AP2: 6.4 CM
BH CV ECHO MEAS - LVLS AP4: 5.8 CM
BH CV ECHO MEAS - LVOT AREA (M): 2 CM^2
BH CV ECHO MEAS - LVOT AREA: 2.1 CM^2
BH CV ECHO MEAS - LVOT DIAM: 1.6 CM
BH CV ECHO MEAS - LVPWD: 0.73 CM
BH CV ECHO MEAS - MED PEAK E' VEL: 9.6 CM/SEC
BH CV ECHO MEAS - MV A MAX VEL: 41 CM/SEC
BH CV ECHO MEAS - MV E MAX VEL: 70.6 CM/SEC
BH CV ECHO MEAS - MV E/A: 1.7
BH CV ECHO MEAS - RVDD: 2.2 CM
BH CV ECHO MEAS - SI(CUBED): 18.4 ML/M^2
BH CV ECHO MEAS - SI(LVOT): 21.2 ML/M^2
BH CV ECHO MEAS - SI(MOD-SP2): 18.2 ML/M^2
BH CV ECHO MEAS - SI(MOD-SP4): 22.1 ML/M^2
BH CV ECHO MEAS - SI(TEICH): 18.1 ML/M^2
BH CV ECHO MEAS - SV(CUBED): 28.3 ML
BH CV ECHO MEAS - SV(LVOT): 32.7 ML
BH CV ECHO MEAS - SV(MOD-SP2): 28 ML
BH CV ECHO MEAS - SV(MOD-SP4): 34 ML
BH CV ECHO MEAS - SV(TEICH): 28 ML
BH CV ECHO MEAS - TAPSE (>1.6): 1.6 CM2
BH CV XLRA - RV BASE: 3 CM
BH CV XLRA - RV LENGTH: 5.3 CM
BH CV XLRA - RV MID: 2.6 CM
BH CV XLRA - TDI S': 10.5 CM/SEC
BILIRUB SERPL-MCNC: 0.5 MG/DL (ref 0.3–1.2)
BUN BLD-MCNC: 13 MG/DL (ref 9–23)
BUN/CREAT SERPL: 18.6 (ref 7–25)
CALCIUM SPEC-SCNC: 8.9 MG/DL (ref 8.7–10.4)
CHLORIDE SERPL-SCNC: 104 MMOL/L (ref 99–109)
CO2 SERPL-SCNC: 30 MMOL/L (ref 20–31)
CREAT BLD-MCNC: 0.7 MG/DL (ref 0.6–1.3)
E/E' RATIO: 5
ERYTHROCYTE [DISTWIDTH] IN BLOOD BY AUTOMATED COUNT: 13.1 % (ref 11.3–14.5)
GFR SERPL CREATININE-BSD FRML MDRD: 126 ML/MIN/1.73
GLOBULIN UR ELPH-MCNC: 3 GM/DL
GLUCOSE BLD-MCNC: 129 MG/DL (ref 70–100)
HCT VFR BLD AUTO: 40.9 % (ref 34.5–44)
HGB BLD-MCNC: 13 G/DL (ref 11.5–15.5)
LEFT ATRIUM VOLUME INDEX: 16.9 ML/M2
LV EF 2D ECHO EST: 55 %
LYMPHOCYTES # BLD AUTO: 0.9 10*3/MM3 (ref 0.6–4.8)
LYMPHOCYTES NFR BLD AUTO: 30.4 % (ref 24–44)
MAXIMAL PREDICTED HEART RATE: 197 BPM
MCH RBC QN AUTO: 28.6 PG (ref 27–31)
MCHC RBC AUTO-ENTMCNC: 31.8 G/DL (ref 32–36)
MCV RBC AUTO: 89.8 FL (ref 80–99)
MONOCYTES # BLD AUTO: 0.2 10*3/MM3 (ref 0–1)
MONOCYTES NFR BLD AUTO: 8.1 % (ref 0–12)
NEUTROPHILS # BLD AUTO: 1.8 10*3/MM3 (ref 1.5–8.3)
NEUTROPHILS NFR BLD AUTO: 61.5 % (ref 41–71)
PLATELET # BLD AUTO: 201 10*3/MM3 (ref 150–450)
PMV BLD AUTO: 6.7 FL (ref 6–12)
POTASSIUM BLD-SCNC: 3.6 MMOL/L (ref 3.5–5.5)
PROT SERPL-MCNC: 7.1 G/DL (ref 5.7–8.2)
RBC # BLD AUTO: 4.55 10*6/MM3 (ref 3.89–5.14)
SODIUM BLD-SCNC: 138 MMOL/L (ref 132–146)
STRESS TARGET HR: 167 BPM
WBC NRBC COR # BLD: 3 10*3/MM3 (ref 3.5–10.8)

## 2018-02-21 PROCEDURE — 96374 THER/PROPH/DIAG INJ IV PUSH: CPT

## 2018-02-21 PROCEDURE — 96367 TX/PROPH/DG ADDL SEQ IV INF: CPT

## 2018-02-21 PROCEDURE — 93306 TTE W/DOPPLER COMPLETE: CPT

## 2018-02-21 PROCEDURE — 25010000002 HEPARIN FLUSH (PORCINE) 100 UNIT/ML SOLUTION: Performed by: INTERNAL MEDICINE

## 2018-02-21 PROCEDURE — 96413 CHEMO IV INFUSION 1 HR: CPT

## 2018-02-21 PROCEDURE — 96375 TX/PRO/DX INJ NEW DRUG ADDON: CPT

## 2018-02-21 PROCEDURE — 25010000002 DIPHENHYDRAMINE PER 50 MG: Performed by: INTERNAL MEDICINE

## 2018-02-21 PROCEDURE — 80053 COMPREHEN METABOLIC PANEL: CPT

## 2018-02-21 PROCEDURE — 85025 COMPLETE CBC W/AUTO DIFF WBC: CPT

## 2018-02-21 PROCEDURE — 93306 TTE W/DOPPLER COMPLETE: CPT | Performed by: INTERNAL MEDICINE

## 2018-02-21 PROCEDURE — 36415 COLL VENOUS BLD VENIPUNCTURE: CPT

## 2018-02-21 PROCEDURE — 25010000002 TRASTUZUMAB PER 10 MG: Performed by: INTERNAL MEDICINE

## 2018-02-21 RX ORDER — SODIUM CHLORIDE 0.9 % (FLUSH) 0.9 %
10 SYRINGE (ML) INJECTION AS NEEDED
Status: CANCELLED | OUTPATIENT
Start: 2018-02-21

## 2018-02-21 RX ORDER — FAMOTIDINE 10 MG/ML
20 INJECTION, SOLUTION INTRAVENOUS ONCE
Status: COMPLETED | OUTPATIENT
Start: 2018-02-21 | End: 2018-02-21

## 2018-02-21 RX ADMIN — SODIUM CHLORIDE 250 ML: 0.9 INJECTION, SOLUTION INTRAVENOUS at 11:38

## 2018-02-21 RX ADMIN — DIPHENHYDRAMINE HYDROCHLORIDE 50 MG: 50 INJECTION INTRAMUSCULAR; INTRAVENOUS at 11:39

## 2018-02-21 RX ADMIN — HEPARIN 500 UNITS: 100 SYRINGE at 13:10

## 2018-02-21 RX ADMIN — FAMOTIDINE 20 MG: 10 INJECTION INTRAVENOUS at 11:38

## 2018-02-21 RX ADMIN — TRASTUZUMAB 320 MG: 150 INJECTION, POWDER, LYOPHILIZED, FOR SOLUTION INTRAVENOUS at 12:07

## 2018-02-26 ENCOUNTER — OFFICE VISIT (OUTPATIENT)
Dept: RADIATION ONCOLOGY | Facility: HOSPITAL | Age: 23
End: 2018-02-26

## 2018-02-26 ENCOUNTER — HOSPITAL ENCOUNTER (OUTPATIENT)
Dept: RADIATION ONCOLOGY | Facility: HOSPITAL | Age: 23
Setting detail: RADIATION/ONCOLOGY SERIES
Discharge: HOME | End: 2018-02-26

## 2018-02-26 VITALS
SYSTOLIC BLOOD PRESSURE: 105 MMHG | BODY MASS INDEX: 23.37 KG/M2 | TEMPERATURE: 98.1 F | RESPIRATION RATE: 16 BRPM | DIASTOLIC BLOOD PRESSURE: 66 MMHG | HEART RATE: 72 BPM | HEIGHT: 61 IN | WEIGHT: 123.8 LBS

## 2018-02-26 DIAGNOSIS — Z17.0 MALIGNANT NEOPLASM OF OVERLAPPING SITES OF RIGHT BREAST IN FEMALE, ESTROGEN RECEPTOR POSITIVE (HCC): Primary | ICD-10-CM

## 2018-02-26 DIAGNOSIS — C50.811 MALIGNANT NEOPLASM OF OVERLAPPING SITES OF RIGHT BREAST IN FEMALE, ESTROGEN RECEPTOR POSITIVE (HCC): Primary | ICD-10-CM

## 2018-02-26 PROCEDURE — G0463 HOSPITAL OUTPT CLINIC VISIT: HCPCS

## 2018-02-26 NOTE — PROGRESS NOTES
FOLLOW UP NOTE    PATIENT:                                                      Angie Sutherland  MEDICAL RECORD #:                        [id number]  :                                                          1995  COMPLETION DATE:   2018  DIAGNOSIS:     Malignant neoplasm of right female breast    Staging form: Breast, AJCC V7    - Clinical: Stage IIIB (T4b, N2a, M0) - Signed by Shadia Amos MD on 2017    - Pathologic stage from 11/15/2017: Stage IA (yT1mic(m), N0, cM0) - Signed by Marily Mckinley MD on 11/15/2017      BRIEF HISTORY:    Angie Sutherland  is a very pleasant 22 y.o. female  with  [id number] (stage IIIB) invasive ductal carcinoma of the right breast, ER-, [place] weakly +, Her2 3+.  She presented with pain and swelling of the right breast after childbirth. Biopsy 17 showed high grade invasive ductal carcinoma and right axillary lymph node biopsy ws positive for metastatic disease. A skin biopsy showed involvement of the dermis with lymphatic space involvement.  PET/CT on 17 showed hypermetabolic activity in the breast and axillary nodes with no distant spread of disease.  MRI bilateral breasts showed tumor involving the right superior breast from approximately 1 to 9:00.  She had abnormal skin enhancement and thickening located in the far superior right 11 1:00 region.  There was no definite abnormal pectoralis muscle or chest wall enhancement.  She had matted, bulky right axillary lymphadenopathy involving levels 1, 2 and 3.  She underwent neoadjuvant chemotherapy with TCHP started on 17.  The taxotere dose was reduced to 80% on cycle 5 for neuropathy. She then underwent bilateral mastectomy and tissue expander placement on 10/30/17.  Pathology showed multifocal < 1 mm high grade IDC with focal lymphovascular invasion.  4 sentinel lymph nodes with no malignancy.   [id number] (Stage yIA)  She has completed chemotherapy and received radiotherapy in our department of:  The  "right chest wall received 50 Gy in 25 fractions and the right supraclavicular region received 44 Gy in 22 fractions.    Ms. Sutherland was tolerating her treatments well until she developed moderate erythema of the skin that was painful.  We recommended silvadene/lidocaine and Aquaphor.  She had intermittent right rib/arm pain that resolved with OTC antiinflammatories.  She had mild fatigue.    Today she complains only of fatigue.      MEDICATIONS: Medication reconciliation for the patient was reviewed and confirmed in the electronic medical record.    Review of Systems   Constitutional: Positive for fatigue.   All other systems reviewed and are negative.      KPS 90%    Physical Exam   Constitutional: She appears well-developed and well-nourished.   Neck: Neck supple.   Cardiovascular: Normal rate, regular rhythm and normal heart sounds.    Pulmonary/Chest: Effort normal and breath sounds normal.   Skin of the right chest wall is hyperpigmented.  She has no skin breakdown.    VITAL SIGNS:   Vitals:    02/26/18 1453   BP: 105/66   Pulse: 72   Resp: 16   Temp: 98.1 °F (36.7 °C)   Weight: 56.2 kg (123 lb 12.8 oz)   Height: 154.9 cm (61\")   PainSc: 0-No pain     The following portions of the patient's history were reviewed and updated as appropriate: allergies, current medications, past family history, past medical history, past social history, past surgical history and problem list.     IMPRESSION: Angie has minimal side effects of radiotherapy.  She has recovered well from most acute side effects.      RECOMMENDATIONS: Angie is doing very well. She will have the port removed in a few weeks.   We will see her back in our department 6 months or sooner should symptoms warrant.           Marily Mckinley MD    Errors in dictation may reflect use of voice recognition software and not all errors in transcription may have been detected prior to signing.  "

## 2018-03-14 ENCOUNTER — INFUSION (OUTPATIENT)
Dept: ONCOLOGY | Facility: HOSPITAL | Age: 23
End: 2018-03-14

## 2018-03-14 ENCOUNTER — OFFICE VISIT (OUTPATIENT)
Dept: ONCOLOGY | Facility: CLINIC | Age: 23
End: 2018-03-14

## 2018-03-14 ENCOUNTER — OFFICE VISIT (OUTPATIENT)
Dept: PSYCHIATRY | Facility: CLINIC | Age: 23
End: 2018-03-14

## 2018-03-14 VITALS
SYSTOLIC BLOOD PRESSURE: 94 MMHG | WEIGHT: 124.8 LBS | RESPIRATION RATE: 16 BRPM | DIASTOLIC BLOOD PRESSURE: 56 MMHG | HEART RATE: 94 BPM | OXYGEN SATURATION: 99 % | BODY MASS INDEX: 23.56 KG/M2 | TEMPERATURE: 97.4 F | HEIGHT: 61 IN

## 2018-03-14 DIAGNOSIS — C50.811 MALIGNANT NEOPLASM OF OVERLAPPING SITES OF RIGHT FEMALE BREAST, UNSPECIFIED ESTROGEN RECEPTOR STATUS (HCC): ICD-10-CM

## 2018-03-14 DIAGNOSIS — F41.1 GENERALIZED ANXIETY DISORDER: ICD-10-CM

## 2018-03-14 DIAGNOSIS — C50.011 MALIGNANT NEOPLASM OF NIPPLE OF RIGHT BREAST IN FEMALE, UNSPECIFIED ESTROGEN RECEPTOR STATUS (HCC): Primary | ICD-10-CM

## 2018-03-14 DIAGNOSIS — F33.2 SEVERE EPISODE OF RECURRENT MAJOR DEPRESSIVE DISORDER, WITHOUT PSYCHOTIC FEATURES (HCC): Primary | ICD-10-CM

## 2018-03-14 DIAGNOSIS — C50.011 MALIGNANT NEOPLASM INVOLVING BOTH NIPPLE AND AREOLA OF RIGHT BREAST IN FEMALE, UNSPECIFIED ESTROGEN RECEPTOR STATUS (HCC): Primary | ICD-10-CM

## 2018-03-14 DIAGNOSIS — C50.011 MALIGNANT NEOPLASM INVOLVING BOTH NIPPLE AND AREOLA OF RIGHT BREAST IN FEMALE, UNSPECIFIED ESTROGEN RECEPTOR STATUS (HCC): ICD-10-CM

## 2018-03-14 LAB
ALBUMIN SERPL-MCNC: 4.2 G/DL (ref 3.2–4.8)
ALBUMIN/GLOB SERPL: 1.2 G/DL (ref 1.5–2.5)
ALP SERPL-CCNC: 70 U/L (ref 25–100)
ALT SERPL W P-5'-P-CCNC: 19 U/L (ref 7–40)
ANION GAP SERPL CALCULATED.3IONS-SCNC: 4 MMOL/L (ref 3–11)
AST SERPL-CCNC: 21 U/L (ref 0–33)
BILIRUB SERPL-MCNC: 0.4 MG/DL (ref 0.3–1.2)
BUN BLD-MCNC: 21 MG/DL (ref 9–23)
BUN/CREAT SERPL: 30 (ref 7–25)
CALCIUM SPEC-SCNC: 9 MG/DL (ref 8.7–10.4)
CHLORIDE SERPL-SCNC: 102 MMOL/L (ref 99–109)
CO2 SERPL-SCNC: 30 MMOL/L (ref 20–31)
CREAT BLD-MCNC: 0.7 MG/DL (ref 0.6–1.3)
ERYTHROCYTE [DISTWIDTH] IN BLOOD BY AUTOMATED COUNT: 13.1 % (ref 11.3–14.5)
GFR SERPL CREATININE-BSD FRML MDRD: 126 ML/MIN/1.73
GLOBULIN UR ELPH-MCNC: 3.4 GM/DL
GLUCOSE BLD-MCNC: 172 MG/DL (ref 70–100)
HCT VFR BLD AUTO: 40.9 % (ref 34.5–44)
HGB BLD-MCNC: 13 G/DL (ref 11.5–15.5)
LYMPHOCYTES # BLD AUTO: 1.1 10*3/MM3 (ref 0.6–4.8)
LYMPHOCYTES NFR BLD AUTO: 32.4 % (ref 24–44)
MCH RBC QN AUTO: 28.7 PG (ref 27–31)
MCHC RBC AUTO-ENTMCNC: 31.7 G/DL (ref 32–36)
MCV RBC AUTO: 90.4 FL (ref 80–99)
MONOCYTES # BLD AUTO: 0.1 10*3/MM3 (ref 0–1)
MONOCYTES NFR BLD AUTO: 4.3 % (ref 0–12)
NEUTROPHILS # BLD AUTO: 2.1 10*3/MM3 (ref 1.5–8.3)
NEUTROPHILS NFR BLD AUTO: 63.3 % (ref 41–71)
PLATELET # BLD AUTO: 180 10*3/MM3 (ref 150–450)
PMV BLD AUTO: 7.2 FL (ref 6–12)
POTASSIUM BLD-SCNC: 3.7 MMOL/L (ref 3.5–5.5)
PROT SERPL-MCNC: 7.6 G/DL (ref 5.7–8.2)
RBC # BLD AUTO: 4.52 10*6/MM3 (ref 3.89–5.14)
SODIUM BLD-SCNC: 136 MMOL/L (ref 132–146)
WBC NRBC COR # BLD: 3.3 10*3/MM3 (ref 3.5–10.8)

## 2018-03-14 PROCEDURE — 25010000002 HEPARIN FLUSH (PORCINE) 100 UNIT/ML SOLUTION: Performed by: INTERNAL MEDICINE

## 2018-03-14 PROCEDURE — 25010000002 TRASTUZUMAB PER 10 MG: Performed by: INTERNAL MEDICINE

## 2018-03-14 PROCEDURE — 80053 COMPREHEN METABOLIC PANEL: CPT

## 2018-03-14 PROCEDURE — 99214 OFFICE O/P EST MOD 30 MIN: CPT | Performed by: INTERNAL MEDICINE

## 2018-03-14 PROCEDURE — 25010000002 LEUPROLIDE ACETATE (3 MONTH) PER 3.75 MG: Performed by: INTERNAL MEDICINE

## 2018-03-14 PROCEDURE — 96413 CHEMO IV INFUSION 1 HR: CPT

## 2018-03-14 PROCEDURE — 99213 OFFICE O/P EST LOW 20 MIN: CPT | Performed by: NURSE PRACTITIONER

## 2018-03-14 PROCEDURE — 96402 CHEMO HORMON ANTINEOPL SQ/IM: CPT

## 2018-03-14 PROCEDURE — 96372 THER/PROPH/DIAG INJ SC/IM: CPT

## 2018-03-14 PROCEDURE — 96375 TX/PRO/DX INJ NEW DRUG ADDON: CPT

## 2018-03-14 PROCEDURE — 90833 PSYTX W PT W E/M 30 MIN: CPT | Performed by: NURSE PRACTITIONER

## 2018-03-14 PROCEDURE — 85025 COMPLETE CBC W/AUTO DIFF WBC: CPT

## 2018-03-14 PROCEDURE — 25010000002 DIPHENHYDRAMINE PER 50 MG: Performed by: INTERNAL MEDICINE

## 2018-03-14 RX ORDER — SODIUM CHLORIDE 0.9 % (FLUSH) 0.9 %
10 SYRINGE (ML) INJECTION AS NEEDED
Status: CANCELLED | OUTPATIENT
Start: 2018-03-14

## 2018-03-14 RX ORDER — VENLAFAXINE HYDROCHLORIDE 37.5 MG/1
37.5 CAPSULE, EXTENDED RELEASE ORAL DAILY
Qty: 7 CAPSULE | Refills: 0 | Status: SHIPPED | OUTPATIENT
Start: 2018-03-14 | End: 2018-04-25

## 2018-03-14 RX ORDER — SUMATRIPTAN 50 MG/1
TABLET, FILM COATED ORAL
Qty: 10 TABLET | Refills: 3 | Status: SHIPPED | OUTPATIENT
Start: 2018-03-14 | End: 2018-07-17 | Stop reason: SDUPTHER

## 2018-03-14 RX ORDER — HYDROXYZINE HYDROCHLORIDE 25 MG/1
25 TABLET, FILM COATED ORAL 3 TIMES DAILY PRN
Qty: 90 TABLET | Refills: 1 | Status: SHIPPED | OUTPATIENT
Start: 2018-03-14 | End: 2018-08-13

## 2018-03-14 RX ORDER — VENLAFAXINE HYDROCHLORIDE 75 MG/1
75 CAPSULE, EXTENDED RELEASE ORAL DAILY
Qty: 30 CAPSULE | Refills: 2 | Status: SHIPPED | OUTPATIENT
Start: 2018-03-14 | End: 2018-04-25

## 2018-03-14 RX ORDER — FAMOTIDINE 10 MG/ML
20 INJECTION, SOLUTION INTRAVENOUS ONCE
Status: CANCELLED | OUTPATIENT
Start: 2018-03-14

## 2018-03-14 RX ORDER — SODIUM CHLORIDE 0.9 % (FLUSH) 0.9 %
10 SYRINGE (ML) INJECTION AS NEEDED
Status: DISCONTINUED | OUTPATIENT
Start: 2018-03-14 | End: 2018-03-14 | Stop reason: HOSPADM

## 2018-03-14 RX ORDER — FAMOTIDINE 10 MG/ML
20 INJECTION, SOLUTION INTRAVENOUS ONCE
Status: COMPLETED | OUTPATIENT
Start: 2018-03-14 | End: 2018-03-14

## 2018-03-14 RX ORDER — FAMOTIDINE 10 MG/ML
20 INJECTION, SOLUTION INTRAVENOUS ONCE
Status: CANCELLED | OUTPATIENT
Start: 2018-04-04

## 2018-03-14 RX ADMIN — DIPHENHYDRAMINE HYDROCHLORIDE 50 MG: 50 INJECTION INTRAMUSCULAR; INTRAVENOUS at 11:24

## 2018-03-14 RX ADMIN — TRASTUZUMAB 320 MG: 150 INJECTION, POWDER, LYOPHILIZED, FOR SOLUTION INTRAVENOUS at 11:40

## 2018-03-14 RX ADMIN — FAMOTIDINE 20 MG: 10 INJECTION, SOLUTION INTRAVENOUS at 11:26

## 2018-03-14 RX ADMIN — Medication 10 ML: at 13:12

## 2018-03-14 RX ADMIN — LEUPROLIDE ACETATE 11.25 MG: KIT at 13:17

## 2018-03-14 RX ADMIN — HEPARIN 500 UNITS: 100 SYRINGE at 13:12

## 2018-03-14 NOTE — PROGRESS NOTES
"    Subjective   Angie Sutherland is a 22 y.o. female who is here today for medication management follow up.    Chief Complaint:     Severe episode of recurrent major depressive disorder, without psychotic features    Generalized anxiety disorder    History of Present Illness Patient presents by herself for f/u. She had been once in November and did not return. She reports now she is having overwhelmed feelings and concerns about her relationships with others , family, best friend. The patient reports depressive symptoms including depressed mood, crying spells, anhedonia, feelings of guilt, poor energy, poor motivation,  feelings of helplessness, feelings of worthlessness,  difficulty concentrating and psychomotor retardation, and have caused impairment in important areas of functioning.  Depression rated 8/10 with 10 being the worst. She denies suicidal intent but has passive thoughts but she states \" I would never do that to my 1 year old she needs me, my 3yo would be fine\". She states her daughters are her reason for living. But had fight with her best friend, her parents aren't emotionally available, her sister is self centered and gets too defensive, \"everyone gets so defensive if you try to have a conversation with them\". She can't work on issues she has with others because they don't know how to work things out without yelling and leaving. She feels alone, and wants to work on concerns she has. Discussed future plans, finishing Herceptin, getting port out, having expanders filled possibly this summer. Skin healing. Discussed her health and back in her own apartment and grandmother of eldest daughter just moved her things into her apartment for awhile as she looks for her own place to stay. She has helped a little with  but pt typically is watching her daughters all the time except for doctor appointments or chemo. The patient reports the following symptoms of anxiety: constant anxiety/worry, " difficulty concentrating, mind goes blank, irritability, muscle tension and anxiety causes distress/impairment in important areas of functioning and have caused impairment in important areas of functioning. Denies panic, but rates anxiety 7 most days. Pt reports her 5yo daughter is in behavioral managemnt therapy for  ADHD and out of control behaviors/anxiety.     (Scales based on 0 - 10 with 10 being the worst)        The following portions of the patient's history were reviewed and updated as appropriate: allergies, current medications, past family history, past medical history, past social history, past surgical history and problem list.    Review of Systems denies fever, cough, s/s’s of infection, denies GI/ problems, denies new medical issues  Is in treatment for breast cancer     Objective   Physical Exam  Last menstrual period 11/23/2015, not currently breastfeeding.    Allergies   Allergen Reactions   • No Known Drug Allergy        Current Medications:   Current Outpatient Prescriptions   Medication Sig Dispense Refill   • acetaminophen (TYLENOL) 500 MG tablet Take 1 tablet by mouth Every 6 (Six) Hours As Needed for Mild Pain  or Moderate Pain. 30 tablet 0   • carisoprodol (SOMA) 350 MG tablet Take 1 tablet by mouth 3 (Three) Times a Day As Needed for Muscle Spasms for up to 35 doses. 35 tablet 0   • hydrOXYzine (ATARAX) 25 MG tablet Take 1 tablet by mouth 3 (Three) Times a Day As Needed for Itching. 90 tablet 1   • ibuprofen (ADVIL,MOTRIN) 400 MG tablet Take 1 tablet by mouth Every 6 (Six) Hours As Needed for Mild Pain. 35 tablet 0   • ondansetron (ZOFRAN) 4 MG tablet Take 1 tablet by mouth Daily As Needed for Nausea or Vomiting. 10 tablet 0   • oxyCODONE (ROXICODONE) 5 MG immediate release tablet Take 1 tablet by mouth Every 6 (Six) Hours As Needed for Pain 35 tablet 0   • SUMAtriptan (IMITREX) 50 MG tablet Take one tablet at onset of headache. May repeat dose one time in 2 hours if headache not  "relieved. 10 tablet 3   • traZODone (DESYREL) 50 MG tablet 1/2 - 1 tablet as needed at bedtime for sleep 30 tablet 1   • venlafaxine XR (EFFEXOR XR) 37.5 MG 24 hr capsule Take 1 capsule by mouth Daily. 7 capsule 0   • venlafaxine XR (EFFEXOR XR) 75 MG 24 hr capsule Take 1 capsule by mouth Daily. 30 capsule 2     No current facility-administered medications for this visit.        Appearance: appropriate hair growing back  Hygiene:   good  Cooperation:  Cooperative  Eye Contact:  Good  Psychomotor Behavior:  Appropriate  Mood:  Anxious and depressed   Affect:  Appropriate  Hopelessness: Denies  Speech:  Normal  Thought Process:  Linear  Thought Content:  Normal  Suicidal:  None  Homicidal:  None  Hallucinations:  None  Delusion:  None  Memory:  Intact  Orientation:  Person, Place, Time and Situation  Reliability:  fair  Insight:  Fair  Judgement: good  Impulse Control:  Fair  Estimated Intelligence: average range       Assessment/Plan   Diagnoses and all orders for this visit:    Severe episode of recurrent major depressive disorder, without psychotic features    Generalized anxiety disorder    Other orders  -     venlafaxine XR (EFFEXOR XR) 37.5 MG 24 hr capsule; Take 1 capsule by mouth Daily.  -     venlafaxine XR (EFFEXOR XR) 75 MG 24 hr capsule; Take 1 capsule by mouth Daily.  -     hydrOXYzine (ATARAX) 25 MG tablet; Take 1 tablet by mouth 3 (Three) Times a Day As Needed for Itching.        45 minutes face to face 15 minutes med management and 30 minutes therapy    pt has significant depressive symptoms, she stopped taking the Lexapro \"it wasn't helping\" she does not have confidence in it now,  and had not returned for an appointment until today    Discussed medication options for her. Since she is possibly going on Tamoxifen but has fears about the adverse effects, I discussed venlafaxine xr as option for depression and anxiety. Also discussed hydroxyzine for anxiety, she asked for something and discussed  risks, " benefits, and side effects of the above medications and the patient was agreeable with the plan. Pt has trazodone as needed for sleep disturbance but she reports sleeping well currently. Patient was educated on the importance of compliance with treatment and follow-up appointments.     Sleep hygiene reviewed, encouraged more whole foods, less processed foods, fruits   Coping skills reviewed and encouraged positive framing of thoughts, we discussed future plans with getting 's permit and license as her priority. 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 Patient is smart and insightful with little perceived emotional support. Discussed other options developing stable friendships. Discussed caring for her children 5yo daughter and 2 yo daughter.     Assisted patient in processing above session content; acknowledged and normalized patient’s thoughts, feelings, and concerns.  Applied  positive coping skills and behavior management in session.  Allowed patient to freely discuss issues without interruption or judgment. Provided safe, confidential environment to facilitate the development of positive therapeutic relationship and encourage open, honest communication. Assisted patient in identifying risk factors which would indicate the need for higher level of care including thoughts to harm self or others and/or self-harming behavior and encouraged patient to contact this office, call 911, or present to the nearest emergency room should any of these events occur. Discussed crisis intervention services and means to access.  Patient adamantly and convincingly denies current suicidal or homicidal ideation or perceptual disturbance.    Instructed to call for questions or concerns and return early if necessary. Crisis plan reviewed including going to the Emergency department.    Return in about 3 weeks (around 4/4/2018).         Please note that portions of this note were completed  with a voice recognition program.  Efforts were made to edit dictation, but occasionally words are mistranscribed.

## 2018-03-14 NOTE — PROGRESS NOTES
PROBLEM LIST:  1. [id number] (stage IIIB) invasive ductal carcinoma of the right breast, ER-, [place] weakly +, Her2 3+.  A) presented with pain and swelling of the right breast after childbirth. Biopsy 17 showed high grade IDC, Right axillary LN biopsy positive for metastatic node involvement. Skin biopsy showed involvement of the dermis with lymphatic space involvement.  PET/CT on 17 showed hypermetabolic activity in the breast and axillary nodes,  No distant spread of disease.  B) neoadjuvant chemotherapy with TCHP started on 17.  Infusion reaction consisting of severe leg pain with the first dose of herceptin.  Taxotere dose reduced to 80% on cycle 5 for neuropathy.  C) bilateral mastectomy on 10/30/17.  Pathology showed multifocal < 1 mm high grade IDC with focal lymphovascular invasion.  4 sentinel lymph nodes with no malignancy.   [id number] (Stage yIA)  D) radiation therapy completed on 18.  Tamoxifen started 2018.  2. Migraines  3. Pre-diabetes    Subjective     HISTORY OF PRESENT ILLNESS:   Angie Sutherland returns for follow-up.  She did not start taking the tamoxifen.  She is concerned about how could affect her mood as she is still struggling with this.  She also had a uncle who  at a young age from a blood clot is concerned about Potential side effect.  She saw Cyndee Soto this morning and was given a prescription for Effexor.  She said she still has daily headaches.  She has not tried taking Imitrex.      Past Medical History, Past Surgical History, Social History, Family History have been reviewed and are without significant changes except as mentioned.    Review of Systems   A comprehensive 14 point review of systems was performed and was negative except as mentioned.    Medications:  The current medication list was reviewed in the EMR    ALLERGIES:    Allergies   Allergen Reactions   • No Known Drug Allergy        Objective      BP 94/56   Pulse 94   Temp 97.4 °F  "(36.3 °C) (Temporal Artery )   Resp 16   Ht 154.9 cm (60.98\")   Wt 56.6 kg (124 lb 12.8 oz)   LMP 11/23/2015   SpO2 99%   BMI 23.59 kg/m²      Performance Status: 0    General: well appearing female in no acute distress  Neuro: alert and oriented  HEENT: sclera anicteric, oropharynx clear  Lymphatics: no cervical, supraclavicular Adenopathy  No palpable axillary adenopathy  Chest: Status post bilateral mastectomy with tissue expanders in place.  No palpable masses  Cardiovascular: regular rate and rhythm, no murmurs  Lungs: clear to auscultation bilaterally  Abdomen: soft, nontender, nondistended.  No palpable organomegaly  Extremeties: no lower extremity edema  Skin: no rashes, lesions, bruising, or petechiae  Psych: mood and affect appropriate    Echocardiogram: EF 55%      Assessment/Plan   Angie Sutherland is a 22 y.o. year old female with stage IIIB HER-2 positive breast cancer who returns for follow-up.   She continues on herceptin.  She has not started taking tamoxifen due to legitimate concerns about side effects.  Her tumor was only weakly progesterone receptor positive so it is unlikely that tamoxifen will have a significant impact on her outcome.  I'm comfortable with holding on this therapy for now.  We will continue Lupron every 3 months for ovarian suppression as well as contraception.    Headaches: The Imitrex prescription was sent again to a different pharmacy at her request.  She will let us know if this is not effective.    Repeat echo due at the end of May.    F/u 6 weeks.          Visit time was 25 minutes, greater than 50% spent in counseling      Shadia Amos MD  [facility] Hematology and Oncology    3/14/2018          CC:          "

## 2018-04-04 ENCOUNTER — INFUSION (OUTPATIENT)
Dept: ONCOLOGY | Facility: HOSPITAL | Age: 23
End: 2018-04-04

## 2018-04-04 VITALS
RESPIRATION RATE: 16 BRPM | DIASTOLIC BLOOD PRESSURE: 67 MMHG | HEART RATE: 123 BPM | WEIGHT: 123 LBS | BODY MASS INDEX: 23.22 KG/M2 | TEMPERATURE: 98 F | HEIGHT: 61 IN | SYSTOLIC BLOOD PRESSURE: 103 MMHG

## 2018-04-04 DIAGNOSIS — C50.011 MALIGNANT NEOPLASM INVOLVING BOTH NIPPLE AND AREOLA OF RIGHT BREAST IN FEMALE, UNSPECIFIED ESTROGEN RECEPTOR STATUS (HCC): Primary | ICD-10-CM

## 2018-04-04 DIAGNOSIS — C50.811 MALIGNANT NEOPLASM OF OVERLAPPING SITES OF RIGHT FEMALE BREAST, UNSPECIFIED ESTROGEN RECEPTOR STATUS (HCC): ICD-10-CM

## 2018-04-04 LAB
ALBUMIN SERPL-MCNC: 3.8 G/DL (ref 3.2–4.8)
ALBUMIN/GLOB SERPL: 1.2 G/DL (ref 1.5–2.5)
ALP SERPL-CCNC: 91 U/L (ref 25–100)
ALT SERPL W P-5'-P-CCNC: 18 U/L (ref 7–40)
ANION GAP SERPL CALCULATED.3IONS-SCNC: 9 MMOL/L (ref 3–11)
AST SERPL-CCNC: 21 U/L (ref 0–33)
BILIRUB SERPL-MCNC: 0.2 MG/DL (ref 0.3–1.2)
BUN BLD-MCNC: 15 MG/DL (ref 9–23)
BUN/CREAT SERPL: 18.8 (ref 7–25)
CALCIUM SPEC-SCNC: 8.7 MG/DL (ref 8.7–10.4)
CHLORIDE SERPL-SCNC: 104 MMOL/L (ref 99–109)
CO2 SERPL-SCNC: 27 MMOL/L (ref 20–31)
CREAT BLD-MCNC: 0.8 MG/DL (ref 0.6–1.3)
ERYTHROCYTE [DISTWIDTH] IN BLOOD BY AUTOMATED COUNT: 13 % (ref 11.3–14.5)
GFR SERPL CREATININE-BSD FRML MDRD: 108 ML/MIN/1.73
GLOBULIN UR ELPH-MCNC: 3.3 GM/DL
GLUCOSE BLD-MCNC: 160 MG/DL (ref 70–100)
HCT VFR BLD AUTO: 37.2 % (ref 34.5–44)
HGB BLD-MCNC: 11.9 G/DL (ref 11.5–15.5)
LYMPHOCYTES # BLD AUTO: 1.2 10*3/MM3 (ref 0.6–4.8)
LYMPHOCYTES NFR BLD AUTO: 24.7 % (ref 24–44)
MCH RBC QN AUTO: 28.9 PG (ref 27–31)
MCHC RBC AUTO-ENTMCNC: 31.9 G/DL (ref 32–36)
MCV RBC AUTO: 90.6 FL (ref 80–99)
MONOCYTES # BLD AUTO: 0.1 10*3/MM3 (ref 0–1)
MONOCYTES NFR BLD AUTO: 2.9 % (ref 0–12)
NEUTROPHILS # BLD AUTO: 3.5 10*3/MM3 (ref 1.5–8.3)
NEUTROPHILS NFR BLD AUTO: 72.4 % (ref 41–71)
PLATELET # BLD AUTO: 254 10*3/MM3 (ref 150–450)
PMV BLD AUTO: 6.8 FL (ref 6–12)
POTASSIUM BLD-SCNC: 4.1 MMOL/L (ref 3.5–5.5)
PROT SERPL-MCNC: 7.1 G/DL (ref 5.7–8.2)
RBC # BLD AUTO: 4.11 10*6/MM3 (ref 3.89–5.14)
SODIUM BLD-SCNC: 140 MMOL/L (ref 132–146)
WBC NRBC COR # BLD: 4.9 10*3/MM3 (ref 3.5–10.8)

## 2018-04-04 PROCEDURE — 25010000002 HEPARIN FLUSH (PORCINE) 100 UNIT/ML SOLUTION: Performed by: INTERNAL MEDICINE

## 2018-04-04 PROCEDURE — 96374 THER/PROPH/DIAG INJ IV PUSH: CPT

## 2018-04-04 PROCEDURE — 25010000002 DIPHENHYDRAMINE PER 50 MG: Performed by: INTERNAL MEDICINE

## 2018-04-04 PROCEDURE — 80053 COMPREHEN METABOLIC PANEL: CPT

## 2018-04-04 PROCEDURE — 96375 TX/PRO/DX INJ NEW DRUG ADDON: CPT

## 2018-04-04 PROCEDURE — 96413 CHEMO IV INFUSION 1 HR: CPT

## 2018-04-04 PROCEDURE — 85025 COMPLETE CBC W/AUTO DIFF WBC: CPT

## 2018-04-04 PROCEDURE — 25010000002 TRASTUZUMAB PER 10 MG: Performed by: INTERNAL MEDICINE

## 2018-04-04 PROCEDURE — 36591 DRAW BLOOD OFF VENOUS DEVICE: CPT

## 2018-04-04 RX ORDER — FAMOTIDINE 10 MG/ML
20 INJECTION, SOLUTION INTRAVENOUS ONCE
Status: COMPLETED | OUTPATIENT
Start: 2018-04-04 | End: 2018-04-04

## 2018-04-04 RX ORDER — SODIUM CHLORIDE 0.9 % (FLUSH) 0.9 %
10 SYRINGE (ML) INJECTION AS NEEDED
Status: CANCELLED | OUTPATIENT
Start: 2018-04-04

## 2018-04-04 RX ADMIN — HEPARIN 500 UNITS: 100 SYRINGE at 13:50

## 2018-04-04 RX ADMIN — FAMOTIDINE 20 MG: 10 INJECTION, SOLUTION INTRAVENOUS at 12:47

## 2018-04-04 RX ADMIN — TRASTUZUMAB 320 MG: 150 INJECTION, POWDER, LYOPHILIZED, FOR SOLUTION INTRAVENOUS at 13:07

## 2018-04-04 RX ADMIN — DIPHENHYDRAMINE HYDROCHLORIDE 50 MG: 50 INJECTION INTRAMUSCULAR; INTRAVENOUS at 12:47

## 2018-04-04 RX ADMIN — SODIUM CHLORIDE 250 ML: 0.9 INJECTION, SOLUTION INTRAVENOUS at 12:44

## 2018-04-11 ENCOUNTER — TELEPHONE (OUTPATIENT)
Dept: PSYCHIATRY | Facility: CLINIC | Age: 23
End: 2018-04-11

## 2018-04-11 NOTE — TELEPHONE ENCOUNTER
She needs to try to get back on the 37.5mg PO QD, I told her it would cause nausea but would go away after a week, see if she will try it, thanks

## 2018-04-11 NOTE — TELEPHONE ENCOUNTER
Please ask her if she felt nauseated the first week on 37.5mg of venlafaxine the antidepressant or when she started the higher dose please?

## 2018-04-25 ENCOUNTER — INFUSION (OUTPATIENT)
Dept: ONCOLOGY | Facility: HOSPITAL | Age: 23
End: 2018-04-25

## 2018-04-25 ENCOUNTER — OFFICE VISIT (OUTPATIENT)
Dept: ONCOLOGY | Facility: CLINIC | Age: 23
End: 2018-04-25

## 2018-04-25 ENCOUNTER — OFFICE VISIT (OUTPATIENT)
Dept: PSYCHIATRY | Facility: CLINIC | Age: 23
End: 2018-04-25

## 2018-04-25 VITALS
WEIGHT: 124 LBS | HEART RATE: 92 BPM | SYSTOLIC BLOOD PRESSURE: 103 MMHG | BODY MASS INDEX: 23.41 KG/M2 | DIASTOLIC BLOOD PRESSURE: 68 MMHG | HEIGHT: 61 IN | RESPIRATION RATE: 14 BRPM | TEMPERATURE: 97.9 F

## 2018-04-25 DIAGNOSIS — C50.811 MALIGNANT NEOPLASM OF OVERLAPPING SITES OF RIGHT FEMALE BREAST, UNSPECIFIED ESTROGEN RECEPTOR STATUS (HCC): ICD-10-CM

## 2018-04-25 DIAGNOSIS — C50.011 MALIGNANT NEOPLASM INVOLVING BOTH NIPPLE AND AREOLA OF RIGHT BREAST IN FEMALE, UNSPECIFIED ESTROGEN RECEPTOR STATUS (HCC): ICD-10-CM

## 2018-04-25 DIAGNOSIS — C50.011 MALIGNANT NEOPLASM INVOLVING BOTH NIPPLE AND AREOLA OF RIGHT BREAST IN FEMALE, UNSPECIFIED ESTROGEN RECEPTOR STATUS (HCC): Primary | ICD-10-CM

## 2018-04-25 DIAGNOSIS — T45.1X5A CHEMOTHERAPY INDUCED CARDIOMYOPATHY (HCC): ICD-10-CM

## 2018-04-25 DIAGNOSIS — F41.1 GENERALIZED ANXIETY DISORDER: Primary | ICD-10-CM

## 2018-04-25 DIAGNOSIS — F33.1 MODERATE EPISODE OF RECURRENT MAJOR DEPRESSIVE DISORDER (HCC): ICD-10-CM

## 2018-04-25 DIAGNOSIS — I42.7 CHEMOTHERAPY INDUCED CARDIOMYOPATHY (HCC): ICD-10-CM

## 2018-04-25 DIAGNOSIS — C50.011 MALIGNANT NEOPLASM OF NIPPLE OF RIGHT BREAST IN FEMALE, UNSPECIFIED ESTROGEN RECEPTOR STATUS (HCC): Primary | ICD-10-CM

## 2018-04-25 DIAGNOSIS — F51.05 INSOMNIA DUE TO MENTAL CONDITION: ICD-10-CM

## 2018-04-25 LAB
ALBUMIN SERPL-MCNC: 4.5 G/DL (ref 3.2–4.8)
ALBUMIN/GLOB SERPL: 1.3 G/DL (ref 1.5–2.5)
ALP SERPL-CCNC: 75 U/L (ref 25–100)
ALT SERPL W P-5'-P-CCNC: 25 U/L (ref 7–40)
ANION GAP SERPL CALCULATED.3IONS-SCNC: 7 MMOL/L (ref 3–11)
AST SERPL-CCNC: 25 U/L (ref 0–33)
BILIRUB SERPL-MCNC: 0.5 MG/DL (ref 0.3–1.2)
BUN BLD-MCNC: 16 MG/DL (ref 9–23)
BUN/CREAT SERPL: 26.7 (ref 7–25)
CALCIUM SPEC-SCNC: 9.9 MG/DL (ref 8.7–10.4)
CHLORIDE SERPL-SCNC: 100 MMOL/L (ref 99–109)
CO2 SERPL-SCNC: 31 MMOL/L (ref 20–31)
CREAT BLD-MCNC: 0.6 MG/DL (ref 0.6–1.3)
ERYTHROCYTE [DISTWIDTH] IN BLOOD BY AUTOMATED COUNT: 12.8 % (ref 11.3–14.5)
GFR SERPL CREATININE-BSD FRML MDRD: 150 ML/MIN/1.73
GLOBULIN UR ELPH-MCNC: 3.4 GM/DL
GLUCOSE BLD-MCNC: 130 MG/DL (ref 70–100)
HCT VFR BLD AUTO: 39.8 % (ref 34.5–44)
HGB BLD-MCNC: 12.7 G/DL (ref 11.5–15.5)
LYMPHOCYTES # BLD AUTO: 1.3 10*3/MM3 (ref 0.6–4.8)
LYMPHOCYTES NFR BLD AUTO: 42.1 % (ref 24–44)
MCH RBC QN AUTO: 29.2 PG (ref 27–31)
MCHC RBC AUTO-ENTMCNC: 32.1 G/DL (ref 32–36)
MCV RBC AUTO: 91 FL (ref 80–99)
MONOCYTES # BLD AUTO: 0.2 10*3/MM3 (ref 0–1)
MONOCYTES NFR BLD AUTO: 6.6 % (ref 0–12)
NEUTROPHILS # BLD AUTO: 1.5 10*3/MM3 (ref 1.5–8.3)
NEUTROPHILS NFR BLD AUTO: 51.3 % (ref 41–71)
PLATELET # BLD AUTO: 195 10*3/MM3 (ref 150–450)
PMV BLD AUTO: 6.7 FL (ref 6–12)
POTASSIUM BLD-SCNC: 3.5 MMOL/L (ref 3.5–5.5)
PROT SERPL-MCNC: 7.9 G/DL (ref 5.7–8.2)
RBC # BLD AUTO: 4.37 10*6/MM3 (ref 3.89–5.14)
SODIUM BLD-SCNC: 138 MMOL/L (ref 132–146)
WBC NRBC COR # BLD: 3 10*3/MM3 (ref 3.5–10.8)

## 2018-04-25 PROCEDURE — 80053 COMPREHEN METABOLIC PANEL: CPT

## 2018-04-25 PROCEDURE — 25010000002 TRASTUZUMAB PER 10 MG: Performed by: INTERNAL MEDICINE

## 2018-04-25 PROCEDURE — 85025 COMPLETE CBC W/AUTO DIFF WBC: CPT

## 2018-04-25 PROCEDURE — 96413 CHEMO IV INFUSION 1 HR: CPT

## 2018-04-25 PROCEDURE — 99214 OFFICE O/P EST MOD 30 MIN: CPT | Performed by: INTERNAL MEDICINE

## 2018-04-25 PROCEDURE — 25010000002 HEPARIN FLUSH (PORCINE) 100 UNIT/ML SOLUTION: Performed by: INTERNAL MEDICINE

## 2018-04-25 PROCEDURE — 99214 OFFICE O/P EST MOD 30 MIN: CPT | Performed by: NURSE PRACTITIONER

## 2018-04-25 PROCEDURE — 25010000002 DIPHENHYDRAMINE PER 50 MG: Performed by: INTERNAL MEDICINE

## 2018-04-25 PROCEDURE — 96375 TX/PRO/DX INJ NEW DRUG ADDON: CPT

## 2018-04-25 RX ORDER — BUSPIRONE HYDROCHLORIDE 5 MG/1
TABLET ORAL
Qty: 60 TABLET | Refills: 1 | Status: SHIPPED | OUTPATIENT
Start: 2018-04-25 | End: 2018-05-02 | Stop reason: SDUPTHER

## 2018-04-25 RX ORDER — FAMOTIDINE 10 MG/ML
20 INJECTION, SOLUTION INTRAVENOUS ONCE
Status: CANCELLED | OUTPATIENT
Start: 2018-05-16

## 2018-04-25 RX ORDER — FAMOTIDINE 10 MG/ML
20 INJECTION, SOLUTION INTRAVENOUS ONCE
Status: CANCELLED | OUTPATIENT
Start: 2018-04-25

## 2018-04-25 RX ORDER — SODIUM CHLORIDE 0.9 % (FLUSH) 0.9 %
10 SYRINGE (ML) INJECTION AS NEEDED
Status: DISCONTINUED | OUTPATIENT
Start: 2018-04-25 | End: 2018-04-25 | Stop reason: HOSPADM

## 2018-04-25 RX ORDER — FAMOTIDINE 10 MG/ML
20 INJECTION, SOLUTION INTRAVENOUS ONCE
Status: COMPLETED | OUTPATIENT
Start: 2018-04-25 | End: 2018-04-25

## 2018-04-25 RX ORDER — SODIUM CHLORIDE 0.9 % (FLUSH) 0.9 %
10 SYRINGE (ML) INJECTION AS NEEDED
Status: CANCELLED | OUTPATIENT
Start: 2018-04-25

## 2018-04-25 RX ADMIN — TRASTUZUMAB 320 MG: 150 INJECTION, POWDER, LYOPHILIZED, FOR SOLUTION INTRAVENOUS at 12:27

## 2018-04-25 RX ADMIN — FAMOTIDINE 20 MG: 10 INJECTION INTRAVENOUS at 12:09

## 2018-04-25 RX ADMIN — HEPARIN 500 UNITS: 100 SYRINGE at 13:31

## 2018-04-25 RX ADMIN — DIPHENHYDRAMINE HYDROCHLORIDE 50 MG: 50 INJECTION INTRAMUSCULAR; INTRAVENOUS at 12:08

## 2018-04-25 NOTE — PROGRESS NOTES
"      PROBLEM LIST:  1. [id number] (stage IIIB) invasive ductal carcinoma of the right breast, ER-, [place] weakly +, Her2 3+.  A) presented with pain and swelling of the right breast after childbirth. Biopsy 5/8/17 showed high grade IDC, Right axillary LN biopsy positive for metastatic node involvement. Skin biopsy showed involvement of the dermis with lymphatic space involvement.  PET/CT on 5/24/17 showed hypermetabolic activity in the breast and axillary nodes,  No distant spread of disease.  B) neoadjuvant chemotherapy with TCHP started on 5/25/17.  Infusion reaction consisting of severe leg pain with the first dose of herceptin.  Taxotere dose reduced to 80% on cycle 5 for neuropathy.  C) bilateral mastectomy on 10/30/17.  Pathology showed multifocal < 1 mm high grade IDC with focal lymphovascular invasion.  4 sentinel lymph nodes with no malignancy.   [id number] (Stage yIA)  D) radiation therapy completed on 1/23/18.  Patient declined tamoxifen.  2. Migraines  3. Pre-diabetes    Subjective     HISTORY OF PRESENT ILLNESS:   Angie Sutherland returns for follow-up.  She reports having a stiff back that we'll occasionally lock up on her.  When this happens it lasts only a minute or so.  She also is having pain in her hips and thighs going down to the knee.  In the past week or so she has noticed a tender spot around her right shoulder blade.  She is having hot flashes.  She says she feels like an old woman.      Past Medical History, Past Surgical History, Social History, Family History have been reviewed and are without significant changes except as mentioned.    Review of Systems   A comprehensive 14 point review of systems was performed and was negative except as mentioned.    Medications:  The current medication list was reviewed in the EMR    ALLERGIES:    Allergies   Allergen Reactions   • No Known Drug Allergy        Objective      /68   Pulse 92   Temp 97.9 °F (36.6 °C)   Resp 14   Ht 154.9 cm (61\")   " Wt 56.2 kg (124 lb)   LMP 11/23/2015   BMI 23.43 kg/m²      Performance Status: 0    General: well appearing female in no acute distress  Neuro: alert and oriented  HEENT: sclera anicteric, oropharynx clear  Lymphatics: no cervical, supraclavicular Adenopathy  No palpable axillary adenopathy  Cardiovascular: regular rate and rhythm, no murmurs  Lungs: clear to auscultation bilaterally  Abdomen: soft, nontender, nondistended.  No palpable organomegaly  Musculoskeletal: Tender to palpation over the right lower scapula or rib.  No pain with range of motion of the hips bilaterally.  No pain with percussion of the spine.  Extremeties: no lower extremity edema  Skin: no rashes, lesions, bruising, or petechiae  Psych: mood and affect appropriate        Assessment/Plan   Angie Sutherland is a 22 y.o. year old female with stage IIIB HER-2 positive breast cancer who returns for follow-up.   She continues on herceptin.    We will continue Lupron every 3 months for ovarian suppression as well as contraception.    Today she is having multifocal joint stiffness and pain, as well as some point tenderness over a right posterior rib.  The joint symptoms may be related to her chemically induced menopause.  I suggested trying ibuprofen or Aleve and regular exercise or yoga.  We will also do a bone scan to make sure there are no structural abnormalities or evidence of metastatic disease to the bones.  I will call her with these results.      We will repeat an echo prior to her next treatment.  This should be the last echocardiogram that she needs during her Herceptin.    F/u 6 weeks.          Visit time was 25 minutes, greater than 50% spent in counseling      Shadia Amos MD  [facility] Hematology [facility] On[facility]    4/25/2018          CC:

## 2018-04-25 NOTE — PROGRESS NOTES
Subjective   Angie Sutherland is a 22 y.o. female who is here for med management and therapy    Chief Complaint:     Generalized anxiety disorder    Insomnia due to mental condition    MDD recurrent mod     History of Present Illness Patient presents by herself for follow. She reports she couldn't tolerate the Effexor xr too nauseated. She is having difficulties with relationships. Parents are financially supportive but not emotionally. Her sister isn't helpful to her she states. She has some friends but not consistent and can't depend on them. Discussed independence and how she can develop that such as learning to drive then she can get a car and not dependent on others to get places. She gets some help with her two daughters but mostly all up to her in her apt. She gets lonely. She has a plan.  Patient reports anxiety 6-7 often and depression 5. Denies SI/HI or AVH. Is sleeping and eating well. Goes out with friends when she can. Has a friend taking her children this weekend and she'll get relief from 24/7 childcare. Discussed boundaries with ex boyfriend.    (Scales based on 0 - 10 with 10 being the worst)        The following portions of the patient's history were reviewed and updated as appropriate: allergies, current medications, past family history, past medical history, past social history, past surgical history and problem list.    Review of Systems  denies fever, cough, s/s’s of infection, denies GI/ problems, denies new medical issues     Objective   Physical Exam  Last menstrual period 11/23/2015, not currently breastfeeding.    Allergies   Allergen Reactions   • No Known Drug Allergy        Current Medications:   Current Outpatient Prescriptions   Medication Sig Dispense Refill   • acetaminophen (TYLENOL) 500 MG tablet Take 1 tablet by mouth Every 6 (Six) Hours As Needed for Mild Pain  or Moderate Pain. 30 tablet 0   • busPIRone (BUSPAR) 5 MG tablet Take one tablet twice a day 60 tablet 1   •  carisoprodol (SOMA) 350 MG tablet Take 1 tablet by mouth 3 (Three) Times a Day As Needed for Muscle Spasms for up to 35 doses. 35 tablet 0   • hydrOXYzine (ATARAX) 25 MG tablet Take 1 tablet by mouth 3 (Three) Times a Day As Needed for Itching. 90 tablet 1   • ibuprofen (ADVIL,MOTRIN) 400 MG tablet Take 1 tablet by mouth Every 6 (Six) Hours As Needed for Mild Pain. 35 tablet 0   • ondansetron (ZOFRAN) 4 MG tablet Take 1 tablet by mouth Daily As Needed for Nausea or Vomiting. 10 tablet 0   • oxyCODONE (ROXICODONE) 5 MG immediate release tablet Take 1 tablet by mouth Every 6 (Six) Hours As Needed for Pain 35 tablet 0   • SUMAtriptan (IMITREX) 50 MG tablet Take one tablet at onset of headache. May repeat dose one time in 2 hours if headache not relieved. 10 tablet 3   • traZODone (DESYREL) 50 MG tablet 1/2 - 1 tablet as needed at bedtime for sleep 30 tablet 1     No current facility-administered medications for this visit.      Appearance: appropriate  Hygiene:   good  Cooperation:  Cooperative  Eye Contact:  Good  Psychomotor Behavior:  Appropriate  Mood:   Anxious about relationships   Affect:  Appropriate  Hopelessness: Denies  Speech:  Normal  Thought Process:  Linear  Thought Content:  Normal  Suicidal:  None  Homicidal:  None  Hallucinations:  None  Delusion:  None  Memory:  Intact  Orientation:  Person, Place, Time and Situation  Reliability:  fair  Insight:  Fair  Judgement:  Fair  Impulse Control:  Fair  Estimated Intelligence: average range   Physical/Medical Issues:  No     Assessment/Plan   Diagnoses and all orders for this visit:    Generalized anxiety disorder    Insomnia due to mental condition    Moderate episode of recurrent major depressive disorder    Other orders  -     busPIRone (BUSPAR) 5 MG tablet; Take one tablet twice a day      25 minutes face to face med check and therapy  DC Effexor intolerant  She reports hydroxyzine ineffective for anxiety  Will trial on Buspar for anxiety    We discussed  risks, benefits, and side effects of the above medications and the patient was agreeable with the plan. Patient was educated on the importance of compliance with treatment and follow-up appointments.     Sleep hygiene reviewed, encouraged more whole foods, less processed foods, fruits   Coping skills reviewed and encouraged positive framing of thoughts    Assisted patient in processing above session content; acknowledged and normalized patient’s thoughts, feelings, and concerns.  Applied  positive coping skills and behavior management in session.  Allowed patient to freely discuss issues without interruption or judgment. Provided safe, confidential environment to facilitate the development of positive therapeutic relationship and encourage open, honest communication. Assisted patient in identifying risk factors which would indicate the need for higher level of care including thoughts to harm self or others and/or self-harming behavior and encouraged patient to contact this office, call 911, or present to the nearest emergency room should any of these events occur. Discussed crisis intervention services and means to access.  Patient adamantly and convincingly denies current suicidal or homicidal ideation or perceptual disturbance.    Instructed to call for questions or concerns and return early if necessary. Crisis plan reviewed including going to the Emergency department.    Return in about 1 week (around 5/2/2018).         Please note that portions of this note were completed with a voice recognition program.  Efforts were made to edit dictation, but occasionally words are mistranscribed.

## 2018-05-02 ENCOUNTER — HOSPITAL ENCOUNTER (OUTPATIENT)
Dept: NUCLEAR MEDICINE | Facility: HOSPITAL | Age: 23
Discharge: HOME | End: 2018-05-02
Attending: INTERNAL MEDICINE

## 2018-05-02 ENCOUNTER — HOSPITAL ENCOUNTER (OUTPATIENT)
Dept: CARDIOLOGY | Facility: HOSPITAL | Age: 23
Discharge: HOME | End: 2018-05-02
Attending: INTERNAL MEDICINE | Admitting: INTERNAL MEDICINE

## 2018-05-02 ENCOUNTER — OFFICE VISIT (OUTPATIENT)
Dept: PSYCHIATRY | Facility: CLINIC | Age: 23
End: 2018-05-02

## 2018-05-02 DIAGNOSIS — I42.7 CHEMOTHERAPY INDUCED CARDIOMYOPATHY (HCC): ICD-10-CM

## 2018-05-02 DIAGNOSIS — C50.011 MALIGNANT NEOPLASM OF NIPPLE OF RIGHT BREAST IN FEMALE, UNSPECIFIED ESTROGEN RECEPTOR STATUS (HCC): ICD-10-CM

## 2018-05-02 DIAGNOSIS — F33.1 MODERATE EPISODE OF RECURRENT MAJOR DEPRESSIVE DISORDER (HCC): ICD-10-CM

## 2018-05-02 DIAGNOSIS — T45.1X5A CHEMOTHERAPY INDUCED CARDIOMYOPATHY (HCC): ICD-10-CM

## 2018-05-02 DIAGNOSIS — F41.1 GENERALIZED ANXIETY DISORDER: Primary | ICD-10-CM

## 2018-05-02 DIAGNOSIS — C50.011 MALIGNANT NEOPLASM INVOLVING BOTH NIPPLE AND AREOLA OF RIGHT BREAST IN FEMALE, UNSPECIFIED ESTROGEN RECEPTOR STATUS (HCC): ICD-10-CM

## 2018-05-02 DIAGNOSIS — F51.05 INSOMNIA DUE TO MENTAL CONDITION: ICD-10-CM

## 2018-05-02 LAB
BH CV ECHO MEAS - AO MAX PG (FULL): 0.96 MMHG
BH CV ECHO MEAS - AO MAX PG: 3 MMHG
BH CV ECHO MEAS - AO MEAN PG (FULL): 0.76 MMHG
BH CV ECHO MEAS - AO MEAN PG: 1.8 MMHG
BH CV ECHO MEAS - AO ROOT AREA (BSA CORRECTED): 1.6
BH CV ECHO MEAS - AO ROOT AREA: 4.7 CM^2
BH CV ECHO MEAS - AO ROOT DIAM: 2.4 CM
BH CV ECHO MEAS - AO V2 MAX: 86.9 CM/SEC
BH CV ECHO MEAS - AO V2 MEAN: 64.4 CM/SEC
BH CV ECHO MEAS - AO V2 VTI: 20.4 CM
BH CV ECHO MEAS - AVA(I,A): 2.3 CM^2
BH CV ECHO MEAS - AVA(I,D): 2.3 CM^2
BH CV ECHO MEAS - AVA(V,A): 2.5 CM^2
BH CV ECHO MEAS - AVA(V,D): 2.5 CM^2
BH CV ECHO MEAS - BSA(HAYCOCK): 1.6 M^2
BH CV ECHO MEAS - BSA: 1.5 M^2
BH CV ECHO MEAS - BZI_BMI: 22.3 KILOGRAMS/M^2
BH CV ECHO MEAS - BZI_METRIC_HEIGHT: 157.5 CM
BH CV ECHO MEAS - BZI_METRIC_WEIGHT: 55.3 KG
BH CV ECHO MEAS - CONTRAST EF (2CH): 53.4 ML/M^2
BH CV ECHO MEAS - CONTRAST EF 4CH: 52.9 ML/M^2
BH CV ECHO MEAS - EDV(CUBED): 56.7 ML
BH CV ECHO MEAS - EDV(MOD-SP2): 58 ML
BH CV ECHO MEAS - EDV(MOD-SP4): 51 ML
BH CV ECHO MEAS - EDV(TEICH): 63.6 ML
BH CV ECHO MEAS - EF(CUBED): 59.8 %
BH CV ECHO MEAS - EF(MOD-SP2): 53.4 %
BH CV ECHO MEAS - EF(MOD-SP4): 52.9 %
BH CV ECHO MEAS - EF(TEICH): 52.1 %
BH CV ECHO MEAS - ESV(CUBED): 22.8 ML
BH CV ECHO MEAS - ESV(MOD-SP2): 27 ML
BH CV ECHO MEAS - ESV(MOD-SP4): 24 ML
BH CV ECHO MEAS - ESV(TEICH): 30.5 ML
BH CV ECHO MEAS - FS: 26.2 %
BH CV ECHO MEAS - IVS/LVPW: 0.9
BH CV ECHO MEAS - IVSD: 0.6 CM
BH CV ECHO MEAS - LA DIMENSION: 1.9 CM
BH CV ECHO MEAS - LA/AO: 0.8
BH CV ECHO MEAS - LAT PEAK E' VEL: 16.6 CM/SEC
BH CV ECHO MEAS - LV DIASTOLIC VOL/BSA (35-75): 32.9 ML/M^2
BH CV ECHO MEAS - LV MASS(C)D: 64.7 GRAMS
BH CV ECHO MEAS - LV MASS(C)DI: 41.8 GRAMS/M^2
BH CV ECHO MEAS - LV MAX PG: 2.1 MMHG
BH CV ECHO MEAS - LV MEAN PG: 1 MMHG
BH CV ECHO MEAS - LV SYSTOLIC VOL/BSA (12-30): 15.5 ML/M^2
BH CV ECHO MEAS - LV V1 MAX: 71.7 CM/SEC
BH CV ECHO MEAS - LV V1 MEAN: 47.2 CM/SEC
BH CV ECHO MEAS - LV V1 VTI: 15.3 CM
BH CV ECHO MEAS - LVIDD: 3.8 CM
BH CV ECHO MEAS - LVIDS: 2.8 CM
BH CV ECHO MEAS - LVLD AP2: 8.1 CM
BH CV ECHO MEAS - LVLD AP4: 8 CM
BH CV ECHO MEAS - LVLS AP2: 6.8 CM
BH CV ECHO MEAS - LVLS AP4: 6.7 CM
BH CV ECHO MEAS - LVOT AREA (M): 3.1 CM^2
BH CV ECHO MEAS - LVOT AREA: 3 CM^2
BH CV ECHO MEAS - LVOT DIAM: 2 CM
BH CV ECHO MEAS - LVPWD: 0.67 CM
BH CV ECHO MEAS - MED PEAK E' VEL: 11 CM/SEC
BH CV ECHO MEAS - MV A MAX VEL: 45.5 CM/SEC
BH CV ECHO MEAS - MV DEC SLOPE: 587.3 CM/SEC^2
BH CV ECHO MEAS - MV DEC TIME: 0.17 SEC
BH CV ECHO MEAS - MV E MAX VEL: 89.1 CM/SEC
BH CV ECHO MEAS - MV E/A: 2
BH CV ECHO MEAS - MV P1/2T MAX VEL: 112.2 CM/SEC
BH CV ECHO MEAS - MV P1/2T: 56 MSEC
BH CV ECHO MEAS - MVA P1/2T LCG: 2 CM^2
BH CV ECHO MEAS - MVA(P1/2T): 3.9 CM^2
BH CV ECHO MEAS - PA ACC SLOPE: 232.5 CM/SEC^2
BH CV ECHO MEAS - PA ACC TIME: 0.23 SEC
BH CV ECHO MEAS - PA MAX PG (FULL): 1.5 MMHG
BH CV ECHO MEAS - PA MAX PG: 2.7 MMHG
BH CV ECHO MEAS - PA PR(ACCEL): -24 MMHG
BH CV ECHO MEAS - PA V2 MAX: 81.4 CM/SEC
BH CV ECHO MEAS - PULM DIAS VEL: 55.1 CM/SEC
BH CV ECHO MEAS - PULM S/D: 0.93
BH CV ECHO MEAS - PULM SYS VEL: 51.3 CM/SEC
BH CV ECHO MEAS - RV MAX PG: 1.2 MMHG
BH CV ECHO MEAS - RV V1 MAX: 54.4 CM/SEC
BH CV ECHO MEAS - SI(AO): 61.6 ML/M^2
BH CV ECHO MEAS - SI(CUBED): 21.9 ML/M^2
BH CV ECHO MEAS - SI(LVOT): 30 ML/M^2
BH CV ECHO MEAS - SI(MOD-SP2): 20 ML/M^2
BH CV ECHO MEAS - SI(MOD-SP4): 17.4 ML/M^2
BH CV ECHO MEAS - SI(TEICH): 21.4 ML/M^2
BH CV ECHO MEAS - SV(AO): 95.4 ML
BH CV ECHO MEAS - SV(CUBED): 33.9 ML
BH CV ECHO MEAS - SV(LVOT): 46.4 ML
BH CV ECHO MEAS - SV(MOD-SP2): 31 ML
BH CV ECHO MEAS - SV(MOD-SP4): 27 ML
BH CV ECHO MEAS - SV(TEICH): 33.1 ML
BH CV ECHO MEAS - TAPSE (>1.6): 1.9 CM2
BH CV ECHO MEAS - TR MAX VEL: 166.8 CM/SEC
BH CV ECHO MEASUREMENTS AVERAGE E/E' RATIO: 6.46
BH CV XLRA - RV BASE: 3.6 CM
BH CV XLRA - RV LENGTH: 5.9 CM
BH CV XLRA - RV MID: 2.6 CM
BH CV XLRA - TDI S': 9.21 CM/SEC
LV EF 2D ECHO EST: 55 %

## 2018-05-02 PROCEDURE — 78306 BONE IMAGING WHOLE BODY: CPT

## 2018-05-02 PROCEDURE — 0 TECHNETIUM MEDRONATE KIT: Performed by: INTERNAL MEDICINE

## 2018-05-02 PROCEDURE — A9503 TC99M MEDRONATE: HCPCS | Performed by: INTERNAL MEDICINE

## 2018-05-02 PROCEDURE — 0399T HC MYOCARDL STRAIN IMAG QUAN ASSMT PER SESS: CPT

## 2018-05-02 PROCEDURE — 93306 TTE W/DOPPLER COMPLETE: CPT

## 2018-05-02 PROCEDURE — 90836 PSYTX W PT W E/M 45 MIN: CPT | Performed by: NURSE PRACTITIONER

## 2018-05-02 PROCEDURE — 99213 OFFICE O/P EST LOW 20 MIN: CPT | Performed by: NURSE PRACTITIONER

## 2018-05-02 RX ORDER — TC 99M MEDRONATE 20 MG/10ML
26.1 INJECTION, POWDER, LYOPHILIZED, FOR SOLUTION INTRAVENOUS
Status: COMPLETED | OUTPATIENT
Start: 2018-05-02 | End: 2018-05-02

## 2018-05-02 RX ORDER — BUPROPION HYDROCHLORIDE 100 MG/1
100 TABLET, EXTENDED RELEASE ORAL DAILY
Qty: 30 TABLET | Refills: 1 | Status: SHIPPED | OUTPATIENT
Start: 2018-05-02 | End: 2019-01-09

## 2018-05-02 RX ORDER — BUSPIRONE HYDROCHLORIDE 10 MG/1
TABLET ORAL
Qty: 90 TABLET | Refills: 1 | Status: SHIPPED | OUTPATIENT
Start: 2018-05-02 | End: 2019-01-09

## 2018-05-02 RX ADMIN — Medication 26.1 MILLICURIE: at 10:15

## 2018-05-02 NOTE — PROGRESS NOTES
"  Subjective   Angie Sutherland is a 22 y.o. female who is here today for medication management follow up. and therapy    Chief Complaint: MDD recurrent mod to severe without psychosis, MANDIE    History of Present Illness Patient presents with her two daughters for session med management and therapy . Patient reports being overwhelmed, has daughters 24/7 with little to no help from family or friends. She is dependent on others for transportation, she lives in [place] in her own apartment. She has no dependable person in her life she states. Her sister won't help watch her daughters, she can't get respite from caring and daughters are 20 months old and 5yo. Her 5yo is very precocious and hyperactive. Her almost two year old tries to keep up with her. Pt has a male friend who helps some but gets overwhelmed with the two girls.The patient reports the following symptoms of anxiety: constant anxiety/worry, restlessness/on edge, difficulty concentrating, mind goes blank, irritability, muscle tension, sleep disturbance and anxiety causes distress/impairment in important areas of functioning and have caused impairment in important areas of functioning. She rates it a 10 at times. Patient doesn't have a 's license, she has no job, she couldn't afford  or car insurance without a job and so it would have to pay well not minimum wage. Her mother and step dad help her out financially but she states not emotionally at all.  Denies adverse effects from buspar but \"it isn't doing anything\". Pt very negative and reports depressive symptoms including depressed mood, insomnia, decreased appetite, anhedonia, feelings of guilt, feelings of hopelessness, feelings of helplessness, feelings of worthlessness, low energy, difficulty concentrating and psychomotor agitation, and have caused impairment in important areas of functioning.  Depression rated 8/10 with 10 being the worst. Denies suicide but overwhelmed, denies self " harm or aggression towards others or self. Denies thoughts of harming others, able to control herself around her daughters but gets overwhelmed by watching them all the time and still in treatment for breast cancer with Herceptin IV.       (Scales based on 0 - 10 with 10 being the worst)    The following portions of the patient's history were reviewed and updated as appropriate: allergies, current medications, past family history, past medical history, past social history, past surgical history and problem list.    Review of Systems denies fever, cough, s/s’s of infection, denies GI/ problems, denies new medical issues     Objective   Physical Exam  Last menstrual period 11/23/2015, not currently breastfeeding.    Allergies   Allergen Reactions   • No Known Drug Allergy        Current Medications:   Current Outpatient Prescriptions   Medication Sig Dispense Refill   • acetaminophen (TYLENOL) 500 MG tablet Take 1 tablet by mouth Every 6 (Six) Hours As Needed for Mild Pain  or Moderate Pain. 30 tablet 0   • buPROPion SR (WELLBUTRIN SR) 100 MG 12 hr tablet Take 1 tablet by mouth Daily. 30 tablet 1   • busPIRone (BUSPAR) 10 MG tablet Take one by mouth three times a day 90 tablet 1   • carisoprodol (SOMA) 350 MG tablet Take 1 tablet by mouth 3 (Three) Times a Day As Needed for Muscle Spasms for up to 35 doses. 35 tablet 0   • hydrOXYzine (ATARAX) 25 MG tablet Take 1 tablet by mouth 3 (Three) Times a Day As Needed for Itching. 90 tablet 1   • ibuprofen (ADVIL,MOTRIN) 400 MG tablet Take 1 tablet by mouth Every 6 (Six) Hours As Needed for Mild Pain. 35 tablet 0   • ondansetron (ZOFRAN) 4 MG tablet Take 1 tablet by mouth Daily As Needed for Nausea or Vomiting. 10 tablet 0   • oxyCODONE (ROXICODONE) 5 MG immediate release tablet Take 1 tablet by mouth Every 6 (Six) Hours As Needed for Pain 35 tablet 0   • SUMAtriptan (IMITREX) 50 MG tablet Take one tablet at onset of headache. May repeat dose one time in 2 hours if headache  not relieved. 10 tablet 3   • traZODone (DESYREL) 50 MG tablet 1/2 - 1 tablet as needed at bedtime for sleep 30 tablet 1     No current facility-administered medications for this visit.         Appearance: appropriate  Hygiene:   good  Cooperation:  Cooperative  Eye Contact:  Good  Psychomotor Behavior:  Appropriate  Mood:  Depressed anxious overwhelmed feelings   Affect:  Appropriate  Hopelessness: Denies  Speech:  Normal  Thought Process:  Linear  Thought Content:  Normal  Suicidal:  None  Homicidal:  None  Hallucinations:  None  Delusion:  None  Memory:  Intact  Orientation:  Person, Place, Time and Situation  Reliability:  fair  Insight:  Fair  Judgement:  Fair  Impulse Control:  Fair  Estimated Intelligence: average range   Physical/Medical Issues:  No       Assessment/Plan   Diagnoses and all orders for this visit:    Generalized anxiety disorder    Insomnia due to mental condition    Moderate episode of recurrent major depressive disorder    Other orders  -     busPIRone (BUSPAR) 10 MG tablet; Take one by mouth three times a day  -     buPROPion SR (WELLBUTRIN SR) 100 MG 12 hr tablet; Take 1 tablet by mouth Daily.    60 minutes, med management 15 minutes therapy 45 minutes   Will increase Buspar for anxiety  Will trial on Wellbutrin  mg daily for depression ,  she has failed trials of Effexor , L exapro   Discussed resources available to her and she reports none, discussed [employer] day cares, discussed [employer] , discussed [employer] members who maybe able to babysit for her.  Will contact Cancer center  and discuss any resources that maybe available.    We discussed risks, benefits, and side effects of the above medications and the patient was agreeable with the plan. Patient was educated on the importance of compliance with treatment and follow-up appointments.   Needs respite   Sleep hygiene reviewed,   Coping skills reviewed and encouraged positive framing of thoughts    Assisted  patient in processing above session content; acknowledged and normalized patient’s thoughts, feelings, and concerns.  Applied  positive coping skills and behavior management in session.  Allowed patient to freely discuss issues without interruption or judgment. Provided safe, confidential environment to facilitate the development of positive therapeutic relationship and encourage open, honest communication. Assisted patient in identifying risk factors which would indicate the need for higher level of care including thoughts to harm self or others and/or self-harming behavior and encouraged patient to contact this office, call 911, or present to the nearest emergency room should any of these events occur. Discussed crisis intervention services and means to access.  Patient adamantly and convincingly denies current suicidal or homicidal ideation or perceptual disturbance.    Instructed to call for questions or concerns and return early if necessary. Crisis plan reviewed including going to the Emergency department.    Return in about 2 weeks (around 5/16/2018).

## 2018-05-04 ENCOUNTER — TELEPHONE (OUTPATIENT)
Dept: ONCOLOGY | Facility: CLINIC | Age: 23
End: 2018-05-04

## 2018-05-04 NOTE — TELEPHONE ENCOUNTER
----- Message from Adam Kam sent at 5/4/2018  9:32 AM EDT -----  Regarding: WERNER/SKIN PROBLEMS  Contact: [phone]  PATIENT CALLED SAYING SOMETHING CRAZY IS HAPPENING TO HER SKIN AND SHE JUST WANTS TO KNOW WHAT SHE CAN DO FOR IT.

## 2018-05-04 NOTE — TELEPHONE ENCOUNTER
Called patient to assess what time of rash patient has. She informed me that it resembled posion ivy and it was itchy along with it existed any where that is exposed to light. Discussed with Dr. Amos and she informed me to tell patient that she doesn't believe it is associated to the Herceptin and wants her to goto her PCP. Patient stated she no longer has a PCP and I informed her to go to an [facility] so that she can be assessed.

## 2018-05-07 ENCOUNTER — TELEPHONE (OUTPATIENT)
Dept: ONCOLOGY | Facility: CLINIC | Age: 23
End: 2018-05-07

## 2018-05-07 NOTE — TELEPHONE ENCOUNTER
Called and spoke with patient. Let her know bone scan was normal, no evidence of cancer in her bones. We will see her at her next follow up, call me if she needs anything before then.

## 2018-05-16 ENCOUNTER — INFUSION (OUTPATIENT)
Dept: ONCOLOGY | Facility: HOSPITAL | Age: 23
End: 2018-05-16

## 2018-05-16 VITALS
WEIGHT: 123 LBS | TEMPERATURE: 97.8 F | RESPIRATION RATE: 20 BRPM | SYSTOLIC BLOOD PRESSURE: 101 MMHG | DIASTOLIC BLOOD PRESSURE: 66 MMHG | HEART RATE: 106 BPM | HEIGHT: 62 IN | BODY MASS INDEX: 22.63 KG/M2

## 2018-05-16 DIAGNOSIS — C50.011 MALIGNANT NEOPLASM INVOLVING BOTH NIPPLE AND AREOLA OF RIGHT BREAST IN FEMALE, UNSPECIFIED ESTROGEN RECEPTOR STATUS (HCC): Primary | ICD-10-CM

## 2018-05-16 DIAGNOSIS — C50.811 MALIGNANT NEOPLASM OF OVERLAPPING SITES OF RIGHT FEMALE BREAST, UNSPECIFIED ESTROGEN RECEPTOR STATUS (HCC): ICD-10-CM

## 2018-05-16 LAB
ALBUMIN SERPL-MCNC: 4.2 G/DL (ref 3.2–4.8)
ALBUMIN/GLOB SERPL: 1.3 G/DL (ref 1.5–2.5)
ALP SERPL-CCNC: 74 U/L (ref 25–100)
ALT SERPL W P-5'-P-CCNC: 24 U/L (ref 7–40)
ANION GAP SERPL CALCULATED.3IONS-SCNC: 3 MMOL/L (ref 3–11)
AST SERPL-CCNC: 23 U/L (ref 0–33)
BILIRUB SERPL-MCNC: 0.6 MG/DL (ref 0.3–1.2)
BUN BLD-MCNC: 17 MG/DL (ref 9–23)
BUN/CREAT SERPL: 21.3 (ref 7–25)
CALCIUM SPEC-SCNC: 9 MG/DL (ref 8.7–10.4)
CHLORIDE SERPL-SCNC: 104 MMOL/L (ref 99–109)
CO2 SERPL-SCNC: 29 MMOL/L (ref 20–31)
CREAT BLD-MCNC: 0.8 MG/DL (ref 0.6–1.3)
ERYTHROCYTE [DISTWIDTH] IN BLOOD BY AUTOMATED COUNT: 13.2 % (ref 11.3–14.5)
GFR SERPL CREATININE-BSD FRML MDRD: 108 ML/MIN/1.73
GLOBULIN UR ELPH-MCNC: 3.2 GM/DL
GLUCOSE BLD-MCNC: 102 MG/DL (ref 70–100)
HCT VFR BLD AUTO: 39.9 % (ref 34.5–44)
HGB BLD-MCNC: 12.4 G/DL (ref 11.5–15.5)
LYMPHOCYTES # BLD AUTO: 1.2 10*3/MM3 (ref 0.6–4.8)
LYMPHOCYTES NFR BLD AUTO: 33.7 % (ref 24–44)
MCH RBC QN AUTO: 28.5 PG (ref 27–31)
MCHC RBC AUTO-ENTMCNC: 31.1 G/DL (ref 32–36)
MCV RBC AUTO: 91.5 FL (ref 80–99)
MONOCYTES # BLD AUTO: 0.2 10*3/MM3 (ref 0–1)
MONOCYTES NFR BLD AUTO: 5.6 % (ref 0–12)
NEUTROPHILS # BLD AUTO: 2.2 10*3/MM3 (ref 1.5–8.3)
NEUTROPHILS NFR BLD AUTO: 60.7 % (ref 41–71)
PLATELET # BLD AUTO: 196 10*3/MM3 (ref 150–450)
PMV BLD AUTO: 6.9 FL (ref 6–12)
POTASSIUM BLD-SCNC: 3.8 MMOL/L (ref 3.5–5.5)
PROT SERPL-MCNC: 7.4 G/DL (ref 5.7–8.2)
RBC # BLD AUTO: 4.36 10*6/MM3 (ref 3.89–5.14)
SODIUM BLD-SCNC: 136 MMOL/L (ref 132–146)
WBC NRBC COR # BLD: 3.6 10*3/MM3 (ref 3.5–10.8)

## 2018-05-16 PROCEDURE — 25010000002 HEPARIN FLUSH (PORCINE) 100 UNIT/ML SOLUTION: Performed by: INTERNAL MEDICINE

## 2018-05-16 PROCEDURE — 96367 TX/PROPH/DG ADDL SEQ IV INF: CPT

## 2018-05-16 PROCEDURE — 96375 TX/PRO/DX INJ NEW DRUG ADDON: CPT

## 2018-05-16 PROCEDURE — 25010000002 TRASTUZUMAB PER 10 MG: Performed by: INTERNAL MEDICINE

## 2018-05-16 PROCEDURE — 80053 COMPREHEN METABOLIC PANEL: CPT

## 2018-05-16 PROCEDURE — 96368 THER/DIAG CONCURRENT INF: CPT

## 2018-05-16 PROCEDURE — 25010000002 DEXAMETHASONE PER 1 MG: Performed by: INTERNAL MEDICINE

## 2018-05-16 PROCEDURE — 96413 CHEMO IV INFUSION 1 HR: CPT

## 2018-05-16 PROCEDURE — 25010000002 DIPHENHYDRAMINE PER 50 MG: Performed by: INTERNAL MEDICINE

## 2018-05-16 PROCEDURE — 96374 THER/PROPH/DIAG INJ IV PUSH: CPT

## 2018-05-16 PROCEDURE — 85025 COMPLETE CBC W/AUTO DIFF WBC: CPT

## 2018-05-16 RX ORDER — SODIUM CHLORIDE 0.9 % (FLUSH) 0.9 %
10 SYRINGE (ML) INJECTION AS NEEDED
Status: CANCELLED | OUTPATIENT
Start: 2018-05-16

## 2018-05-16 RX ORDER — SODIUM CHLORIDE 0.9 % (FLUSH) 0.9 %
10 SYRINGE (ML) INJECTION AS NEEDED
Status: DISCONTINUED | OUTPATIENT
Start: 2018-05-16 | End: 2018-05-16 | Stop reason: HOSPADM

## 2018-05-16 RX ORDER — FAMOTIDINE 10 MG/ML
20 INJECTION, SOLUTION INTRAVENOUS ONCE
Status: COMPLETED | OUTPATIENT
Start: 2018-05-16 | End: 2018-05-16

## 2018-05-16 RX ADMIN — Medication 10 ML: at 13:25

## 2018-05-16 RX ADMIN — DIPHENHYDRAMINE HYDROCHLORIDE 50 MG: 50 INJECTION INTRAMUSCULAR; INTRAVENOUS at 12:20

## 2018-05-16 RX ADMIN — TRASTUZUMAB 320 MG: 150 INJECTION, POWDER, LYOPHILIZED, FOR SOLUTION INTRAVENOUS at 12:40

## 2018-05-16 RX ADMIN — DEXAMETHASONE SODIUM PHOSPHATE 10 MG: 4 INJECTION, SOLUTION INTRAMUSCULAR; INTRAVENOUS at 12:20

## 2018-05-16 RX ADMIN — FAMOTIDINE 20 MG: 10 INJECTION INTRAVENOUS at 12:17

## 2018-05-16 RX ADMIN — SODIUM CHLORIDE, PRESERVATIVE FREE 500 UNITS: 5 INJECTION INTRAVENOUS at 13:25

## 2018-06-06 ENCOUNTER — SPECIALTY PHARMACY (OUTPATIENT)
Dept: ONCOLOGY | Facility: HOSPITAL | Age: 23
End: 2018-06-06

## 2018-06-06 ENCOUNTER — INFUSION (OUTPATIENT)
Dept: ONCOLOGY | Facility: HOSPITAL | Age: 23
End: 2018-06-06

## 2018-06-06 ENCOUNTER — OFFICE VISIT (OUTPATIENT)
Dept: ONCOLOGY | Facility: CLINIC | Age: 23
End: 2018-06-06

## 2018-06-06 VITALS
HEART RATE: 109 BPM | SYSTOLIC BLOOD PRESSURE: 121 MMHG | OXYGEN SATURATION: 98 % | DIASTOLIC BLOOD PRESSURE: 68 MMHG | RESPIRATION RATE: 16 BRPM | WEIGHT: 127 LBS | TEMPERATURE: 97.7 F | BODY MASS INDEX: 23.23 KG/M2

## 2018-06-06 DIAGNOSIS — C50.011 MALIGNANT NEOPLASM INVOLVING BOTH NIPPLE AND AREOLA OF RIGHT BREAST IN FEMALE, UNSPECIFIED ESTROGEN RECEPTOR STATUS (HCC): Primary | ICD-10-CM

## 2018-06-06 DIAGNOSIS — C50.011 MALIGNANT NEOPLASM OF NIPPLE OF RIGHT BREAST IN FEMALE, UNSPECIFIED ESTROGEN RECEPTOR STATUS (HCC): ICD-10-CM

## 2018-06-06 DIAGNOSIS — C50.011 MALIGNANT NEOPLASM OF NIPPLE OF RIGHT BREAST IN FEMALE, UNSPECIFIED ESTROGEN RECEPTOR STATUS (HCC): Primary | ICD-10-CM

## 2018-06-06 DIAGNOSIS — C50.011 MALIGNANT NEOPLASM INVOLVING BOTH NIPPLE AND AREOLA OF RIGHT BREAST IN FEMALE, UNSPECIFIED ESTROGEN RECEPTOR STATUS (HCC): ICD-10-CM

## 2018-06-06 LAB
ALBUMIN SERPL-MCNC: 4.17 G/DL (ref 3.2–4.8)
ALBUMIN/GLOB SERPL: 1.3 G/DL (ref 1.5–2.5)
ALP SERPL-CCNC: 72 U/L (ref 25–100)
ALT SERPL W P-5'-P-CCNC: 20 U/L (ref 7–40)
ANION GAP SERPL CALCULATED.3IONS-SCNC: -4 MMOL/L (ref 3–11)
AST SERPL-CCNC: 24 U/L (ref 0–33)
BILIRUB SERPL-MCNC: 0.5 MG/DL (ref 0.3–1.2)
BUN BLD-MCNC: 17 MG/DL (ref 9–23)
BUN/CREAT SERPL: 25 (ref 7–25)
CALCIUM SPEC-SCNC: 9 MG/DL (ref 8.7–10.4)
CHLORIDE SERPL-SCNC: 113 MMOL/L (ref 99–109)
CO2 SERPL-SCNC: 32 MMOL/L (ref 20–31)
CREAT BLD-MCNC: 0.68 MG/DL (ref 0.6–1.3)
ERYTHROCYTE [DISTWIDTH] IN BLOOD BY AUTOMATED COUNT: 13.2 % (ref 11.3–14.5)
GFR SERPL CREATININE-BSD FRML MDRD: 130 ML/MIN/1.73
GLOBULIN UR ELPH-MCNC: 3.2 GM/DL
GLUCOSE BLD-MCNC: 123 MG/DL (ref 70–100)
HCT VFR BLD AUTO: 39.2 % (ref 34.5–44)
HGB BLD-MCNC: 12.8 G/DL (ref 11.5–15.5)
LYMPHOCYTES # BLD AUTO: 1.3 10*3/MM3 (ref 0.6–4.8)
LYMPHOCYTES NFR BLD AUTO: 34.8 % (ref 24–44)
MCH RBC QN AUTO: 29.5 PG (ref 27–31)
MCHC RBC AUTO-ENTMCNC: 32.7 G/DL (ref 32–36)
MCV RBC AUTO: 90.1 FL (ref 80–99)
MONOCYTES # BLD AUTO: 0.1 10*3/MM3 (ref 0–1)
MONOCYTES NFR BLD AUTO: 3.6 % (ref 0–12)
NEUTROPHILS # BLD AUTO: 2.3 10*3/MM3 (ref 1.5–8.3)
NEUTROPHILS NFR BLD AUTO: 61.6 % (ref 41–71)
PLATELET # BLD AUTO: 215 10*3/MM3 (ref 150–450)
PMV BLD AUTO: 6.7 FL (ref 6–12)
POTASSIUM BLD-SCNC: 3.7 MMOL/L (ref 3.5–5.5)
PROT SERPL-MCNC: 7.4 G/DL (ref 5.7–8.2)
RBC # BLD AUTO: 4.35 10*6/MM3 (ref 3.89–5.14)
SODIUM BLD-SCNC: 141 MMOL/L (ref 132–146)
WBC NRBC COR # BLD: 3.7 10*3/MM3 (ref 3.5–10.8)

## 2018-06-06 PROCEDURE — 36591 DRAW BLOOD OFF VENOUS DEVICE: CPT

## 2018-06-06 PROCEDURE — 96367 TX/PROPH/DG ADDL SEQ IV INF: CPT

## 2018-06-06 PROCEDURE — 96375 TX/PRO/DX INJ NEW DRUG ADDON: CPT

## 2018-06-06 PROCEDURE — 85025 COMPLETE CBC W/AUTO DIFF WBC: CPT

## 2018-06-06 PROCEDURE — 96374 THER/PROPH/DIAG INJ IV PUSH: CPT

## 2018-06-06 PROCEDURE — 96409 CHEMO IV PUSH SNGL DRUG: CPT

## 2018-06-06 PROCEDURE — 99214 OFFICE O/P EST MOD 30 MIN: CPT | Performed by: INTERNAL MEDICINE

## 2018-06-06 PROCEDURE — 25010000002 HEPARIN FLUSH (PORCINE) 100 UNIT/ML SOLUTION: Performed by: INTERNAL MEDICINE

## 2018-06-06 PROCEDURE — 96413 CHEMO IV INFUSION 1 HR: CPT

## 2018-06-06 PROCEDURE — 25010000002 TRASTUZUMAB PER 10 MG: Performed by: INTERNAL MEDICINE

## 2018-06-06 PROCEDURE — 25010000002 DEXAMETHASONE PER 1 MG: Performed by: INTERNAL MEDICINE

## 2018-06-06 PROCEDURE — 25010000002 DIPHENHYDRAMINE PER 50 MG: Performed by: INTERNAL MEDICINE

## 2018-06-06 PROCEDURE — 80053 COMPREHEN METABOLIC PANEL: CPT

## 2018-06-06 RX ORDER — SODIUM CHLORIDE 0.9 % (FLUSH) 0.9 %
10 SYRINGE (ML) INJECTION AS NEEDED
Status: CANCELLED | OUTPATIENT
Start: 2018-06-06

## 2018-06-06 RX ORDER — FAMOTIDINE 10 MG/ML
20 INJECTION, SOLUTION INTRAVENOUS ONCE
Status: CANCELLED | OUTPATIENT
Start: 2018-06-06

## 2018-06-06 RX ORDER — SODIUM CHLORIDE 0.9 % (FLUSH) 0.9 %
10 SYRINGE (ML) INJECTION AS NEEDED
Status: DISCONTINUED | OUTPATIENT
Start: 2018-06-06 | End: 2018-06-06 | Stop reason: HOSPADM

## 2018-06-06 RX ORDER — FAMOTIDINE 10 MG/ML
20 INJECTION, SOLUTION INTRAVENOUS ONCE
Status: CANCELLED | OUTPATIENT
Start: 2018-06-27

## 2018-06-06 RX ORDER — FAMOTIDINE 10 MG/ML
20 INJECTION, SOLUTION INTRAVENOUS ONCE
Status: COMPLETED | OUTPATIENT
Start: 2018-06-06 | End: 2018-06-06

## 2018-06-06 RX ADMIN — TRASTUZUMAB 320 MG: 150 INJECTION, POWDER, LYOPHILIZED, FOR SOLUTION INTRAVENOUS at 14:18

## 2018-06-06 RX ADMIN — DEXAMETHASONE SODIUM PHOSPHATE 10 MG: 4 INJECTION, SOLUTION INTRAMUSCULAR; INTRAVENOUS at 13:39

## 2018-06-06 RX ADMIN — FAMOTIDINE 20 MG: 10 INJECTION, SOLUTION INTRAVENOUS at 13:35

## 2018-06-06 RX ADMIN — Medication 10 ML: at 14:56

## 2018-06-06 RX ADMIN — SODIUM CHLORIDE, PRESERVATIVE FREE 500 UNITS: 5 INJECTION INTRAVENOUS at 14:56

## 2018-06-06 RX ADMIN — SODIUM CHLORIDE 250 ML: 0.9 INJECTION, SOLUTION INTRAVENOUS at 13:33

## 2018-06-06 RX ADMIN — DIPHENHYDRAMINE HYDROCHLORIDE 50 MG: 50 INJECTION INTRAMUSCULAR; INTRAVENOUS at 13:59

## 2018-06-06 NOTE — PROGRESS NOTES
Oral Chemotherapy Teaching      Patient Name/:  Angie Sutherland   1995  Oral Chemotherapy Regimen:  Neratinib 240mg PO daily with food  Date Started Medication: pending medication acquisition - will plan to start tentatively 18 with MD ramost           Initial Teaching Follow Up Comments      Safety      Storage instructions (away from children; away from heat/cold, sunlight, or moisture), handling - use of gloves (caregivers), washing hands after touching pills, managing waste       “How are you storing your medications?”, reminders on storage, proper handling (caregivers using gloves, washing hands, away from children, managing waste, etc.), disposal of medication with D/C or dosage change     Patient counseled on hazardous handling and storage precautions. Discussed plan to wash hands after handling. Caregivers to handle with latex gloves. Store at room temp, away from pets and children. Pt verbalized understanding.       Adherence       patient and/or caregiver on how to take medication, take with/without food, assess their adherence potential, stress importance of adherence, ways to manage adherence (pill boxes, phone reminders, calendars), what to do if miss a dose    “How are you taking your medication?” “How are you remembering to take your medication?”, “How many doses have you missed?”, determine reasons for non-adherence (not remembering, side effects, etc), ways to improve, overadherence? Remind patient of ways to improve/maintain adherence    Discussed plan to start neratinib after completion of Iv Herceptin. Plan to take Neratinib x 1 year. Reviewed plan for Neratinib 240mg (6 tablets) by mouth once a day with a meal. Discussed the use of Loperamide as antidiarrheal ppx. Instructions written for Loperamide 4mg PO TID x 2 weeks, Loperamide 4mg PO BID during weeks 3-8. Then Loperamide PRN for weeks 9 and thereafter. Will submit script to [facility] once signed. Reviewed with patient  process for specialty medication acquisitions.       Side Effects/Adverse Reactions       patient on potential side effects, s/s, ways to manage, when to call MD/seek help       Determine if patient experiencing side effects, ways to manage  Discussed possible side effects of oral medications including but not limited to: diarrhea, N/V,fatigue, and stomach pain. Reviewed loperamide ppx schedule.      Miscellaneous      Food interactions, DDIs, financial issues Determine if patient started any new medications since being placed on oral chemo (analyze for DDI) NO DDIs identified with active medication list.       Additional Notes:Discussed aforementioned material with patient in clinic.. All questions and concerns addressed. Provided patient with my contact information and instructions to call should additional questions arise.

## 2018-06-06 NOTE — PROGRESS NOTES
PROBLEM LIST:  1. [id number] (stage IIIB) invasive ductal carcinoma of the right breast, ER-, [place] weakly +, Her2 3+.  A) presented with pain and swelling of the right breast after childbirth. Biopsy 5/8/17 showed high grade IDC, Right axillary LN biopsy positive for metastatic node involvement. Skin biopsy showed involvement of the dermis with lymphatic space involvement.  PET/CT on 5/24/17 showed hypermetabolic activity in the breast and axillary nodes,  No distant spread of disease.  B) neoadjuvant chemotherapy with TCHP started on 5/25/17.  Infusion reaction consisting of severe leg pain with the first dose of herceptin.  Taxotere dose reduced to 80% on cycle 5 for neuropathy.  C) bilateral mastectomy on 10/30/17.  Pathology showed multifocal < 1 mm high grade IDC with focal lymphovascular invasion.  4 sentinel lymph nodes with no malignancy.   [id number] (Stage yIA)  D) radiation therapy completed on 1/23/18.  Patient declined tamoxifen.  herceptin completed June 2018.  Neratinib started July 2018.  2. Migraines  3. Pre-diabetes    Subjective     HISTORY OF PRESENT ILLNESS:   Angie Sutherland returns for follow-up.  She went to a dermatologist and said she has a skin condition where she just can't be out in the sun.  She has to stay in a shaved and use lots of sunscreen.  She says her joint symptoms have improved somewhat.  No other new complaints today.      Past Medical History, Past Surgical History, Social History, Family History have been reviewed and are without significant changes except as mentioned.    Review of Systems   A comprehensive 14 point review of systems was performed and was negative except as mentioned.    Medications:  The current medication list was reviewed in the EMR    ALLERGIES:    Allergies   Allergen Reactions   • No Known Drug Allergy        Objective      /68   Pulse 109   Temp 97.7 °F (36.5 °C) (Temporal Artery )   Resp 16   Wt 57.6 kg (127 lb)   SpO2 98%   BMI 23.23  kg/m²      Performance Status: 0    General: well appearing female in no acute distress  Neuro: alert and oriented  HEENT: sclera anicteric, oropharynx clear  Lymphatics: no cervical, supraclavicular Adenopathy  No palpable axillary adenopathy  Cardiovascular: regular rate and rhythm, no murmurs  Lungs: clear to auscultation bilaterally  Abdomen: soft, nontender, nondistended.  No palpable organomegaly  Extremeties: no lower extremity edema  Skin: no rashes, lesions, bruising, or petechiae  Psych: mood and affect appropriate    Echo showed an EF of 55%.    Assessment/Plan   Angie Sutherland is a 22 y.o. year old female with stage IIIB HER-2 positive breast cancer who returns for follow-up.   She continues on herceptin, and has one dose remaining after today.    We will continue Lupron every 3 months for ovarian suppression as well as contraception.    We discussed the use of neratinib for high-risk HER-2 positive breast cancers.  The absolute benefit is likely no more than 5%.  There is a high risk of significant diarrhea with this medication.  We discussed other side effects including rash, fatigue, nausea/vomiting, mouth sores, and muscle cramps.  She says she is willing to try it.    I will see her back in 6 weeks and we will plan to start neratinib at around that time.            Visit time was 25 minutes, greater than 50% spent in counseling      Shadia Amos MD  [facility]    6/6/2018          CC:

## 2018-06-27 ENCOUNTER — INFUSION (OUTPATIENT)
Dept: ONCOLOGY | Facility: HOSPITAL | Age: 23
End: 2018-06-27

## 2018-06-27 ENCOUNTER — TELEPHONE (OUTPATIENT)
Dept: ONCOLOGY | Facility: CLINIC | Age: 23
End: 2018-06-27

## 2018-06-27 VITALS
HEART RATE: 133 BPM | WEIGHT: 125 LBS | DIASTOLIC BLOOD PRESSURE: 71 MMHG | BODY MASS INDEX: 23 KG/M2 | TEMPERATURE: 97.8 F | SYSTOLIC BLOOD PRESSURE: 104 MMHG | RESPIRATION RATE: 18 BRPM | HEIGHT: 62 IN

## 2018-06-27 DIAGNOSIS — C50.811 MALIGNANT NEOPLASM OF OVERLAPPING SITES OF RIGHT BREAST IN FEMALE, ESTROGEN RECEPTOR POSITIVE (HCC): ICD-10-CM

## 2018-06-27 DIAGNOSIS — R82.90 ABNORMAL URINE ODOR: ICD-10-CM

## 2018-06-27 DIAGNOSIS — C50.811 MALIGNANT NEOPLASM OF OVERLAPPING SITES OF RIGHT BREAST IN FEMALE, ESTROGEN RECEPTOR POSITIVE (HCC): Primary | ICD-10-CM

## 2018-06-27 DIAGNOSIS — Z17.0 MALIGNANT NEOPLASM OF OVERLAPPING SITES OF RIGHT BREAST IN FEMALE, ESTROGEN RECEPTOR POSITIVE (HCC): ICD-10-CM

## 2018-06-27 DIAGNOSIS — Z17.0 MALIGNANT NEOPLASM OF OVERLAPPING SITES OF RIGHT BREAST IN FEMALE, ESTROGEN RECEPTOR POSITIVE (HCC): Primary | ICD-10-CM

## 2018-06-27 DIAGNOSIS — C50.011 MALIGNANT NEOPLASM OF NIPPLE OF RIGHT BREAST IN FEMALE, UNSPECIFIED ESTROGEN RECEPTOR STATUS (HCC): Primary | ICD-10-CM

## 2018-06-27 DIAGNOSIS — C50.811 MALIGNANT NEOPLASM OF OVERLAPPING SITES OF RIGHT FEMALE BREAST, UNSPECIFIED ESTROGEN RECEPTOR STATUS (HCC): ICD-10-CM

## 2018-06-27 DIAGNOSIS — C50.011 MALIGNANT NEOPLASM INVOLVING BOTH NIPPLE AND AREOLA OF RIGHT BREAST IN FEMALE, UNSPECIFIED ESTROGEN RECEPTOR STATUS (HCC): ICD-10-CM

## 2018-06-27 LAB
ALBUMIN SERPL-MCNC: 4.33 G/DL (ref 3.2–4.8)
ALBUMIN/GLOB SERPL: 1.3 G/DL (ref 1.5–2.5)
ALP SERPL-CCNC: 74 U/L (ref 25–100)
ALT SERPL W P-5'-P-CCNC: 21 U/L (ref 7–40)
ANION GAP SERPL CALCULATED.3IONS-SCNC: 4 MMOL/L (ref 3–11)
AST SERPL-CCNC: 25 U/L (ref 0–33)
BACTERIA UR QL AUTO: ABNORMAL /HPF
BILIRUB SERPL-MCNC: 0.6 MG/DL (ref 0.3–1.2)
BILIRUB UR QL STRIP: NEGATIVE
BUN BLD-MCNC: 13 MG/DL (ref 9–23)
BUN/CREAT SERPL: 18.8 (ref 7–25)
CALCIUM SPEC-SCNC: 9.1 MG/DL (ref 8.7–10.4)
CHLORIDE SERPL-SCNC: 104 MMOL/L (ref 99–109)
CLARITY UR: CLEAR
CO2 SERPL-SCNC: 31 MMOL/L (ref 20–31)
COLOR UR: YELLOW
CREAT BLD-MCNC: 0.69 MG/DL (ref 0.6–1.3)
ERYTHROCYTE [DISTWIDTH] IN BLOOD BY AUTOMATED COUNT: 12.7 % (ref 11.3–14.5)
GFR SERPL CREATININE-BSD FRML MDRD: 128 ML/MIN/1.73
GLOBULIN UR ELPH-MCNC: 3.3 GM/DL
GLUCOSE BLD-MCNC: 115 MG/DL (ref 70–100)
GLUCOSE UR STRIP-MCNC: NEGATIVE MG/DL
HCT VFR BLD AUTO: 42.3 % (ref 34.5–44)
HGB BLD-MCNC: 13 G/DL (ref 11.5–15.5)
HGB UR QL STRIP.AUTO: NEGATIVE
HYALINE CASTS UR QL AUTO: ABNORMAL /LPF
KETONES UR QL STRIP: NEGATIVE
LEUKOCYTE ESTERASE UR QL STRIP.AUTO: ABNORMAL
LYMPHOCYTES # BLD AUTO: 1.2 10*3/MM3 (ref 0.6–4.8)
LYMPHOCYTES NFR BLD AUTO: 40.6 % (ref 24–44)
MCH RBC QN AUTO: 28.1 PG (ref 27–31)
MCHC RBC AUTO-ENTMCNC: 30.8 G/DL (ref 32–36)
MCV RBC AUTO: 91.1 FL (ref 80–99)
MONOCYTES # BLD AUTO: 0.2 10*3/MM3 (ref 0–1)
MONOCYTES NFR BLD AUTO: 6.1 % (ref 0–12)
NEUTROPHILS # BLD AUTO: 1.5 10*3/MM3 (ref 1.5–8.3)
NEUTROPHILS NFR BLD AUTO: 53.3 % (ref 41–71)
NITRITE UR QL STRIP: NEGATIVE
PH UR STRIP.AUTO: 7.5 [PH] (ref 5–8)
PLATELET # BLD AUTO: 244 10*3/MM3 (ref 150–450)
PMV BLD AUTO: 6.4 FL (ref 6–12)
POTASSIUM BLD-SCNC: 3.9 MMOL/L (ref 3.5–5.5)
PROT SERPL-MCNC: 7.6 G/DL (ref 5.7–8.2)
PROT UR QL STRIP: NEGATIVE
RBC # BLD AUTO: 4.64 10*6/MM3 (ref 3.89–5.14)
RBC # UR: ABNORMAL /HPF
REF LAB TEST METHOD: ABNORMAL
SODIUM BLD-SCNC: 139 MMOL/L (ref 132–146)
SP GR UR STRIP: 1.01 (ref 1–1.03)
SQUAMOUS #/AREA URNS HPF: ABNORMAL /HPF
UROBILINOGEN UR QL STRIP: ABNORMAL
WBC NRBC COR # BLD: 2.9 10*3/MM3 (ref 3.5–10.8)
WBC UR QL AUTO: ABNORMAL /HPF

## 2018-06-27 PROCEDURE — 25010000002 HEPARIN FLUSH (PORCINE) 100 UNIT/ML SOLUTION: Performed by: INTERNAL MEDICINE

## 2018-06-27 PROCEDURE — 85025 COMPLETE CBC W/AUTO DIFF WBC: CPT

## 2018-06-27 PROCEDURE — 96413 CHEMO IV INFUSION 1 HR: CPT

## 2018-06-27 PROCEDURE — 96367 TX/PROPH/DG ADDL SEQ IV INF: CPT

## 2018-06-27 PROCEDURE — 25010000002 LEUPROLIDE ACETATE (3 MONTH) PER 7.5 MG: Performed by: INTERNAL MEDICINE

## 2018-06-27 PROCEDURE — 80053 COMPREHEN METABOLIC PANEL: CPT

## 2018-06-27 PROCEDURE — 25010000002 DIPHENHYDRAMINE PER 50 MG: Performed by: INTERNAL MEDICINE

## 2018-06-27 PROCEDURE — 25010000002 TRASTUZUMAB PER 10 MG: Performed by: INTERNAL MEDICINE

## 2018-06-27 PROCEDURE — 25010000002 DEXAMETHASONE PER 1 MG: Performed by: INTERNAL MEDICINE

## 2018-06-27 PROCEDURE — 96375 TX/PRO/DX INJ NEW DRUG ADDON: CPT

## 2018-06-27 PROCEDURE — 96401 CHEMO ANTI-NEOPL SQ/IM: CPT

## 2018-06-27 PROCEDURE — 81001 URINALYSIS AUTO W/SCOPE: CPT

## 2018-06-27 PROCEDURE — 96402 CHEMO HORMON ANTINEOPL SQ/IM: CPT

## 2018-06-27 PROCEDURE — 96365 THER/PROPH/DIAG IV INF INIT: CPT

## 2018-06-27 RX ORDER — TRAMADOL HYDROCHLORIDE 50 MG/1
50 TABLET ORAL EVERY 6 HOURS PRN
Qty: 30 TABLET | Refills: 0 | OUTPATIENT
Start: 2018-06-27 | End: 2018-08-13

## 2018-06-27 RX ORDER — SODIUM CHLORIDE 0.9 % (FLUSH) 0.9 %
10 SYRINGE (ML) INJECTION AS NEEDED
Status: DISCONTINUED | OUTPATIENT
Start: 2018-06-27 | End: 2018-06-27 | Stop reason: HOSPADM

## 2018-06-27 RX ORDER — SODIUM CHLORIDE 0.9 % (FLUSH) 0.9 %
10 SYRINGE (ML) INJECTION AS NEEDED
Status: CANCELLED | OUTPATIENT
Start: 2018-06-27

## 2018-06-27 RX ORDER — FAMOTIDINE 10 MG/ML
20 INJECTION, SOLUTION INTRAVENOUS ONCE
Status: COMPLETED | OUTPATIENT
Start: 2018-06-27 | End: 2018-06-27

## 2018-06-27 RX ADMIN — SODIUM CHLORIDE, PRESERVATIVE FREE 500 UNITS: 5 INJECTION INTRAVENOUS at 12:53

## 2018-06-27 RX ADMIN — DEXAMETHASONE SODIUM PHOSPHATE 10 MG: 4 INJECTION, SOLUTION INTRAMUSCULAR; INTRAVENOUS at 11:30

## 2018-06-27 RX ADMIN — Medication 10 ML: at 12:52

## 2018-06-27 RX ADMIN — FAMOTIDINE 20 MG: 10 INJECTION, SOLUTION INTRAVENOUS at 11:33

## 2018-06-27 RX ADMIN — TRASTUZUMAB 320 MG: 150 INJECTION, POWDER, LYOPHILIZED, FOR SOLUTION INTRAVENOUS at 11:50

## 2018-06-27 RX ADMIN — SODIUM CHLORIDE 250 ML: 0.9 INJECTION, SOLUTION INTRAVENOUS at 11:29

## 2018-06-27 RX ADMIN — LEUPROLIDE ACETATE 11.25 MG: KIT at 13:03

## 2018-06-27 RX ADMIN — DIPHENHYDRAMINE HYDROCHLORIDE 50 MG: 50 INJECTION INTRAMUSCULAR; INTRAVENOUS at 11:31

## 2018-06-27 NOTE — TELEPHONE ENCOUNTER
Received phone call from infusion RN. Pt complained to her of wrist and abdominal pain. No injury reported. Patient having regular BMs. Spoke with on call MD, offered her tramadol. Asked pt to let me know if the abdominal pain continues.     Patient also reported strong smelling odor to her urine. UA order placed. Told her I will be in touch with results.

## 2018-07-03 ENCOUNTER — SPECIALTY PHARMACY (OUTPATIENT)
Dept: ONCOLOGY | Facility: HOSPITAL | Age: 23
End: 2018-07-03

## 2018-07-03 DIAGNOSIS — C50.011 MALIGNANT NEOPLASM INVOLVING BOTH NIPPLE AND AREOLA OF RIGHT BREAST IN FEMALE, UNSPECIFIED ESTROGEN RECEPTOR STATUS (HCC): Primary | ICD-10-CM

## 2018-07-03 NOTE — PROGRESS NOTES
Oral Chemotherapy Teaching      Patient Name/:  Angie Sutherland   1995  Oral Chemotherapy Regimen:  Neratinib 240mg PO daily with food  Date Started Medication: pending medication acquisition - will plan to start tentatively 18 with MD appt        Additional Notes:Pt completed Herceptin. Submitted script for Neratinib to [facility] to begin processing. Plan to tentatively start following MD appt on 18.

## 2018-07-17 ENCOUNTER — TELEPHONE (OUTPATIENT)
Dept: ONCOLOGY | Facility: CLINIC | Age: 23
End: 2018-07-17

## 2018-07-17 RX ORDER — ONDANSETRON HYDROCHLORIDE 8 MG/1
8 TABLET, FILM COATED ORAL EVERY 8 HOURS PRN
Qty: 30 TABLET | Refills: 5 | Status: SHIPPED | OUTPATIENT
Start: 2018-07-17 | End: 2018-09-18

## 2018-07-17 RX ORDER — SUMATRIPTAN 50 MG/1
TABLET, FILM COATED ORAL
Qty: 10 TABLET | Refills: 3 | Status: SHIPPED | OUTPATIENT
Start: 2018-07-17 | End: 2018-08-13

## 2018-07-17 RX ORDER — LIDOCAINE AND PRILOCAINE 25; 25 MG/G; MG/G
CREAM TOPICAL AS NEEDED
Qty: 30 G | Refills: 3 | Status: SHIPPED | OUTPATIENT
Start: 2018-07-17 | End: 2018-08-13

## 2018-07-17 NOTE — TELEPHONE ENCOUNTER
----- Message from Abril Serrano sent at 7/17/2018 10:51 AM EDT -----  Regarding: WERNER - RX REFILLS   Contact: [phone]  PATIENT CALLED NEEDING RX REFILLS ON THE FOLLOWING MEDS:  ondansetron (ZOFRAN) 4 MG tablet  SUMAtriptan (IMITREX) 50 MG tablet  Lidocaine Cream

## 2018-07-18 ENCOUNTER — INFUSION (OUTPATIENT)
Dept: ONCOLOGY | Facility: HOSPITAL | Age: 23
End: 2018-07-18

## 2018-07-18 ENCOUNTER — OFFICE VISIT (OUTPATIENT)
Dept: ONCOLOGY | Facility: CLINIC | Age: 23
End: 2018-07-18

## 2018-07-18 VITALS
HEIGHT: 62 IN | HEART RATE: 82 BPM | WEIGHT: 127.06 LBS | DIASTOLIC BLOOD PRESSURE: 65 MMHG | TEMPERATURE: 97.6 F | BODY MASS INDEX: 23.38 KG/M2 | SYSTOLIC BLOOD PRESSURE: 94 MMHG

## 2018-07-18 DIAGNOSIS — C50.011 MALIGNANT NEOPLASM OF NIPPLE OF RIGHT BREAST IN FEMALE, UNSPECIFIED ESTROGEN RECEPTOR STATUS (HCC): Primary | ICD-10-CM

## 2018-07-18 DIAGNOSIS — C50.011 MALIGNANT NEOPLASM INVOLVING BOTH NIPPLE AND AREOLA OF RIGHT BREAST IN FEMALE, UNSPECIFIED ESTROGEN RECEPTOR STATUS (HCC): Primary | ICD-10-CM

## 2018-07-18 DIAGNOSIS — C50.011 MALIGNANT NEOPLASM INVOLVING BOTH NIPPLE AND AREOLA OF RIGHT BREAST IN FEMALE, UNSPECIFIED ESTROGEN RECEPTOR STATUS (HCC): ICD-10-CM

## 2018-07-18 LAB
ALBUMIN SERPL-MCNC: 4.3 G/DL (ref 3.2–4.8)
ALP SERPL-CCNC: 75 U/L (ref 25–100)
ALT SERPL W P-5'-P-CCNC: 22 U/L (ref 7–40)
AST SERPL-CCNC: 25 U/L (ref 0–33)
BILIRUB CONJ SERPL-MCNC: 0.1 MG/DL (ref 0–0.2)
BILIRUB INDIRECT SERPL-MCNC: 0.3 MG/DL (ref 0.1–1.1)
BILIRUB SERPL-MCNC: 0.4 MG/DL (ref 0.3–1.2)
PROT SERPL-MCNC: 7.6 G/DL (ref 5.7–8.2)

## 2018-07-18 PROCEDURE — 99214 OFFICE O/P EST MOD 30 MIN: CPT | Performed by: INTERNAL MEDICINE

## 2018-07-18 PROCEDURE — 25010000002 HEPARIN FLUSH (PORCINE) 100 UNIT/ML SOLUTION: Performed by: INTERNAL MEDICINE

## 2018-07-18 PROCEDURE — 36591 DRAW BLOOD OFF VENOUS DEVICE: CPT

## 2018-07-18 PROCEDURE — 80076 HEPATIC FUNCTION PANEL: CPT

## 2018-07-18 RX ORDER — SODIUM CHLORIDE 0.9 % (FLUSH) 0.9 %
10 SYRINGE (ML) INJECTION AS NEEDED
Status: CANCELLED | OUTPATIENT
Start: 2018-07-18

## 2018-07-18 RX ORDER — LOPERAMIDE HYDROCHLORIDE 2 MG/1
CAPSULE ORAL
Qty: 140 CAPSULE | Refills: 3 | OUTPATIENT
Start: 2018-07-18 | End: 2019-01-14 | Stop reason: HOSPADM

## 2018-07-18 RX ADMIN — HEPARIN 500 UNITS: 100 SYRINGE at 12:07

## 2018-07-18 NOTE — PROGRESS NOTES
"      PROBLEM LIST:  1. [id number] (stage IIIB) invasive ductal carcinoma of the right breast, ER-, [place] weakly +, Her2 3+.  A) presented with pain and swelling of the right breast after childbirth. Biopsy 5/8/17 showed high grade IDC, Right axillary LN biopsy positive for metastatic node involvement. Skin biopsy showed involvement of the dermis with lymphatic space involvement.  PET/CT on 5/24/17 showed hypermetabolic activity in the breast and axillary nodes,  No distant spread of disease.  B) neoadjuvant chemotherapy with TCHP started on 5/25/17.  Infusion reaction consisting of severe leg pain with the first dose of herceptin.  Taxotere dose reduced to 80% on cycle 5 for neuropathy.  C) bilateral mastectomy on 10/30/17.  Pathology showed multifocal < 1 mm high grade IDC with focal lymphovascular invasion.  4 sentinel lymph nodes with no malignancy.   [id number] (Stage yIA)  D) radiation therapy completed on 1/23/18.  Patient declined tamoxifen.  herceptin completed June 2018.  Neratinib started July 2018.  2. Migraines  3. Pre-diabetes    Subjective     HISTORY OF PRESENT ILLNESS:   Angie Sutherland returns for follow-up.  She reports some mild nausea and is still using zofran as needed.  Her nausea seems to be worse when she is in the heat.      Past Medical History, Past Surgical History, Social History, Family History have been reviewed and are without significant changes except as mentioned.    Review of Systems   A comprehensive 14 point review of systems was performed and was negative except as mentioned.    Medications:  The current medication list was reviewed in the EMR    ALLERGIES:    Allergies   Allergen Reactions   • Benadryl [Diphenhydramine] Diarrhea       Objective      BP 94/65   Pulse 82   Temp 97.6 °F (36.4 °C) (Temporal Artery )   Ht 157.5 cm (62\")   Wt 57.6 kg (127 lb 1 oz)   BMI 23.24 kg/m²      Performance Status: 0    General: well appearing female in no acute distress  Neuro: alert and " oriented  HEENT: sclera anicteric, oropharynx clear  Lymphatics: no cervical, supraclavicular Adenopathy  No palpable axillary adenopathy  Cardiovascular: regular rate and rhythm, no murmurs  Lungs: clear to auscultation bilaterally  Abdomen: soft, nontender, nondistended.  No palpable organomegaly  Extremeties: no lower extremity edema  Skin: no rashes, lesions, bruising, or petechiae  Psych: mood and affect appropriate    Echo showed an EF of 55%.    Assessment/Plan   Angie Sutherland is a 22 y.o. year old female with stage IIIB HER-2 positive breast cancer who returns for follow-up.   She has completed herceptin treatment.    We will begin neratinib today.  We reviewed the use of prophylactic immodium.  We discussed the need to monitor LFTs while on this medication.     I will see her back in 1 month.                  Visit time was 25 minutes, greater than 50% spent in counseling      Shadia Amos MD  [facility]    7/18/2018          CC:

## 2018-07-27 ENCOUNTER — TELEPHONE (OUTPATIENT)
Dept: ONCOLOGY | Facility: CLINIC | Age: 23
End: 2018-07-27

## 2018-07-27 RX ORDER — PROCHLORPERAZINE MALEATE 10 MG
10 TABLET ORAL EVERY 6 HOURS PRN
Qty: 45 TABLET | Refills: 5 | Status: SHIPPED | OUTPATIENT
Start: 2018-07-27 | End: 2019-01-14 | Stop reason: HOSPADM

## 2018-07-27 NOTE — TELEPHONE ENCOUNTER
"Returned call to pt. She states \"my stomach is so tore up,\" and has been unable to start neratinib. She has been experiencing nausea without vomiting and diarrhea. She also c/o a headache unrelieved by immitrex. I asked her if she was having any other symptoms related to a migrane- she said yes to light sensitivity. I told her that I think the nausea is secondary to the migrane she was having.  Advised we could either get her into the infusion center today for IVF and some IV antiemetics for her nausea/diarrhea, but there would be no provider who could evaluate her for the migraine or she could go to an urgent treatment center/ ER for headache control since she does not have a pcp. Patient is scheduled to get established with a primary care, but her apt is not until next month.     She wants to think about it and let me know. I told her if she decided on coming to the OP infusion center, let me know sooner rather than later so I could schedule an apt.   "

## 2018-07-27 NOTE — TELEPHONE ENCOUNTER
----- Message from Fabiola Elliott sent at 7/27/2018 10:33 AM EDT -----  Regarding: [facility]  Contact: [phone]  Patient called and the nausea medication is not working she is feeling awful and also the migraine medication is not working, and her stomach is torn up she needs some different medications and she can't start the new medication until all this get settles. Please call.

## 2018-07-27 NOTE — TELEPHONE ENCOUNTER
I never heard back from Angie. I called and spoke with her to check on how she was doing. She said her headache has been on and off all day, she did not go anywhere to get checked out. She says she has been taking zofran continuously with no nausea relief. I told her the zofran can cause a headache. She says she doesn't have anything else at home for nausea. Told her I would send compazine to [facility] for her to try. Asked her to call back if no relief.

## 2018-08-02 ENCOUNTER — TELEPHONE (OUTPATIENT)
Dept: ONCOLOGY | Facility: CLINIC | Age: 23
End: 2018-08-02

## 2018-08-02 NOTE — TELEPHONE ENCOUNTER
Patient called stating that she was still very nauseous, no energy, and weak. She has been taking the zofran and compazine with no relief. No vomiting or diarrhea. Discussed with BOB Voss, since she has been in contact with patient regarding this issue recently in the past, and also discussed with UYEN Minaya. Per Veronica, [place] [facility] [place] 1400 [place] 1500. Patient declined the appointment, stating that she is unable to make it in to be seen until after 1600. Encouraged her to try to come in to be seen, but still declines. Per Veronica, patient can come in tomorrow morning at 1000 for evaluation, and pt agreeable. Advised patient that if her symptoms worsen overnight to go to the ER for further evaluation, otherwise, will keep appt for tomorrow morning as planned.

## 2018-08-03 ENCOUNTER — OFFICE VISIT (OUTPATIENT)
Dept: ONCOLOGY | Facility: CLINIC | Age: 23
End: 2018-08-03

## 2018-08-03 ENCOUNTER — INFUSION (OUTPATIENT)
Dept: ONCOLOGY | Facility: HOSPITAL | Age: 23
End: 2018-08-03

## 2018-08-03 VITALS
DIASTOLIC BLOOD PRESSURE: 59 MMHG | SYSTOLIC BLOOD PRESSURE: 98 MMHG | TEMPERATURE: 98.2 F | RESPIRATION RATE: 12 BRPM | WEIGHT: 125 LBS | HEIGHT: 62 IN | BODY MASS INDEX: 23 KG/M2 | HEART RATE: 60 BPM

## 2018-08-03 DIAGNOSIS — R11.0 NAUSEA: ICD-10-CM

## 2018-08-03 DIAGNOSIS — C50.011 MALIGNANT NEOPLASM OF NIPPLE OF RIGHT BREAST IN FEMALE, UNSPECIFIED ESTROGEN RECEPTOR STATUS (HCC): Primary | ICD-10-CM

## 2018-08-03 DIAGNOSIS — C50.811 MALIGNANT NEOPLASM OF OVERLAPPING SITES OF RIGHT FEMALE BREAST, UNSPECIFIED ESTROGEN RECEPTOR STATUS (HCC): ICD-10-CM

## 2018-08-03 DIAGNOSIS — E86.0 DEHYDRATION: Primary | ICD-10-CM

## 2018-08-03 PROCEDURE — 96361 HYDRATE IV INFUSION ADD-ON: CPT

## 2018-08-03 PROCEDURE — 25010000002 ONDANSETRON PER 1 MG: Performed by: NURSE PRACTITIONER

## 2018-08-03 PROCEDURE — 99214 OFFICE O/P EST MOD 30 MIN: CPT | Performed by: NURSE PRACTITIONER

## 2018-08-03 PROCEDURE — 25010000002 DEXAMETHASONE PER 1 MG: Performed by: NURSE PRACTITIONER

## 2018-08-03 PROCEDURE — 96375 TX/PRO/DX INJ NEW DRUG ADDON: CPT

## 2018-08-03 PROCEDURE — 96360 HYDRATION IV INFUSION INIT: CPT

## 2018-08-03 PROCEDURE — 96374 THER/PROPH/DIAG INJ IV PUSH: CPT

## 2018-08-03 PROCEDURE — 96376 TX/PRO/DX INJ SAME DRUG ADON: CPT

## 2018-08-03 RX ORDER — SODIUM CHLORIDE 9 MG/ML
1000 INJECTION, SOLUTION INTRAVENOUS ONCE
Status: CANCELLED | OUTPATIENT
Start: 2018-08-03

## 2018-08-03 RX ORDER — SODIUM CHLORIDE 9 MG/ML
1000 INJECTION, SOLUTION INTRAVENOUS ONCE
Status: COMPLETED | OUTPATIENT
Start: 2018-08-03 | End: 2018-08-03

## 2018-08-03 RX ADMIN — ONDANSETRON 8 MG: 2 INJECTION INTRAMUSCULAR; INTRAVENOUS at 11:01

## 2018-08-03 RX ADMIN — SODIUM CHLORIDE 1000 ML: 9 INJECTION, SOLUTION INTRAVENOUS at 11:01

## 2018-08-03 RX ADMIN — DEXAMETHASONE SODIUM PHOSPHATE 12 MG: 4 INJECTION, SOLUTION INTRAMUSCULAR; INTRAVENOUS at 11:01

## 2018-08-03 NOTE — PROGRESS NOTES
"      PROBLEM LIST:  1. [id number] (stage IIIB) invasive ductal carcinoma of the right breast, ER-, [place] weakly +, Her2 3+.  A) presented with pain and swelling of the right breast after childbirth. Biopsy 5/8/17 showed high grade IDC, Right axillary LN biopsy positive for metastatic node involvement. Skin biopsy showed involvement of the dermis with lymphatic space involvement.  PET/CT on 5/24/17 showed hypermetabolic activity in the breast and axillary nodes,  No distant spread of disease.  B) neoadjuvant chemotherapy with TCHP started on 5/25/17.  Infusion reaction consisting of severe leg pain with the first dose of herceptin.  Taxotere dose reduced to 80% on cycle 5 for neuropathy.  C) bilateral mastectomy on 10/30/17.  Pathology showed multifocal < 1 mm high grade IDC with focal lymphovascular invasion.  4 sentinel lymph nodes with no malignancy.   [id number] (Stage yIA)  D) radiation therapy completed on 1/23/18.  Patient declined tamoxifen.  herceptin completed June 2018.  Neratinib started July 2018.  2. Migraines  3. Pre-diabetes    Subjective     HISTORY OF PRESENT ILLNESS:   Angie Sutherland is here for interim visit to urgent care clinic for nausea, weakness and fatigue.  She completed Herceptin June 2018.  She began Neratinib 7/30/2018. Since that time she complains of no energy and feeling very weak.  She states her stomach feels \"messed up\".  She has nausea constantly.  She denies any emesis.  She's only had one episode of diarrhea and that was yesterday.  She is taking the Imodium as prescribed along with the Neratinib.  Yesterday she only had a small bowl of noodles and was unable to eat the rest of the day.  She is drinking approximately 32 ounces of water a day over the past week.  She continues to have daily migraines/headaches but she states this is her baseline for headaches.  She is not taking antacids or PPIs. She denies any abdominal pain or distention.  No melena or hematochezia.  No fevers or " "chills.      Past Medical History, Past Surgical History, Social History, Family History have been reviewed and are without significant changes except as mentioned.    Review of Systems   A comprehensive 14 point review of systems was performed and was negative except as mentioned.    Medications:  The current medication list was reviewed in the EMR    ALLERGIES:    Allergies   Allergen Reactions   • Benadryl [Diphenhydramine] Diarrhea       Objective      BP 98/59 Comment: LEFT  Pulse 60   Temp 98.2 °F (36.8 °C) (Temporal Artery )   Resp 12   Ht 157.5 cm (62\")   Wt 56.7 kg (125 lb)   BMI 22.86 kg/m²      Performance Status: 0    General: well appearing female, fatigued, laying on the exam table   Neuro: alert and oriented  HEENT: sclera anicteric, oropharynx clear  Lymphatics: no cervical, supraclavicular Adenopathy  No palpable axillary adenopathy  Cardiovascular: regular rate and rhythm, no murmurs  Lungs: clear to auscultation bilaterally  Abdomen: soft, nontender, nondistended.  No palpable organomegaly  Extremeties: no lower extremity edema  Skin: no rashes, lesions, bruising, or petechiae  Psych: mood and affect appropriate        Assessment/Plan   Angie Sutherland is a 22 y.o. year old female with stage IIIB HER-2 positive breast cancer who returns for follow-up.   She has completed herceptin treatment.    She started neratinib 7/30/2018 and is tolerating it poorly.  She is taking prophylactic imodium with good results.  She appears to be dehydrated and has nausea.  After consultation with Dr. Shadia Amos we will hold the neratinib until her nausea resolves.  She will then     restart the Neratinib at 4 tablets a day.  I will send her to the infusion center for a liter of normal saline and IV Zofran and dexamethasone to help with her nausea. She has a follow-up appointment already scheduled with Dr. Amos August 15, 2018 at 9:45 AM.        Ana Jade APRN  [facility] Hematology and " Oncology    8/3/2018          CC:

## 2018-08-10 ENCOUNTER — SPECIALTY PHARMACY (OUTPATIENT)
Dept: ONCOLOGY | Facility: HOSPITAL | Age: 23
End: 2018-08-10

## 2018-08-10 NOTE — PROGRESS NOTES
Oral Chemotherapy Teaching      Patient Name/:  Angie Sutherland   1995  Oral Chemotherapy Regimen:  Neratinib 160mg PO daily with food (DOSE REDUCED)    Date Started Medication: Medication initiated 18; however currently on hold given Nausea associated with use; plan to resume treatment at reduced dose of Neratinib 160mg PO daily on 18            Initial Teaching Follow Up Comments      Safety      Storage instructions (away from children; away from heat/cold, sunlight, or moisture), handling - use of gloves (caregivers), washing hands after touching pills, managing waste       “How are you storing your medications?”, reminders on storage, proper handling (caregivers using gloves, washing hands, away from children, managing waste, etc.), disposal of medication with D/C or dosage change     Pt reports appropriate handling and storage.      Adherence       patient and/or caregiver on how to take medication, take with/without food, assess their adherence potential, stress importance of adherence, ways to manage adherence (pill boxes, phone reminders, calendars), what to do if miss a dose    “How are you taking your medication?” “How are you remembering to take your medication?”, “How many doses have you missed?”, determine reasons for non-adherence (not remembering, side effects, etc), ways to improve, overadherence? Remind patient of ways to improve/maintain adherence    Pt initially started treatment at Neratinib 240mg PO daily however was seen in [facility] [place] secondary to nausea and dehydration on 8/3/18. HOLDING treatment until nausea resolves. Pt reports plan to enjoy her birthday weekend and restart treatment at REDUCED dose of Neratinib 160mg PO daily on 18. Pt with MD follow-up scheduled 8/15/18      Side Effects/Adverse Reactions       patient on potential side effects, s/s, ways to manage, when to call MD/seek help       Determine if patient experiencing side  effects, ways to manage  Pt reports that diarrhea has been controlled with antidiarrheal ppx. However, nausea continues to wax and wane. Reports plan to enjoy her weekend and restart at reduced dose on Monday 8/13/18 as previously discussed.       Miscellaneous      Food interactions, DDIs, financial issues Determine if patient started any new medications since being placed on oral chemo (analyze for DDI) NO DDIs identified with active medication list.       Additional Notes:Discussed aforementioned material with patient over the phone. All questions and concerns addressed. Provided patient with my contact information and instructions to call should additional questions arise.

## 2018-08-13 ENCOUNTER — OFFICE VISIT (OUTPATIENT)
Dept: FAMILY MEDICINE CLINIC | Facility: CLINIC | Age: 23
End: 2018-08-13

## 2018-08-13 VITALS
DIASTOLIC BLOOD PRESSURE: 68 MMHG | BODY MASS INDEX: 23.7 KG/M2 | HEIGHT: 62 IN | RESPIRATION RATE: 16 BRPM | SYSTOLIC BLOOD PRESSURE: 110 MMHG | OXYGEN SATURATION: 99 % | WEIGHT: 128.8 LBS | TEMPERATURE: 98.4 F | HEART RATE: 95 BPM

## 2018-08-13 DIAGNOSIS — R06.02 SHORTNESS OF BREATH: ICD-10-CM

## 2018-08-13 DIAGNOSIS — G62.0 PERIPHERAL NEUROPATHY DUE TO CHEMOTHERAPY (HCC): ICD-10-CM

## 2018-08-13 DIAGNOSIS — G43.009 MIGRAINE WITHOUT AURA AND WITHOUT STATUS MIGRAINOSUS, NOT INTRACTABLE: Primary | ICD-10-CM

## 2018-08-13 DIAGNOSIS — F33.9 EPISODE OF RECURRENT MAJOR DEPRESSIVE DISORDER, UNSPECIFIED DEPRESSION EPISODE SEVERITY (HCC): ICD-10-CM

## 2018-08-13 DIAGNOSIS — Z23 NEED FOR HPV VACCINATION: ICD-10-CM

## 2018-08-13 DIAGNOSIS — Z23 NEED FOR TDAP VACCINATION: ICD-10-CM

## 2018-08-13 DIAGNOSIS — T45.1X5A PERIPHERAL NEUROPATHY DUE TO CHEMOTHERAPY (HCC): ICD-10-CM

## 2018-08-13 DIAGNOSIS — F41.9 ANXIETY: ICD-10-CM

## 2018-08-13 PROCEDURE — 90472 IMMUNIZATION ADMIN EACH ADD: CPT | Performed by: FAMILY MEDICINE

## 2018-08-13 PROCEDURE — 99214 OFFICE O/P EST MOD 30 MIN: CPT | Performed by: FAMILY MEDICINE

## 2018-08-13 PROCEDURE — 90649 4VHPV VACCINE 3 DOSE IM: CPT | Performed by: FAMILY MEDICINE

## 2018-08-13 PROCEDURE — 90471 IMMUNIZATION ADMIN: CPT | Performed by: FAMILY MEDICINE

## 2018-08-13 PROCEDURE — 90715 TDAP VACCINE 7 YRS/> IM: CPT | Performed by: FAMILY MEDICINE

## 2018-08-13 RX ORDER — AMITRIPTYLINE HYDROCHLORIDE 25 MG/1
25 TABLET, FILM COATED ORAL NIGHTLY
Qty: 30 TABLET | Refills: 5 | Status: SHIPPED | OUTPATIENT
Start: 2018-08-13 | End: 2018-09-18 | Stop reason: SDUPTHER

## 2018-08-13 RX ORDER — TRAMADOL HYDROCHLORIDE 50 MG/1
50 TABLET ORAL EVERY 6 HOURS PRN
COMMUNITY
End: 2018-10-02 | Stop reason: SDUPTHER

## 2018-08-13 NOTE — PROGRESS NOTES
Angie Sutherland presents today to establish care.    Chief Complaint   Patient presents with   • Establish Care     New patient - Physical         HPI   Breast cancer:  Life after cancer. Still going through treatment. Body is doing a lot. Brain is fuzzy. Writing or using hands for a long period starts to cramp and hurt really bad. Extremely exhausted with a lot of factors. If she walks for too long feels weak and tired. Finished herceptin and put on a pill, had to go in for fluids recently because it made her sick and starts pill again tonight lower dose.     Nerve pain:  Nerve damage in her feet like walking on pins and needles.     Depression:  Mood is up and down. Tried to take medicines but doesn't like waiting a month and doesn't help much or change things. Hasn't seen psychiatrist in awhile.     Arm pain:  Right upper arm hurts when she grabs it. Feels sensitive. Worried about lymph nodes removed on that side. Denies swelling.     Migraines:  Migraines all the time. Has nausea, pain and pressure in head. Light hurts. Not able to lay down. Pressure headache squeezing her brain. No aura. Spreads to top of head.  Nothing that she's been prescribed has worked too well. Prescribed tramadol in the past. Previous work-ups MRIs normal.     Shortness of breath:  Shortness of breath, worried its from her tissue expanders. Sometimes gets weird feeling in chest she can't breathe, sometimes related to anxiety.     When she started chemo had nose bleeds.    Review of Systems   Constitutional: Positive for appetite change and fatigue.   HENT: Positive for nosebleeds.    Eyes: Positive for visual disturbance.   Respiratory: Positive for shortness of breath.    Cardiovascular: Positive for chest pain.        Leg cramps   Gastrointestinal: Positive for abdominal pain and constipation.   Endocrine:        Hot flashes   Genitourinary: Positive for pelvic pain and vaginal discharge.   Musculoskeletal: Negative.    Skin:  Negative.    Neurological: Positive for dizziness.   Hematological: Negative.    Psychiatric/Behavioral: Positive for sleep disturbance, suicidal ideas and depressed mood. The patient is nervous/anxious.         Past Medical History:   Diagnosis Date   • Anxiety    • Breast cancer (CMS/[facility])     [id number] (stage IIIB) invasive ductal carcinoma of the right breast, ER-, [place] weakly +, Her2 3+.   • Depression    • GERD (gastroesophageal reflux disease)    • Migraine    • Ovarian cyst    • Pregnancy, incidental     3/2/2016: 14 weeks pregnant. She was seen in 2013 during pregnancy and received counselling and glucometer. She was lost to f/u but states baby had no complications - weighed 7lb1oz   • Spinal headache         Past Surgical History:   Procedure Laterality Date   • BREAST RECONSTRUCTION, BREAST TISSUE EXPANDER INSERTION Bilateral 10/30/2017    Procedure: BREAST RECONSTRUCTION WITH ALLODERM, BREAST TISSUE EXPANDER INSERTION BILATERAL;  Surgeon: Delia Covarrubias MD;  Location:  [facility];  Service:    •  SECTION     •  SECTION N/A 2016    Procedure:  SECTION REPEAT;  Surgeon: Malika Munoz MD;  Location:  [facility];  Service:    • MASTECTOMY W/ SENTINEL NODE BIOPSY Bilateral 10/30/2017    Procedure: BREAST MASTECTOMY BILATERAL WITH RIGHT SENTINEL NODE BIOPSY;  Surgeon: Carlo Paulino MD;  Location:  [facility];  Service:    • VENOUS ACCESS DEVICE (PORT) INSERTION  2017        Family History   Problem Relation Age of Onset   • Diabetes Mother    • Ovarian cancer Maternal Grandmother    • Diabetes Brother         Social History     Social History   • Marital status: Single     Spouse name: N/A   • Number of children: N/A   • Years of education: N/A     Occupational History   • Not on file.     Social History Main Topics   • Smoking status: Never Smoker   • Smokeless tobacco: Never Used   • Alcohol use Yes      Comment: occasional   • Drug use: No   • Sexual  "activity: Not Currently     Other Topics Concern   • Not on file     Social History Narrative   • No narrative on file        Current Outpatient Prescriptions   Medication Sig Dispense Refill   • acetaminophen (TYLENOL) 500 MG tablet Take 1 tablet by mouth Every 6 (Six) Hours As Needed for Mild Pain  or Moderate Pain. 30 tablet 0   • buPROPion SR (WELLBUTRIN SR) 100 MG 12 hr tablet Take 1 tablet by mouth Daily. 30 tablet 1   • busPIRone (BUSPAR) 10 MG tablet Take one by mouth three times a day 90 tablet 1   • carisoprodol (SOMA) 350 MG tablet Take 1 tablet by mouth 3 (Three) Times a Day As Needed for Muscle Spasms for up to 35 doses. 35 tablet 0   • ibuprofen (ADVIL,MOTRIN) 400 MG tablet Take 1 tablet by mouth Every 6 (Six) Hours As Needed for Mild Pain. 35 tablet 0   • Neratinib 40 MG tablet Take 240 mg by mouth Daily. 180 tablet 3   • ondansetron (ZOFRAN) 4 MG tablet Take 1 tablet by mouth Daily As Needed for Nausea or Vomiting. 10 tablet 0   • ondansetron (ZOFRAN) 8 MG tablet Take 1 tablet by mouth Every 8 (Eight) Hours As Needed for Nausea or Vomiting. 30 tablet 5   • prochlorperazine (COMPAZINE) 10 MG tablet Take 1 tablet by mouth Every 6 (Six) Hours As Needed for Nausea or Vomiting. 45 tablet 5   • traMADol (ULTRAM) 50 MG tablet Take 50 mg by mouth Every 6 (Six) Hours As Needed for Moderate Pain .     • traZODone (DESYREL) 50 MG tablet 1/2 - 1 tablet as needed at bedtime for sleep 30 tablet 1   • loperamide (HM LOPERAMIDE HCL) 2 MG capsule Take 2 caps PO TID on Weeks 1-2, then 2 caps PO BID on Wks 3-8, then PRN.  Do NOT exceed 16 mg daily. 140 capsule 3         Allergies   Allergen Reactions   • Benadryl [Diphenhydramine] Diarrhea        Visit Vitals  /68 (BP Location: Left arm, Patient Position: Sitting, Cuff Size: Adult)   Pulse 95   Temp 98.4 °F (36.9 °C) (Temporal Artery )   Resp 16   Ht 157.5 cm (62.01\")   Wt 58.4 kg (128 lb 12.8 oz)   SpO2 99%   BMI 23.55 kg/m²          Physical Exam "   Constitutional: She is oriented to person, place, and time. No distress.   HENT:   Nose: Mucosal edema present. Rhinorrhea: Right 2+   Mouth/Throat: Oropharynx is clear and moist.   Eyes: Pupils are equal, round, and reactive to light. Conjunctivae are normal.   Neck: Neck supple. No thyromegaly present.   Cardiovascular: Normal rate, regular rhythm and intact distal pulses.    No murmur heard.  Pulses:       Posterior tibial pulses are 2+ on the right side, and 2+ on the left side.   Pulmonary/Chest: Effort normal and breath sounds normal.   Tissue expanders   Abdominal: Soft. There is no hepatosplenomegaly. There is no tenderness.   Musculoskeletal: She exhibits no edema.   Neck and scalp non-tender   Lymphadenopathy:     She has no cervical adenopathy.   Neurological: She is alert and oriented to person, place, and time.   Skin: Skin is warm and dry. No rash noted.   Psychiatric: Her speech is normal and behavior is normal. Judgment normal. Cognition and memory are normal. She exhibits a depressed mood.   Vitals reviewed.       Results for orders placed or performed in visit on 07/18/18   Hepatic Function Panel   Result Value Ref Range    Total Protein 7.6 5.7 - 8.2 g/dL    Albumin 4.30 3.20 - 4.80 g/dL    ALT (SGPT) 22 7 - 40 U/L    AST (SGOT) 25 0 - 33 U/L    Alkaline Phosphatase 75 25 - 100 U/L    Total Bilirubin 0.4 0.3 - 1.2 mg/dL    Bilirubin, Direct 0.1 0.0 - 0.2 mg/dL    Bilirubin, Indirect 0.3 0.1 - 1.1 mg/dL    Spirometry Without Bronchodilator  Date/Time: 8/13/2018 9:37 AM  Performed by: JULIETH STINSON  Authorized by: JULIETH STINSON   Comments: Poor patient effort despite several attempts.         Immunization History   Administered Date(s) Administered   • HPV Quadrivalent 08/13/2018   • Tdap 08/13/2018         Angie was seen today for establish care.    Diagnoses and all orders for this visit:    Migraine without aura and without status migrainosus, not intractable  -      amitriptyline (ELAVIL) 25 MG tablet; Take 1 tablet by mouth Every Night.  Start amitriptyline at night.  Discussed potential side effects.  Reassess in 4 weeks.  Peripheral neuropathy due to chemotherapy (CMS/HCC)  -     amitriptyline (ELAVIL) 25 MG tablet; Take 1 tablet by mouth Every Night.  Amitriptyline may also help with nerve pain.  Shortness of breath  -     Spirometry Without Bronchodilator  Unable to complete spirometry in the office due to poor effort.  Shortness of breath possibly related to stress.  Episode of recurrent major depressive disorder, unspecified depression episode severity (CMS/HCC)  Patient is overdue for follow-up with psychiatry last visit in May.  Anxiety  Patient is overdue for follow-up with psychiatry last visit in May.  Need for HPV vaccination    Need for Tdap vaccination    Other orders  -     Tdap Vaccine Greater Than or Equal To 6yo IM  -     HPV Vaccine QuadriValent 3 Dose IM        Return in about 4 weeks (around 9/10/2018) for Follow-up migrained.

## 2018-08-14 ENCOUNTER — SPECIALTY PHARMACY (OUTPATIENT)
Dept: ONCOLOGY | Facility: HOSPITAL | Age: 23
End: 2018-08-14

## 2018-08-14 DIAGNOSIS — C50.011 MALIGNANT NEOPLASM INVOLVING BOTH NIPPLE AND AREOLA OF RIGHT BREAST IN FEMALE, UNSPECIFIED ESTROGEN RECEPTOR STATUS (HCC): ICD-10-CM

## 2018-08-14 NOTE — PROGRESS NOTES
Received notification from [facility], unable to process next script of Neratinib given insurance requires to process through [facility]. Submitted updated script along with insurance and demographics to [facility] to process for next fill.

## 2018-08-15 ENCOUNTER — OFFICE VISIT (OUTPATIENT)
Dept: ONCOLOGY | Facility: CLINIC | Age: 23
End: 2018-08-15

## 2018-08-15 VITALS
TEMPERATURE: 97.2 F | WEIGHT: 127 LBS | HEIGHT: 62 IN | SYSTOLIC BLOOD PRESSURE: 109 MMHG | OXYGEN SATURATION: 98 % | RESPIRATION RATE: 16 BRPM | HEART RATE: 94 BPM | DIASTOLIC BLOOD PRESSURE: 58 MMHG | BODY MASS INDEX: 23.37 KG/M2

## 2018-08-15 DIAGNOSIS — C50.011 MALIGNANT NEOPLASM OF NIPPLE OF RIGHT BREAST IN FEMALE, UNSPECIFIED ESTROGEN RECEPTOR STATUS (HCC): Primary | ICD-10-CM

## 2018-08-15 DIAGNOSIS — C50.011 MALIGNANT NEOPLASM INVOLVING BOTH NIPPLE AND AREOLA OF RIGHT BREAST IN FEMALE, UNSPECIFIED ESTROGEN RECEPTOR STATUS (HCC): ICD-10-CM

## 2018-08-15 PROBLEM — C50.919 BREAST CANCER: Status: RESOLVED | Noted: 2017-10-30 | Resolved: 2018-08-15

## 2018-08-15 PROBLEM — Z17.0 MALIGNANT NEOPLASM OF OVERLAPPING SITES OF RIGHT BREAST IN FEMALE, ESTROGEN RECEPTOR POSITIVE: Status: RESOLVED | Noted: 2018-02-26 | Resolved: 2018-08-15

## 2018-08-15 PROBLEM — C50.811 MALIGNANT NEOPLASM OF OVERLAPPING SITES OF RIGHT BREAST IN FEMALE, ESTROGEN RECEPTOR POSITIVE (HCC): Status: RESOLVED | Noted: 2018-02-26 | Resolved: 2018-08-15

## 2018-08-15 PROCEDURE — 99214 OFFICE O/P EST MOD 30 MIN: CPT | Performed by: INTERNAL MEDICINE

## 2018-08-15 RX ORDER — OLANZAPINE 5 MG/1
5 TABLET ORAL NIGHTLY
Qty: 30 TABLET | Refills: 5 | Status: SHIPPED | OUTPATIENT
Start: 2018-08-15 | End: 2019-04-23

## 2018-08-15 NOTE — PROGRESS NOTES
PROBLEM LIST:  1. [id number] (stage IIIB) invasive ductal carcinoma of the right breast, ER-, [place] weakly +, Her2 3+.  A) presented with pain and swelling of the right breast after childbirth. Biopsy 5/8/17 showed high grade IDC, Right axillary LN biopsy positive for metastatic node involvement. Skin biopsy showed involvement of the dermis with lymphatic space involvement.  PET/CT on 5/24/17 showed hypermetabolic activity in the breast and axillary nodes,  No distant spread of disease.  B) neoadjuvant chemotherapy with TCHP started on 5/25/17.  Infusion reaction consisting of severe leg pain with the first dose of herceptin.  Taxotere dose reduced to 80% on cycle 5 for neuropathy.  C) bilateral mastectomy on 10/30/17.  Pathology showed multifocal < 1 mm high grade IDC with focal lymphovascular invasion.  4 sentinel lymph nodes with no malignancy.   [id number] (Stage yIA)  D) radiation therapy completed on 1/23/18.  Patient declined tamoxifen.  herceptin completed June 2018.  Neratinib started July 2018.  2. Migraines  3. Pre-diabetes    Subjective     HISTORY OF PRESENT ILLNESS:   Angie Sutherland returns for follow-up.  With neratinib so far she has had significant nausea and needed to come in last week for IV fluids.  She did take the prophylactic immodium and did not have significant diarrhea.  She has tried Compazine, Zofran, and Phenergan and nothing really seems to be very effective for her nausea.  She gets only minimal relief with these medications.  She says she still feels tired but her nausea has improved since she has been off of treatment.      Past Medical History, Past Surgical History, Social History, Family History have been reviewed and are without significant changes except as mentioned.    Review of Systems   A comprehensive 14 point review of systems was performed and was negative except as mentioned.    Medications:  The current medication list was reviewed in the EMR    ALLERGIES:   "  Allergies   Allergen Reactions   • Benadryl [Diphenhydramine] Diarrhea       Objective      /58 Comment: LUE  Pulse 94   Temp 97.2 °F (36.2 °C) (Temporal Artery )   Resp 16   Ht 157.5 cm (62\")   Wt 57.6 kg (127 lb)   SpO2 98% Comment: RA  BMI 23.23 kg/m²      Performance Status: 0    General: well appearing female in no acute distress  Neuro: alert and oriented  HEENT: sclera anicteric, oropharynx clear  Lymphatics: no cervical, supraclavicular Adenopathy  No palpable axillary adenopathy  Cardiovascular: regular rate and rhythm, no murmurs  Lungs: clear to auscultation bilaterally  Abdomen: soft, nontender, nondistended.  No palpable organomegaly  Extremeties: no lower extremity edema  Skin: no rashes, lesions, bruising, or petechiae  Psych: mood and affect appropriate        Assessment/Plan   Angie Sutherland is a 23 y.o. year old female with stage IIIB HER-2 positive breast cancer who returns for follow-up.   She has completed herceptin treatment.    She had significant nausea after starting neratinib.  She has been off the medication for about a week.  We will try restarting it at 4 pills a day.  I recommended that she take Zofran about 30-60 minutes before taking the pills, and then again when she wakes up in the morning.  We will also add Zyprexa for management of nausea.    I will see her back in 1 month with labs.                  Visit time was 25 minutes, greater than 50% spent in counseling      Shadia Amos MD  Psychiatric Hematology and Oncology    8/15/2018          CC:          "

## 2018-08-27 ENCOUNTER — HOSPITAL ENCOUNTER (OUTPATIENT)
Dept: RADIATION ONCOLOGY | Facility: HOSPITAL | Age: 23
Setting detail: RADIATION/ONCOLOGY SERIES
Discharge: HOME | End: 2018-08-27

## 2018-08-30 ENCOUNTER — SPECIALTY PHARMACY (OUTPATIENT)
Dept: ONCOLOGY | Facility: HOSPITAL | Age: 23
End: 2018-08-30

## 2018-08-30 NOTE — PROGRESS NOTES
Oral Chemotherapy Teaching      Patient Name/:  Angie Sutherland   1995  Oral Chemotherapy Regimen:  Neratinib 160mg PO daily with food (DOSE REDUCED)    Date Started Medication: Medication initiated 18           Initial Teaching Follow Up Comments      Safety      Storage instructions (away from children; away from heat/cold, sunlight, or moisture), handling - use of gloves (caregivers), washing hands after touching pills, managing waste       “How are you storing your medications?”, reminders on storage, proper handling (caregivers using gloves, washing hands, away from children, managing waste, etc.), disposal of medication with D/C or dosage change     Pt reports appropriate handling and storage.      Adherence       patient and/or caregiver on how to take medication, take with/without food, assess their adherence potential, stress importance of adherence, ways to manage adherence (pill boxes, phone reminders, calendars), what to do if miss a dose    “How are you taking your medication?” “How are you remembering to take your medication?”, “How many doses have you missed?”, determine reasons for non-adherence (not remembering, side effects, etc), ways to improve, overadherence? Remind patient of ways to improve/maintain adherence    Pt confirms compliance to reduced dose Neratinib 160mg PO daily. Pt reports still dealing with side effects from medication , however nausea slightly improved. Reports that she believes she can take current dose for the anticipated duration. Discussed use of olanzapine. Reviewed plan to follow-up with MD on 18 to discuss further and watch for additional side effects which we may assist with for management.       Side Effects/Adverse Reactions       patient on potential side effects, s/s, ways to manage, when to call MD/seek help       Determine if patient experiencing side effects, ways to manage  See note above.       Miscellaneous      Food  interactions, DDIs, financial issues Determine if patient started any new medications since being placed on oral chemo (analyze for DDI) NO DDIs identified with active medication list.       Additional Notes:Discussed aforementioned material with patient over the phone. All questions and concerns addressed. Provided patient with my contact information and instructions to call should additional questions arise.

## 2018-09-12 ENCOUNTER — OFFICE VISIT (OUTPATIENT)
Dept: ONCOLOGY | Facility: CLINIC | Age: 23
End: 2018-09-12

## 2018-09-12 ENCOUNTER — LAB (OUTPATIENT)
Dept: LAB | Facility: HOSPITAL | Age: 23
End: 2018-09-12

## 2018-09-12 VITALS
RESPIRATION RATE: 16 BRPM | BODY MASS INDEX: 24.11 KG/M2 | SYSTOLIC BLOOD PRESSURE: 100 MMHG | TEMPERATURE: 97.8 F | OXYGEN SATURATION: 100 % | HEIGHT: 62 IN | HEART RATE: 100 BPM | WEIGHT: 131 LBS | DIASTOLIC BLOOD PRESSURE: 71 MMHG

## 2018-09-12 DIAGNOSIS — C50.011 MALIGNANT NEOPLASM INVOLVING BOTH NIPPLE AND AREOLA OF RIGHT BREAST IN FEMALE, UNSPECIFIED ESTROGEN RECEPTOR STATUS (HCC): ICD-10-CM

## 2018-09-12 LAB
ALBUMIN SERPL-MCNC: 4.45 G/DL (ref 3.2–4.8)
ALP SERPL-CCNC: 77 U/L (ref 25–100)
ALT SERPL W P-5'-P-CCNC: 37 U/L (ref 7–40)
AST SERPL-CCNC: 28 U/L (ref 0–33)
BILIRUB CONJ SERPL-MCNC: 0.1 MG/DL (ref 0–0.2)
BILIRUB INDIRECT SERPL-MCNC: 0.2 MG/DL (ref 0.1–1.1)
BILIRUB SERPL-MCNC: 0.3 MG/DL (ref 0.3–1.2)
PROT SERPL-MCNC: 7.1 G/DL (ref 5.7–8.2)

## 2018-09-12 PROCEDURE — 36415 COLL VENOUS BLD VENIPUNCTURE: CPT

## 2018-09-12 PROCEDURE — 80076 HEPATIC FUNCTION PANEL: CPT

## 2018-09-12 PROCEDURE — 99213 OFFICE O/P EST LOW 20 MIN: CPT | Performed by: INTERNAL MEDICINE

## 2018-09-12 NOTE — PROGRESS NOTES
PROBLEM LIST:  1. [id number] (stage IIIB) invasive ductal carcinoma of the right breast, ER-, [place] weakly +, Her2 3+.  A) presented with pain and swelling of the right breast after childbirth. Biopsy 5/8/17 showed high grade IDC, Right axillary LN biopsy positive for metastatic node involvement. Skin biopsy showed involvement of the dermis with lymphatic space involvement.  PET/CT on 5/24/17 showed hypermetabolic activity in the breast and axillary nodes,  No distant spread of disease.  B) neoadjuvant chemotherapy with TCHP started on 5/25/17.  Infusion reaction consisting of severe leg pain with the first dose of herceptin.  Taxotere dose reduced to 80% on cycle 5 for neuropathy.  C) bilateral mastectomy on 10/30/17.  Pathology showed multifocal < 1 mm high grade IDC with focal lymphovascular invasion.  4 sentinel lymph nodes with no malignancy.   [id number] (Stage yIA)  D) radiation therapy completed on 1/23/18.  Patient declined tamoxifen.  herceptin completed June 2018.  Neratinib started July 2018.  2. Migraines  3. Pre-diabetes    Subjective     HISTORY OF PRESENT ILLNESS:   Angie Sutherland returns for follow-up.  Since restarting the neratinib at a lower dose she has not had as much trouble with nausea.  However she does report significant fatigue.  She says that her symptoms are tolerable.  She does have diarrhea and is able to control this with Imodium.  For now she is doing okay and says she can continue with this level of symptoms for now.      Past Medical History, Past Surgical History, Social History, Family History have been reviewed and are without significant changes except as mentioned.    Review of Systems   A comprehensive 14 point review of systems was performed and was negative except as mentioned.    Medications:  The current medication list was reviewed in the EMR    ALLERGIES:    Allergies   Allergen Reactions   • Benadryl [Diphenhydramine] Diarrhea       Objective      /71 Comment:  "LUE  Pulse 100   Temp 97.8 °F (36.6 °C) (Temporal Artery )   Resp 16   Ht 157.5 cm (62\")   Wt 59.4 kg (131 lb)   SpO2 100% Comment: RA  BMI 23.96 kg/m²      Performance Status: 0    General: well appearing female in no acute distress  Neuro: alert and oriented  HEENT: sclera anicteric, oropharynx clear  Lymphatics: no cervical, supraclavicular Adenopathy  No palpable axillary adenopathy  Cardiovascular: regular rate and rhythm, no murmurs  Lungs: clear to auscultation bilaterally  Abdomen: soft, nontender, nondistended.  No palpable organomegaly  Extremeties: no lower extremity edema  Skin: no rashes, lesions, bruising, or petechiae  Psych: mood and affect appropriate        Assessment/Plan   Angie Sutherland is a 23 y.o. year old female with stage IIIB HER-2 positive breast cancer who returns for follow-up.  She is doing better on a reduced dose of neratinib.  Nausea is improved.  She has manageable diarrhea and fatigue.  We will plan to continue neratinib and I will see her back again in a month.  We will continue to monitor her liver function tests.                  Visit time was 15 minutes, greater than 50% spent in counseling      Shadia Amos MD  [facility] Hematology and Oncology    9/12/2018          CC:          "

## 2018-09-18 ENCOUNTER — OFFICE VISIT (OUTPATIENT)
Dept: FAMILY MEDICINE CLINIC | Facility: CLINIC | Age: 23
End: 2018-09-18

## 2018-09-18 VITALS
OXYGEN SATURATION: 97 % | WEIGHT: 128 LBS | HEIGHT: 62 IN | DIASTOLIC BLOOD PRESSURE: 70 MMHG | BODY MASS INDEX: 23.55 KG/M2 | HEART RATE: 94 BPM | SYSTOLIC BLOOD PRESSURE: 110 MMHG

## 2018-09-18 DIAGNOSIS — G43.009 MIGRAINE WITHOUT AURA AND WITHOUT STATUS MIGRAINOSUS, NOT INTRACTABLE: Primary | ICD-10-CM

## 2018-09-18 DIAGNOSIS — R19.4 CHANGE IN BOWEL HABITS: ICD-10-CM

## 2018-09-18 PROCEDURE — 99214 OFFICE O/P EST MOD 30 MIN: CPT | Performed by: FAMILY MEDICINE

## 2018-09-18 RX ORDER — AMITRIPTYLINE HYDROCHLORIDE 25 MG/1
25 TABLET, FILM COATED ORAL NIGHTLY
Qty: 90 TABLET | Refills: 1 | Status: SHIPPED | OUTPATIENT
Start: 2018-09-18 | End: 2019-02-18

## 2018-09-18 NOTE — PROGRESS NOTES
Chief Complaint   Patient presents with   • Follow-up     migraines        Migraine    This is a chronic problem. The problem occurs intermittently. The problem has been gradually improving. The patient is experiencing no pain. Associated symptoms include abdominal pain and nausea. Pertinent negatives include no vomiting. The symptoms are aggravated by bright light. She has tried antidepressants for the symptoms. The treatment provided significant relief. Her past medical history is significant for cancer.      Less frequent headaches. Unknown amount in last month. No longer daily. Feels awful, can't get anything done, nausea. No vomiting. No change with lack of sleep,  weather or allergies. Unsure if stress affects. No side effects on medication. Ran out of nausea medication.     Abdominal pain:  Prior to cancer stomach was always upset. Gets bloated sometimes. Gets really constipated, up to a week without using bathroom. Oral chemotherapy gives her diarrhea, but sometimes backed up for a couple days.       Review of Systems   Gastrointestinal: Positive for abdominal distention, abdominal pain, constipation, diarrhea and nausea. Negative for vomiting.   Neurological: Negative for headache.   Psychiatric/Behavioral: Positive for depressed mood.          Current Outpatient Prescriptions:   •  acetaminophen (TYLENOL) 500 MG tablet, Take 1 tablet by mouth Every 6 (Six) Hours As Needed for Mild Pain  or Moderate Pain., Disp: 30 tablet, Rfl: 0  •  amitriptyline (ELAVIL) 25 MG tablet, Take 1 tablet by mouth Every Night., Disp: 30 tablet, Rfl: 5  •  buPROPion SR (WELLBUTRIN SR) 100 MG 12 hr tablet, Take 1 tablet by mouth Daily., Disp: 30 tablet, Rfl: 1  •  busPIRone (BUSPAR) 10 MG tablet, Take one by mouth three times a day, Disp: 90 tablet, Rfl: 1  •  carisoprodol (SOMA) 350 MG tablet, Take 1 tablet by mouth 3 (Three) Times a Day As Needed for Muscle Spasms for up to 35 doses., Disp: 35 tablet, Rfl: 0  •  ibuprofen  (ADVIL,MOTRIN) 400 MG tablet, Take 1 tablet by mouth Every 6 (Six) Hours As Needed for Mild Pain., Disp: 35 tablet, Rfl: 0  •  loperamide (HM LOPERAMIDE HCL) 2 MG capsule, Take 2 caps PO TID on Weeks 1-2, then 2 caps PO BID on Wks 3-8, then PRN.  Do NOT exceed 16 mg daily., Disp: 140 capsule, Rfl: 3  •  Neratinib 40 MG tablet, Take 160 mg by mouth Daily., Disp: 120 tablet, Rfl: 3  •  OLANZapine (zyPREXA) 5 MG tablet, Take 1 tablet by mouth Every Night., Disp: 30 tablet, Rfl: 5  •  ondansetron (ZOFRAN) 4 MG tablet, Take 1 tablet by mouth Daily As Needed for Nausea or Vomiting., Disp: 10 tablet, Rfl: 0  •  ondansetron (ZOFRAN) 8 MG tablet, Take 1 tablet by mouth Every 8 (Eight) Hours As Needed for Nausea or Vomiting., Disp: 30 tablet, Rfl: 5  •  prochlorperazine (COMPAZINE) 10 MG tablet, Take 1 tablet by mouth Every 6 (Six) Hours As Needed for Nausea or Vomiting., Disp: 45 tablet, Rfl: 5  •  traMADol (ULTRAM) 50 MG tablet, Take 50 mg by mouth Every 6 (Six) Hours As Needed for Moderate Pain ., Disp: , Rfl:   •  traZODone (DESYREL) 50 MG tablet, 1/2 - 1 tablet as needed at bedtime for sleep, Disp: 30 tablet, Rfl: 1     Allergies   Allergen Reactions   • Benadryl [Diphenhydramine] Diarrhea       Past Medical History:   Diagnosis Date   • Anxiety    • Breast cancer (CMS/[facility]) 2017    [id number] (stage IIIB) invasive ductal carcinoma of the right breast, ER-, [place] weakly +, Her2 3+.   • Depression    • GERD (gastroesophageal reflux disease)    • Migraine    • Ovarian cyst    • Pregnancy, incidental     3/2/2016: 14 weeks pregnant. She was seen in 2013 during pregnancy and received counselling and glucometer. She was lost to f/u but states baby had no complications - weighed 7lb1oz   • Spinal headache         Past Surgical History:   Procedure Laterality Date   • BREAST RECONSTRUCTION, BREAST TISSUE EXPANDER INSERTION Bilateral 10/30/2017    Procedure: BREAST RECONSTRUCTION WITH ALLODERM, BREAST TISSUE EXPANDER INSERTION  "BILATERAL;  Surgeon: Delia Covarrubias MD;  Location:  [facility];  Service:    •  SECTION     •  SECTION N/A 2016    Procedure:  SECTION REPEAT;  Surgeon: Malika Munoz MD;  Location:  [facility];  Service:    • MASTECTOMY W/ SENTINEL NODE BIOPSY Bilateral 10/30/2017    Procedure: BREAST MASTECTOMY BILATERAL WITH RIGHT SENTINEL NODE BIOPSY;  Surgeon: Carlo Paulino MD;  Location:  [facility];  Service:    • VENOUS ACCESS DEVICE (PORT) INSERTION  2017        Family History   Problem Relation Age of Onset   • Diabetes Mother    • Ovarian cancer Maternal Grandmother    • Diabetes Brother         Social History     Social History   • Marital status: Single     Spouse name: N/A   • Number of children: N/A   • Years of education: N/A     Occupational History   • Not on file.     Social History Main Topics   • Smoking status: Never Smoker   • Smokeless tobacco: Never Used   • Alcohol use Yes      Comment: occasional   • Drug use: No   • Sexual activity: Not Currently     Other Topics Concern   • Not on file     Social History Narrative   • No narrative on file        Visit Vitals  /70 (BP Location: Left arm, Patient Position: Sitting, Cuff Size: Adult)   Pulse 94   Ht 157.5 cm (62\")   Wt 58.1 kg (128 lb)   SpO2 97%   BMI 23.41 kg/m²        Physical Exam   Constitutional: No distress.   Cardiovascular: Normal rate and regular rhythm.    No murmur heard.  Pulmonary/Chest: Effort normal and breath sounds normal.   Abdominal: Soft. Bowel sounds are normal. She exhibits no distension. There is no tenderness. There is no rebound and no guarding.   Musculoskeletal:   Head and scalp non-tender.  Neck non-tender.   Neurological: She is alert.   Psychiatric: She exhibits a depressed mood.   Vitals reviewed.      Results for orders placed or performed in visit on 18   Hepatic Function Panel   Result Value Ref Range    Total Protein 7.1 5.7 - 8.2 g/dL    Albumin 4.45 3.20 " - 4.80 g/dL    ALT (SGPT) 37 7 - 40 U/L    AST (SGOT) 28 0 - 33 U/L    Alkaline Phosphatase 77 25 - 100 U/L    Total Bilirubin 0.3 0.3 - 1.2 mg/dL    Bilirubin, Direct 0.1 0.0 - 0.2 mg/dL    Bilirubin, Indirect 0.2 0.1 - 1.1 mg/dL        Angie was seen today for follow-up.    Diagnoses and all orders for this visit:    Migraine without aura and without status migrainosus, not intractable  -     amitriptyline (ELAVIL) 25 MG tablet; Take 1 tablet by mouth Every Night.  Improved. Continue elavil. Zofran non-effective for nausea, take compazine prescribed elsewhere.   Advised to contact psychiatrist for follow-up depression.   Change in bowel habits  New. Discussed increase fiber, fruits and vegetables in diet. Try OTC fiber powder and probiotic.       Return in about 6 months (around 3/18/2019) for Follow-up.

## 2018-09-25 DIAGNOSIS — C50.811 MALIGNANT NEOPLASM OF OVERLAPPING SITES OF RIGHT FEMALE BREAST, UNSPECIFIED ESTROGEN RECEPTOR STATUS (HCC): ICD-10-CM

## 2018-09-26 ENCOUNTER — APPOINTMENT (OUTPATIENT)
Dept: ONCOLOGY | Facility: HOSPITAL | Age: 23
End: 2018-09-26

## 2018-10-01 ENCOUNTER — SPECIALTY PHARMACY (OUTPATIENT)
Dept: ONCOLOGY | Facility: HOSPITAL | Age: 23
End: 2018-10-01

## 2018-10-02 ENCOUNTER — TELEPHONE (OUTPATIENT)
Dept: ONCOLOGY | Facility: CLINIC | Age: 23
End: 2018-10-02

## 2018-10-02 RX ORDER — TRAMADOL HYDROCHLORIDE 50 MG/1
50 TABLET ORAL EVERY 6 HOURS PRN
Qty: 30 TABLET | Refills: 0 | OUTPATIENT
Start: 2018-10-02 | End: 2019-01-14 | Stop reason: HOSPADM

## 2018-10-02 NOTE — TELEPHONE ENCOUNTER
----- Message from Fabiola Elliott sent at 10/2/2018  8:36 AM EDT -----  Regarding: WERNER-REFILL  Contact: [phone]  Patient needs a refill on Tramadol called into [place], [place].

## 2018-10-03 ENCOUNTER — APPOINTMENT (OUTPATIENT)
Dept: ONCOLOGY | Facility: HOSPITAL | Age: 23
End: 2018-10-03

## 2018-10-10 ENCOUNTER — OFFICE VISIT (OUTPATIENT)
Dept: ONCOLOGY | Facility: CLINIC | Age: 23
End: 2018-10-10

## 2018-10-10 ENCOUNTER — LAB (OUTPATIENT)
Dept: LAB | Facility: HOSPITAL | Age: 23
End: 2018-10-10

## 2018-10-10 ENCOUNTER — INFUSION (OUTPATIENT)
Dept: ONCOLOGY | Facility: HOSPITAL | Age: 23
End: 2018-10-10

## 2018-10-10 VITALS
HEIGHT: 62 IN | SYSTOLIC BLOOD PRESSURE: 103 MMHG | HEART RATE: 112 BPM | RESPIRATION RATE: 18 BRPM | DIASTOLIC BLOOD PRESSURE: 72 MMHG | TEMPERATURE: 97.3 F | BODY MASS INDEX: 23.92 KG/M2 | WEIGHT: 130 LBS

## 2018-10-10 VITALS
OXYGEN SATURATION: 98 % | BODY MASS INDEX: 24.11 KG/M2 | DIASTOLIC BLOOD PRESSURE: 63 MMHG | TEMPERATURE: 97.4 F | WEIGHT: 131 LBS | HEIGHT: 62 IN | SYSTOLIC BLOOD PRESSURE: 108 MMHG | RESPIRATION RATE: 18 BRPM | HEART RATE: 105 BPM

## 2018-10-10 DIAGNOSIS — C50.011 MALIGNANT NEOPLASM INVOLVING BOTH NIPPLE AND AREOLA OF RIGHT BREAST IN FEMALE, UNSPECIFIED ESTROGEN RECEPTOR STATUS (HCC): ICD-10-CM

## 2018-10-10 DIAGNOSIS — C50.011 MALIGNANT NEOPLASM OF NIPPLE OF RIGHT BREAST IN FEMALE, UNSPECIFIED ESTROGEN RECEPTOR STATUS (HCC): Primary | ICD-10-CM

## 2018-10-10 DIAGNOSIS — C50.811 MALIGNANT NEOPLASM OF OVERLAPPING SITES OF RIGHT FEMALE BREAST, UNSPECIFIED ESTROGEN RECEPTOR STATUS (HCC): Primary | ICD-10-CM

## 2018-10-10 LAB
ALBUMIN SERPL-MCNC: 4.46 G/DL (ref 3.2–4.8)
ALBUMIN/GLOB SERPL: 1.5 G/DL (ref 1.5–2.5)
ALP SERPL-CCNC: 75 U/L (ref 25–100)
ALT SERPL W P-5'-P-CCNC: 32 U/L (ref 7–40)
ANION GAP SERPL CALCULATED.3IONS-SCNC: 7 MMOL/L (ref 3–11)
AST SERPL-CCNC: 26 U/L (ref 0–33)
BILIRUB SERPL-MCNC: 0.4 MG/DL (ref 0.3–1.2)
BUN BLD-MCNC: 17 MG/DL (ref 9–23)
BUN/CREAT SERPL: 20.2 (ref 7–25)
CALCIUM SPEC-SCNC: 9.3 MG/DL (ref 8.7–10.4)
CHLORIDE SERPL-SCNC: 102 MMOL/L (ref 99–109)
CO2 SERPL-SCNC: 26 MMOL/L (ref 20–31)
CREAT BLD-MCNC: 0.84 MG/DL (ref 0.6–1.3)
ERYTHROCYTE [DISTWIDTH] IN BLOOD BY AUTOMATED COUNT: 12.8 % (ref 11.3–14.5)
GFR SERPL CREATININE-BSD FRML MDRD: 101 ML/MIN/1.73
GLOBULIN UR ELPH-MCNC: 3 GM/DL
GLUCOSE BLD-MCNC: 125 MG/DL (ref 70–100)
HCT VFR BLD AUTO: 40 % (ref 34.5–44)
HGB BLD-MCNC: 13.4 G/DL (ref 11.5–15.5)
LYMPHOCYTES # BLD AUTO: 1 10*3/MM3 (ref 0.6–4.8)
LYMPHOCYTES NFR BLD AUTO: 29.7 % (ref 24–44)
MCH RBC QN AUTO: 30.6 PG (ref 27–31)
MCHC RBC AUTO-ENTMCNC: 33.5 G/DL (ref 32–36)
MCV RBC AUTO: 91.3 FL (ref 80–99)
MONOCYTES # BLD AUTO: 0.2 10*3/MM3 (ref 0–1)
MONOCYTES NFR BLD AUTO: 5 % (ref 0–12)
NEUTROPHILS # BLD AUTO: 2.3 10*3/MM3 (ref 1.5–8.3)
NEUTROPHILS NFR BLD AUTO: 65.3 % (ref 41–71)
PLATELET # BLD AUTO: 246 10*3/MM3 (ref 150–450)
PMV BLD AUTO: 7 FL (ref 6–12)
POTASSIUM BLD-SCNC: 3.9 MMOL/L (ref 3.5–5.5)
PROT SERPL-MCNC: 7.5 G/DL (ref 5.7–8.2)
RBC # BLD AUTO: 4.38 10*6/MM3 (ref 3.89–5.14)
SODIUM BLD-SCNC: 135 MMOL/L (ref 132–146)
WBC NRBC COR # BLD: 3.5 10*3/MM3 (ref 3.5–10.8)

## 2018-10-10 PROCEDURE — 99213 OFFICE O/P EST LOW 20 MIN: CPT | Performed by: INTERNAL MEDICINE

## 2018-10-10 PROCEDURE — 96372 THER/PROPH/DIAG INJ SC/IM: CPT

## 2018-10-10 PROCEDURE — 96402 CHEMO HORMON ANTINEOPL SQ/IM: CPT

## 2018-10-10 PROCEDURE — 25010000002 LEUPROLIDE ACETATE (3 MONTH) PER 7.5 MG: Performed by: INTERNAL MEDICINE

## 2018-10-10 PROCEDURE — 80053 COMPREHEN METABOLIC PANEL: CPT

## 2018-10-10 PROCEDURE — 36415 COLL VENOUS BLD VENIPUNCTURE: CPT

## 2018-10-10 PROCEDURE — 85025 COMPLETE CBC W/AUTO DIFF WBC: CPT

## 2018-10-10 RX ADMIN — LEUPROLIDE ACETATE 11.25 MG: KIT at 11:40

## 2018-10-10 NOTE — PROGRESS NOTES
"      PROBLEM LIST:  1. [id number] (stage IIIB) invasive ductal carcinoma of the right breast, ER-, [place] weakly +, Her2 3+.  A) presented with pain and swelling of the right breast after childbirth. Biopsy 5/8/17 showed high grade IDC, Right axillary LN biopsy positive for metastatic node involvement. Skin biopsy showed involvement of the dermis with lymphatic space involvement.  PET/CT on 5/24/17 showed hypermetabolic activity in the breast and axillary nodes,  No distant spread of disease.  B) neoadjuvant chemotherapy with TCHP started on 5/25/17.  Infusion reaction consisting of severe leg pain with the first dose of herceptin.  Taxotere dose reduced to 80% on cycle 5 for neuropathy.  C) bilateral mastectomy on 10/30/17.  Pathology showed multifocal < 1 mm high grade IDC with focal lymphovascular invasion.  4 sentinel lymph nodes with no malignancy.   [id number] (Stage yIA)  D) radiation therapy completed on 1/23/18.  Patient declined tamoxifen.  herceptin completed June 2018.  Neratinib started July 2018.  2. Migraines  3. Pre-diabetes    Subjective     HISTORY OF PRESENT ILLNESS:   Angie Sutherland returns for follow-up.  She says she's been feeling about the same.  She feels tired which is unchanged.  She has occasional nausea.  She has not had any diarrhea.    Past Medical History, Past Surgical History, Social History, Family History have been reviewed and are without significant changes except as mentioned.    Review of Systems   A comprehensive 14 point review of systems was performed and was negative except as mentioned.    Medications:  The current medication list was reviewed in the EMR    ALLERGIES:    Allergies   Allergen Reactions   • Benadryl [Diphenhydramine] Diarrhea       Objective      /63   Pulse 105   Temp 97.4 °F (36.3 °C) (Temporal Artery )   Resp 18   Ht 157.5 cm (62.01\")   Wt 59.4 kg (131 lb)   SpO2 98%   BMI 23.95 kg/m²      Performance Status: 0    General: well appearing female " in no acute distress  Neuro: alert and oriented  HEENT: sclera anicteric, oropharynx clear  Lymphatics: no cervical, supraclavicular Adenopathy  No palpable axillary adenopathy  Chest: Status post bilateral mastectomy with implant reconstruction.  No palpable masses or lesions  Cardiovascular: regular rate and rhythm, no murmurs  Lungs: clear to auscultation bilaterally  Abdomen: soft, nontender, nondistended.  No palpable organomegaly  Extremeties: no lower extremity edema  Skin: no rashes, lesions, bruising, or petechiae  Psych: mood and affect appropriate        Assessment/Plan   Angie Sutherland is a 23 y.o. year old female with stage IIIB HER-2 positive breast cancer who returns for follow-up.  She is doing better on a reduced dose of neratinib.  She is tolerating this reasonably well.  She has not had diarrhea.  She does have mild nausea and fairly persistent fatigue but is able to manage this.  We will continue at her current dose.  She will receive Lupron today.  I will see her back in one month.                Visit time was 15 minutes, greater than 50% spent in counseling      Shadia Amos MD  [facility] Hematology and Oncology    10/10/2018          CC:

## 2018-11-05 ENCOUNTER — HOSPITAL ENCOUNTER (EMERGENCY)
Facility: HOSPITAL | Age: 23
Discharge: HOME | End: 2018-11-06
Attending: EMERGENCY MEDICINE | Admitting: EMERGENCY MEDICINE

## 2018-11-05 DIAGNOSIS — R51.9 NONINTRACTABLE HEADACHE, UNSPECIFIED CHRONICITY PATTERN, UNSPECIFIED HEADACHE TYPE: Primary | ICD-10-CM

## 2018-11-05 PROCEDURE — 96375 TX/PRO/DX INJ NEW DRUG ADDON: CPT

## 2018-11-05 PROCEDURE — 96361 HYDRATE IV INFUSION ADD-ON: CPT

## 2018-11-05 PROCEDURE — 99283 EMERGENCY DEPT VISIT LOW MDM: CPT

## 2018-11-05 PROCEDURE — 96374 THER/PROPH/DIAG INJ IV PUSH: CPT

## 2018-11-05 PROCEDURE — 25010000002 KETOROLAC TROMETHAMINE PER 15 MG: Performed by: NURSE PRACTITIONER

## 2018-11-05 PROCEDURE — 25010000002 PROCHLORPERAZINE EDISYLATE PER 10 MG: Performed by: NURSE PRACTITIONER

## 2018-11-05 RX ORDER — KETOROLAC TROMETHAMINE 30 MG/ML
30 INJECTION, SOLUTION INTRAMUSCULAR; INTRAVENOUS ONCE
Status: COMPLETED | OUTPATIENT
Start: 2018-11-05 | End: 2018-11-05

## 2018-11-05 RX ORDER — SODIUM CHLORIDE 0.9 % (FLUSH) 0.9 %
10 SYRINGE (ML) INJECTION AS NEEDED
Status: DISCONTINUED | OUTPATIENT
Start: 2018-11-05 | End: 2018-11-06 | Stop reason: HOSPADM

## 2018-11-05 RX ADMIN — PROCHLORPERAZINE EDISYLATE 10 MG: 5 INJECTION INTRAMUSCULAR; INTRAVENOUS at 23:10

## 2018-11-05 RX ADMIN — KETOROLAC TROMETHAMINE 30 MG: 30 INJECTION, SOLUTION INTRAMUSCULAR at 23:11

## 2018-11-05 RX ADMIN — SODIUM CHLORIDE 1000 ML: 9 INJECTION, SOLUTION INTRAVENOUS at 23:08

## 2018-11-06 VITALS
SYSTOLIC BLOOD PRESSURE: 115 MMHG | TEMPERATURE: 97.6 F | DIASTOLIC BLOOD PRESSURE: 65 MMHG | BODY MASS INDEX: 23.92 KG/M2 | OXYGEN SATURATION: 99 % | RESPIRATION RATE: 16 BRPM | HEART RATE: 82 BPM | WEIGHT: 130 LBS | HEIGHT: 62 IN

## 2018-11-06 DIAGNOSIS — C50.811 MALIGNANT NEOPLASM OF OVERLAPPING SITES OF RIGHT BREAST IN FEMALE, ESTROGEN RECEPTOR POSITIVE (HCC): ICD-10-CM

## 2018-11-06 DIAGNOSIS — Z17.0 MALIGNANT NEOPLASM OF OVERLAPPING SITES OF RIGHT BREAST IN FEMALE, ESTROGEN RECEPTOR POSITIVE (HCC): ICD-10-CM

## 2018-11-06 NOTE — ED PROVIDER NOTES
Subjective   Angie Sutherland is a 23 y.o.female who presents to the ED with complaints of a frontal headache. The patient reports her headache has been gradually worsening since it onset earlier today. She states her pain is worse when she is exposed to light or noise. She rates her pain as a 10/10 in severity. She has taken Tramadol and experienced some relief. She also complains of nausea and vomiting. Additionally, she has a history of migraine headaches, and she states her current discomfort feels similar to her previous experiences. She is currently undergoing treatment for breast cancer. There are no other complaints at this time.         History provided by:  Patient  Headache   Pain location:  Frontal  Quality:  Sharp  Radiates to:  Does not radiate  Severity currently:  10/10  Severity at highest:  10/10  Onset quality:  Gradual  Duration:  1 day  Timing:  Constant  Progression:  Worsening  Chronicity:  New  Similar to prior headaches: yes    Relieved by: Tramadol.  Worsened by:  Light and sound  Associated symptoms: nausea, photophobia and vomiting    Associated symptoms: no dizziness and no fever        Review of Systems   Constitutional: Negative for chills and fever.   Eyes: Positive for photophobia. Negative for visual disturbance.   Respiratory: Negative for shortness of breath.    Cardiovascular: Negative for chest pain.   Gastrointestinal: Positive for nausea and vomiting.   Neurological: Positive for headaches. Negative for dizziness.   All other systems reviewed and are negative.      Past Medical History:   Diagnosis Date   • Anxiety    • Atypical squamous cell changes of undetermined significance (ASCUS) on cervical cytology with negative high risk human papilloma virus (HPV) test result 09/17/2015   • Breast cancer (CMS/[facility]) 2017    [id number] (stage IIIB) invasive ductal carcinoma of the right breast, ER-, [place] weakly +, Her2 3+.   • Depression    • GERD (gastroesophageal reflux disease)    •  Migraine    • Ovarian cyst    • Pregnancy, incidental     3/2/2016: 14 weeks pregnant. She was seen in 2013 during pregnancy and received counselling and glucometer. She was lost to f/u but states baby had no complications - weighed 7lb1oz   • Spinal headache        Allergies   Allergen Reactions   • Benadryl [Diphenhydramine] Diarrhea       Past Surgical History:   Procedure Laterality Date   • BREAST RECONSTRUCTION, BREAST TISSUE EXPANDER INSERTION Bilateral 10/30/2017    Procedure: BREAST RECONSTRUCTION WITH ALLODERM, BREAST TISSUE EXPANDER INSERTION BILATERAL;  Surgeon: Delia Covarrubias MD;  Location:  [facility];  Service:    •  SECTION     •  SECTION N/A 2016    Procedure:  SECTION REPEAT;  Surgeon: Malika Munoz MD;  Location:  [facility];  Service:    • MASTECTOMY W/ SENTINEL NODE BIOPSY Bilateral 10/30/2017    Procedure: BREAST MASTECTOMY BILATERAL WITH RIGHT SENTINEL NODE BIOPSY;  Surgeon: Carlo Paulino MD;  Location:  [facility];  Service:    • VENOUS ACCESS DEVICE (PORT) INSERTION  2017       Family History   Problem Relation Age of Onset   • Diabetes Mother    • Ovarian cancer Maternal Grandmother    • Diabetes Brother        Social History     Social History   • Marital status: Single     Social History Main Topics   • Smoking status: Never Smoker   • Smokeless tobacco: Never Used   • Alcohol use Yes      Comment: occasional   • Drug use: No   • Sexual activity: Not Currently     Other Topics Concern   • Not on file         Objective   Physical Exam   Constitutional: She is oriented to person, place, and time. She appears well-developed and well-nourished. She is cooperative.  Non-toxic appearance.   Pt is lying in dark room at this time.     HENT:   Head: Normocephalic and atraumatic.   Right Ear: Tympanic membrane and external ear normal.   Left Ear: Tympanic membrane and external ear normal.   Nose: Nose normal.   Mouth/Throat: Oropharynx is clear  "and moist. No oropharyngeal exudate.   Eyes: Pupils are equal, round, and reactive to light. Conjunctivae, EOM and lids are normal.   Neck: Trachea normal, normal range of motion and full passive range of motion without pain.   Cardiovascular: Regular rhythm, normal heart sounds, intact distal pulses and normal pulses.    Pulmonary/Chest: Effort normal and breath sounds normal. No respiratory distress. She has no decreased breath sounds. She has no wheezes. She has no rhonchi. She has no rales.   Abdominal: Soft. Normal appearance and bowel sounds are normal. She exhibits no distension. There is no tenderness.   Musculoskeletal: Normal range of motion.   Neurological: She is alert and oriented to person, place, and time. She has normal strength. No cranial nerve deficit.   Skin: Skin is warm, dry and intact. No rash noted.   Psychiatric: She has a normal mood and affect. Her speech is normal and behavior is normal.   Nursing note and vitals reviewed.      Procedures         ED Course  ED Course as of Nov 06 0525   Tue Nov 06, 2018   0000 Pt headache has resolved at this time.  Pt will be discharged home.  Patient to follow-up with PCP, neurology as needed.  Patient agrees and verbalizes understanding.  [KG]      ED Course User Index  [KG] Cande Matias, APRN     No results found for this or any previous visit (from the past 24 hour(s)).  Note: In addition to lab results from this visit, the labs listed above may include labs taken at another facility or during a different encounter within the last 24 hours. Please correlate lab times with ED admission and discharge times for further clarification of the services performed during this visit.    No orders to display     Vitals:    11/05/18 2036 11/06/18 0022   BP: 109/59 115/65   Pulse: 91 82   Resp: 16 16   Temp: 97.6 °F (36.4 °C)    SpO2: 98% 99%   Weight: 59 kg (130 lb)    Height: 157.5 cm (62\")      Medications   sodium chloride 0.9 % bolus 1,000 mL (0 mL " Intravenous Stopped 11/6/18 0022)   prochlorperazine (COMPAZINE) injection 10 mg (10 mg Intravenous Given 11/5/18 2310)   ketorolac (TORADOL) injection 30 mg (30 mg Intravenous Given 11/5/18 2311)     ECG/EMG Results (last 24 hours)     ** No results found for the last 24 hours. **                        MDM    Final diagnoses:   Nonintractable headache, unspecified chronicity pattern, unspecified headache type       Documentation assistance provided by lynette Hernandez.  Information recorded by the lynette was done at my direction and has been verified and validated by me.     Morgan Hernandez  11/05/18 2214       Morgan Hernandez  11/05/18 2224       Morgan Hernandez  11/06/18 0006       Cande Matias, UYEN  11/06/18 0544

## 2018-11-07 ENCOUNTER — OFFICE VISIT (OUTPATIENT)
Dept: ONCOLOGY | Facility: CLINIC | Age: 23
End: 2018-11-07

## 2018-11-07 ENCOUNTER — LAB (OUTPATIENT)
Dept: LAB | Facility: HOSPITAL | Age: 23
End: 2018-11-07

## 2018-11-07 VITALS
BODY MASS INDEX: 25.76 KG/M2 | SYSTOLIC BLOOD PRESSURE: 118 MMHG | WEIGHT: 140 LBS | HEART RATE: 86 BPM | DIASTOLIC BLOOD PRESSURE: 76 MMHG | HEIGHT: 62 IN | OXYGEN SATURATION: 100 % | RESPIRATION RATE: 18 BRPM | TEMPERATURE: 97.4 F

## 2018-11-07 DIAGNOSIS — C50.011 MALIGNANT NEOPLASM INVOLVING BOTH NIPPLE AND AREOLA OF RIGHT BREAST IN FEMALE, UNSPECIFIED ESTROGEN RECEPTOR STATUS (HCC): ICD-10-CM

## 2018-11-07 LAB
ALBUMIN SERPL-MCNC: 4.79 G/DL (ref 3.2–4.8)
ALP SERPL-CCNC: 75 U/L (ref 25–100)
ALT SERPL W P-5'-P-CCNC: 27 U/L (ref 7–40)
AST SERPL-CCNC: 24 U/L (ref 0–33)
BILIRUB CONJ SERPL-MCNC: 0.1 MG/DL (ref 0–0.2)
BILIRUB INDIRECT SERPL-MCNC: 0.3 MG/DL (ref 0.1–1.1)
BILIRUB SERPL-MCNC: 0.4 MG/DL (ref 0.3–1.2)
PROT SERPL-MCNC: 7.7 G/DL (ref 5.7–8.2)

## 2018-11-07 PROCEDURE — 99213 OFFICE O/P EST LOW 20 MIN: CPT | Performed by: INTERNAL MEDICINE

## 2018-11-07 PROCEDURE — 80076 HEPATIC FUNCTION PANEL: CPT

## 2018-11-07 PROCEDURE — 36415 COLL VENOUS BLD VENIPUNCTURE: CPT

## 2018-11-07 NOTE — PROGRESS NOTES
PROBLEM LIST:  1. [id number] (stage IIIB) invasive ductal carcinoma of the right breast, ER-, [place] weakly +, Her2 3+.  A) presented with pain and swelling of the right breast after childbirth. Biopsy 5/8/17 showed high grade IDC, Right axillary LN biopsy positive for metastatic node involvement. Skin biopsy showed involvement of the dermis with lymphatic space involvement.  PET/CT on 5/24/17 showed hypermetabolic activity in the breast and axillary nodes,  No distant spread of disease.  B) neoadjuvant chemotherapy with TCHP started on 5/25/17.  Infusion reaction consisting of severe leg pain with the first dose of herceptin.  Taxotere dose reduced to 80% on cycle 5 for neuropathy.  C) bilateral mastectomy on 10/30/17.  Pathology showed multifocal < 1 mm high grade IDC with focal lymphovascular invasion.  4 sentinel lymph nodes with no malignancy.   [id number] (Stage yIA)  D) radiation therapy completed on 1/23/18.  Patient declined tamoxifen.  herceptin completed June 2018.  Neratinib started July 2018.  2. Migraines  3. Pre-diabetes    Subjective     HISTORY OF PRESENT ILLNESS:   Angie Sutherland returns for follow-up.  She was in the emergency room on Monday with a really bad migraine.  This is now improved.  She says it's taken longer for her to bounce back from this than she expected.  She has been off of her neratinib for a few days because she hasn't gotten this month's shipment of it yet.  She says she feels tired today but otherwise no complaints.    Past Medical History, Past Surgical History, Social History, Family History have been reviewed and are without significant changes except as mentioned.    Review of Systems   A comprehensive 14 point review of systems was performed and was negative except as mentioned.    Medications:  The current medication list was reviewed in the EMR    ALLERGIES:    Allergies   Allergen Reactions   • Benadryl [Diphenhydramine] Diarrhea       Objective      /76  "Comment: LUE  Pulse 86   Temp 97.4 °F (36.3 °C) (Temporal Artery )   Resp 18   Ht 157.5 cm (62\")   Wt 63.5 kg (140 lb)   SpO2 100% Comment: RA  BMI 25.61 kg/m²      Performance Status: 0    General: well appearing female in no acute distress  Neuro: alert and oriented  HEENT: sclera anicteric, oropharynx clear  Lymphatics: no cervical, supraclavicular Adenopathy  No palpable axillary adenopathy  Cardiovascular: regular rate and rhythm, no murmurs  Lungs: clear to auscultation bilaterally  Abdomen: soft, nontender, nondistended.  No palpable organomegaly  Extremeties: no lower extremity edema  Skin: no rashes, lesions, bruising, or petechiae  Psych: mood and affect appropriate        Assessment/Plan   Angie Sutherland is a 23 y.o. year old female with stage IIIB HER-2 positive breast cancer who returns for follow-up.  She is doing well.  She continues to have fatigue which is likely due to the neratinib at least in part, but otherwise no major side effects.  We will contact the pharmacist regarding her medication shipment.  Otherwise I will see her back in one month.                Visit time was 15 minutes, greater than 50% spent in counseling      Shadia Amos MD  [facility]    11/7/2018          CC:          "

## 2018-11-21 ENCOUNTER — APPOINTMENT (OUTPATIENT)
Dept: ONCOLOGY | Facility: HOSPITAL | Age: 23
End: 2018-11-21

## 2018-12-05 ENCOUNTER — OFFICE VISIT (OUTPATIENT)
Dept: ONCOLOGY | Facility: CLINIC | Age: 23
End: 2018-12-05

## 2018-12-05 ENCOUNTER — LAB (OUTPATIENT)
Dept: LAB | Facility: HOSPITAL | Age: 23
End: 2018-12-05

## 2018-12-05 VITALS
DIASTOLIC BLOOD PRESSURE: 61 MMHG | SYSTOLIC BLOOD PRESSURE: 115 MMHG | TEMPERATURE: 97.8 F | RESPIRATION RATE: 18 BRPM | OXYGEN SATURATION: 100 % | HEART RATE: 97 BPM | HEIGHT: 62 IN | WEIGHT: 143 LBS | BODY MASS INDEX: 26.31 KG/M2

## 2018-12-05 DIAGNOSIS — C50.011 MALIGNANT NEOPLASM OF NIPPLE OF RIGHT BREAST IN FEMALE, UNSPECIFIED ESTROGEN RECEPTOR STATUS (HCC): Primary | ICD-10-CM

## 2018-12-05 DIAGNOSIS — C50.011 MALIGNANT NEOPLASM INVOLVING BOTH NIPPLE AND AREOLA OF RIGHT BREAST IN FEMALE, UNSPECIFIED ESTROGEN RECEPTOR STATUS (HCC): ICD-10-CM

## 2018-12-05 DIAGNOSIS — C50.011 MALIGNANT NEOPLASM OF NIPPLE OF RIGHT BREAST IN FEMALE, UNSPECIFIED ESTROGEN RECEPTOR STATUS (HCC): ICD-10-CM

## 2018-12-05 LAB
ALBUMIN SERPL-MCNC: 4.52 G/DL (ref 3.2–4.8)
ALBUMIN/GLOB SERPL: 1.6 G/DL (ref 1.5–2.5)
ALP SERPL-CCNC: 73 U/L (ref 25–100)
ALT SERPL W P-5'-P-CCNC: 36 U/L (ref 7–40)
ANION GAP SERPL CALCULATED.3IONS-SCNC: 7 MMOL/L (ref 3–11)
AST SERPL-CCNC: 32 U/L (ref 0–33)
BILIRUB SERPL-MCNC: 0.3 MG/DL (ref 0.3–1.2)
BUN BLD-MCNC: 14 MG/DL (ref 9–23)
BUN/CREAT SERPL: 18.9 (ref 7–25)
CALCIUM SPEC-SCNC: 9 MG/DL (ref 8.7–10.4)
CHLORIDE SERPL-SCNC: 104 MMOL/L (ref 99–109)
CO2 SERPL-SCNC: 27 MMOL/L (ref 20–31)
CREAT BLD-MCNC: 0.74 MG/DL (ref 0.6–1.3)
GFR SERPL CREATININE-BSD FRML MDRD: 117 ML/MIN/1.73
GLOBULIN UR ELPH-MCNC: 2.8 GM/DL
GLUCOSE BLD-MCNC: 108 MG/DL (ref 70–100)
POTASSIUM BLD-SCNC: 4.5 MMOL/L (ref 3.5–5.5)
PROT SERPL-MCNC: 7.3 G/DL (ref 5.7–8.2)
SODIUM BLD-SCNC: 138 MMOL/L (ref 132–146)

## 2018-12-05 PROCEDURE — 99213 OFFICE O/P EST LOW 20 MIN: CPT | Performed by: INTERNAL MEDICINE

## 2018-12-05 PROCEDURE — 36415 COLL VENOUS BLD VENIPUNCTURE: CPT

## 2018-12-05 PROCEDURE — 80053 COMPREHEN METABOLIC PANEL: CPT

## 2018-12-05 NOTE — PROGRESS NOTES
PROBLEM LIST:  1. [id number] (stage IIIB) invasive ductal carcinoma of the right breast, ER-, [place] weakly +, Her2 3+.  A) presented with pain and swelling of the right breast after childbirth. Biopsy 5/8/17 showed high grade IDC, Right axillary LN biopsy positive for metastatic node involvement. Skin biopsy showed involvement of the dermis with lymphatic space involvement.  PET/CT on 5/24/17 showed hypermetabolic activity in the breast and axillary nodes,  No distant spread of disease.  B) neoadjuvant chemotherapy with TCHP started on 5/25/17.  Infusion reaction consisting of severe leg pain with the first dose of herceptin.  Taxotere dose reduced to 80% on cycle 5 for neuropathy.  C) bilateral mastectomy on 10/30/17.  Pathology showed multifocal < 1 mm high grade IDC with focal lymphovascular invasion.  4 sentinel lymph nodes with no malignancy.   [id number] (Stage yIA)  D) radiation therapy completed on 1/23/18.  Patient declined tamoxifen.  herceptin completed June 2018.  Neratinib started July 2018.  2. Migraines  3. Pre-diabetes    Subjective     HISTORY OF PRESENT ILLNESS:   Angie Sutherland returns for follow-up.  She was in the emergency room again after her last visit here with a headache.  She says she has headaches only sporadically.  If they are mild she will take tramadol in this helps.  If they are severe then she needs getting the emergency room for an IV cocktail.  She has not seen neurology.  She says that she can't schedule that until she is able to have an MRI, and she can't have an MRI until her tissue expanders are removed.  Otherwise she is feeling tired but doing okay.    Past Medical History, Past Surgical History, Social History, Family History have been reviewed and are without significant changes except as mentioned.    Review of Systems   A comprehensive 14 point review of systems was performed and was negative except as mentioned.    Medications:  The current medication list was  "reviewed in the EMR    ALLERGIES:    Allergies   Allergen Reactions   • Benadryl [Diphenhydramine] Diarrhea       Objective      /61 Comment: ALEKSANDER  Pulse 97   Temp 97.8 °F (36.6 °C) (Temporal)   Resp 18   Ht 157.5 cm (62\")   Wt 64.9 kg (143 lb)   SpO2 100% Comment: RA  BMI 26.16 kg/m²      Performance Status: 0    General: well appearing female in no acute distress  Neuro: alert and oriented  HEENT: sclera anicteric, oropharynx clear  Lymphatics: no cervical, supraclavicular Adenopathy  No palpable axillary adenopathy  Chest: Status post bilateral mastectomy with tissue expanders in place.  No palpable masses or lesions.  Cardiovascular: regular rate and rhythm, no murmurs  Lungs: clear to auscultation bilaterally  Abdomen: soft, nontender, nondistended.  No palpable organomegaly  Extremeties: no lower extremity edema  Skin: no rashes, lesions, bruising, or petechiae  Psych: mood and affect appropriate        Assessment/Plan   Angie Sutherland is a 23 y.o. year old female with stage IIIB HER-2 positive breast cancer who returns for follow-up.  She has some fatigue likely related to neratinib but no other significant side effects.  She is starting her 6 month of this treatment with a total of 12 planned.    She has recurrent migraine headaches.  I do think she would benefit from neurology evaluation.  She will have her tissue expanders removed and mid January and could potentially have an MRI after that.  She has had CT imaging of her head within the past several months which showed no evidence of metastatic disease.    Follow-up one month.                Visit time was 15 minutes, greater than 50% spent in counseling on headache management and treatment plans.      Shadia Amos MD  [facility]    12/5/2018          CC:          "

## 2019-01-02 ENCOUNTER — SPECIALTY PHARMACY (OUTPATIENT)
Dept: ONCOLOGY | Facility: HOSPITAL | Age: 24
End: 2019-01-02

## 2019-01-02 DIAGNOSIS — C50.011 MALIGNANT NEOPLASM INVOLVING BOTH NIPPLE AND AREOLA OF RIGHT BREAST IN FEMALE, UNSPECIFIED ESTROGEN RECEPTOR STATUS (HCC): ICD-10-CM

## 2019-01-02 NOTE — PROGRESS NOTES
Oral Chemotherapy Teaching      Patient Name/:  Angie Sutherland   1995  Oral Chemotherapy Regimen:  Neratinib 160mg (4 tablets) PO daily  Date Started Medication: Started 2018    Additional Notes:  Reviewed Dr. Amos's note from 2018 and patient is to continued the reduced dosage of neratinib. E-scribed a refill for neratinib 160mg PO daily, #120 with 3 refills to [facility] to begin processing.

## 2019-01-08 ENCOUNTER — APPOINTMENT (OUTPATIENT)
Dept: PREADMISSION TESTING | Facility: HOSPITAL | Age: 24
End: 2019-01-08

## 2019-01-08 VITALS — BODY MASS INDEX: 27.14 KG/M2 | WEIGHT: 147.49 LBS | HEIGHT: 62 IN

## 2019-01-08 LAB
ALBUMIN SERPL-MCNC: 4.39 G/DL (ref 3.2–4.8)
ALBUMIN/GLOB SERPL: 1.7 G/DL (ref 1.5–2.5)
ALP SERPL-CCNC: 75 U/L (ref 25–100)
ALT SERPL W P-5'-P-CCNC: 35 U/L (ref 7–40)
ANION GAP SERPL CALCULATED.3IONS-SCNC: 9 MMOL/L (ref 3–11)
AST SERPL-CCNC: 25 U/L (ref 0–33)
BILIRUB SERPL-MCNC: 0.4 MG/DL (ref 0.3–1.2)
BILIRUB UR QL STRIP: NEGATIVE
BUN BLD-MCNC: 11 MG/DL (ref 9–23)
BUN/CREAT SERPL: 14.9 (ref 7–25)
CALCIUM SPEC-SCNC: 9.2 MG/DL (ref 8.7–10.4)
CHLORIDE SERPL-SCNC: 103 MMOL/L (ref 99–109)
CLARITY UR: CLEAR
CO2 SERPL-SCNC: 26 MMOL/L (ref 20–31)
COLOR UR: YELLOW
CREAT BLD-MCNC: 0.74 MG/DL (ref 0.6–1.3)
DEPRECATED RDW RBC AUTO: 42 FL (ref 37–54)
ERYTHROCYTE [DISTWIDTH] IN BLOOD BY AUTOMATED COUNT: 12.4 % (ref 11.3–14.5)
GFR SERPL CREATININE-BSD FRML MDRD: 117 ML/MIN/1.73
GLOBULIN UR ELPH-MCNC: 2.5 GM/DL
GLUCOSE BLD-MCNC: 122 MG/DL (ref 70–100)
GLUCOSE UR STRIP-MCNC: NEGATIVE MG/DL
HCT VFR BLD AUTO: 40.8 % (ref 34.5–44)
HGB BLD-MCNC: 13.4 G/DL (ref 11.5–15.5)
HGB UR QL STRIP.AUTO: NEGATIVE
KETONES UR QL STRIP: NEGATIVE
LEUKOCYTE ESTERASE UR QL STRIP.AUTO: NEGATIVE
MCH RBC QN AUTO: 30.5 PG (ref 27–31)
MCHC RBC AUTO-ENTMCNC: 32.8 G/DL (ref 32–36)
MCV RBC AUTO: 92.9 FL (ref 80–99)
NITRITE UR QL STRIP: NEGATIVE
PH UR STRIP.AUTO: 7 [PH] (ref 5–8)
PLATELET # BLD AUTO: 231 10*3/MM3 (ref 150–450)
PMV BLD AUTO: 9.3 FL (ref 6–12)
POTASSIUM BLD-SCNC: 3.7 MMOL/L (ref 3.5–5.5)
PROT SERPL-MCNC: 6.9 G/DL (ref 5.7–8.2)
PROT UR QL STRIP: NEGATIVE
RBC # BLD AUTO: 4.39 10*6/MM3 (ref 3.89–5.14)
SODIUM BLD-SCNC: 138 MMOL/L (ref 132–146)
SP GR UR STRIP: 1.01 (ref 1–1.03)
UROBILINOGEN UR QL STRIP: NORMAL
WBC NRBC COR # BLD: 5.21 10*3/MM3 (ref 3.5–10.8)

## 2019-01-08 PROCEDURE — 81003 URINALYSIS AUTO W/O SCOPE: CPT | Performed by: FAMILY MEDICINE

## 2019-01-08 PROCEDURE — 36415 COLL VENOUS BLD VENIPUNCTURE: CPT

## 2019-01-08 PROCEDURE — 82248 BILIRUBIN DIRECT: CPT | Performed by: FAMILY MEDICINE

## 2019-01-08 PROCEDURE — 80053 COMPREHEN METABOLIC PANEL: CPT | Performed by: FAMILY MEDICINE

## 2019-01-08 PROCEDURE — 85027 COMPLETE CBC AUTOMATED: CPT | Performed by: FAMILY MEDICINE

## 2019-01-08 PROCEDURE — 80076 HEPATIC FUNCTION PANEL: CPT | Performed by: FAMILY MEDICINE

## 2019-01-08 NOTE — DISCHARGE INSTRUCTIONS
The following information and instructions were given:    NPO after MN except sips of water with routine prescribed medication (except blood thinners, certain blood pressure medications, diabetes medications, or weight reducing medications) unless otherwise instructed by your physician.  Do not eat, drink, smoke or chew gum after midnight the night before surgery. This also includes no mints.    DO NOT shave for two days before your procedure.  Do not wear makeup.      DO NOT wear fingernail polish (gel/regular) and/or acrylic/artificial nails on the day of surgery.   If a patient had recent manicure and would rather not remove polish or artificial nails, then the minimum requirement is that the polish/artificial nails must be removed from the middle finger on each hand.      If patient is having surgery/procedure on an upper extremity, then the patient was instructed that fingernail polish/artificial fingernails must be removed for surgery.  NO EXCEPTIONS.      If patient is having surgery/procedure on a lower extremity, then the patient was instructed that toenail polish on both extremities must be removed for surgery.  NO EXCEPTIONS.    Remove all jewelry (advised to go to jeweler if unable to remove).  Jewelry, especially rings, can no longer be taped for surgery.    Leave anything you consider valuable at home.    Leave your suitcase in the car until after your surgery.    Bring the following with you (if applicable)       -picture ID and insurance cards       -Co-pay/deductible required by insurance       -Medications in the original bottles (not a list) including all over-the-counter  medications if not brought to PAT       -Copy of advance directive, living will or power of  documents if not  brought to [facility]       -CPAP or BIPAP mask and tubing (do not bring machine)       -Skin prep instructions sheet       -PAT Pass    Education booklet, brochure, handout or binder given to patient.    Pain Control  After Surgery handout given to patient.    Respirex use (handout given to patient) and pneumonia prevention.    Signs and Symptoms of infection discussed.    DVT Prevention education given.  Stressing the importance of ambulation.    Patient to apply Chlorhexadine wipes to surgical area (as instructed) the night before procedure and the AM of procedure.    When applicable patients with ERAS orders were instructed to drink 20 ounces of Gatorade or G2 for diabetics (or until full) the morning of surgery.  The Gatorade or G2 must be consumed at least 1 hour before arrival time on the day of surgery .  No RED Gatorade or G2.  Appropriate ERAS handout and/or booklet given to patient during PAT visit.

## 2019-01-09 ENCOUNTER — OFFICE VISIT (OUTPATIENT)
Dept: ONCOLOGY | Facility: CLINIC | Age: 24
End: 2019-01-09

## 2019-01-09 ENCOUNTER — OFFICE VISIT (OUTPATIENT)
Dept: PSYCHIATRY | Facility: CLINIC | Age: 24
End: 2019-01-09

## 2019-01-09 VITALS
HEIGHT: 62 IN | SYSTOLIC BLOOD PRESSURE: 111 MMHG | BODY MASS INDEX: 26.31 KG/M2 | HEART RATE: 86 BPM | TEMPERATURE: 97.1 F | DIASTOLIC BLOOD PRESSURE: 58 MMHG | RESPIRATION RATE: 18 BRPM | OXYGEN SATURATION: 100 % | WEIGHT: 143 LBS

## 2019-01-09 DIAGNOSIS — C50.011 MALIGNANT NEOPLASM INVOLVING BOTH NIPPLE AND AREOLA OF RIGHT BREAST IN FEMALE, UNSPECIFIED ESTROGEN RECEPTOR STATUS (HCC): ICD-10-CM

## 2019-01-09 DIAGNOSIS — F41.1 GENERALIZED ANXIETY DISORDER: Primary | ICD-10-CM

## 2019-01-09 DIAGNOSIS — F33.1 MODERATE EPISODE OF RECURRENT MAJOR DEPRESSIVE DISORDER (HCC): ICD-10-CM

## 2019-01-09 DIAGNOSIS — G43.009 MIGRAINE WITHOUT AURA AND WITHOUT STATUS MIGRAINOSUS, NOT INTRACTABLE: Primary | ICD-10-CM

## 2019-01-09 DIAGNOSIS — F51.05 INSOMNIA DUE TO MENTAL CONDITION: ICD-10-CM

## 2019-01-09 LAB
ALBUMIN SERPL-MCNC: 4.48 G/DL (ref 3.2–4.8)
ALP SERPL-CCNC: 77 U/L (ref 25–100)
ALT SERPL W P-5'-P-CCNC: 31 U/L (ref 7–40)
AST SERPL-CCNC: 25 U/L (ref 0–33)
BILIRUB CONJ SERPL-MCNC: 0.1 MG/DL (ref 0–0.2)
BILIRUB INDIRECT SERPL-MCNC: 0.3 MG/DL (ref 0.1–1.1)
BILIRUB SERPL-MCNC: 0.4 MG/DL (ref 0.3–1.2)
PROT SERPL-MCNC: 7.2 G/DL (ref 5.7–8.2)

## 2019-01-09 PROCEDURE — 99213 OFFICE O/P EST LOW 20 MIN: CPT | Performed by: NURSE PRACTITIONER

## 2019-01-09 PROCEDURE — 90837 PSYTX W PT 60 MINUTES: CPT | Performed by: NURSE PRACTITIONER

## 2019-01-09 RX ORDER — ALPRAZOLAM 0.25 MG/1
0.25 TABLET ORAL 2 TIMES DAILY PRN
Qty: 60 TABLET | Refills: 0 | Status: SHIPPED | OUTPATIENT
Start: 2019-01-09 | End: 2019-02-18 | Stop reason: SDUPTHER

## 2019-01-09 RX ORDER — ESCITALOPRAM OXALATE 5 MG/1
5 TABLET ORAL DAILY
Qty: 30 TABLET | Refills: 1 | Status: SHIPPED | OUTPATIENT
Start: 2019-01-09 | End: 2019-01-14 | Stop reason: HOSPADM

## 2019-01-09 RX ORDER — TOPIRAMATE 25 MG/1
25 TABLET ORAL DAILY
Qty: 30 TABLET | Refills: 2 | Status: SHIPPED | OUTPATIENT
Start: 2019-01-09 | End: 2019-02-18 | Stop reason: SDUPTHER

## 2019-01-09 NOTE — PROGRESS NOTES
PROBLEM LIST:  1. [id number] (stage IIIB) invasive ductal carcinoma of the right breast, ER-, [place] weakly +, Her2 3+.  A) presented with pain and swelling of the right breast after childbirth. Biopsy 5/8/17 showed high grade IDC, Right axillary LN biopsy positive for metastatic node involvement. Skin biopsy showed involvement of the dermis with lymphatic space involvement.  PET/CT on 5/24/17 showed hypermetabolic activity in the breast and axillary nodes,  No distant spread of disease.  B) neoadjuvant chemotherapy with TCHP started on 5/25/17.  Infusion reaction consisting of severe leg pain with the first dose of herceptin.  Taxotere dose reduced to 80% on cycle 5 for neuropathy.  C) bilateral mastectomy on 10/30/17.  Pathology showed multifocal < 1 mm high grade IDC with focal lymphovascular invasion.  4 sentinel lymph nodes with no malignancy.   [id number] (Stage yIA)  D) radiation therapy completed on 1/23/18.  Patient declined tamoxifen.  herceptin completed June 2018.  Neratinib started July 2018.  2. Migraines  3. Pre-diabetes    Subjective     HISTORY OF PRESENT ILLNESS:   Angie Sutherland returns for follow-up.  She continues on Neratinib with mild side effects. She continues to have headaches weekly and migraines at least once every 2 weeks.  She has not received a call yet regarding neurology consult for the frequent headaches.  She continues to have mild fatigue. She reports intermittent diarrhea that is manageable.        Past Medical History, Past Surgical History, Social History, Family History have been reviewed and are without significant changes except as mentioned.    Review of Systems   A comprehensive 14 point review of systems was performed and was negative except as mentioned.    Medications:  The current medication list was reviewed in the EMR    ALLERGIES:    Allergies   Allergen Reactions   • Benadryl [Diphenhydramine] Diarrhea       Objective      /58 Comment: LUE  Pulse 86   Temp  "97.1 °F (36.2 °C) (Temporal)   Resp 18   Ht 157.5 cm (62\")   Wt 64.9 kg (143 lb)   SpO2 100% Comment: RA  BMI 26.16 kg/m²      Performance Status: 0    General: well appearing female in no acute distress  Neuro: alert and oriented  HEENT: sclera anicteric, oropharynx clear  Lymphatics: no cervical, supraclavicular Adenopathy  No palpable axillary adenopathy  Chest: Status post bilateral mastectomy with tissue expanders in place.  No palpable masses or lesions.  Cardiovascular: regular rate and rhythm, no murmurs  Lungs: clear to auscultation bilaterally  Abdomen: soft, nontender, nondistended.  No palpable organomegaly  Extremeties: no lower extremity edema  Skin: no rashes, lesions, bruising, or petechiae  Psych: mood and affect appropriate        Assessment/Plan   Anige Sutherland is a 23 y.o. year old female with stage IIIB HER-2 positive breast cancer who returns for follow-up.  She has some fatigue likely related to neratinib but no other significant side effects.  She is starting her 7th month of this treatment with a total of 12 planned.     She has recurrent migraine headaches. I will make another referral to neurology for evaluation.       Follow-up one month with Dr. Amos.                 Visit time was 15 minutes, greater than 50% spent in counseling on headache management and treatment plans.      Ana Jade, UYEN  [facility]ology [facility] On[facility]    1/9/2019          CC:          "

## 2019-01-09 NOTE — PROGRESS NOTES
Subjective   Angie Sutherland is a 23 y.o. female who is here today for medication management follow up. therapy    Chief Complaint: MANDIE, MDD recurrent moderate     History of Present Illness Patient presents with her two year old daughter who is sleeping in pt's arms. Pt reports she is now on oral chemotherapy for 6 more months. She states she continus to have issues with being dependent on her parents who belittle her for any request for help especially her step dad who is a . She can't work she has two young children, no support from the two different fathers of her children, one in [employer] and the other is hit and miss on any involvement or help with even diapers. She doesn't have a 's license. She constantly is worrying about money and her health, feeling inner tension all the time, quick to anger, quick to cry, poor motivation, anhedonia, denies SI/HI or AVH, has poor energy worsening with chemotherapy. She rates anxiety a 6-8 most days and depression an 7-8. She states she helps her parents and never is thanked. She feels a big divide between her parents and sister and just can't understand how having cancer they won't give her more understanding. She is in low income housing, she is very frugal takes the bus when she can , like today instead of asking from her parents. Her mother does take her to med onc appointments.she doesn't have friends anymore except one who started work so she doesn't see her. She states she doesn't really have anyone who tries to understand her situation   .  Denies adverse effects from medications. She began disability proceedings early last year when diagnosed but hasn't heard anything from court so still waiting. She wants to work in her future but can't currently with no .  (Scales based on 0 - 10 with 10 being the worst)        The following portions of the patient's history were reviewed and updated as appropriate: allergies, current medications,  past family history, past medical history, past social history, past surgical history and problem list.    Review of Systemsdenies fever, cough, s/s’s of infection, denies GI/ problems, denies new medical issues     Objective   Physical Exam  not currently breastfeeding.    Allergies   Allergen Reactions   • Benadryl [Diphenhydramine] Diarrhea       Current Medications:   Current Outpatient Medications   Medication Sig Dispense Refill   • acetaminophen (TYLENOL) 500 MG tablet Take 1 tablet by mouth Every 6 (Six) Hours As Needed for Mild Pain  or Moderate Pain. 30 tablet 0   • ALPRAZolam (XANAX) 0.25 MG tablet Take 1 tablet by mouth 2 (Two) Times a Day As Needed for Anxiety. 60 tablet 0   • amitriptyline (ELAVIL) 25 MG tablet Take 1 tablet by mouth Every Night. 90 tablet 1   • carisoprodol (SOMA) 350 MG tablet Take 1 tablet by mouth 3 (Three) Times a Day As Needed for Muscle Spasms for up to 35 doses. 35 tablet 0   • escitalopram (LEXAPRO) 5 MG tablet Take 1 tablet by mouth Daily. 30 tablet 1   • ibuprofen (ADVIL,MOTRIN) 400 MG tablet Take 1 tablet by mouth Every 6 (Six) Hours As Needed for Mild Pain. 35 tablet 0   • loperamide (HM LOPERAMIDE HCL) 2 MG capsule Take 2 caps PO TID on Weeks 1-2, then 2 caps PO BID on Wks 3-8, then PRN.  Do NOT exceed 16 mg daily. 140 capsule 3   • Neratinib 40 MG tablet Take 160 mg by mouth Daily. 120 tablet 3   • OLANZapine (zyPREXA) 5 MG tablet Take 1 tablet by mouth Every Night. 30 tablet 5   • ONDANSETRON PO Take 1 tablet by mouth Daily As Needed.     • prochlorperazine (COMPAZINE) 10 MG tablet Take 1 tablet by mouth Every 6 (Six) Hours As Needed for Nausea or Vomiting. 45 tablet 5   • topiramate (TOPAMAX) 25 MG tablet Take 1 tablet by mouth Daily. 30 tablet 2   • traMADol (ULTRAM) 50 MG tablet Take 1 tablet by mouth Every 6 (Six) Hours As Needed for Moderate Pain . 30 tablet 0     No current facility-administered medications for this visit.        Appearance:  appropriate  Hygiene:   good  Cooperation:  Cooperative  Eye Contact:  Good  Psychomotor Behavior:  No psychomotor agitation/retardation, No EPS, No motor tics  Mood:  depressed anxious   Affect:  Appropriate  Hopelessness: Denies  Speech:  Normal  Thought Process:  Linear  Thought Content:  Normal  Concentration: Normal   Suicidal:  None  Homicidal:  None  Hallucinations:  None  Delusion:  None  Memory:  Intact  Orientation:  Person, Place, Time and Situation  Reliability:  fair  Insight:  Fair  Judgement:  Fair  Impulse Control:  Fair  Estimated Intelligence: average range      Assessment/Plan   Diagnoses and all orders for this visit:    Generalized anxiety disorder    Insomnia due to mental condition    Moderate episode of recurrent major depressive disorder (CMS/HCC)    Other orders  -     topiramate (TOPAMAX) 25 MG tablet; Take 1 tablet by mouth Daily.  -     escitalopram (LEXAPRO) 5 MG tablet; Take 1 tablet by mouth Daily.  -     ALPRAZolam (XANAX) 0.25 MG tablet; Take 1 tablet by mouth 2 (Two) Times a Day As Needed for Anxiety.    60 minutes face to face assessment, reviewed present condition and treatment regarding breast cancer challenges, relational challenges, financial stressors, dependence stressors, single mom stressors, supportive active listening motivation therapy given as well as med management   IN 0900  OUT 1000    IMPRESSION: has tension headaches weekly, on zyprexa for nausea which has helped her sleep but not reduced anxiety  PLAN:   Cont therapy, goal of independence, ie study for 's license   ADD Lexapro 5mg daily  ADD alprazolam 0.25mg BID AS NEEDED FOR ANXIETY PANIC FEELINGS , discussed risk of dependence.     As part of this patient's treatment plan I may be prescribing controlled substances. The patient has been made aware of appropriate use of such medications, including potential risk for dependence or overdose. It has also been made clear that these medications are for use by  this patient only, without concomitant use of alcohol or other substances unless prescribed.    Patient has completed prescribing agreement detailing terms of continued prescribing of controlled substances, including monitoring PEDRO reports, urine drug screening, and pill counts if necessary. The patient is aware that inappropriate use will result in cessation of prescribing such medications.         PEDRO report has been reviewed and scanned into the patient's chart   Patient was counseled on medication safety and patient responsibility of medication against theft or stolen medications.  No early refills requests will be honored for lost or stolen medication.  Patient is expected to comply with requests for random medication counts and urine drug monitoring while receiving control substance therapy.    We discussed risks, benefits, and side effects of the above medications and the patient was agreeable with the plan. Patient was educated on the importance of compliance with treatment and follow-up appointments.     Counseled patient regarding multimodal approach with healthy nutrition, healthy sleep, regular physical activity, social activities, counseling, and medications.   Sleep hygiene discussed     Coping skills reviewed and encouraged positive framing of thoughts    Assisted patient in processing above session content; acknowledged and normalized patient’s thoughts, feelings, and concerns.  Applied  positive coping skills and behavior management in session.  Allowed patient to freely discuss issues without interruption or judgment. Provided safe, confidential environment to facilitate the development of positive therapeutic relationship and encourage open, honest communication. Assisted patient in identifying risk factors which would indicate the need for higher level of care including thoughts to harm self or others and/or self-harming behavior and encouraged patient to contact this office, call 911, or  present to the nearest emergency room should any of these events occur. Discussed crisis intervention services and means to access.  Patient adamantly and convincingly denies current suicidal or homicidal ideation or perceptual disturbance.    Treatment Plan: stabilize mood, patient will stay out of the hospital and be at optimal level of functioning, take all medication as prescribed. Patient verbalized  understanding and agreement to plan.    Instructed to call for questions or concerns and return early if necessary. Crisis plan reviewed including going to the Emergency department.    Return in about 3 weeks (around 1/30/2019).

## 2019-01-14 ENCOUNTER — ANESTHESIA EVENT (OUTPATIENT)
Dept: PERIOP | Facility: HOSPITAL | Age: 24
End: 2019-01-14

## 2019-01-14 ENCOUNTER — HOSPITAL ENCOUNTER (OUTPATIENT)
Facility: HOSPITAL | Age: 24
Discharge: HOME | End: 2019-01-14
Attending: FAMILY MEDICINE | Admitting: FAMILY MEDICINE

## 2019-01-14 ENCOUNTER — ANESTHESIA (OUTPATIENT)
Dept: PERIOP | Facility: HOSPITAL | Age: 24
End: 2019-01-14

## 2019-01-14 VITALS
SYSTOLIC BLOOD PRESSURE: 127 MMHG | RESPIRATION RATE: 18 BRPM | BODY MASS INDEX: 26.31 KG/M2 | HEIGHT: 62 IN | HEART RATE: 91 BPM | DIASTOLIC BLOOD PRESSURE: 80 MMHG | OXYGEN SATURATION: 99 % | TEMPERATURE: 97.1 F | WEIGHT: 143 LBS

## 2019-01-14 LAB
B-HCG UR QL: NEGATIVE
INTERNAL NEGATIVE CONTROL: NEGATIVE
INTERNAL POSITIVE CONTROL: POSITIVE
Lab: NORMAL

## 2019-01-14 PROCEDURE — 25010000002 HYDROMORPHONE PER 4 MG: Performed by: FAMILY MEDICINE

## 2019-01-14 PROCEDURE — 25010000002 MIDAZOLAM PER 1 MG: Performed by: NURSE ANESTHETIST, CERTIFIED REGISTERED

## 2019-01-14 PROCEDURE — 25010000002 DEXAMETHASONE PER 1 MG: Performed by: NURSE ANESTHETIST, CERTIFIED REGISTERED

## 2019-01-14 PROCEDURE — 25010000002 ONDANSETRON PER 1 MG: Performed by: NURSE ANESTHETIST, CERTIFIED REGISTERED

## 2019-01-14 PROCEDURE — 25010000002 PROPOFOL 10 MG/ML EMULSION: Performed by: NURSE ANESTHETIST, CERTIFIED REGISTERED

## 2019-01-14 PROCEDURE — 81025 URINE PREGNANCY TEST: CPT | Performed by: ANESTHESIOLOGY

## 2019-01-14 PROCEDURE — C1789 PROSTHESIS, BREAST, IMP: HCPCS | Performed by: FAMILY MEDICINE

## 2019-01-14 PROCEDURE — 25010000002 FENTANYL CITRATE (PF) 100 MCG/2ML SOLUTION: Performed by: NURSE ANESTHETIST, CERTIFIED REGISTERED

## 2019-01-14 PROCEDURE — 25010000003 CEFAZOLIN IN DEXTROSE 2-4 GM/100ML-% SOLUTION: Performed by: FAMILY MEDICINE

## 2019-01-14 PROCEDURE — 25010000002 HYDROMORPHONE PER 4 MG: Performed by: NURSE ANESTHETIST, CERTIFIED REGISTERED

## 2019-01-14 PROCEDURE — 63710000001 OXYCODONE 5 MG TABLET: Performed by: FAMILY MEDICINE

## 2019-01-14 PROCEDURE — A9270 NON-COVERED ITEM OR SERVICE: HCPCS | Performed by: FAMILY MEDICINE

## 2019-01-14 DEVICE — IMPLANTABLE DEVICE: Type: IMPLANTABLE DEVICE | Site: BREAST | Status: FUNCTIONAL

## 2019-01-14 RX ORDER — CEFAZOLIN SODIUM 2 G/100ML
2 INJECTION, SOLUTION INTRAVENOUS ONCE
Status: COMPLETED | OUTPATIENT
Start: 2019-01-14 | End: 2019-01-14

## 2019-01-14 RX ORDER — MAGNESIUM HYDROXIDE 1200 MG/15ML
LIQUID ORAL AS NEEDED
Status: DISCONTINUED | OUTPATIENT
Start: 2019-01-14 | End: 2019-01-14 | Stop reason: HOSPADM

## 2019-01-14 RX ORDER — OXYCODONE HYDROCHLORIDE 5 MG/1
5 TABLET ORAL ONCE
Status: COMPLETED | OUTPATIENT
Start: 2019-01-14 | End: 2019-01-14

## 2019-01-14 RX ORDER — LIDOCAINE HYDROCHLORIDE 10 MG/ML
0.5 INJECTION, SOLUTION EPIDURAL; INFILTRATION; INTRACAUDAL; PERINEURAL ONCE AS NEEDED
Status: COMPLETED | OUTPATIENT
Start: 2019-01-14 | End: 2019-01-14

## 2019-01-14 RX ORDER — HYDROMORPHONE HYDROCHLORIDE 1 MG/ML
0.5 INJECTION, SOLUTION INTRAMUSCULAR; INTRAVENOUS; SUBCUTANEOUS ONCE
Status: COMPLETED | OUTPATIENT
Start: 2019-01-14 | End: 2019-01-14

## 2019-01-14 RX ORDER — SODIUM CHLORIDE 0.9 % (FLUSH) 0.9 %
3 SYRINGE (ML) INJECTION EVERY 12 HOURS SCHEDULED
Status: CANCELLED | OUTPATIENT
Start: 2019-01-14

## 2019-01-14 RX ORDER — CEPHALEXIN 500 MG/1
500 CAPSULE ORAL 4 TIMES DAILY
Qty: 40 CAPSULE | Refills: 0 | Status: SHIPPED | OUTPATIENT
Start: 2019-01-14 | End: 2019-01-24

## 2019-01-14 RX ORDER — FENTANYL CITRATE 50 UG/ML
50 INJECTION, SOLUTION INTRAMUSCULAR; INTRAVENOUS
Status: DISCONTINUED | OUTPATIENT
Start: 2019-01-14 | End: 2019-01-14 | Stop reason: HOSPADM

## 2019-01-14 RX ORDER — GLYCOPYRROLATE 0.2 MG/ML
INJECTION INTRAMUSCULAR; INTRAVENOUS AS NEEDED
Status: DISCONTINUED | OUTPATIENT
Start: 2019-01-14 | End: 2019-01-14 | Stop reason: SURG

## 2019-01-14 RX ORDER — LIDOCAINE HYDROCHLORIDE 10 MG/ML
INJECTION, SOLUTION INFILTRATION; PERINEURAL AS NEEDED
Status: DISCONTINUED | OUTPATIENT
Start: 2019-01-14 | End: 2019-01-14 | Stop reason: SURG

## 2019-01-14 RX ORDER — PROPOFOL 10 MG/ML
VIAL (ML) INTRAVENOUS AS NEEDED
Status: DISCONTINUED | OUTPATIENT
Start: 2019-01-14 | End: 2019-01-14 | Stop reason: SURG

## 2019-01-14 RX ORDER — SODIUM CHLORIDE, SODIUM LACTATE, POTASSIUM CHLORIDE, CALCIUM CHLORIDE 600; 310; 30; 20 MG/100ML; MG/100ML; MG/100ML; MG/100ML
INJECTION, SOLUTION INTRAVENOUS CONTINUOUS PRN
Status: DISCONTINUED | OUTPATIENT
Start: 2019-01-14 | End: 2019-01-14 | Stop reason: SURG

## 2019-01-14 RX ORDER — HYDROMORPHONE HYDROCHLORIDE 1 MG/ML
0.5 INJECTION, SOLUTION INTRAMUSCULAR; INTRAVENOUS; SUBCUTANEOUS
Status: DISCONTINUED | OUTPATIENT
Start: 2019-01-14 | End: 2019-01-14 | Stop reason: HOSPADM

## 2019-01-14 RX ORDER — SODIUM CHLORIDE 0.9 % (FLUSH) 0.9 %
3-10 SYRINGE (ML) INJECTION AS NEEDED
Status: CANCELLED | OUTPATIENT
Start: 2019-01-14

## 2019-01-14 RX ORDER — ONDANSETRON 2 MG/ML
INJECTION INTRAMUSCULAR; INTRAVENOUS AS NEEDED
Status: DISCONTINUED | OUTPATIENT
Start: 2019-01-14 | End: 2019-01-14 | Stop reason: SURG

## 2019-01-14 RX ORDER — OXYCODONE HYDROCHLORIDE 5 MG/1
5 TABLET ORAL ONCE
Status: DISCONTINUED | OUTPATIENT
Start: 2019-01-14 | End: 2019-01-14

## 2019-01-14 RX ORDER — ESMOLOL HYDROCHLORIDE 10 MG/ML
INJECTION INTRAVENOUS AS NEEDED
Status: DISCONTINUED | OUTPATIENT
Start: 2019-01-14 | End: 2019-01-14 | Stop reason: SURG

## 2019-01-14 RX ORDER — FAMOTIDINE 20 MG/1
20 TABLET, FILM COATED ORAL ONCE
Status: COMPLETED | OUTPATIENT
Start: 2019-01-14 | End: 2019-01-14

## 2019-01-14 RX ORDER — NEOSTIGMINE METHYLSULFATE 5 MG/5 ML
SYRINGE (ML) INTRAVENOUS AS NEEDED
Status: DISCONTINUED | OUTPATIENT
Start: 2019-01-14 | End: 2019-01-14 | Stop reason: SURG

## 2019-01-14 RX ORDER — MIDAZOLAM HYDROCHLORIDE 1 MG/ML
INJECTION INTRAMUSCULAR; INTRAVENOUS AS NEEDED
Status: DISCONTINUED | OUTPATIENT
Start: 2019-01-14 | End: 2019-01-14 | Stop reason: SURG

## 2019-01-14 RX ORDER — ROCURONIUM BROMIDE 10 MG/ML
INJECTION, SOLUTION INTRAVENOUS AS NEEDED
Status: DISCONTINUED | OUTPATIENT
Start: 2019-01-14 | End: 2019-01-14 | Stop reason: SURG

## 2019-01-14 RX ORDER — PROMETHAZINE HYDROCHLORIDE 25 MG/1
25 SUPPOSITORY RECTAL ONCE AS NEEDED
Status: DISCONTINUED | OUTPATIENT
Start: 2019-01-14 | End: 2019-01-14 | Stop reason: HOSPADM

## 2019-01-14 RX ORDER — ONDANSETRON 4 MG/1
4 TABLET, FILM COATED ORAL EVERY 8 HOURS PRN
Qty: 10 TABLET | Refills: 0 | Status: SHIPPED | OUTPATIENT
Start: 2019-01-14 | End: 2019-03-29 | Stop reason: SDUPTHER

## 2019-01-14 RX ORDER — OXYCODONE HYDROCHLORIDE 5 MG/1
5 TABLET ORAL EVERY 6 HOURS PRN
Qty: 25 TABLET | Refills: 0 | OUTPATIENT
Start: 2019-01-14 | End: 2019-07-02

## 2019-01-14 RX ORDER — SODIUM CHLORIDE, SODIUM LACTATE, POTASSIUM CHLORIDE, CALCIUM CHLORIDE 600; 310; 30; 20 MG/100ML; MG/100ML; MG/100ML; MG/100ML
9 INJECTION, SOLUTION INTRAVENOUS CONTINUOUS
Status: DISCONTINUED | OUTPATIENT
Start: 2019-01-14 | End: 2019-01-14 | Stop reason: HOSPADM

## 2019-01-14 RX ORDER — PROMETHAZINE HYDROCHLORIDE 25 MG/ML
12.5 INJECTION, SOLUTION INTRAMUSCULAR; INTRAVENOUS ONCE AS NEEDED
Status: DISCONTINUED | OUTPATIENT
Start: 2019-01-14 | End: 2019-01-14 | Stop reason: HOSPADM

## 2019-01-14 RX ORDER — PROMETHAZINE HYDROCHLORIDE 25 MG/1
25 TABLET ORAL ONCE AS NEEDED
Status: DISCONTINUED | OUTPATIENT
Start: 2019-01-14 | End: 2019-01-14 | Stop reason: HOSPADM

## 2019-01-14 RX ORDER — MEPERIDINE HYDROCHLORIDE 25 MG/ML
12.5 INJECTION INTRAMUSCULAR; INTRAVENOUS; SUBCUTANEOUS
Status: DISCONTINUED | OUTPATIENT
Start: 2019-01-14 | End: 2019-01-14 | Stop reason: HOSPADM

## 2019-01-14 RX ORDER — FAMOTIDINE 10 MG/ML
20 INJECTION, SOLUTION INTRAVENOUS ONCE
Status: CANCELLED | OUTPATIENT
Start: 2019-01-14 | End: 2019-01-14

## 2019-01-14 RX ORDER — DEXAMETHASONE SODIUM PHOSPHATE 4 MG/ML
INJECTION, SOLUTION INTRA-ARTICULAR; INTRALESIONAL; INTRAMUSCULAR; INTRAVENOUS; SOFT TISSUE AS NEEDED
Status: DISCONTINUED | OUTPATIENT
Start: 2019-01-14 | End: 2019-01-14 | Stop reason: SURG

## 2019-01-14 RX ORDER — FENTANYL CITRATE 50 UG/ML
INJECTION, SOLUTION INTRAMUSCULAR; INTRAVENOUS AS NEEDED
Status: DISCONTINUED | OUTPATIENT
Start: 2019-01-14 | End: 2019-01-14 | Stop reason: SURG

## 2019-01-14 RX ADMIN — CEFAZOLIN SODIUM 2 G: 2 INJECTION, SOLUTION INTRAVENOUS at 09:29

## 2019-01-14 RX ADMIN — SODIUM CHLORIDE, POTASSIUM CHLORIDE, SODIUM LACTATE AND CALCIUM CHLORIDE: 600; 310; 30; 20 INJECTION, SOLUTION INTRAVENOUS at 09:18

## 2019-01-14 RX ADMIN — ESMOLOL HYDROCHLORIDE 10 MG: 10 INJECTION INTRAVENOUS at 10:07

## 2019-01-14 RX ADMIN — ONDANSETRON 4 MG: 2 INJECTION INTRAMUSCULAR; INTRAVENOUS at 11:04

## 2019-01-14 RX ADMIN — MIDAZOLAM HYDROCHLORIDE 2 MG: 1 INJECTION, SOLUTION INTRAMUSCULAR; INTRAVENOUS at 09:19

## 2019-01-14 RX ADMIN — FENTANYL CITRATE 50 MCG: 50 INJECTION, SOLUTION INTRAMUSCULAR; INTRAVENOUS at 11:35

## 2019-01-14 RX ADMIN — DEXAMETHASONE SODIUM PHOSPHATE 8 MG: 4 INJECTION, SOLUTION INTRAMUSCULAR; INTRAVENOUS at 09:38

## 2019-01-14 RX ADMIN — SODIUM CHLORIDE, POTASSIUM CHLORIDE, SODIUM LACTATE AND CALCIUM CHLORIDE 9 ML/HR: 600; 310; 30; 20 INJECTION, SOLUTION INTRAVENOUS at 07:43

## 2019-01-14 RX ADMIN — FENTANYL CITRATE 100 MCG: 50 INJECTION, SOLUTION INTRAMUSCULAR; INTRAVENOUS at 09:23

## 2019-01-14 RX ADMIN — OXYCODONE HYDROCHLORIDE 5 MG: 5 TABLET ORAL at 15:01

## 2019-01-14 RX ADMIN — PROPOFOL 200 MG: 10 INJECTION, EMULSION INTRAVENOUS at 09:23

## 2019-01-14 RX ADMIN — GLYCOPYRROLATE 0.4 MG: 0.2 INJECTION, SOLUTION INTRAMUSCULAR; INTRAVENOUS at 11:06

## 2019-01-14 RX ADMIN — LIDOCAINE HYDROCHLORIDE 50 MG: 10 INJECTION, SOLUTION INFILTRATION; PERINEURAL at 09:23

## 2019-01-14 RX ADMIN — ROCURONIUM BROMIDE 30 MG: 10 SOLUTION INTRAVENOUS at 09:23

## 2019-01-14 RX ADMIN — FENTANYL CITRATE 50 MCG: 50 INJECTION, SOLUTION INTRAMUSCULAR; INTRAVENOUS at 11:50

## 2019-01-14 RX ADMIN — ESMOLOL HYDROCHLORIDE 10 MG: 10 INJECTION INTRAVENOUS at 09:59

## 2019-01-14 RX ADMIN — HYDROMORPHONE HYDROCHLORIDE 0.5 MG: 1 INJECTION, SOLUTION INTRAMUSCULAR; INTRAVENOUS; SUBCUTANEOUS at 13:09

## 2019-01-14 RX ADMIN — FAMOTIDINE 20 MG: 20 TABLET ORAL at 07:42

## 2019-01-14 RX ADMIN — Medication 3 MG: at 11:06

## 2019-01-14 RX ADMIN — HYDROMORPHONE HYDROCHLORIDE 0.5 MG: 1 INJECTION, SOLUTION INTRAMUSCULAR; INTRAVENOUS; SUBCUTANEOUS at 12:15

## 2019-01-14 RX ADMIN — LIDOCAINE HYDROCHLORIDE 0.5 ML: 10 INJECTION, SOLUTION EPIDURAL; INFILTRATION; INTRACAUDAL; PERINEURAL at 07:43

## 2019-01-14 NOTE — ANESTHESIA PREPROCEDURE EVALUATION
Anesthesia Evaluation                  Airway   Mallampati: I  TM distance: >3 FB  Neck ROM: full  No difficulty expected  Dental      Pulmonary    Cardiovascular         Neuro/Psych  (+) headaches, numbness, psychiatric history,     GI/Hepatic/Renal/Endo    (+)  GERD,      Musculoskeletal     Abdominal    Substance History      OB/GYN          Other                        Anesthesia Plan    ASA 2     general     intravenous induction   Anesthetic plan, all risks, benefits, and alternatives have been provided, discussed and informed consent has been obtained with: patient.    Plan discussed with CRNA.

## 2019-01-14 NOTE — ANESTHESIA POSTPROCEDURE EVALUATION
Patient: Angie Sutherland    Procedure Summary     Date:  01/14/19 Room / Location:   [facility] /  [facility]    Anesthesia Start:  0919 Anesthesia Stop:      Procedure:  EXCHANGE BILATERAL EXPANDERS TO HIGHLY COHESIVE SILICONE IMPLANTS, BILATERAL CAPSULECTOMIES (Bilateral Breast) Diagnosis:      Surgeon:  Delia Covarrubias MD Provider:  Leland Reilly MD    Anesthesia Type:  general ASA Status:  2          Anesthesia Type: general  Last vitals  BP   139/88   Temp   97.0   Pulse   101   Resp   18     SpO2   100%     Post Anesthesia Care and Evaluation    Patient location during evaluation: PACU  Patient participation: waiting for patient participation  Pain score: 0  Pain management: adequate  Airway patency: patent  Anesthetic complications: No anesthetic complications  PONV Status: none  Cardiovascular status: hemodynamically stable and acceptable  Respiratory status: nonlabored ventilation, acceptable and nasal cannula  Hydration status: acceptable

## 2019-01-14 NOTE — ANESTHESIA PROCEDURE NOTES
ANESTHESIA INTUBATION  Urgency: elective    Airway not difficult    General Information and Staff    Patient location during procedure: OR  CRNA: Светлана Martinez CRNA    Indications and Patient Condition  Indications for airway management: airway protection    Preoxygenated: yes  MILS not maintained throughout  Mask difficulty assessment: 1 - vent by mask    Final Airway Details  Final airway type: endotracheal airway      Successful airway: ETT  Cuffed: yes   Successful intubation technique: direct laryngoscopy  Endotracheal tube insertion site: oral  Blade: Sophy  Blade size: 3  ETT size (mm): 7.0  Cormack-Lehane Classification: grade IIb - view of arytenoids or posterior of glottis only  Placement verified by: chest auscultation and capnometry   Measured from: lips  ETT to lips (cm): 20  Number of attempts at approach: 1    Additional Comments  Negative epigastric sounds, Breath sound equal bilaterally with symmetric chest rise and fall, lips and teeth as pre op

## 2019-02-01 DIAGNOSIS — C50.011 MALIGNANT NEOPLASM INVOLVING BOTH NIPPLE AND AREOLA OF RIGHT BREAST IN FEMALE, UNSPECIFIED ESTROGEN RECEPTOR STATUS (HCC): ICD-10-CM

## 2019-02-06 ENCOUNTER — LAB (OUTPATIENT)
Dept: LAB | Facility: HOSPITAL | Age: 24
End: 2019-02-06

## 2019-02-06 ENCOUNTER — OFFICE VISIT (OUTPATIENT)
Dept: ONCOLOGY | Facility: CLINIC | Age: 24
End: 2019-02-06

## 2019-02-06 VITALS
HEIGHT: 62 IN | RESPIRATION RATE: 18 BRPM | OXYGEN SATURATION: 98 % | BODY MASS INDEX: 25.58 KG/M2 | HEART RATE: 101 BPM | WEIGHT: 139 LBS | SYSTOLIC BLOOD PRESSURE: 96 MMHG | TEMPERATURE: 97.8 F | DIASTOLIC BLOOD PRESSURE: 56 MMHG

## 2019-02-06 DIAGNOSIS — C50.011 MALIGNANT NEOPLASM INVOLVING BOTH NIPPLE AND AREOLA OF RIGHT BREAST IN FEMALE, UNSPECIFIED ESTROGEN RECEPTOR STATUS (HCC): ICD-10-CM

## 2019-02-06 DIAGNOSIS — C50.011 MALIGNANT NEOPLASM OF NIPPLE OF RIGHT BREAST IN FEMALE, UNSPECIFIED ESTROGEN RECEPTOR STATUS (HCC): Primary | ICD-10-CM

## 2019-02-06 LAB
ALBUMIN SERPL-MCNC: 4.38 G/DL (ref 3.2–4.8)
ALBUMIN/GLOB SERPL: 1.7 G/DL (ref 1.5–2.5)
ALP SERPL-CCNC: 78 U/L (ref 25–100)
ALT SERPL W P-5'-P-CCNC: 16 U/L (ref 7–40)
ANION GAP SERPL CALCULATED.3IONS-SCNC: 3 MMOL/L (ref 3–11)
AST SERPL-CCNC: 25 U/L (ref 0–33)
BILIRUB SERPL-MCNC: 0.5 MG/DL (ref 0.3–1.2)
BUN BLD-MCNC: 11 MG/DL (ref 9–23)
BUN/CREAT SERPL: 12.4 (ref 7–25)
CALCIUM SPEC-SCNC: 8.9 MG/DL (ref 8.7–10.4)
CHLORIDE SERPL-SCNC: 111 MMOL/L (ref 99–109)
CO2 SERPL-SCNC: 26 MMOL/L (ref 20–31)
CREAT BLD-MCNC: 0.89 MG/DL (ref 0.6–1.3)
ERYTHROCYTE [DISTWIDTH] IN BLOOD BY AUTOMATED COUNT: 12.6 % (ref 11.3–14.5)
GFR SERPL CREATININE-BSD FRML MDRD: 94 ML/MIN/1.73
GLOBULIN UR ELPH-MCNC: 2.6 GM/DL
GLUCOSE BLD-MCNC: 120 MG/DL (ref 70–100)
HCT VFR BLD AUTO: 40.5 % (ref 34.5–44)
HGB BLD-MCNC: 13.7 G/DL (ref 11.5–15.5)
LYMPHOCYTES # BLD AUTO: 1.4 10*3/MM3 (ref 0.6–4.8)
LYMPHOCYTES NFR BLD AUTO: 50.7 % (ref 24–44)
MCH RBC QN AUTO: 31.3 PG (ref 27–31)
MCHC RBC AUTO-ENTMCNC: 33.9 G/DL (ref 32–36)
MCV RBC AUTO: 92.2 FL (ref 80–99)
MONOCYTES # BLD AUTO: 0.1 10*3/MM3 (ref 0–1)
MONOCYTES NFR BLD AUTO: 3.9 % (ref 0–12)
NEUTROPHILS # BLD AUTO: 1.3 10*3/MM3 (ref 1.5–8.3)
NEUTROPHILS NFR BLD AUTO: 45.4 % (ref 41–71)
PLATELET # BLD AUTO: 186 10*3/MM3 (ref 150–450)
PMV BLD AUTO: 7.5 FL (ref 6–12)
POTASSIUM BLD-SCNC: 4.1 MMOL/L (ref 3.5–5.5)
PROT SERPL-MCNC: 7 G/DL (ref 5.7–8.2)
RBC # BLD AUTO: 4.39 10*6/MM3 (ref 3.89–5.14)
SODIUM BLD-SCNC: 140 MMOL/L (ref 132–146)
WBC NRBC COR # BLD: 2.8 10*3/MM3 (ref 3.5–10.8)

## 2019-02-06 PROCEDURE — 99213 OFFICE O/P EST LOW 20 MIN: CPT | Performed by: INTERNAL MEDICINE

## 2019-02-06 PROCEDURE — 80053 COMPREHEN METABOLIC PANEL: CPT

## 2019-02-06 PROCEDURE — 85025 COMPLETE CBC W/AUTO DIFF WBC: CPT

## 2019-02-06 PROCEDURE — 36415 COLL VENOUS BLD VENIPUNCTURE: CPT

## 2019-02-06 NOTE — PROGRESS NOTES
PROBLEM LIST:  1. [id number] (stage IIIB) invasive ductal carcinoma of the right breast, ER-, [place] weakly +, Her2 3+.  A) presented with pain and swelling of the right breast after childbirth. Biopsy 5/8/17 showed high grade IDC, Right axillary LN biopsy positive for metastatic node involvement. Skin biopsy showed involvement of the dermis with lymphatic space involvement.  PET/CT on 5/24/17 showed hypermetabolic activity in the breast and axillary nodes,  No distant spread of disease.  B) neoadjuvant chemotherapy with TCHP started on 5/25/17.  Infusion reaction consisting of severe leg pain with the first dose of herceptin.  Taxotere dose reduced to 80% on cycle 5 for neuropathy.  C) bilateral mastectomy on 10/30/17.  Pathology showed multifocal < 1 mm high grade IDC with focal lymphovascular invasion.  4 sentinel lymph nodes with no malignancy.   [id number] (Stage yIA)  D) radiation therapy completed on 1/23/18.  Patient declined tamoxifen.  herceptin completed June 2018.  Neratinib started July 2018.  2. Migraines  3. Pre-diabetes    Subjective     HISTORY OF PRESENT ILLNESS:   Angie Sutherland returns for follow-up.    She says she is feeling ok.  She has not been on neratinib since early January.  She was expecting a shipment mid January but was told there were insurance issues and has not heard anything more.  She doesn't really feel any different off of it.  She still struggles with depression/anxiety and is following up with Cyndee Soto regarding this.    She sees neurology next wee re: migraines.    Past Medical History, Past Surgical History, Social History, Family History have been reviewed and are without significant changes except as mentioned.    Review of Systems   A comprehensive 14 point review of systems was performed and was negative except as mentioned.    Medications:  The current medication list was reviewed in the EMR    ALLERGIES:    Allergies   Allergen Reactions   • Benadryl  "[Diphenhydramine] Diarrhea       Objective      BP 96/56 Comment: CHARYE  Pulse 101   Temp 97.8 °F (36.6 °C) (Temporal)   Resp 18   Ht 157.5 cm (62\")   Wt 63 kg (139 lb)   SpO2 98% Comment: RA  BMI 25.42 kg/m²      Performance Status: 0    General: well appearing female in no acute distress  Neuro: alert and oriented  HEENT: sclera anicteric, oropharynx clear  Lymphatics: no cervical, supraclavicular Adenopathy  No palpable axillary adenopathy  Chest: Status post bilateral mastectomy with implants in place. No palpable masses or lesions.  Cardiovascular: regular rate and rhythm, no murmurs  Lungs: clear to auscultation bilaterally  Abdomen: soft, nontender, nondistended.  No palpable organomegaly  Extremeties: no lower extremity edema  Skin: no rashes, lesions, bruising, or petechiae  Psych: mood and affect appropriate        Assessment/Plan   Angie Sutherland is a 23 y.o. year old female with stage IIIB HER-2 positive breast cancer who returns for follow-up.  She should still be taking a rat negative and we will follow-up with the specialty pharmacy regarding her shipment.  We'll plan to continue a total of 12 months.    Depression and anxiety: Follow up with Cyndee Soto    Contraception.  She did not get her Lupron shot as was due in January.  We will try to do this today or in the next few days.    She has recurrent migraine headaches.  She will see neurology next week.    Follow-up one month.                I spent 15 minutes with the patient. I spent > 50% percent of this time counseling and discussing prognosis, diagnostic testing, evaluation, current status and management.      Shadia Amos MD  [facility] Hematology [facility] On[facility]    2/6/2019          CC:          "

## 2019-02-07 ENCOUNTER — TRANSCRIBE ORDERS (OUTPATIENT)
Dept: PHYSICAL THERAPY | Facility: HOSPITAL | Age: 24
End: 2019-02-07

## 2019-02-07 ENCOUNTER — HOSPITAL ENCOUNTER (OUTPATIENT)
Dept: ONCOLOGY | Facility: HOSPITAL | Age: 24
Setting detail: INFUSION SERIES
End: 2019-02-07

## 2019-02-07 DIAGNOSIS — Z90.11 STATUS POST RIGHT MASTECTOMY: Primary | ICD-10-CM

## 2019-02-11 ENCOUNTER — HOSPITAL ENCOUNTER (OUTPATIENT)
Dept: ONCOLOGY | Facility: HOSPITAL | Age: 24
Setting detail: INFUSION SERIES
Discharge: HOME | End: 2019-02-11

## 2019-02-11 VITALS
HEIGHT: 62 IN | SYSTOLIC BLOOD PRESSURE: 135 MMHG | DIASTOLIC BLOOD PRESSURE: 94 MMHG | WEIGHT: 137 LBS | RESPIRATION RATE: 16 BRPM | TEMPERATURE: 97.2 F | HEART RATE: 97 BPM | BODY MASS INDEX: 25.21 KG/M2

## 2019-02-11 DIAGNOSIS — C50.811 MALIGNANT NEOPLASM OF OVERLAPPING SITES OF RIGHT FEMALE BREAST, UNSPECIFIED ESTROGEN RECEPTOR STATUS (HCC): Primary | ICD-10-CM

## 2019-02-11 PROCEDURE — 96402 CHEMO HORMON ANTINEOPL SQ/IM: CPT

## 2019-02-11 PROCEDURE — 25010000002 LEUPROLIDE ACETATE (3 MONTH) PER 3.75 MG: Performed by: INTERNAL MEDICINE

## 2019-02-11 RX ADMIN — LEUPROLIDE ACETATE 11.25 MG: KIT at 14:59

## 2019-02-18 ENCOUNTER — OFFICE VISIT (OUTPATIENT)
Dept: PSYCHIATRY | Facility: CLINIC | Age: 24
End: 2019-02-18

## 2019-02-18 DIAGNOSIS — F33.2 SEVERE EPISODE OF RECURRENT MAJOR DEPRESSIVE DISORDER, WITHOUT PSYCHOTIC FEATURES (HCC): ICD-10-CM

## 2019-02-18 DIAGNOSIS — F41.1 GENERALIZED ANXIETY DISORDER: Primary | ICD-10-CM

## 2019-02-18 PROCEDURE — 90836 PSYTX W PT W E/M 45 MIN: CPT | Performed by: NURSE PRACTITIONER

## 2019-02-18 PROCEDURE — 99213 OFFICE O/P EST LOW 20 MIN: CPT | Performed by: NURSE PRACTITIONER

## 2019-02-18 RX ORDER — TOPIRAMATE 25 MG/1
25 TABLET ORAL 2 TIMES DAILY
Qty: 60 TABLET | Refills: 2 | Status: SHIPPED | OUTPATIENT
Start: 2019-02-18 | End: 2019-03-05 | Stop reason: DRUGHIGH

## 2019-02-18 RX ORDER — ALPRAZOLAM 0.5 MG/1
TABLET ORAL
Qty: 30 TABLET | Refills: 0 | Status: SHIPPED | OUTPATIENT
Start: 2019-02-18 | End: 2020-09-29

## 2019-02-18 NOTE — PROGRESS NOTES
Subjective   Angie Sutherland is a 23 y.o. female who is here today for medication management follow up. and therapy     Chief Complaint: MANDIE, MDD recurrent severe without psychosis     History of Present Illness Patient presents with her two young daughters, 3yo and 4yo. She reports she doesn't get any financial help from either father of her two girls. She gets some money from her mother and her rent is free based on income. She has utility credits and her mother helps pay the rest. Pt has no transportation having to take two buses to get here for this appointment. She doesn't have employment, no 's license, can't afford , a car or car insurance. She gets money for food with food stamps. She reports continued anxiety and the alprazolam 0.25mg didn't help decrease worry.  The patient reports the following symptoms of anxiety: constant anxiety/worry, restlessness/on edge, difficulty concentrating, mind goes blank, irritability and muscle tension and have caused impairment in important areas of functioning. She rates anxiety daily a 6 or higher. Patient reports poor motivation and interest and just lays on couch or bed and doesn't little as possible with her children, she feeds them clothes them and makes sure they are safe.     The patient does reports depressive symptoms including depressed mood, crying spells, hypersomnia, anhedonia, feelings of guilt, feelings of hopelessness, feelings of helplessness, feelings of worthlessness, low energy, difficulty concentrating and psychomotor retardation.  Depression currently rated 8/10 with 10 being the worst. She adamantly denies suicidal thoughts or plans. She denies HI/AVH. Patient has a list of negatives in her life. No friends, no help, no time away from her daughters, little money, no independence, no transportation. When asked about getting her license she discusses all the reasons it wouldn't be helpful. With a job she needs childcare and a job  that can support she and the girls. She feels others only take advantage of her by wanting her to babysit knowing she is home and they don't pay her. The father of her youngest doesn't help financially and yet she currently is allowing him to sleep on her couch because he doesn't have a place to live but is working but not paying her. She feels her parents are only critical of her and her sister only wants things for herself and not a sister relationship of support. Pt working on disability but has been denied twice so far and she has a  and could be more months in [facility].        .  Denies adverse effects from medications.   (Scales based on 0 - 10 with 10 being the worst)        The following portions of the patient's history were reviewed and updated as appropriate: allergies, current medications, past family history, past medical history, past social history, past surgical history and problem list.    Review of Systemsdenies fever, cough, s/s’s of infection, denies GI/ problems, denies new medical issues     Objective   Physical Exam  not currently breastfeeding.    Allergies   Allergen Reactions   • Benadryl [Diphenhydramine] Diarrhea       Current Medications:   Current Outpatient Medications   Medication Sig Dispense Refill   • ALPRAZolam (XANAX) 0.5 MG tablet Take one tablet as needed for severe anxiety or panic 30 tablet 0   • Neratinib 40 MG tablet Take 160 mg by mouth Daily. 120 tablet 3   • OLANZapine (zyPREXA) 5 MG tablet Take 1 tablet by mouth Every Night. 30 tablet 5   • ondansetron (ZOFRAN) 4 MG tablet Take 1 tablet by mouth Every 8 (Eight) Hours As Needed for Nausea for up to 10 doses. 10 tablet 0   • oxyCODONE (ROXICODONE) 5 MG immediate release tablet Take 1 tablet by mouth Every 6 (Six) Hours As Needed for Moderate Pain . Do not drive while taking. 25 tablet 0   • topiramate (TOPAMAX) 25 MG tablet Take 1 tablet by mouth 2 (Two) Times a Day. (headache prevention) 60 tablet 2     No current  facility-administered medications for this visit.        Appearance: appropriate  Hygiene:   good  Cooperation:  Cooperative  Eye Contact:  Good  Psychomotor Behavior:  No psychomotor agitation/retardation, No EPS, No motor tics  Mood:  Depressed overwhelmed.   Affect:  Appropriate  Hopelessness: Denies  Speech:  Normal  Thought Process:  Linear  Thought Content:  Normal  Concentration: Normal   Suicidal:  None  Homicidal:  None  Hallucinations:  None  Delusion:  None  Memory:  Intact  Orientation:  Person, Place, Time and Situation  Reliability:  fair  Insight:  Fair  Judgement:  Fair  Impulse Control:  Fair  Estimated Intelligence: average range      Assessment/Plan   Diagnoses and all orders for this visit:    Generalized anxiety disorder    Severe episode of recurrent major depressive disorder, without psychotic features (CMS/HCC)    Other orders  -     topiramate (TOPAMAX) 25 MG tablet; Take 1 tablet by mouth 2 (Two) Times a Day. (headache prevention)  -     ALPRAZolam (XANAX) 0.5 MG tablet; Take one tablet as needed for severe anxiety or panic    IN 3pm   OUT 4pm   Face to face 10 min med management and 50 minutes therapy     IMPRESSION:  overwhelmed with struggles, challenges  PLAN:   Increase topiramate 25 mg bid for headaches  Change alprazolam to 0.5mg once a day as needed for high anxiety    Wrote out with pt top priorities and discussed the following , she is agreeable to do     1. Getting her 's license whether she has a car or not,  The manual to study she has an richard for  2. Research  assistance there are programs, get the facts  3. 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     I plan on doing [employer] Testing for antidepressants next visit, don't have kit with me today. She has reported side effects from most SSRI's and effexor she didn't tolerate, will get testing and go from there    We discussed risks, benefits, and side effects of the above medications and the patient was agreeable with the plan. Patient was educated on the importance of compliance with treatment and follow-up appointments.     Counseled patient regarding multimodal approach with healthy nutrition, healthy sleep, regular physical activity, social activities, counseling, and medications. Encouraged to practice lateral sleep position. Avoid alcohol and stimulants ie caffeine close to bedtime.      Coping skills reviewed and encouraged positive framing of thoughts    Assisted patient in processing above session content; acknowledged and normalized patient’s thoughts, feelings, and concerns.  Applied  positive coping skills and behavior management in session.  Allowed patient to freely discuss issues without interruption or judgment. Provided safe, confidential environment to facilitate the development of positive therapeutic relationship and encourage open, honest communication. Assisted patient in identifying risk factors which would indicate the need for higher level of care including thoughts to harm self or others and/or self-harming behavior and encouraged patient to contact this office, call 911, or present to the nearest emergency room should any of these events occur. Discussed crisis intervention services and means to access.  Patient adamantly and convincingly denies current suicidal or homicidal ideation or perceptual disturbance.    Treatment Plan: stabilize mood, patient will stay out of the hospital and be at optimal level of functioning, take all medication  as prescribed. Patient verbalized  understanding and agreement to plan.    Instructed to call for questions or concerns and return early if necessary. Crisis plan reviewed including going to the Emergency department.    Return in about 2 weeks (around 3/4/2019).

## 2019-03-05 ENCOUNTER — OFFICE VISIT (OUTPATIENT)
Dept: NEUROLOGY | Facility: CLINIC | Age: 24
End: 2019-03-05

## 2019-03-05 VITALS
SYSTOLIC BLOOD PRESSURE: 124 MMHG | BODY MASS INDEX: 25.58 KG/M2 | WEIGHT: 139 LBS | HEIGHT: 62 IN | DIASTOLIC BLOOD PRESSURE: 76 MMHG

## 2019-03-05 DIAGNOSIS — G43.019 INTRACTABLE MIGRAINE WITHOUT AURA AND WITHOUT STATUS MIGRAINOSUS: Primary | ICD-10-CM

## 2019-03-05 PROCEDURE — 99204 OFFICE O/P NEW MOD 45 MIN: CPT | Performed by: PSYCHIATRY & NEUROLOGY

## 2019-03-05 RX ORDER — TOPIRAMATE 50 MG/1
75 TABLET, FILM COATED ORAL 2 TIMES DAILY
Qty: 90 TABLET | Refills: 1 | Status: SHIPPED | OUTPATIENT
Start: 2019-03-05 | End: 2019-04-23 | Stop reason: SINTOL

## 2019-03-05 RX ORDER — SUMATRIPTAN 100 MG/1
TABLET, FILM COATED ORAL
Qty: 9 TABLET | Refills: 1 | Status: SHIPPED | OUTPATIENT
Start: 2019-03-05 | End: 2019-04-23 | Stop reason: SINTOL

## 2019-03-05 NOTE — PROGRESS NOTES
Subjective:    CC: Angie Sutherland is seen today in consultation at the request of UYEN Gray for Migraine       HPI:  Patient is a 23-year-old female with past medical history of invasive ductal carcinoma on treatment and status post bilateral mastectomy  referred to the clinic for evaluation and management of headaches.  She reports that she has been having headaches for a long time now.  She currently is reporting a proximally 8 headaches in a month.  She reports that typically it involves bifrontal area associated with light and sound sensitivity as well as sometimes nausea and vomiting.  She describes pain as dull throbbing type of pain with maximum pain intensity being 6-7 out of 10.  She is currently is taking tramadol as needed which helps only a little bit.  For severe headaches, she had to go to ER at least once or twice in a month.  She cannot think of any specific triggers.  She reports that her sleep is good so has the mood.  She was recently started on Topamax 25 mg twice daily which did not help reduce the intensity and frequency shows she is stopped taking it.  CT head without contrast was done in January 2018 and it did not reveal any acute intracranial abnormalities.    The following portions of the patient's history were reviewed today and updated as of 03/05/2019  : allergies, social history and problem list.  This document will be scanned to patient's chart.      Current Outpatient Medications:   •  ALPRAZolam (XANAX) 0.5 MG tablet, Take one tablet as needed for severe anxiety or panic, Disp: 30 tablet, Rfl: 0  •  OLANZapine (zyPREXA) 5 MG tablet, Take 1 tablet by mouth Every Night., Disp: 30 tablet, Rfl: 5  •  ondansetron (ZOFRAN) 4 MG tablet, Take 1 tablet by mouth Every 8 (Eight) Hours As Needed for Nausea for up to 10 doses., Disp: 10 tablet, Rfl: 0  •  Neratinib 40 MG tablet, Take 160 mg by mouth Daily., Disp: 120 tablet, Rfl: 3  •  oxyCODONE (ROXICODONE) 5 MG immediate  release tablet, Take 1 tablet by mouth Every 6 (Six) Hours As Needed for Moderate Pain . Do not drive while taking., Disp: 25 tablet, Rfl: 0  •  SUMAtriptan (IMITREX) 100 MG tablet, Take one tablet at onset of headache. May repeat dose one time in 2 hours if headache not relieved., Disp: 9 tablet, Rfl: 1  •  topiramate (TOPAMAX) 50 MG tablet, Take 1.5 tablets by mouth 2 (Two) Times a Day., Disp: 90 tablet, Rfl: 1   Past Medical History:   Diagnosis Date   • Anxiety    • Atypical squamous cell changes of undetermined significance (ASCUS) on cervical cytology with negative high risk human papilloma virus (HPV) test result 2015   • Breast cancer (CMS/[facility]) 2017    [id number] (stage IIIB) invasive ductal carcinoma of the right breast, ER-, [place] weakly +, Her2 3+.   • Depression    • GERD (gastroesophageal reflux disease)    • Migraine    • Ovarian cyst    • Pregnancy, incidental     3/2/2016: 14 weeks pregnant. She was seen in 2013 during pregnancy and received counselling and glucometer. She was lost to f/u but states baby had no complications - weighed 7lb1oz   • Spinal headache       Past Surgical History:   Procedure Laterality Date   • BREAST CAPSULOTOMY, IMPLANT REVISION Bilateral 2019    Procedure: EXCHANGE BILATERAL EXPANDERS TO HIGHLY COHESIVE SILICONE IMPLANTS, BILATERAL CAPSULECTOMIES;  Surgeon: Delia Covarrubias MD;  Location: [facility] OR;  Service: Plastics   • BREAST RECONSTRUCTION, BREAST TISSUE EXPANDER INSERTION Bilateral 10/30/2017    Procedure: BREAST RECONSTRUCTION WITH ALLODERM, BREAST TISSUE EXPANDER INSERTION BILATERAL;  Surgeon: Delia Covarrubias MD;  Location: [facility] OR;  Service:    •  SECTION     •  SECTION N/A 2016    Procedure:  SECTION REPEAT;  Surgeon: Malika Munoz MD;  Location: [facility] LABOR DELIVERY;  Service:    • MASTECTOMY W/ SENTINEL NODE BIOPSY Bilateral 10/30/2017    Procedure: BREAST MASTECTOMY BILATERAL WITH RIGHT SENTINEL NODE  "BIOPSY;  Surgeon: Carlo Paulion MD;  Location: [facility];  Service:    • VENOUS ACCESS DEVICE (PORT) INSERTION  05/18/2017      Family History   Problem Relation Age of Onset   • Diabetes Mother    • Ovarian cancer Maternal Grandmother    • Diabetes Brother       Review of Systems   Eyes: Positive for photophobia and visual disturbance.   Gastrointestinal: Positive for nausea.   Neurological: Positive for dizziness, light-headedness and headache.       All other systems reviewed and are negative     Objective:    /76   Ht 157.5 cm (62\")   Wt 63 kg (139 lb)   BMI 25.42 kg/m²     Neurology Exam:    General apperance: NAD.     Mental status: Alert, awake and oriented to time place and person.    Recent and Remote memory: Can recall 3/3 objects at 5 minutes. Can recall historical events.     Attention span and Concentration: Serial 7s: Normal.     Fund of knowledge:  Normal.     Language and Speech: No aphasia or dysarthria.    Naming , Repitition and Comprehension:  Can name objects, repeat a sentence and follow commands. Speech is clear and fluent with good repetition, comprehension, and naming.    Cranial Nerves:   CN II: Visual fields are full. Intact. Fundi - Normal, No papillederma, Pupils - DAVID  CN III, IV and VI: Extraocular movements are intact. Normal saccades.   CN V: Facial sensation is intact.   CN VII: Muscles of facial expression reveal no asymmetry. Intact.   CN VIII: Hearing is intact. Whispered voice intact.   CN IX and X: Palate elevates symmetrically. Intact  CN XI: Shoulder shrug is intact.   CN XII: Tongue is midline without evidence of atrophy or fasciculation.     Motor:  Right UE muscle strength 5/5. Normal tone.     Left UE muscle strength 5/5. Normal tone.      Right LE muscle strength5/5. Normal tone.     Left LE muscle strength 5/5. Normal tone.      Sensory: Normal light touch, vibration and pinprick sensation bilaterally.    DTRs: 2+ bilaterally in upper and lower " extremities.    Babinski: Negative bilaterally.    Co-ordination: Normal finger-to-nose, heel to shin B/L.    Rhomberg: Negative.    Gait: Normal.    Cardiovascular: Regular rate and rhythm without murmur, gallop or rub.    Assessment and Plan:  1. Intractable migraine without aura and without status migrainosus  She is currently reporting approximately 8 headache days in a month out of which 2 headaches are really severe.  She tried Topamax 25 mg twice daily without any help.  I have instructed her to increase the dose to 75 mg twice daily dose in the next 5 weeks for proper therapeutic effect.  Most common side effects including digital, perioral paresthesias, weight loss and rarely kidney stone and the cognitive side effects were explained to the patient.  I would advise her to drink plenty of water while on Topamax.  Will also prescribe Imitrex 100 mg as needed as an abortive therapy. CT head without contrast was reviewed that she did not reveal any acute intracranial abnormalities.  I will see her back in 5 weeks in follow-up.         Return in about 5 weeks (around 4/9/2019).     Ildefonso Preciado MD

## 2019-03-13 ENCOUNTER — OFFICE VISIT (OUTPATIENT)
Dept: PSYCHIATRY | Facility: CLINIC | Age: 24
End: 2019-03-13

## 2019-03-13 DIAGNOSIS — F33.1 MODERATE EPISODE OF RECURRENT MAJOR DEPRESSIVE DISORDER (HCC): ICD-10-CM

## 2019-03-13 DIAGNOSIS — F41.1 GENERALIZED ANXIETY DISORDER: Primary | ICD-10-CM

## 2019-03-13 PROCEDURE — 90836 PSYTX W PT W E/M 45 MIN: CPT | Performed by: NURSE PRACTITIONER

## 2019-03-13 PROCEDURE — 99213 OFFICE O/P EST LOW 20 MIN: CPT | Performed by: NURSE PRACTITIONER

## 2019-03-13 RX ORDER — VILAZODONE HYDROCHLORIDE 10 MG/1
10 TABLET ORAL DAILY
Qty: 15 TABLET | Refills: 0 | Status: SHIPPED | OUTPATIENT
Start: 2019-03-13 | End: 2019-04-23

## 2019-03-13 NOTE — PROGRESS NOTES
Subjective   Angie Sutherland is a 23 y.o. female who is here today for medication management follow up.  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  Patient reports that she got her 's permit the second time she took her test.  She is very pleased and was excited to tell this provider that she accomplished that goal.  Patient will be practicing driving with her mother's car and take her test within a month to 6 weeks if she is able to get enough practice in.  Discussed having her 's permit may be able to contact agencies that give cars to people.  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  If she is able to pay for car insurance gas and get to work then she did have a salary.  She is still be able to have food stamps because the salary would not cover everything.  Patient lives in low income housing.  She knows state funding programs for childcare and would look into that if she was going to get a job.  Patient feels a lot of depression and isolation because she cares for her 2 children does not have transportation it has been in the winter and does not have much money.  She states she has a friend who lives below her who has a child but that woman is 23 years old and depressed.  Patient reports she does not feel like medication has been beneficial at all.  She rates anxiety as 6-7 most days.  She states that she lays on the couch and does minimal for her children but make sure they are dressed eat and safe as well as entertained.  She has a strained relationship with her family and sister.  She never feels like her parents are doing enough for her but due for her sister.  Patient denies suicidal ideation denies homicidal ideations  denies AVH perceptual disturbance.  Patient denies self harming.  (Scales based on 0 - 10 with 10 being the worst)        The following portions of the patient's history were reviewed and updated as appropriate: allergies, current medications, past family history, past medical history, past social history, past surgical history and problem list.    Review of Systemsdenies fever, cough, s/s’s of infection, denies GI/ problems, denies new medical issues     Objective   Physical Exam  not currently breastfeeding.    Allergies   Allergen Reactions   • Benadryl [Diphenhydramine] Diarrhea       Current Medications:   Current Outpatient Medications   Medication Sig Dispense Refill   • ALPRAZolam (XANAX) 0.5 MG tablet Take one tablet as needed for severe anxiety or panic 30 tablet 0   • Neratinib 40 MG tablet Take 160 mg by mouth Daily. 120 tablet 3   • OLANZapine (zyPREXA) 5 MG tablet Take 1 tablet by mouth Every Night. 30 tablet 5   • ondansetron (ZOFRAN) 4 MG tablet Take 1 tablet by mouth Every 8 (Eight) Hours As Needed for Nausea for up to 10 doses. 10 tablet 0   • oxyCODONE (ROXICODONE) 5 MG immediate release tablet Take 1 tablet by mouth Every 6 (Six) Hours As Needed for Moderate Pain . Do not drive while taking. 25 tablet 0   • SUMAtriptan (IMITREX) 100 MG tablet Take one tablet at onset of headache. May repeat dose one time in 2 hours if headache not relieved. 9 tablet 1   • topiramate (TOPAMAX) 50 MG tablet Take 1.5 tablets by mouth 2 (Two) Times a Day. 90 tablet 1   • vilazodone (VIIBRYD) 10 MG tablet tablet Take 1 tablet by mouth Daily. 15 tablet 0     No current facility-administered medications for this visit.        Appearance: appropriate  Hygiene:   good  Cooperation:  Cooperative  Eye Contact:  Good  Psychomotor Behavior:  No psychomotor agitation/retardation, No EPS, No motor tics  Mood:  within normal limits  Affect:  Appropriate  Hopelessness: Denies  Speech:  Normal  Thought Process:  Linear  Thought  Content:  Normal  Concentration: Normal   Suicidal:  None  Homicidal:  None  Hallucinations:  None  Delusion:  None  Memory:  Intact  Orientation:  Person, Place, Time and Situation  Reliability:  fair  Insight:  Fair  Judgement:  Fair  Impulse Control:  Fair  Estimated Intelligence: average range    PEDRO REVIEWED NO RED FLAGS Request # : [id number]      Assessment/Plan   Diagnoses and all orders for this visit:    Generalized anxiety disorder    Moderate episode of recurrent major depressive disorder (CMS/HCC)    Other orders  -     vilazodone (VIIBRYD) 10 MG tablet tablet; Take 1 tablet by mouth Daily.      60 minutes face-to-face, 15 minutes medication management, 45 minutes therapy  In 210 PM  out 310 PM  IMPRESSION: Continued depression and anxiety  PLAN:   Will begin Viibryd 10 mg 1 p.o. daily for anxiety and depression  We discussed risks, benefits, and side effects of the above medications and the patient was agreeable with the plan. Patient was educated on the importance of compliance with treatment and follow-up appointments.   Gave patient a book on finding personal mission statement, passions for different activities, exploring who Angie is and work on connecting with herself again.  She is willing to read the book and work on the activities that are in the book.  Counseled patient regarding multimodal approach with healthy nutrition, healthy sleep, regular physical activity, social activities, counseling, and medications.  Weather will be improving  Coping skills reviewed and encouraged positive framing of thoughts and planning her future with getting her 's license and then a job as well as  that may be paid for by the state.    Assisted patient in processing above session content; independence, depression and sources of conflict with family, caring for her 2 young children, safety acknowledged and normalized patient’s thoughts, feelings, and concerns.  Applied  positive coping skills and  behavior management in session.  Allowed patient to freely discuss issues without interruption or judgment. Provided safe, confidential environment to facilitate the development of positive therapeutic relationship and encourage open, honest communication. Assisted patient in identifying risk factors which would indicate the need for higher level of care including thoughts to harm self or others and/or self-harming behavior and encouraged patient to contact this office, call 911, or present to the nearest emergency room should any of these events occur. Discussed crisis intervention services and means to access.  Patient adamantly and convincingly denies current suicidal or homicidal ideation or perceptual disturbance.    Treatment Plan: stabilize mood, patient will stay out of the hospital and be at optimal level of functioning, take all medication as prescribed. Patient verbalized  understanding and agreement to plan.    Instructed to call for questions or concerns and return early if necessary. Crisis plan reviewed including going to the Emergency department.    Return in about 4 weeks (around 4/10/2019).

## 2019-03-29 ENCOUNTER — TELEPHONE (OUTPATIENT)
Dept: ONCOLOGY | Facility: CLINIC | Age: 24
End: 2019-03-29

## 2019-03-29 ENCOUNTER — OFFICE VISIT (OUTPATIENT)
Dept: ONCOLOGY | Facility: CLINIC | Age: 24
End: 2019-03-29

## 2019-03-29 VITALS
TEMPERATURE: 97.1 F | WEIGHT: 137 LBS | BODY MASS INDEX: 25.21 KG/M2 | DIASTOLIC BLOOD PRESSURE: 67 MMHG | HEART RATE: 78 BPM | RESPIRATION RATE: 18 BRPM | HEIGHT: 62 IN | SYSTOLIC BLOOD PRESSURE: 108 MMHG

## 2019-03-29 DIAGNOSIS — C50.011 MALIGNANT NEOPLASM INVOLVING BOTH NIPPLE AND AREOLA OF RIGHT BREAST IN FEMALE, UNSPECIFIED ESTROGEN RECEPTOR STATUS (HCC): ICD-10-CM

## 2019-03-29 DIAGNOSIS — C50.011 MALIGNANT NEOPLASM OF NIPPLE OF RIGHT BREAST IN FEMALE, UNSPECIFIED ESTROGEN RECEPTOR STATUS (HCC): Primary | ICD-10-CM

## 2019-03-29 PROCEDURE — 99213 OFFICE O/P EST LOW 20 MIN: CPT | Performed by: INTERNAL MEDICINE

## 2019-03-29 RX ORDER — ONDANSETRON 4 MG/1
4 TABLET, FILM COATED ORAL EVERY 8 HOURS PRN
Qty: 40 TABLET | Refills: 5 | Status: SHIPPED | OUTPATIENT
Start: 2019-03-29 | End: 2019-04-01 | Stop reason: SDUPTHER

## 2019-03-29 NOTE — TELEPHONE ENCOUNTER
Called and spoke with pt. She has not heard from [facility] yet today regarding delivery of medication. Let her know she has a $0 co-pay and that she can reach the specialty pharmacy at [phone] to set up delivery. Pt v/u.

## 2019-03-29 NOTE — PROGRESS NOTES
PROBLEM LIST:  1. [id number] (stage IIIB) invasive ductal carcinoma of the right breast, ER-, [place] weakly +, Her2 3+.  A) presented with pain and swelling of the right breast after childbirth. Biopsy 5/8/17 showed high grade IDC, Right axillary LN biopsy positive for metastatic node involvement. Skin biopsy showed involvement of the dermis with lymphatic space involvement.  PET/CT on 5/24/17 showed hypermetabolic activity in the breast and axillary nodes,  No distant spread of disease.  B) neoadjuvant chemotherapy with TCHP started on 5/25/17.  Infusion reaction consisting of severe leg pain with the first dose of herceptin.  Taxotere dose reduced to 80% on cycle 5 for neuropathy.  C) bilateral mastectomy on 10/30/17.  Pathology showed multifocal < 1 mm high grade IDC with focal lymphovascular invasion.  4 sentinel lymph nodes with no malignancy.   [id number] (Stage yIA)  D) radiation therapy completed on 1/23/18.  Patient declined tamoxifen.  herceptin completed June 2018.  Neratinib started July 2018.  2. Migraines  3. Pre-diabetes    Subjective      CC: breast cancer    HISTORY OF PRESENT ILLNESS:   Angie Sutherland returns for follow-up.  She tried increasing Topamax for the migraines but did not notice any improvement.  She says tramadol has been the thing that helps the most.  She has not been taking neratinib since January.  Her insurance changed and it has not been covered.  She has noticed that since stopping that she feels better and her skin has improved.  She does report a lot of anxiety about recurrence risk.    Past Medical History, Past Surgical History, Social History, Family History have been reviewed and are without significant changes except as mentioned.    Review of Systems   A comprehensive 14 point review of systems was performed and was negative except as mentioned.    Medications:  The current medication list was reviewed in the EMR    ALLERGIES:    Allergies   Allergen Reactions   •  "Benadryl [Diphenhydramine] Diarrhea       Objective      /67   Pulse 78   Temp 97.1 °F (36.2 °C)   Resp 18   Ht 157.5 cm (62\")   Wt 62.1 kg (137 lb)   BMI 25.06 kg/m²      Performance Status: 0    General: well appearing female in no acute distress  Neuro: alert and oriented  HEENT: sclera anicteric, oropharynx clear  Lymphatics: no cervical, supraclavicular Adenopathy  No palpable axillary adenopathy  Chest: Status post bilateral mastectomy with implants in place. No palpable masses or lesions.  Cardiovascular: regular rate and rhythm, no murmurs  Lungs: clear to auscultation bilaterally  Abdomen: soft, nontender, nondistended.  No palpable organomegaly  Extremeties: no lower extremity edema  Skin: no rashes, lesions, bruising, or petechiae  Psych: mood and affect appropriate        Assessment/Plan   Angie Sutherland is a 23 y.o. year old female with stage IIIB HER-2 positive breast cancer who returns for follow-up.  She has taken neratinib for 6 months but has been unable to get it for the past 3 months due to insurance issues.  We will follow-up with her pharmacist about this.  I would like her to complete the 1 year of treatment.    Depression and anxiety: Follow up with Cyndee Rudd.  Lupron is due again May 1    She has recurrent migraine headaches.  Follow-up with neurology.  We will refill her Zofran.    Follow-up one month.  Once she is done with neratinib, we will space out visits to every 3 months.                I spent 15 minutes with the patient. I spent > 50% percent of this time counseling and discussing prognosis, diagnostic testing, evaluation, current status and management.      Shadia Amos MD  [facility]    3/29/2019          CC:          "

## 2019-04-01 ENCOUNTER — HOSPITAL ENCOUNTER (EMERGENCY)
Facility: HOSPITAL | Age: 24
Discharge: HOME | End: 2019-04-01
Attending: EMERGENCY MEDICINE | Admitting: EMERGENCY MEDICINE

## 2019-04-01 VITALS
WEIGHT: 136 LBS | SYSTOLIC BLOOD PRESSURE: 116 MMHG | OXYGEN SATURATION: 99 % | TEMPERATURE: 98.1 F | RESPIRATION RATE: 18 BRPM | BODY MASS INDEX: 25.03 KG/M2 | HEIGHT: 62 IN | DIASTOLIC BLOOD PRESSURE: 78 MMHG | HEART RATE: 89 BPM

## 2019-04-01 DIAGNOSIS — R19.7 NAUSEA VOMITING AND DIARRHEA: ICD-10-CM

## 2019-04-01 DIAGNOSIS — R10.84 GENERALIZED ABDOMINAL PAIN: Primary | ICD-10-CM

## 2019-04-01 DIAGNOSIS — R11.2 NAUSEA VOMITING AND DIARRHEA: ICD-10-CM

## 2019-04-01 LAB
ALBUMIN SERPL-MCNC: 4.69 G/DL (ref 3.2–4.8)
ALBUMIN/GLOB SERPL: 1.5 G/DL (ref 1.5–2.5)
ALP SERPL-CCNC: 79 U/L (ref 25–100)
ALT SERPL W P-5'-P-CCNC: 27 U/L (ref 7–40)
ANION GAP SERPL CALCULATED.3IONS-SCNC: 11 MMOL/L (ref 3–11)
AST SERPL-CCNC: 27 U/L (ref 0–33)
B-HCG UR QL: NEGATIVE
BASOPHILS # BLD AUTO: 0.01 10*3/MM3 (ref 0–0.2)
BASOPHILS NFR BLD AUTO: 0.1 % (ref 0–1)
BILIRUB SERPL-MCNC: 0.6 MG/DL (ref 0.3–1.2)
BILIRUB UR QL STRIP: NEGATIVE
BUN BLD-MCNC: 12 MG/DL (ref 9–23)
BUN/CREAT SERPL: 16.4 (ref 7–25)
CALCIUM SPEC-SCNC: 9.7 MG/DL (ref 8.7–10.4)
CHLORIDE SERPL-SCNC: 105 MMOL/L (ref 99–109)
CLARITY UR: CLEAR
CO2 SERPL-SCNC: 25 MMOL/L (ref 20–31)
COLOR UR: YELLOW
CREAT BLD-MCNC: 0.73 MG/DL (ref 0.6–1.3)
DEPRECATED RDW RBC AUTO: 41.7 FL (ref 37–54)
EOSINOPHIL # BLD AUTO: 0.01 10*3/MM3 (ref 0–0.3)
EOSINOPHIL NFR BLD AUTO: 0.1 % (ref 0–3)
ERYTHROCYTE [DISTWIDTH] IN BLOOD BY AUTOMATED COUNT: 12.3 % (ref 11.3–14.5)
GFR SERPL CREATININE-BSD FRML MDRD: 119 ML/MIN/1.73
GLOBULIN UR ELPH-MCNC: 3.2 GM/DL
GLUCOSE BLD-MCNC: 136 MG/DL (ref 70–100)
GLUCOSE UR STRIP-MCNC: NEGATIVE MG/DL
HCT VFR BLD AUTO: 48.2 % (ref 34.5–44)
HGB BLD-MCNC: 15.9 G/DL (ref 11.5–15.5)
HGB UR QL STRIP.AUTO: NEGATIVE
HOLD SPECIMEN: NORMAL
HOLD SPECIMEN: NORMAL
IMM GRANULOCYTES # BLD AUTO: 0.01 10*3/MM3 (ref 0–0.05)
IMM GRANULOCYTES NFR BLD AUTO: 0.1 % (ref 0–0.6)
INTERNAL NEGATIVE CONTROL: NEGATIVE
INTERNAL POSITIVE CONTROL: POSITIVE
KETONES UR QL STRIP: NEGATIVE
LEUKOCYTE ESTERASE UR QL STRIP.AUTO: NEGATIVE
LIPASE SERPL-CCNC: 48 U/L (ref 6–51)
LYMPHOCYTES # BLD AUTO: 0.41 10*3/MM3 (ref 0.6–4.8)
LYMPHOCYTES NFR BLD AUTO: 5.1 % (ref 24–44)
Lab: NORMAL
MCH RBC QN AUTO: 30.4 PG (ref 27–31)
MCHC RBC AUTO-ENTMCNC: 33 G/DL (ref 32–36)
MCV RBC AUTO: 92.2 FL (ref 80–99)
MONOCYTES # BLD AUTO: 0.33 10*3/MM3 (ref 0–1)
MONOCYTES NFR BLD AUTO: 4.1 % (ref 0–12)
NEUTROPHILS # BLD AUTO: 7.33 10*3/MM3 (ref 1.5–8.3)
NEUTROPHILS NFR BLD AUTO: 90.6 % (ref 41–71)
NITRITE UR QL STRIP: NEGATIVE
PH UR STRIP.AUTO: 6.5 [PH] (ref 5–8)
PLATELET # BLD AUTO: 185 10*3/MM3 (ref 150–450)
PMV BLD AUTO: 9.8 FL (ref 6–12)
POTASSIUM BLD-SCNC: 3.7 MMOL/L (ref 3.5–5.5)
PROT SERPL-MCNC: 7.9 G/DL (ref 5.7–8.2)
PROT UR QL STRIP: NEGATIVE
RBC # BLD AUTO: 5.23 10*6/MM3 (ref 3.89–5.14)
SODIUM BLD-SCNC: 141 MMOL/L (ref 132–146)
SP GR UR STRIP: 1.02 (ref 1–1.03)
UROBILINOGEN UR QL STRIP: NORMAL
WBC NRBC COR # BLD: 8.09 10*3/MM3 (ref 3.5–10.8)
WHOLE BLOOD HOLD SPECIMEN: NORMAL
WHOLE BLOOD HOLD SPECIMEN: NORMAL

## 2019-04-01 PROCEDURE — 25010000002 ONDANSETRON PER 1 MG: Performed by: PHYSICIAN ASSISTANT

## 2019-04-01 PROCEDURE — 83690 ASSAY OF LIPASE: CPT

## 2019-04-01 PROCEDURE — 85025 COMPLETE CBC W/AUTO DIFF WBC: CPT

## 2019-04-01 PROCEDURE — 25010000002 KETOROLAC TROMETHAMINE PER 15 MG: Performed by: PHYSICIAN ASSISTANT

## 2019-04-01 PROCEDURE — 96375 TX/PRO/DX INJ NEW DRUG ADDON: CPT

## 2019-04-01 PROCEDURE — 96376 TX/PRO/DX INJ SAME DRUG ADON: CPT

## 2019-04-01 PROCEDURE — 81003 URINALYSIS AUTO W/O SCOPE: CPT | Performed by: EMERGENCY MEDICINE

## 2019-04-01 PROCEDURE — 25010000002 ONDANSETRON PER 1 MG: Performed by: EMERGENCY MEDICINE

## 2019-04-01 PROCEDURE — 80053 COMPREHEN METABOLIC PANEL: CPT

## 2019-04-01 PROCEDURE — 99285 EMERGENCY DEPT VISIT HI MDM: CPT

## 2019-04-01 PROCEDURE — 96374 THER/PROPH/DIAG INJ IV PUSH: CPT

## 2019-04-01 PROCEDURE — 81025 URINE PREGNANCY TEST: CPT | Performed by: EMERGENCY MEDICINE

## 2019-04-01 RX ORDER — ONDANSETRON 4 MG/1
4 TABLET, FILM COATED ORAL EVERY 8 HOURS PRN
Qty: 20 TABLET | Refills: 0 | OUTPATIENT
Start: 2019-04-01 | End: 2019-07-02

## 2019-04-01 RX ORDER — ONDANSETRON 2 MG/ML
4 INJECTION INTRAMUSCULAR; INTRAVENOUS ONCE
Status: COMPLETED | OUTPATIENT
Start: 2019-04-01 | End: 2019-04-01

## 2019-04-01 RX ORDER — SODIUM CHLORIDE 0.9 % (FLUSH) 0.9 %
10 SYRINGE (ML) INJECTION AS NEEDED
Status: DISCONTINUED | OUTPATIENT
Start: 2019-04-01 | End: 2019-04-01 | Stop reason: HOSPADM

## 2019-04-01 RX ORDER — FAMOTIDINE 10 MG/ML
20 INJECTION, SOLUTION INTRAVENOUS ONCE
Status: COMPLETED | OUTPATIENT
Start: 2019-04-01 | End: 2019-04-01

## 2019-04-01 RX ORDER — KETOROLAC TROMETHAMINE 15 MG/ML
15 INJECTION, SOLUTION INTRAMUSCULAR; INTRAVENOUS ONCE
Status: COMPLETED | OUTPATIENT
Start: 2019-04-01 | End: 2019-04-01

## 2019-04-01 RX ADMIN — FAMOTIDINE 20 MG: 10 INJECTION, SOLUTION INTRAVENOUS at 15:00

## 2019-04-01 RX ADMIN — ONDANSETRON 4 MG: 2 INJECTION INTRAMUSCULAR; INTRAVENOUS at 17:17

## 2019-04-01 RX ADMIN — ONDANSETRON 4 MG: 2 INJECTION INTRAMUSCULAR; INTRAVENOUS at 15:00

## 2019-04-01 RX ADMIN — KETOROLAC TROMETHAMINE 15 MG: 15 INJECTION, SOLUTION INTRAMUSCULAR; INTRAVENOUS at 15:00

## 2019-04-01 RX ADMIN — SODIUM CHLORIDE 1000 ML: 9 INJECTION, SOLUTION INTRAVENOUS at 14:57

## 2019-04-01 NOTE — ED PROVIDER NOTES
Subjective   Pt is a 24 yo female presenting to ED with abdominal pain, nausea, vomiting and diarrhea. Pt began having V/D around 5am today. The V/D have slowed down and she denies any blood in vomit or stool. She is having generalized abdominal cramping. She denies urinary sx or fever. SHe denies known sick contacts. She ate mozzarella cheese sticks from [employer] last night and states they didn't taste right and didn't finish eating them but she feels she has food poisoning. Only abdominal surgery includes Csection. She denies recent antibiotics. She denies syncope, dizziness, CP, SOB, sore throat / URI or urinary sx. PMHx significant for Migraines, Anxiety, Depression, Ovarian cysts and Breast Cancer s/p mastectomy 10/2017 and radiation and s/p reconstructive surgery 1/2019. She denies tobacco use and uses ETOH occasionally.         History provided by:  Patient  Abdominal Pain   Pain location:  Generalized  Pain quality: bloating, cramping and pressure    Pain radiates to:  Does not radiate  Duration:  8 hours  Progression:  Partially resolved  Chronicity:  New  Context: suspicious food intake    Context: not alcohol use, not diet changes, not recent illness, not recent travel and not sick contacts    Ineffective treatments:  None tried  Associated symptoms: diarrhea, nausea and vomiting    Associated symptoms: no chest pain, no cough, no dysuria, no fever, no hematemesis, no hematochezia, no hematuria, no melena, no shortness of breath, no vaginal bleeding and no vaginal discharge        Review of Systems   Constitutional: Negative for fever.   Respiratory: Negative for cough and shortness of breath.    Cardiovascular: Negative for chest pain.   Gastrointestinal: Positive for abdominal pain, diarrhea, nausea and vomiting. Negative for hematemesis, hematochezia and melena.   Genitourinary: Negative for dysuria, hematuria, vaginal bleeding and vaginal discharge.       Past Medical History:   Diagnosis Date   •  Anxiety    • Atypical squamous cell changes of undetermined significance (ASCUS) on cervical cytology with negative high risk human papilloma virus (HPV) test result 2015   • Breast cancer (CMS/[facility]) 2017    [id number] (stage IIIB) invasive ductal carcinoma of the right breast, ER-, [place] weakly +, Her2 3+.   • Depression    • GERD (gastroesophageal reflux disease)    • Migraine    • Ovarian cyst    • Pregnancy, incidental     3/2/2016: 14 weeks pregnant. She was seen in 2013 during pregnancy and received counselling and glucometer. She was lost to f/u but states baby had no complications - weighed 7lb1oz   • Spinal headache        Allergies   Allergen Reactions   • Benadryl [Diphenhydramine] Diarrhea       Past Surgical History:   Procedure Laterality Date   • BREAST CAPSULOTOMY, IMPLANT REVISION Bilateral 2019    Procedure: EXCHANGE BILATERAL EXPANDERS TO HIGHLY COHESIVE SILICONE IMPLANTS, BILATERAL CAPSULECTOMIES;  Surgeon: Delia Covarrubias MD;  Location:  [facility];  Service: Plastics   • BREAST RECONSTRUCTION, BREAST TISSUE EXPANDER INSERTION Bilateral 10/30/2017    Procedure: BREAST RECONSTRUCTION WITH ALLODERM, BREAST TISSUE EXPANDER INSERTION BILATERAL;  Surgeon: Delia Covarrubias MD;  Location:  [facility];  Service:    •  SECTION     •  SECTION N/A 2016    Procedure:  SECTION REPEAT;  Surgeon: Malika Munoz MD;  Location:  [facility];  Service:    • MASTECTOMY W/ SENTINEL NODE BIOPSY Bilateral 10/30/2017    Procedure: BREAST MASTECTOMY BILATERAL WITH RIGHT SENTINEL NODE BIOPSY;  Surgeon: Carlo Paulino MD;  Location:  [facility];  Service:    • VENOUS ACCESS DEVICE (PORT) INSERTION  2017       Family History   Problem Relation Age of Onset   • Diabetes Mother    • Ovarian cancer Maternal Grandmother    • Diabetes Brother        Social History     Socioeconomic History   • Marital status: Single     Spouse name: Not on file   • Number of children:  Not on file   • Years of education: Not on file   • Highest education level: Not on file   Tobacco Use   • Smoking status: Never Smoker   • Smokeless tobacco: Never Used   Substance and Sexual Activity   • Alcohol use: Yes     Comment: occasional   • Drug use: Yes     Types: Marijuana     Comment: OCCASIONALLY   • Sexual activity: Defer           Objective   Physical Exam   Constitutional: She is oriented to person, place, and time. Vital signs are normal. She appears well-developed.   HENT:   Head: Atraumatic.   Nose: Nose normal.   Mouth/Throat: Mucous membranes are normal.   Eyes: Conjunctivae, EOM and lids are normal. Pupils are equal, round, and reactive to light.   Neck: Normal range of motion. Neck supple.   Cardiovascular: Normal rate, regular rhythm and normal heart sounds.   Pulmonary/Chest: Effort normal and breath sounds normal. She has no wheezes.   Abdominal: Soft. She exhibits no distension. There is generalized tenderness. There is no rebound and no guarding.   Musculoskeletal: Normal range of motion. She exhibits no edema or tenderness.   Neurological: She is alert and oriented to person, place, and time. No sensory deficit.   Skin: Skin is warm and dry. No rash noted. No erythema.   Psychiatric: She has a normal mood and affect. Her speech is normal and behavior is normal.   Nursing note and vitals reviewed.      Procedures           ED Course      Re-examined patient several times in ED. Pt resting comfortably and no V/D in ED. Pt hydrated with fluids.     Reviewed old records.     Recent Results (from the past 24 hour(s))   Comprehensive Metabolic Panel    Collection Time: 04/01/19 12:49 PM   Result Value Ref Range    Glucose 136 (H) 70 - 100 mg/dL    BUN 12 9 - 23 mg/dL    Creatinine 0.73 0.60 - 1.30 mg/dL    Sodium 141 132 - 146 mmol/L    Potassium 3.7 3.5 - 5.5 mmol/L    Chloride 105 99 - 109 mmol/L    CO2 25.0 20.0 - 31.0 mmol/L    Calcium 9.7 8.7 - 10.4 mg/dL    Total Protein 7.9 5.7 - 8.2  g/dL    Albumin 4.69 3.20 - 4.80 g/dL    ALT (SGPT) 27 7 - 40 U/L    AST (SGOT) 27 0 - 33 U/L    Alkaline Phosphatase 79 25 - 100 U/L    Total Bilirubin 0.6 0.3 - 1.2 mg/dL    eGFR  African Amer 119 >60 mL/min/1.73    Globulin 3.2 gm/dL    A/G Ratio 1.5 1.5 - 2.5 g/dL    BUN/Creatinine Ratio 16.4 7.0 - 25.0    Anion Gap 11.0 3.0 - 11.0 mmol/L   Lipase    Collection Time: 04/01/19 12:49 PM   Result Value Ref Range    Lipase 48 6 - 51 U/L   Light Blue Top    Collection Time: 04/01/19 12:49 PM   Result Value Ref Range    Extra Tube hold for add-on    Green Top (Gel)    Collection Time: 04/01/19 12:49 PM   Result Value Ref Range    Extra Tube Hold for add-ons.    Lavender Top    Collection Time: 04/01/19 12:49 PM   Result Value Ref Range    Extra Tube hold for add-on    Gold Top - SST    Collection Time: 04/01/19 12:49 PM   Result Value Ref Range    Extra Tube Hold for add-ons.    CBC Auto Differential    Collection Time: 04/01/19 12:49 PM   Result Value Ref Range    WBC 8.09 3.50 - 10.80 10*3/mm3    RBC 5.23 (H) 3.89 - 5.14 10*6/mm3    Hemoglobin 15.9 (H) 11.5 - 15.5 g/dL    Hematocrit 48.2 (H) 34.5 - 44.0 %    MCV 92.2 80.0 - 99.0 fL    MCH 30.4 27.0 - 31.0 pg    MCHC 33.0 32.0 - 36.0 g/dL    RDW 12.3 11.3 - 14.5 %    RDW-SD 41.7 37.0 - 54.0 fl    MPV 9.8 6.0 - 12.0 fL    Platelets 185 150 - 450 10*3/mm3    Neutrophil % 90.6 (H) 41.0 - 71.0 %    Lymphocyte % 5.1 (L) 24.0 - 44.0 %    Monocyte % 4.1 0.0 - 12.0 %    Eosinophil % 0.1 0.0 - 3.0 %    Basophil % 0.1 0.0 - 1.0 %    Immature Grans % 0.1 0.0 - 0.6 %    Neutrophils, Absolute 7.33 1.50 - 8.30 10*3/mm3    Lymphocytes, Absolute 0.41 (L) 0.60 - 4.80 10*3/mm3    Monocytes, Absolute 0.33 0.00 - 1.00 10*3/mm3    Eosinophils, Absolute 0.01 0.00 - 0.30 10*3/mm3    Basophils, Absolute 0.01 0.00 - 0.20 10*3/mm3    Immature Grans, Absolute 0.01 0.00 - 0.05 10*3/mm3   Urinalysis With Microscopic If Indicated (No Culture) - Urine, Clean Catch    Collection Time: 04/01/19   2:46 PM   Result Value Ref Range    Color, UA Yellow Yellow, Straw    Appearance, UA Clear Clear    pH, UA 6.5 5.0 - 8.0    Specific Gravity, UA 1.025 1.001 - 1.030    Glucose, UA Negative Negative    Ketones, UA Negative Negative    Bilirubin, UA Negative Negative    Blood, UA Negative Negative    Protein, UA Negative Negative    Leuk Esterase, UA Negative Negative    Nitrite, UA Negative Negative    Urobilinogen, UA 0.2 E.U./dL 0.2 - 1.0 E.U./dL   POCT pregnancy, urine    Collection Time: 04/01/19  2:49 PM   Result Value Ref Range    HCG, Urine, QL Negative Negative    Lot Number [id number]     Internal Positive Control Positive     Internal Negative Control Negative      Note: In addition to lab results from this visit, the labs listed above may include labs taken at another facility or during a different encounter within the last 24 hours. Please correlate lab times with ED admission and discharge times for further clarification of the services performed during this visit.    No orders to display     Vitals:    04/01/19 1500 04/01/19 1530 04/01/19 1600 04/01/19 1630   BP: 99/63 100/65 109/68 103/60   BP Location:       Patient Position:       Pulse: 102 82 89 85   Resp:       Temp:       TempSrc:       SpO2: 98% 96% 98% 97%   Weight:       Height:         Medications   sodium chloride 0.9 % flush 10 mL (not administered)   sodium chloride 0.9 % flush 10 mL (not administered)   sodium chloride 0.9 % bolus 1,000 mL (1,000 mL Intravenous New Bag 4/1/19 1457)   ondansetron (ZOFRAN) injection 4 mg (4 mg Intravenous Given 4/1/19 1500)   ketorolac (TORADOL) injection 15 mg (15 mg Intravenous Given 4/1/19 1500)   famotidine (PEPCID) injection 20 mg (20 mg Intravenous Given 4/1/19 1500)   sodium chloride 0.9 % bolus 1,000 mL (1,000 mL Intravenous New Bag 4/1/19 1457)     ECG/EMG Results (last 24 hours)     ** No results found for the last 24 hours. **        No orders to display                   MDM      Final  diagnoses:   Generalized abdominal pain   Nausea vomiting and diarrhea            Natalia Henriquez PA  04/01/19 0278

## 2019-04-23 ENCOUNTER — OFFICE VISIT (OUTPATIENT)
Dept: NEUROLOGY | Facility: CLINIC | Age: 24
End: 2019-04-23

## 2019-04-23 VITALS
SYSTOLIC BLOOD PRESSURE: 116 MMHG | BODY MASS INDEX: 25.03 KG/M2 | HEIGHT: 62 IN | WEIGHT: 136 LBS | DIASTOLIC BLOOD PRESSURE: 76 MMHG

## 2019-04-23 DIAGNOSIS — G43.019 INTRACTABLE MIGRAINE WITHOUT AURA AND WITHOUT STATUS MIGRAINOSUS: Primary | ICD-10-CM

## 2019-04-23 PROCEDURE — 99213 OFFICE O/P EST LOW 20 MIN: CPT | Performed by: PSYCHIATRY & NEUROLOGY

## 2019-04-23 RX ORDER — AMITRIPTYLINE HYDROCHLORIDE 10 MG/1
20 TABLET, FILM COATED ORAL NIGHTLY
Qty: 60 TABLET | Refills: 1 | OUTPATIENT
Start: 2019-04-23 | End: 2019-07-02

## 2019-04-23 RX ORDER — RIZATRIPTAN BENZOATE 10 MG/1
10 TABLET ORAL ONCE AS NEEDED
Qty: 9 TABLET | Refills: 1 | Status: SHIPPED | OUTPATIENT
Start: 2019-04-23 | End: 2019-05-23

## 2019-04-23 NOTE — PROGRESS NOTES
Subjective:    CC: Angie Sutherland is in clinic today for follow up for migraines.    HPI:  She is in clinic for regular follow-up.  Since her last visit, she reports that she increase Topamax to 75 mg twice daily as per my instruction but it did not help with her headache control and she developed side effects so she stopped taking it.  She also tried Imitrex 100 mg for severe headache and developed side effects to it so does not want to try it again.  She does report poor sleep quality.  Current headache frequency is approximately 8-10 headache days in a month.    The following portions of the patient's history were reviewed and updated as of 04/23/2019: allergies, social history and problem list.       Current Outpatient Medications:   •  ALPRAZolam (XANAX) 0.5 MG tablet, Take one tablet as needed for severe anxiety or panic, Disp: 30 tablet, Rfl: 0  •  Neratinib 40 MG tablet, Take 160 mg by mouth Daily., Disp: 120 tablet, Rfl: 3  •  ondansetron (ZOFRAN) 4 MG tablet, Take 1 tablet by mouth Every 8 (Eight) Hours As Needed for Nausea for up to 20 doses., Disp: 20 tablet, Rfl: 0  •  oxyCODONE (ROXICODONE) 5 MG immediate release tablet, Take 1 tablet by mouth Every 6 (Six) Hours As Needed for Moderate Pain . Do not drive while taking., Disp: 25 tablet, Rfl: 0  •  amitriptyline (ELAVIL) 10 MG tablet, Take 2 tablets by mouth Every Night., Disp: 60 tablet, Rfl: 1  •  rizatriptan (MAXALT) 10 MG tablet, Take 1 tablet by mouth 1 (One) Time As Needed for Migraine for up to 30 days. May repeat in 2 hours if needed, Disp: 9 tablet, Rfl: 1   Past Medical History:   Diagnosis Date   • Anxiety    • Atypical squamous cell changes of undetermined significance (ASCUS) on cervical cytology with negative high risk human papilloma virus (HPV) test result 09/17/2015   • Breast cancer (CMS/[facility]) 2017    [id number] (stage IIIB) invasive ductal carcinoma of the right breast, ER-, [place] weakly +, Her2 3+.   • Depression    • GERD  "(gastroesophageal reflux disease)    • Migraine    • Ovarian cyst    • Pregnancy, incidental     3/2/2016: 14 weeks pregnant. She was seen in 2013 during pregnancy and received counselling and glucometer. She was lost to f/u but states baby had no complications - weighed 7lb1oz   • Spinal headache       Past Surgical History:   Procedure Laterality Date   • BREAST CAPSULOTOMY, IMPLANT REVISION Bilateral 2019    Procedure: EXCHANGE BILATERAL EXPANDERS TO HIGHLY COHESIVE SILICONE IMPLANTS, BILATERAL CAPSULECTOMIES;  Surgeon: Delia Covarrubias MD;  Location:  [facility];  Service: Plastics   • BREAST RECONSTRUCTION, BREAST TISSUE EXPANDER INSERTION Bilateral 10/30/2017    Procedure: BREAST RECONSTRUCTION WITH ALLODERM, BREAST TISSUE EXPANDER INSERTION BILATERAL;  Surgeon: Delia Covarrubias MD;  Location:  [facility];  Service:    •  SECTION     •  SECTION N/A 2016    Procedure:  SECTION REPEAT;  Surgeon: Malika Munoz MD;  Location:  [facility];  Service:    • MASTECTOMY W/ SENTINEL NODE BIOPSY Bilateral 10/30/2017    Procedure: BREAST MASTECTOMY BILATERAL WITH RIGHT SENTINEL NODE BIOPSY;  Surgeon: Carlo Paulino MD;  Location:  [facility];  Service:    • VENOUS ACCESS DEVICE (PORT) INSERTION  2017      Family History   Problem Relation Age of Onset   • Diabetes Mother    • Ovarian cancer Maternal Grandmother    • Diabetes Brother         Review of Systems   All other systems reviewed and are negative.    Objective:    /76   Ht 157.5 cm (62\")   Wt 61.7 kg (136 lb)   BMI 24.87 kg/m²     Neurology Exam:  General apperance: NAD.     Mental status: Alert, awake and oriented to time place and person.    Recent and Remote memory: Can recall 3/3 objects at 5 minutes. Can recall historical events.     Attention span and Concentration: Serial 7s: Normal.     Fund of knowledge:  Normal.     Language and Speech: No aphasia or dysarthria.    Naming , Repitition and " Comprehension:  Can name objects, repeat a sentence and follow commands. Speech is clear and fluent with good repetition, comprehension, and naming.    CN II to XII: Intact.    Opthalmoscopic Exam: No papilledema.    Motor:  Right UE muscle strength 5/5. Normal tone.     Left UE muscle strength 5/5. Normal tone.      Right LE muscle strength5/5. Normal tone.     Left LE muscle strength 5/5. Normal tone.      Sensory: Normal light touch, vibration and pinprick sensation bilaterally.    DTRs: 2+ bilaterally.    Babinski: Negative bilaterally.    Co-ordination: Normal finger-to-nose, heel to shin B/L.    Rhomberg: Negative.    Gait: Normal.    Cardiovascular: Regular rate and rhythm without murmur, gallop or rub.    Assessment and Plan:  1. Intractable migraine without aura and without status migrainosus  She developed side effects with Topamax and Imitrex are stopped taking it.  She continues to have approximately 8-10 headache days in a month.  She also reports poor sleep quality.  We will start her on amitriptyline 10 mg slowly increasing to 20 mg at bedtime for migraine prevention.  We will also prescribe Maxalt 10 mg as needed as an abortive therapy as she developed side effects with Imitrex use.  I will see her back in 5 weeks for follow-up.    I spent 15 minutes face to face with the patient and spent 10 minutes of this time  in management, instructions and education regarding above mentioned diagnosis and also on counseling and discussing about taking medication regularly, possible side effects with medication use, importance of good sleep hygiene, good hydration and regular exercise.    Return in about 5 weeks (around 5/28/2019).

## 2019-04-30 ENCOUNTER — HOSPITAL ENCOUNTER (OUTPATIENT)
Dept: ONCOLOGY | Facility: HOSPITAL | Age: 24
Setting detail: INFUSION SERIES
Discharge: HOME | End: 2019-04-30

## 2019-04-30 ENCOUNTER — OFFICE VISIT (OUTPATIENT)
Dept: ONCOLOGY | Facility: CLINIC | Age: 24
End: 2019-04-30

## 2019-04-30 ENCOUNTER — APPOINTMENT (OUTPATIENT)
Dept: LAB | Facility: HOSPITAL | Age: 24
End: 2019-04-30

## 2019-04-30 VITALS
WEIGHT: 136 LBS | HEART RATE: 80 BPM | TEMPERATURE: 97.6 F | SYSTOLIC BLOOD PRESSURE: 96 MMHG | DIASTOLIC BLOOD PRESSURE: 67 MMHG | BODY MASS INDEX: 24.87 KG/M2 | RESPIRATION RATE: 18 BRPM

## 2019-04-30 DIAGNOSIS — C50.011 MALIGNANT NEOPLASM OF NIPPLE OF RIGHT BREAST IN FEMALE, UNSPECIFIED ESTROGEN RECEPTOR STATUS (HCC): Primary | ICD-10-CM

## 2019-04-30 LAB
ALBUMIN SERPL-MCNC: 4.1 G/DL (ref 3.5–5.2)
ALBUMIN/GLOB SERPL: 1.1 G/DL
ALP SERPL-CCNC: 76 U/L (ref 39–117)
ALT SERPL W P-5'-P-CCNC: 19 U/L (ref 1–33)
ANION GAP SERPL CALCULATED.3IONS-SCNC: 13 MMOL/L
AST SERPL-CCNC: 22 U/L (ref 1–32)
BILIRUB SERPL-MCNC: 0.3 MG/DL (ref 0.2–1.2)
BUN BLD-MCNC: 7 MG/DL (ref 6–20)
BUN/CREAT SERPL: 9.3 (ref 7–25)
CALCIUM SPEC-SCNC: 9.4 MG/DL (ref 8.6–10.5)
CHLORIDE SERPL-SCNC: 100 MMOL/L (ref 98–107)
CO2 SERPL-SCNC: 26 MMOL/L (ref 22–29)
CREAT BLD-MCNC: 0.75 MG/DL (ref 0.57–1)
ERYTHROCYTE [DISTWIDTH] IN BLOOD BY AUTOMATED COUNT: 12.7 % (ref 12.3–15.4)
GFR SERPL CREATININE-BSD FRML MDRD: 115 ML/MIN/1.73
GLOBULIN UR ELPH-MCNC: 3.9 GM/DL
GLUCOSE BLD-MCNC: 105 MG/DL (ref 65–99)
HCT VFR BLD AUTO: 44.4 % (ref 34–46.6)
HGB BLD-MCNC: 14.7 G/DL (ref 12–15.9)
LYMPHOCYTES # BLD AUTO: 1.8 10*3/MM3 (ref 0.7–3.1)
LYMPHOCYTES NFR BLD AUTO: 43 % (ref 19.6–45.3)
MAGNESIUM SERPL-MCNC: 1.9 MG/DL (ref 1.6–2.6)
MCH RBC QN AUTO: 30.2 PG (ref 26.6–33)
MCHC RBC AUTO-ENTMCNC: 33.2 G/DL (ref 31.5–35.7)
MCV RBC AUTO: 90.8 FL (ref 79–97)
MONOCYTES # BLD AUTO: 0.2 10*3/MM3 (ref 0.1–0.9)
MONOCYTES NFR BLD AUTO: 3.8 % (ref 5–12)
NEUTROPHILS # BLD AUTO: 2.2 10*3/MM3 (ref 1.7–7)
NEUTROPHILS NFR BLD AUTO: 53.2 % (ref 42.7–76)
PLATELET # BLD AUTO: 235 10*3/MM3 (ref 140–450)
PMV BLD AUTO: 7.5 FL (ref 6–12)
POTASSIUM BLD-SCNC: 3.5 MMOL/L (ref 3.5–5.2)
PROT SERPL-MCNC: 8 G/DL (ref 6–8.5)
RBC # BLD AUTO: 4.89 10*6/MM3 (ref 3.77–5.28)
SODIUM BLD-SCNC: 139 MMOL/L (ref 136–145)
WBC NRBC COR # BLD: 4.1 10*3/MM3 (ref 3.4–10.8)

## 2019-04-30 PROCEDURE — 80053 COMPREHEN METABOLIC PANEL: CPT | Performed by: NURSE PRACTITIONER

## 2019-04-30 PROCEDURE — 36415 COLL VENOUS BLD VENIPUNCTURE: CPT | Performed by: NURSE PRACTITIONER

## 2019-04-30 PROCEDURE — 85025 COMPLETE CBC W/AUTO DIFF WBC: CPT | Performed by: NURSE PRACTITIONER

## 2019-04-30 PROCEDURE — 99213 OFFICE O/P EST LOW 20 MIN: CPT | Performed by: NURSE PRACTITIONER

## 2019-04-30 PROCEDURE — 83735 ASSAY OF MAGNESIUM: CPT | Performed by: NURSE PRACTITIONER

## 2019-04-30 NOTE — PROGRESS NOTES
PROBLEM LIST:  1. [id number] (stage IIIB) invasive ductal carcinoma of the right breast, ER-, [place] weakly +, Her2 3+.  A) presented with pain and swelling of the right breast after childbirth. Biopsy 5/8/17 showed high grade IDC, Right axillary LN biopsy positive for metastatic node involvement. Skin biopsy showed involvement of the dermis with lymphatic space involvement.  PET/CT on 5/24/17 showed hypermetabolic activity in the breast and axillary nodes,  No distant spread of disease.  B) neoadjuvant chemotherapy with TCHP started on 5/25/17.  Infusion reaction consisting of severe leg pain with the first dose of herceptin.  Taxotere dose reduced to 80% on cycle 5 for neuropathy.  C) bilateral mastectomy on 10/30/17.  Pathology showed multifocal < 1 mm high grade IDC with focal lymphovascular invasion.  4 sentinel lymph nodes with no malignancy.   [id number] (Stage yIA)  D) radiation therapy completed on 1/23/18.  Patient declined tamoxifen.  herceptin completed June 2018.  Neratinib started July 2018. Stopped Neratinib December 2018.   E) 2/11/2019 last dose of Lupron. She has declined any further Lupron as of 4/30/2019.   2. Migraines  3. Pre-diabetes    Subjective      CC: breast cancer    HISTORY OF PRESENT ILLNESS:   Angie Sutherland returns for follow-up. She did not restart Neratinib in April. The patient last had Neratinib in December 2018 and states she does not plan on restarting it at all. She also wants to stop the Lupron injections. She continues to have frequent migraines and is being followed by neurology. She has intermittent right breast pains that are sharp and last for a few seconds. She has occasional muscle cramping as well. She denies any new long bone pain, cough or dyspnea.     Past Medical History, Past Surgical History, Social History, Family History have been reviewed and are without significant changes except as mentioned.    Review of Systems   A comprehensive 14 point review of  systems was performed and was negative except as mentioned.    Medications:  The current medication list was reviewed in the EMR    ALLERGIES:    Allergies   Allergen Reactions   • Benadryl [Diphenhydramine] Diarrhea       Objective      BP 96/67   Pulse 80   Temp 97.6 °F (36.4 °C) (Temporal)   Resp 18   Wt 61.7 kg (136 lb)   BMI 24.87 kg/m²      Performance Status: 0    General: well appearing female in no acute distress  Neuro: alert and oriented  HEENT: sclera anicteric, oropharynx clear  Lymphatics: no cervical, supraclavicular Adenopathy  No palpable axillary adenopathy  Chest: Status post bilateral mastectomy with implants in place. No palpable masses or lesions.  Cardiovascular: regular rate and rhythm, no murmurs  Lungs: clear to auscultation bilaterally  Abdomen: soft, nontender, nondistended.  No palpable organomegaly  Extremeties: no lower extremity edema  Skin: no rashes, lesions, bruising, or petechiae  Psych: mood and affect appropriate        Assessment/Plan   Angie Sutherland is a 23 y.o. year old female with stage IIIB HER-2 positive breast cancer who returns for follow-up.  She took neratinib for 6 months but wants to stop due to intolerance.    Depression and anxiety: Follow up with Cyndee Soto    Contraception.  She wants to stop Lupron as well. I reinforced the importance of needing a non hormonal form of birth control starting immediately. Patient would like a referral to gyn. I will place a referral for her to discuss birth control options with a gynecologist.      She has recurrent migraine headaches.  Follow-up with neurology.     Follow-up three months with Dr. Amos.                  I spent 15 minutes with the patient. I spent > 50% percent of this time counseling and discussing prognosis, diagnostic testing, evaluation, current status and management.      Ana Jade APRN  [facility]    4/30/2019          CC:

## 2019-05-01 ENCOUNTER — TELEPHONE (OUTPATIENT)
Dept: ONCOLOGY | Facility: CLINIC | Age: 24
End: 2019-05-01

## 2019-05-01 NOTE — TELEPHONE ENCOUNTER
----- Message from UYEN Gray sent at 5/1/2019  8:27 AM EDT -----  Can you let patient know labs looked good.

## 2019-07-02 ENCOUNTER — HOSPITAL ENCOUNTER (EMERGENCY)
Facility: HOSPITAL | Age: 24
Discharge: HOME | End: 2019-07-02
Attending: EMERGENCY MEDICINE | Admitting: EMERGENCY MEDICINE

## 2019-07-02 ENCOUNTER — TELEPHONE (OUTPATIENT)
Dept: ONCOLOGY | Facility: CLINIC | Age: 24
End: 2019-07-02

## 2019-07-02 ENCOUNTER — APPOINTMENT (OUTPATIENT)
Dept: CT IMAGING | Facility: HOSPITAL | Age: 24
End: 2019-07-02

## 2019-07-02 VITALS
DIASTOLIC BLOOD PRESSURE: 58 MMHG | WEIGHT: 130 LBS | BODY MASS INDEX: 24.55 KG/M2 | HEIGHT: 61 IN | HEART RATE: 84 BPM | SYSTOLIC BLOOD PRESSURE: 96 MMHG | RESPIRATION RATE: 16 BRPM | OXYGEN SATURATION: 100 % | TEMPERATURE: 98.4 F

## 2019-07-02 DIAGNOSIS — Z85.3 HISTORY OF BREAST CANCER: ICD-10-CM

## 2019-07-02 DIAGNOSIS — R09.1 PLEURISY: Primary | ICD-10-CM

## 2019-07-02 LAB
ALBUMIN SERPL-MCNC: 4.4 G/DL (ref 3.5–5.2)
ALBUMIN/GLOB SERPL: 1.4 G/DL
ALP SERPL-CCNC: 72 U/L (ref 39–117)
ALT SERPL W P-5'-P-CCNC: 14 U/L (ref 1–33)
ANION GAP SERPL CALCULATED.3IONS-SCNC: 11 MMOL/L (ref 5–15)
APTT PPP: 36.2 SECONDS (ref 24–37)
AST SERPL-CCNC: 18 U/L (ref 1–32)
BASOPHILS # BLD AUTO: 0.02 10*3/MM3 (ref 0–0.2)
BASOPHILS NFR BLD AUTO: 0.4 % (ref 0–1.5)
BILIRUB SERPL-MCNC: 0.2 MG/DL (ref 0.2–1.2)
BUN BLD-MCNC: 17 MG/DL (ref 6–20)
BUN/CREAT SERPL: 17 (ref 7–25)
CALCIUM SPEC-SCNC: 9.5 MG/DL (ref 8.6–10.5)
CHLORIDE SERPL-SCNC: 105 MMOL/L (ref 98–107)
CO2 SERPL-SCNC: 27 MMOL/L (ref 22–29)
CREAT BLD-MCNC: 1 MG/DL (ref 0.57–1)
DEPRECATED RDW RBC AUTO: 39.9 FL (ref 37–54)
EOSINOPHIL # BLD AUTO: 0.11 10*3/MM3 (ref 0–0.4)
EOSINOPHIL NFR BLD AUTO: 2.2 % (ref 0.3–6.2)
ERYTHROCYTE [DISTWIDTH] IN BLOOD BY AUTOMATED COUNT: 11.9 % (ref 12.3–15.4)
GFR SERPL CREATININE-BSD FRML MDRD: 83 ML/MIN/1.73
GLOBULIN UR ELPH-MCNC: 3.2 GM/DL
GLUCOSE BLD-MCNC: 118 MG/DL (ref 65–99)
HCT VFR BLD AUTO: 42.9 % (ref 34–46.6)
HGB BLD-MCNC: 14 G/DL (ref 12–15.9)
IMM GRANULOCYTES # BLD AUTO: 0.01 10*3/MM3 (ref 0–0.05)
IMM GRANULOCYTES NFR BLD AUTO: 0.2 % (ref 0–0.5)
INR PPP: 1.01 (ref 0.85–1.16)
LYMPHOCYTES # BLD AUTO: 1.83 10*3/MM3 (ref 0.7–3.1)
LYMPHOCYTES NFR BLD AUTO: 37.2 % (ref 19.6–45.3)
MCH RBC QN AUTO: 29.8 PG (ref 26.6–33)
MCHC RBC AUTO-ENTMCNC: 32.6 G/DL (ref 31.5–35.7)
MCV RBC AUTO: 91.3 FL (ref 79–97)
MONOCYTES # BLD AUTO: 0.31 10*3/MM3 (ref 0.1–0.9)
MONOCYTES NFR BLD AUTO: 6.3 % (ref 5–12)
NEUTROPHILS # BLD AUTO: 2.64 10*3/MM3 (ref 1.7–7)
NEUTROPHILS NFR BLD AUTO: 53.7 % (ref 42.7–76)
NRBC BLD AUTO-RTO: 0 /100 WBC (ref 0–0.2)
PLATELET # BLD AUTO: 236 10*3/MM3 (ref 140–450)
PMV BLD AUTO: 9.4 FL (ref 6–12)
POTASSIUM BLD-SCNC: 3.9 MMOL/L (ref 3.5–5.2)
PROT SERPL-MCNC: 7.6 G/DL (ref 6–8.5)
PROTHROMBIN TIME: 12.8 SECONDS (ref 11.2–14.3)
RBC # BLD AUTO: 4.7 10*6/MM3 (ref 3.77–5.28)
SODIUM BLD-SCNC: 143 MMOL/L (ref 136–145)
TROPONIN T SERPL-MCNC: <0.01 NG/ML (ref 0–0.03)
WBC NRBC COR # BLD: 4.92 10*3/MM3 (ref 3.4–10.8)

## 2019-07-02 PROCEDURE — 85025 COMPLETE CBC W/AUTO DIFF WBC: CPT | Performed by: PHYSICIAN ASSISTANT

## 2019-07-02 PROCEDURE — 99284 EMERGENCY DEPT VISIT MOD MDM: CPT

## 2019-07-02 PROCEDURE — 85610 PROTHROMBIN TIME: CPT | Performed by: PHYSICIAN ASSISTANT

## 2019-07-02 PROCEDURE — 0 IOPAMIDOL PER 1 ML: Performed by: EMERGENCY MEDICINE

## 2019-07-02 PROCEDURE — 84484 ASSAY OF TROPONIN QUANT: CPT | Performed by: PHYSICIAN ASSISTANT

## 2019-07-02 PROCEDURE — 93005 ELECTROCARDIOGRAM TRACING: CPT | Performed by: EMERGENCY MEDICINE

## 2019-07-02 PROCEDURE — 80053 COMPREHEN METABOLIC PANEL: CPT | Performed by: PHYSICIAN ASSISTANT

## 2019-07-02 PROCEDURE — 71275 CT ANGIOGRAPHY CHEST: CPT

## 2019-07-02 PROCEDURE — 93005 ELECTROCARDIOGRAM TRACING: CPT

## 2019-07-02 PROCEDURE — 85730 THROMBOPLASTIN TIME PARTIAL: CPT | Performed by: PHYSICIAN ASSISTANT

## 2019-07-02 RX ADMIN — IOPAMIDOL 58 ML: 755 INJECTION, SOLUTION INTRAVENOUS at 16:52

## 2019-07-02 NOTE — TELEPHONE ENCOUNTER
Patient called triage line and reported that for the last 2 days that she has been having chest pain.  Patient reports that the pain is in upper chest and rib area.  The pain has increased over the last two day, with today being the worse.  She reports that the pain is constant and increases with taking a deep breath or talking.  Patient is advised to go to ER.  Patient verbalized understanding.

## 2019-07-02 NOTE — ED PROVIDER NOTES
Subjective   Ms. Medina is a 23-year-old female that presents to the emergency department today with pleuritic chest pain.  Patient is already had bilateral mastectomies done last year and reconstruction this year for breast cancer.  She had invasive ductal carcinoma.  In addition to the mastectomy she also had chemotherapy.  Her port is already been removed.  Patient reports that she been having pain on the left side with deep inspiration.  Patient denies fever chill cough.  She had no hemoptysis.  No prior history of pulmonary emboli or DVT.  She does not smoke.  No family history of breast cancer.        History provided by:  Patient   used: No    Pain With Breathing   Location:  Left chest  Quality:  Sharp stabbing  Severity:  Moderate  Onset quality:  Sudden  Duration:  2 days  Timing:  Constant  Progression:  Waxing and waning  Chronicity:  New  Context:  History of invasive ductal carcinoma with bilateral mastectomies and previous chemotherapy.  Relieved by:  Shallow breathing  Worsened by:  Deep breathing  Associated symptoms: chest pain and shortness of breath    Associated symptoms: no abdominal pain, no congestion, no cough, no ear pain, no fever, no headaches, no myalgias, no nausea, no rhinorrhea, no vomiting and no wheezing    Risk factors:  History of cancer      Review of Systems   Constitutional: Negative for chills and fever.   HENT: Negative for congestion, ear pain, rhinorrhea, sinus pressure and trouble swallowing.    Respiratory: Positive for shortness of breath. Negative for cough, chest tightness and wheezing.    Cardiovascular: Positive for chest pain. Negative for palpitations and leg swelling.   Gastrointestinal: Negative for abdominal pain, nausea and vomiting.   Genitourinary: Negative for dysuria, frequency and urgency.   Musculoskeletal: Negative for myalgias.   Neurological: Negative for headaches.   Hematological: Negative for adenopathy.    Psychiatric/Behavioral: Negative.    All other systems reviewed and are negative.      Past Medical History:   Diagnosis Date   • Anxiety    • Atypical squamous cell changes of undetermined significance (ASCUS) on cervical cytology with negative high risk human papilloma virus (HPV) test result 2015   • Breast cancer (CMS/[facility])     [id number] (stage IIIB) invasive ductal carcinoma of the right breast, ER-, [place] weakly +, Her2 3+.   • Depression    • GERD (gastroesophageal reflux disease)    • Migraine    • Ovarian cyst    • Pregnancy, incidental     3/2/2016: 14 weeks pregnant. She was seen in 2013 during pregnancy and received counselling and glucometer. She was lost to f/u but states baby had no complications - weighed 7lb1oz   • Spinal headache        Allergies   Allergen Reactions   • Benadryl [Diphenhydramine] Diarrhea       Past Surgical History:   Procedure Laterality Date   • BREAST CAPSULOTOMY, IMPLANT REVISION Bilateral 2019    Procedure: EXCHANGE BILATERAL EXPANDERS TO HIGHLY COHESIVE SILICONE IMPLANTS, BILATERAL CAPSULECTOMIES;  Surgeon: Delia Covarrubias MD;  Location:  [facility];  Service: Plastics   • BREAST RECONSTRUCTION, BREAST TISSUE EXPANDER INSERTION Bilateral 10/30/2017    Procedure: BREAST RECONSTRUCTION WITH ALLODERM, BREAST TISSUE EXPANDER INSERTION BILATERAL;  Surgeon: Delia Covarrubias MD;  Location:  [facility];  Service:    •  SECTION     •  SECTION N/A 2016    Procedure:  SECTION REPEAT;  Surgeon: Malika Munoz MD;  Location:  [facility];  Service:    • MASTECTOMY W/ SENTINEL NODE BIOPSY Bilateral 10/30/2017    Procedure: BREAST MASTECTOMY BILATERAL WITH RIGHT SENTINEL NODE BIOPSY;  Surgeon: Carlo Paulino MD;  Location:  [facility];  Service:    • VENOUS ACCESS DEVICE (PORT) INSERTION  2017       Family History   Problem Relation Age of Onset   • Diabetes Mother    • Ovarian cancer Maternal Grandmother    • Diabetes  Brother        Social History     Socioeconomic History   • Marital status: Single     Spouse name: Not on file   • Number of children: Not on file   • Years of education: Not on file   • Highest education level: Not on file   Tobacco Use   • Smoking status: Never Smoker   • Smokeless tobacco: Never Used   Substance and Sexual Activity   • Alcohol use: Yes     Comment: occasional   • Drug use: Yes     Types: Marijuana     Comment: OCCASIONALLY   • Sexual activity: Defer           Objective   Physical Exam   Constitutional: She is oriented to person, place, and time. She appears well-developed and well-nourished.   HENT:   Head: Normocephalic and atraumatic.   Right Ear: External ear normal.   Left Ear: External ear normal.   Nose: Nose normal.   Mouth/Throat: Oropharynx is clear and moist.   Eyes: Conjunctivae and EOM are normal. Pupils are equal, round, and reactive to light. No scleral icterus.   Neck: Normal range of motion. No thyromegaly present.   Cardiovascular: Normal rate, regular rhythm and normal heart sounds. Exam reveals no gallop, no S3, no S4, no distant heart sounds and no friction rub.   No murmur heard.  Pulmonary/Chest: Effort normal and breath sounds normal. No respiratory distress. She has no decreased breath sounds. She has no wheezes. She has no rhonchi. She has no rales. She exhibits no tenderness.   Chest wall is nontender no rash or lesions were noted.   Abdominal: Soft. Bowel sounds are normal. She exhibits no distension. There is no tenderness.   Musculoskeletal: Normal range of motion.   Lymphadenopathy:     She has no cervical adenopathy.   Neurological: She is alert and oriented to person, place, and time. She has normal reflexes. She displays normal reflexes. No cranial nerve deficit. Coordination normal.   Skin: Skin is warm and dry.   Psychiatric: She has a normal mood and affect. Her behavior is normal. Judgment and thought content normal.   Nursing note and vitals  reviewed.      Procedures           ED Course        Recent Results (from the past 24 hour(s))   Comprehensive Metabolic Panel    Collection Time: 07/02/19  4:00 PM   Result Value Ref Range    Glucose 118 (H) 65 - 99 mg/dL    BUN 17 6 - 20 mg/dL    Creatinine 1.00 0.57 - 1.00 mg/dL    Sodium 143 136 - 145 mmol/L    Potassium 3.9 3.5 - 5.2 mmol/L    Chloride 105 98 - 107 mmol/L    CO2 27.0 22.0 - 29.0 mmol/L    Calcium 9.5 8.6 - 10.5 mg/dL    Total Protein 7.6 6.0 - 8.5 g/dL    Albumin 4.40 3.50 - 5.20 g/dL    ALT (SGPT) 14 1 - 33 U/L    AST (SGOT) 18 1 - 32 U/L    Alkaline Phosphatase 72 39 - 117 U/L    Total Bilirubin 0.2 0.2 - 1.2 mg/dL    eGFR  African Amer 83 >60 mL/min/1.73    Globulin 3.2 gm/dL    A/G Ratio 1.4 g/dL    BUN/Creatinine Ratio 17.0 7.0 - 25.0    Anion Gap 11.0 5.0 - 15.0 mmol/L   Protime-INR    Collection Time: 07/02/19  4:00 PM   Result Value Ref Range    Protime 12.8 11.2 - 14.3 Seconds    INR 1.01 0.85 - 1.16   aPTT    Collection Time: 07/02/19  4:00 PM   Result Value Ref Range    PTT 36.2 24.0 - 37.0 seconds   Troponin    Collection Time: 07/02/19  4:00 PM   Result Value Ref Range    Troponin T <0.010 0.000 - 0.030 ng/mL   CBC Auto Differential    Collection Time: 07/02/19  4:00 PM   Result Value Ref Range    WBC 4.92 3.40 - 10.80 10*3/mm3    RBC 4.70 3.77 - 5.28 10*6/mm3    Hemoglobin 14.0 12.0 - 15.9 g/dL    Hematocrit 42.9 34.0 - 46.6 %    MCV 91.3 79.0 - 97.0 fL    MCH 29.8 26.6 - 33.0 pg    MCHC 32.6 31.5 - 35.7 g/dL    RDW 11.9 (L) 12.3 - 15.4 %    RDW-SD 39.9 37.0 - 54.0 fl    MPV 9.4 6.0 - 12.0 fL    Platelets 236 140 - 450 10*3/mm3    Neutrophil % 53.7 42.7 - 76.0 %    Lymphocyte % 37.2 19.6 - 45.3 %    Monocyte % 6.3 5.0 - 12.0 %    Eosinophil % 2.2 0.3 - 6.2 %    Basophil % 0.4 0.0 - 1.5 %    Immature Grans % 0.2 0.0 - 0.5 %    Neutrophils, Absolute 2.64 1.70 - 7.00 10*3/mm3    Lymphocytes, Absolute 1.83 0.70 - 3.10 10*3/mm3    Monocytes, Absolute 0.31 0.10 - 0.90 10*3/mm3     "Eosinophils, Absolute 0.11 0.00 - 0.40 10*3/mm3    Basophils, Absolute 0.02 0.00 - 0.20 10*3/mm3    Immature Grans, Absolute 0.01 0.00 - 0.05 10*3/mm3    nRBC 0.0 0.0 - 0.2 /100 WBC     Note: In addition to lab results from this visit, the labs listed above may include labs taken at another facility or during a different encounter within the last 24 hours. Please correlate lab times with ED admission and discharge times for further clarification of the services performed during this visit.    CT Angiogram Chest With Contrast   Preliminary Result   No PE or acute intrathoracic findings with lung fields   grossly clear without effusion or focal consolidation.       D:  07/02/2019   E:  07/03/2019            Vitals:    07/02/19 1431 07/02/19 1700 07/02/19 1702 07/02/19 1723   BP: 100/60 96/58     BP Location: Left arm      Patient Position: Sitting      Pulse: 93  87 84   Resp: 16      Temp: 98.4 °F (36.9 °C)      TempSrc: Oral      SpO2: 97%  100% 100%   Weight: 59 kg (130 lb)      Height: 154.9 cm (61\")        Medications   iopamidol (ISOVUE-370) 76 % injection 100 mL (58 mL Intravenous Given 7/2/19 1652)     ECG/EMG Results (last 24 hours)     Procedure Component Value Units Date/Time    ECG 12 Lead [[id number]] Collected:  07/02/19 1448     Updated:  07/02/19 1702    Narrative:       Test Reason : CP  Blood Pressure : **/** mmHG  Vent. Rate : 089 BPM     Atrial Rate : 089 BPM     P-R Int : 174 ms          QRS Dur : 074 ms      QT Int : 346 ms       P-R-T Axes : 079 080 059 degrees     QTc Int : 420 ms    Normal sinus rhythm  Normal ECG  When compared with ECG of 11-NOV-2016 23:34,  Vent. rate has increased BY  31 BPM  Confirmed by DARLEEN CAST MD ([id number]) on 7/2/2019 5:02:29 PM    Referred By:  EDMD           Confirmed By:DARLEEN CAST MD        ECG 12 Lead   Final Result   Test Reason : CP   Blood Pressure : **/** mmHG   Vent. Rate : 089 BPM     Atrial Rate : 089 BPM      P-R Int : 174 ms          QRS Dur : 074 ms    "    QT Int : 346 ms       P-R-T Axes : 079 080 059 degrees      QTc Int : 420 ms      Normal sinus rhythm   Normal ECG   When compared with ECG of 11-NOV-2016 23:34,   Vent. rate has increased BY  31 BPM   Confirmed by DARLEEN CAST MD ([id number]) on 7/2/2019 5:02:29 PM      Referred By:  EDMD           Confirmed By:DARLEEN Cabello' Criteria (for pulmonary embolism) reviewed and/or performed as part of the patient evaluation and treatment planning process.  The result associated with this review/performance is: 4       MDM  Number of Diagnoses or Management Options  History of breast cancer: new and requires workup  Pleurisy: new and requires workup     Amount and/or Complexity of Data Reviewed  Clinical lab tests: reviewed and ordered  Tests in the radiology section of CPT®: reviewed and ordered  Tests in the medicine section of CPT®: reviewed and ordered  Discuss the patient with other providers: yes    Patient Progress  Patient progress: stable        Final diagnoses:   Pleurisy   History of breast cancer            Neil Christian PA  07/03/19 1312

## 2019-08-20 ENCOUNTER — APPOINTMENT (OUTPATIENT)
Dept: PREADMISSION TESTING | Facility: HOSPITAL | Age: 24
End: 2019-08-20

## 2019-08-20 VITALS — HEIGHT: 62 IN | WEIGHT: 131.13 LBS | BODY MASS INDEX: 24.13 KG/M2

## 2019-08-20 LAB
ANION GAP SERPL CALCULATED.3IONS-SCNC: 12 MMOL/L (ref 5–15)
BUN BLD-MCNC: 12 MG/DL (ref 6–20)
BUN/CREAT SERPL: 19.4 (ref 7–25)
CALCIUM SPEC-SCNC: 9.4 MG/DL (ref 8.6–10.5)
CHLORIDE SERPL-SCNC: 103 MMOL/L (ref 98–107)
CO2 SERPL-SCNC: 25 MMOL/L (ref 22–29)
CREAT BLD-MCNC: 0.62 MG/DL (ref 0.57–1)
DEPRECATED RDW RBC AUTO: 39.2 FL (ref 37–54)
ERYTHROCYTE [DISTWIDTH] IN BLOOD BY AUTOMATED COUNT: 11.8 % (ref 12.3–15.4)
GFR SERPL CREATININE-BSD FRML MDRD: 142 ML/MIN/1.73
GLUCOSE BLD-MCNC: 117 MG/DL (ref 65–99)
HCT VFR BLD AUTO: 46.6 % (ref 34–46.6)
HGB BLD-MCNC: 14.9 G/DL (ref 12–15.9)
MCH RBC QN AUTO: 29.2 PG (ref 26.6–33)
MCHC RBC AUTO-ENTMCNC: 32 G/DL (ref 31.5–35.7)
MCV RBC AUTO: 91.2 FL (ref 79–97)
PLATELET # BLD AUTO: 218 10*3/MM3 (ref 140–450)
PMV BLD AUTO: 9.5 FL (ref 6–12)
POTASSIUM BLD-SCNC: 4.1 MMOL/L (ref 3.5–5.2)
RBC # BLD AUTO: 5.11 10*6/MM3 (ref 3.77–5.28)
SODIUM BLD-SCNC: 140 MMOL/L (ref 136–145)
WBC NRBC COR # BLD: 3.15 10*3/MM3 (ref 3.4–10.8)

## 2019-08-20 PROCEDURE — 85027 COMPLETE CBC AUTOMATED: CPT | Performed by: PLASTIC SURGERY

## 2019-08-20 PROCEDURE — 80048 BASIC METABOLIC PNL TOTAL CA: CPT | Performed by: PLASTIC SURGERY

## 2019-08-20 PROCEDURE — 36415 COLL VENOUS BLD VENIPUNCTURE: CPT

## 2019-08-23 ENCOUNTER — ANESTHESIA (OUTPATIENT)
Dept: PERIOP | Facility: HOSPITAL | Age: 24
End: 2019-08-23

## 2019-08-23 ENCOUNTER — HOSPITAL ENCOUNTER (OUTPATIENT)
Facility: HOSPITAL | Age: 24
Setting detail: HOSPITAL OUTPATIENT SURGERY
Discharge: HOME | End: 2019-08-23
Attending: PLASTIC SURGERY | Admitting: PLASTIC SURGERY

## 2019-08-23 ENCOUNTER — ANESTHESIA EVENT (OUTPATIENT)
Dept: PERIOP | Facility: HOSPITAL | Age: 24
End: 2019-08-23

## 2019-08-23 VITALS
SYSTOLIC BLOOD PRESSURE: 109 MMHG | TEMPERATURE: 97.5 F | HEIGHT: 62 IN | OXYGEN SATURATION: 95 % | HEART RATE: 87 BPM | RESPIRATION RATE: 16 BRPM | DIASTOLIC BLOOD PRESSURE: 68 MMHG | BODY MASS INDEX: 24.11 KG/M2 | WEIGHT: 131 LBS

## 2019-08-23 PROCEDURE — 25010000002 FENTANYL CITRATE (PF) 100 MCG/2ML SOLUTION: Performed by: NURSE ANESTHETIST, CERTIFIED REGISTERED

## 2019-08-23 PROCEDURE — 25010000003 CEFAZOLIN IN DEXTROSE 2-4 GM/100ML-% SOLUTION: Performed by: PLASTIC SURGERY

## 2019-08-23 PROCEDURE — 25010000002 ONDANSETRON PER 1 MG: Performed by: NURSE ANESTHETIST, CERTIFIED REGISTERED

## 2019-08-23 PROCEDURE — 25010000002 HYDROMORPHONE PER 4 MG: Performed by: NURSE ANESTHETIST, CERTIFIED REGISTERED

## 2019-08-23 PROCEDURE — 25010000003 LIDOCAINE 1 % SOLUTION 20 ML VIAL: Performed by: PLASTIC SURGERY

## 2019-08-23 PROCEDURE — 25010000002 DEXAMETHASONE PER 1 MG: Performed by: NURSE ANESTHETIST, CERTIFIED REGISTERED

## 2019-08-23 PROCEDURE — 81025 URINE PREGNANCY TEST: CPT | Performed by: PLASTIC SURGERY

## 2019-08-23 PROCEDURE — 25010000002 PROPOFOL 1000 MG/ML EMULSION: Performed by: NURSE ANESTHETIST, CERTIFIED REGISTERED

## 2019-08-23 PROCEDURE — 25010000002 PROPOFOL 10 MG/ML EMULSION: Performed by: NURSE ANESTHETIST, CERTIFIED REGISTERED

## 2019-08-23 PROCEDURE — 25010000002 SUCCINYLCHOLINE PER 20 MG: Performed by: NURSE ANESTHETIST, CERTIFIED REGISTERED

## 2019-08-23 PROCEDURE — 25010000002 EPINEPHRINE PER 0.1 MG: Performed by: PLASTIC SURGERY

## 2019-08-23 RX ORDER — LIDOCAINE HYDROCHLORIDE 10 MG/ML
INJECTION, SOLUTION EPIDURAL; INFILTRATION; INTRACAUDAL; PERINEURAL AS NEEDED
Status: DISCONTINUED | OUTPATIENT
Start: 2019-08-23 | End: 2019-08-23 | Stop reason: SURG

## 2019-08-23 RX ORDER — CEPHALEXIN 500 MG/1
500 CAPSULE ORAL 3 TIMES DAILY
Qty: 15 CAPSULE | Refills: 0 | Status: SHIPPED | OUTPATIENT
Start: 2019-08-23 | End: 2019-08-28

## 2019-08-23 RX ORDER — HYDROMORPHONE HYDROCHLORIDE 1 MG/ML
0.5 INJECTION, SOLUTION INTRAMUSCULAR; INTRAVENOUS; SUBCUTANEOUS
Status: COMPLETED | OUTPATIENT
Start: 2019-08-23 | End: 2019-08-23

## 2019-08-23 RX ORDER — SODIUM CHLORIDE 0.9 % (FLUSH) 0.9 %
3-10 SYRINGE (ML) INJECTION AS NEEDED
Status: DISCONTINUED | OUTPATIENT
Start: 2019-08-23 | End: 2019-08-23 | Stop reason: HOSPADM

## 2019-08-23 RX ORDER — CEFAZOLIN SODIUM 2 G/100ML
2 INJECTION, SOLUTION INTRAVENOUS ONCE
Status: COMPLETED | OUTPATIENT
Start: 2019-08-23 | End: 2019-08-23

## 2019-08-23 RX ORDER — FENTANYL CITRATE 50 UG/ML
INJECTION, SOLUTION INTRAMUSCULAR; INTRAVENOUS AS NEEDED
Status: DISCONTINUED | OUTPATIENT
Start: 2019-08-23 | End: 2019-08-23 | Stop reason: SURG

## 2019-08-23 RX ORDER — MAGNESIUM HYDROXIDE 1200 MG/15ML
LIQUID ORAL AS NEEDED
Status: DISCONTINUED | OUTPATIENT
Start: 2019-08-23 | End: 2019-08-23 | Stop reason: HOSPADM

## 2019-08-23 RX ORDER — ONDANSETRON 4 MG/1
4 TABLET, FILM COATED ORAL EVERY 8 HOURS PRN
Qty: 20 TABLET | Refills: 1 | Status: SHIPPED | OUTPATIENT
Start: 2019-08-23 | End: 2020-08-22

## 2019-08-23 RX ORDER — PROMETHAZINE HYDROCHLORIDE 25 MG/ML
6.25 INJECTION, SOLUTION INTRAMUSCULAR; INTRAVENOUS
Status: DISCONTINUED | OUTPATIENT
Start: 2019-08-23 | End: 2019-08-23 | Stop reason: HOSPADM

## 2019-08-23 RX ORDER — LIDOCAINE HYDROCHLORIDE 10 MG/ML
0.5 INJECTION, SOLUTION EPIDURAL; INFILTRATION; INTRACAUDAL; PERINEURAL ONCE AS NEEDED
Status: COMPLETED | OUTPATIENT
Start: 2019-08-23 | End: 2019-08-23

## 2019-08-23 RX ORDER — MEPERIDINE HYDROCHLORIDE 25 MG/ML
12.5 INJECTION INTRAMUSCULAR; INTRAVENOUS; SUBCUTANEOUS
Status: COMPLETED | OUTPATIENT
Start: 2019-08-23 | End: 2019-08-23

## 2019-08-23 RX ORDER — ONDANSETRON 2 MG/ML
INJECTION INTRAMUSCULAR; INTRAVENOUS AS NEEDED
Status: DISCONTINUED | OUTPATIENT
Start: 2019-08-23 | End: 2019-08-23 | Stop reason: SURG

## 2019-08-23 RX ORDER — SUCCINYLCHOLINE CHLORIDE 20 MG/ML
INJECTION INTRAMUSCULAR; INTRAVENOUS AS NEEDED
Status: DISCONTINUED | OUTPATIENT
Start: 2019-08-23 | End: 2019-08-23 | Stop reason: SURG

## 2019-08-23 RX ORDER — SODIUM CHLORIDE, SODIUM LACTATE, POTASSIUM CHLORIDE, CALCIUM CHLORIDE 600; 310; 30; 20 MG/100ML; MG/100ML; MG/100ML; MG/100ML
9 INJECTION, SOLUTION INTRAVENOUS CONTINUOUS PRN
Status: DISCONTINUED | OUTPATIENT
Start: 2019-08-23 | End: 2019-08-23 | Stop reason: HOSPADM

## 2019-08-23 RX ORDER — PROPOFOL 10 MG/ML
VIAL (ML) INTRAVENOUS AS NEEDED
Status: DISCONTINUED | OUTPATIENT
Start: 2019-08-23 | End: 2019-08-23 | Stop reason: SURG

## 2019-08-23 RX ORDER — FAMOTIDINE 20 MG/1
20 TABLET, FILM COATED ORAL
Status: COMPLETED | OUTPATIENT
Start: 2019-08-23 | End: 2019-08-23

## 2019-08-23 RX ORDER — DEXAMETHASONE SODIUM PHOSPHATE 4 MG/ML
INJECTION, SOLUTION INTRA-ARTICULAR; INTRALESIONAL; INTRAMUSCULAR; INTRAVENOUS; SOFT TISSUE AS NEEDED
Status: DISCONTINUED | OUTPATIENT
Start: 2019-08-23 | End: 2019-08-23 | Stop reason: SURG

## 2019-08-23 RX ORDER — ROCURONIUM BROMIDE 10 MG/ML
INJECTION, SOLUTION INTRAVENOUS AS NEEDED
Status: DISCONTINUED | OUTPATIENT
Start: 2019-08-23 | End: 2019-08-23 | Stop reason: SURG

## 2019-08-23 RX ORDER — FENTANYL CITRATE 50 UG/ML
50 INJECTION, SOLUTION INTRAMUSCULAR; INTRAVENOUS
Status: DISCONTINUED | OUTPATIENT
Start: 2019-08-23 | End: 2019-08-23 | Stop reason: HOSPADM

## 2019-08-23 RX ORDER — OXYCODONE HYDROCHLORIDE AND ACETAMINOPHEN 5; 325 MG/1; MG/1
1-2 TABLET ORAL EVERY 4 HOURS PRN
Qty: 20 TABLET | Refills: 0 | Status: SHIPPED | OUTPATIENT
Start: 2019-08-23 | End: 2021-11-11

## 2019-08-23 RX ORDER — SODIUM CHLORIDE 0.9 % (FLUSH) 0.9 %
3 SYRINGE (ML) INJECTION EVERY 12 HOURS SCHEDULED
Status: DISCONTINUED | OUTPATIENT
Start: 2019-08-23 | End: 2019-08-23 | Stop reason: HOSPADM

## 2019-08-23 RX ADMIN — SODIUM CHLORIDE, POTASSIUM CHLORIDE, SODIUM LACTATE AND CALCIUM CHLORIDE 9 ML/HR: 600; 310; 30; 20 INJECTION, SOLUTION INTRAVENOUS at 10:13

## 2019-08-23 RX ADMIN — MEPERIDINE HYDROCHLORIDE 12.5 MG: 25 INJECTION INTRAMUSCULAR; INTRAVENOUS; SUBCUTANEOUS at 15:25

## 2019-08-23 RX ADMIN — FENTANYL CITRATE 25 MCG: 50 INJECTION, SOLUTION INTRAMUSCULAR; INTRAVENOUS at 14:10

## 2019-08-23 RX ADMIN — DEXAMETHASONE SODIUM PHOSPHATE 8 MG: 4 INJECTION, SOLUTION INTRAMUSCULAR; INTRAVENOUS at 13:47

## 2019-08-23 RX ADMIN — PROPOFOL 200 MG: 10 INJECTION, EMULSION INTRAVENOUS at 13:47

## 2019-08-23 RX ADMIN — LIDOCAINE HYDROCHLORIDE 0.2 ML: 10 INJECTION, SOLUTION EPIDURAL; INFILTRATION; INTRACAUDAL; PERINEURAL at 10:13

## 2019-08-23 RX ADMIN — MEPERIDINE HYDROCHLORIDE 12.5 MG: 25 INJECTION INTRAMUSCULAR; INTRAVENOUS; SUBCUTANEOUS at 15:20

## 2019-08-23 RX ADMIN — FENTANYL CITRATE 50 MCG: 50 INJECTION, SOLUTION INTRAMUSCULAR; INTRAVENOUS at 13:44

## 2019-08-23 RX ADMIN — ONDANSETRON 4 MG: 2 INJECTION INTRAMUSCULAR; INTRAVENOUS at 13:55

## 2019-08-23 RX ADMIN — PROPOFOL 25 MCG/KG/MIN: 10 INJECTION, EMULSION INTRAVENOUS at 13:55

## 2019-08-23 RX ADMIN — CEFAZOLIN SODIUM 2 G: 2 INJECTION, SOLUTION INTRAVENOUS at 13:42

## 2019-08-23 RX ADMIN — SODIUM CHLORIDE, POTASSIUM CHLORIDE, SODIUM LACTATE AND CALCIUM CHLORIDE: 600; 310; 30; 20 INJECTION, SOLUTION INTRAVENOUS at 14:27

## 2019-08-23 RX ADMIN — SUCCINYLCHOLINE CHLORIDE 60 MG: 20 INJECTION, SOLUTION INTRAMUSCULAR; INTRAVENOUS; PARENTERAL at 13:47

## 2019-08-23 RX ADMIN — SODIUM CHLORIDE, POTASSIUM CHLORIDE, SODIUM LACTATE AND CALCIUM CHLORIDE: 600; 310; 30; 20 INJECTION, SOLUTION INTRAVENOUS at 10:14

## 2019-08-23 RX ADMIN — ONDANSETRON 4 MG: 2 INJECTION INTRAMUSCULAR; INTRAVENOUS at 14:27

## 2019-08-23 RX ADMIN — ROCURONIUM BROMIDE 5 MG: 10 INJECTION INTRAVENOUS at 13:47

## 2019-08-23 RX ADMIN — HYDROMORPHONE HYDROCHLORIDE 0.5 MG: 1 INJECTION, SOLUTION INTRAMUSCULAR; INTRAVENOUS; SUBCUTANEOUS at 16:09

## 2019-08-23 RX ADMIN — FAMOTIDINE 20 MG: 20 TABLET ORAL at 10:14

## 2019-08-23 RX ADMIN — FENTANYL CITRATE 25 MCG: 50 INJECTION, SOLUTION INTRAMUSCULAR; INTRAVENOUS at 14:27

## 2019-08-23 RX ADMIN — HYDROMORPHONE HYDROCHLORIDE 0.5 MG: 1 INJECTION, SOLUTION INTRAMUSCULAR; INTRAVENOUS; SUBCUTANEOUS at 16:40

## 2019-08-23 RX ADMIN — LIDOCAINE HYDROCHLORIDE 50 MG: 10 INJECTION, SOLUTION EPIDURAL; INFILTRATION; INTRACAUDAL; PERINEURAL at 13:47

## 2019-08-23 NOTE — BRIEF OP NOTE
FAT GRAFTING  Progress Note    Angie Sutherland  8/23/2019    Pre-op Diagnosis:   Bilateral reconstructed breast asymmetry.        Post-Op Diagnosis Codes:   same    Procedure/CPT® Codes:      Procedure(s):  RECONSTRUCTED BREAST REVISION WITH FAT GRAFTING    Surgeon(s):  Edgar Block MD    Anesthesia: General    Staff:   Circulator: Valeriy Sr RN  Scrub Person: Diandra Serrano; Geovanna Batista; Parisa Marks  Nursing Assistant: Lindsay Hopkins    Estimated Blood Loss: minimal    Urine Voided: * No values recorded between 8/23/2019  1:42 PM and 8/23/2019  2:57 PM *    Specimens:                None          Drains:  None     Findings: 97 mL of fat grafted to right side and 26 mL to left side    Complications: none immediate      Edgar Block MD     Date: 8/23/2019  Time: 2:57 PM

## 2019-08-23 NOTE — OP NOTE
DATE OF PROCEDURE:  08/23/19    PREOPERATIVE DIAGNOSIS: Bilateral reconstructed breast asymmetry.      POSTOPERATIVE DIAGNOSIS: Bilateral reconstructed breast asymmetry.      PROCEDURES PERFORMED: Revision of reconstructed breasts with fat grafting.     SURGEON: Edgar Block MD     ASSISTANT: none     ANESTHESIA: General.     INDICATIONS: The patient is a 24-year-old female who had a history of breast cancer for which she underwent bilateral mastectomies, as well as tissue expander placement. She subsequently underwent expansion with exchange of expanders to permanent implants. She continued to have asymmetry with her reconstructed breasts, especially in the superior and medial poles, and therefore presented today for fat grafting of those poles. The patient understood all of the risks and benefits of the procedure, including fat necrosis, oil cysts, need for future procedures, and elected to proceed.      FINDINGS: Placement of 97 mL of fat to the right reconstructed breast superior and medial poles. Placement of 26 mL of fat to the left breast.     DESCRIPTION OF PROCEDURE: The patient was seen in preoperative holding, marked in a standing position and taken to the operating room by anesthesia after informed consent was signed and on the chart. The patient was placed supine on the operating room table and general anesthesia was induced. Once verified, the case was then turned over to the surgical service. The patient had her chest and abdomen prepped and draped in a normal sterile fashion. A timeout was called and all parties were in agreement, including nursing and anesthesia. Preoperative antibiotics were given. We began by making 2 small poke holes, stab incisions with a #15 blade scalpel in the lower right and left quadrants for which we infiltrated our tumescent solution which consisted of a liter of lactated ringer and 30 mL of 1% plain Lidocaine and an ampule of 1:1000 epinephrine. This was  infiltrated to the abdomen. After allowing sufficient time for this to work, we began with liposuction of her entire abdomen. We infiltrated approximately 600cc of tumescent solution and we removed approximately 600 mL of lipoaspirate. The fat was then washed and spun in the [employer], and we began by fat grafting after making a small stab incision on the #15 blade scalpel to her left and right reconstructed breasts. We fat grafted to the superior and medial poles of both reconstructed breasts. At this point, she was sat upright and noted to have better improved symmetry between both breasts, and laid back down. The small incisions were then closed with 5-0 nylon in a simple interrupted fashion. She was then extubated, had Kerlix fluffs and a surgical bra, as well as abdominal binder placed on her, awoken and taken to PACU in stable condition. All sponge and instrument counts were correct. I, Dr. Edgar Block, was present and scrubbed for the entire procedure.      SPECIMEN: Lipoaspirate.      ESTIMATED BLOOD LOSS: 10cc.     DRAINS: None.      COMPLICATIONS: None immediate.     Edgar Block MD  08/23/19  2:58 PM

## 2019-08-23 NOTE — ANESTHESIA POSTPROCEDURE EVALUATION
Patient: Angie Sutherland    Procedure Summary     Date:  08/23/19 Room / Location:   [facility] / [facility]    Anesthesia Start:  1342 Anesthesia Stop:      Procedure:  RECONSTRUCTED BREAST REVISION WITH FAT GRAFTING (Bilateral ) Diagnosis:      Surgeon:  Edgar Block MD Provider:  Prashant España MD    Anesthesia Type:  general with block ASA Status:  2          Anesthesia Type: general with block  Last vitals  BP   97/55   Temp   97.8   Pulse   88   Resp   12   SpO2   97%     Post Anesthesia Care and Evaluation    Patient location during evaluation: PACU  Patient participation: complete - patient cannot participate  Post-procedure mental status: asleep.  Pain management: adequate  Airway patency: patent  Anesthetic complications: No anesthetic complications  PONV Status: none  Cardiovascular status: acceptable  Respiratory status: acceptable  Hydration status: acceptable

## 2019-08-23 NOTE — ANESTHESIA PREPROCEDURE EVALUATION
Anesthesia Evaluation     Patient summary reviewed and Nursing notes reviewed   NPO Solid Status: > 4 hours  NPO Liquid Status: > 4 hours           Airway   Mallampati: II  TM distance: >3 FB  Neck ROM: full  No difficulty expected  Dental      Pulmonary    (-) COPD, asthma, shortness of breath, recent URI, not a smoker  Cardiovascular     ECG reviewed    (-) past MI, dysrhythmias, angina    ROS comment: EKG NSR   ECHO Left ventricular systolic function is normal. Estimated EF = 55%.    Neuro/Psych  (+) headaches, numbness, psychiatric history (xanax ),     (-) seizures, CVA  GI/Hepatic/Renal/Endo    (+)  GERD,    (-) liver disease, no renal disease, diabetes, hypothyroidism    Musculoskeletal     Abdominal    Substance History      OB/GYN    (-)  Pregnant        Other      history of cancer        Phys Exam Other: Mac3 grade 1 view  2017                Anesthesia Plan    ASA 2     general with block   (TAP's  Pt not NPO until 12.30 (ate at 0500))  intravenous induction   Anesthetic plan, all risks, benefits, and alternatives have been provided, discussed and informed consent has been obtained with: patient.    Plan discussed with CRNA.

## 2019-08-23 NOTE — ANESTHESIA PROCEDURE NOTES
Airway  Urgency: elective    Airway not difficult    General Information and Staff    Patient location during procedure: OR    Indications and Patient Condition  Indications for airway management: airway protection    Preoxygenated: yes  MILS not maintained throughout  Mask difficulty assessment: 1 - vent by mask    Final Airway Details  Final airway type: endotracheal airway      Successful airway: ETT  Cuffed: yes   Successful intubation technique: direct laryngoscopy  Endotracheal tube insertion site: oral  Blade: Sophy  Blade size: 3  ETT size (mm): 7.0  Cormack-Lehane Classification: grade I - full view of glottis  Placement verified by: chest auscultation and capnometry   Measured from: lips  ETT to lips (cm): 21  Number of attempts at approach: 1    Additional Comments  Negative epigastric sounds, Breath sound equal bilaterally with symmetric chest rise and fall

## 2019-08-23 NOTE — H&P
Pre-Op H&P  Angie Sutherland  [id number]  1995    Chief complaint: History of breast cancer    HPI:    Patient is a 24 y.o.female who presents with history of [id number] (stage IIIB) invasive ductal carcinoma of the right breast, ER-, [place] weakly +, Her2 3+.s/p chemotherapy/radiation and bilateral mastectomy 2017 with immediate breast reconstruction, exchange to perm implants 1/2019.  Here today to undergo bilateral breast revision with fat grafting.    Review of Systems:  General ROS: negative for chills, fever or skin lesions;  No changes since last office visit  Cardiovascular ROS: no chest pain or dyspnea on exertion  Respiratory ROS: no cough, shortness of breath, or wheezing    Allergies:   Allergies   Allergen Reactions   • Benadryl [Diphenhydramine] Shortness Of Breath and Diarrhea       Home Meds:    No current facility-administered medications on file prior to encounter.      Current Outpatient Medications on File Prior to Encounter   Medication Sig Dispense Refill   • ALPRAZolam (XANAX) 0.5 MG tablet Take one tablet as needed for severe anxiety or panic (Patient taking differently: Take 0.5 mg by mouth. Take one tablet as needed for severe anxiety or panic) 30 tablet 0       PMH:   Past Medical History:   Diagnosis Date   • Anxiety    • Atypical squamous cell changes of undetermined significance (ASCUS) on cervical cytology with negative high risk human papilloma virus (HPV) test result 09/17/2015   • Breast cancer (CMS/[facility]) 2017    [id number] (stage IIIB) invasive ductal carcinoma of the right breast, ER-, [place] weakly +, Her2 3+.   • Depression    • Eczema    • GERD (gastroesophageal reflux disease)     during chemo   • Migraine    • Ovarian cyst    • Prediabetes    • Pregnancy, incidental     3/2/2016: 14 weeks pregnant. She was seen in 2013 during pregnancy and received counselling and glucometer. She was lost to f/u but states baby had no complications - weighed 7lb1oz   • Wears glasses   "    PSH:    Past Surgical History:   Procedure Laterality Date   • BREAST CAPSULOTOMY, IMPLANT REVISION Bilateral 2019    Procedure: EXCHANGE BILATERAL EXPANDERS TO HIGHLY COHESIVE SILICONE IMPLANTS, BILATERAL CAPSULECTOMIES;  Surgeon: Delia Covarrubias MD;  Location: [facility] OR;  Service: Plastics   • BREAST RECONSTRUCTION, BREAST TISSUE EXPANDER INSERTION Bilateral 10/30/2017    Procedure: BREAST RECONSTRUCTION WITH ALLODERM, BREAST TISSUE EXPANDER INSERTION BILATERAL;  Surgeon: Delia Covarrubias MD;  Location: [facility] OR;  Service:    •  SECTION     •  SECTION N/A 2016    Procedure:  SECTION REPEAT;  Surgeon: Malika Munoz MD;  Location: [facility] LABOR DELIVERY;  Service:    • MASTECTOMY W/ SENTINEL NODE BIOPSY Bilateral 10/30/2017    Procedure: BREAST MASTECTOMY BILATERAL WITH RIGHT SENTINEL NODE BIOPSY;  Surgeon: Carlo Paulino MD;  Location: [facility] OR;  Service:    • VENOUS ACCESS DEVICE (PORT) INSERTION  2017   • VENOUS ACCESS DEVICE (PORT) REMOVAL       Social History:   Tobacco:   Social History     Tobacco Use   Smoking Status Never Smoker   Smokeless Tobacco Never Used      Alcohol:     Social History     Substance and Sexual Activity   Alcohol Use Yes    Comment: occasional       Vitals:           /77 (BP Location: Left arm, Patient Position: Lying)   Pulse 71   Temp 97.5 °F (36.4 °C) (Temporal)   Resp 18   Ht 156.2 cm (61.5\")   Wt 59.4 kg (131 lb)   SpO2 98%   BMI 24.35 kg/m²     Physical Exam:  General Appearance:    Alert, cooperative, no distress, appears stated age   Head:    Normocephalic, without obvious abnormality, atraumatic   Lungs:     Clear to auscultation bilaterally, respirations unlabored    Heart:   Regular rate and rhythm, S1 and S2 normal, no murmur, rub    or gallop    Abdomen:    Soft, non-tender.  +bowel sounds   Breast Exam:    deferred   Genitalia:    deferred   Extremities:   Extremities normal, atraumatic, no cyanosis " or edema   Skin:   Skin color, texture, turgor normal, no rashes or lesions   Neurologic:   Grossly intact   Results Review  LABS:  Lab Results   Component Value Date    WBC 3.15 (L) 08/20/2019    HGB 14.9 08/20/2019    HCT 46.6 08/20/2019    MCV 91.2 08/20/2019     08/20/2019    NEUTROABS 2.64 07/02/2019    GLUCOSE 117 (H) 08/20/2019    BUN 12 08/20/2019    CREATININE 0.62 08/20/2019    EGFRIFAFRI 142 08/20/2019     08/20/2019    K 4.1 08/20/2019     08/20/2019    CO2 25.0 08/20/2019    MG 1.9 04/30/2019    CALCIUM 9.4 08/20/2019    ALBUMIN 4.40 07/02/2019    AST 18 07/02/2019    ALT 14 07/02/2019    BILITOT 0.2 07/02/2019       RADIOLOGY:  Imaging Results (last 72 hours)     ** No results found for the last 72 hours. **          I reviewed the patient's new clinical results.    Impression: History of [id number] (stage IIIB) invasive ductal carcinoma of the right breast, ER-, [place] weakly +, Her2 3+.s/p chemotherapy/radiation and bilateral mastectomy 2017 with immediate breast reconstruction, exchange to perm implants 1/2019    Plan: Bilateral breast revision with fat grafting    Anne-Marie Moore, APRN   8/23/2019   10:47 AM

## 2019-11-04 ENCOUNTER — TELEPHONE (OUTPATIENT)
Dept: ONCOLOGY | Facility: CLINIC | Age: 24
End: 2019-11-04

## 2019-11-04 NOTE — TELEPHONE ENCOUNTER
"Received call from patient through triage line. Pt is concerned because she feels a \"weird lump\" on the side of her right breast. She describes it as hard and rubbery. Denies pain, tenderness, redness. She had breast reconstruction with fat graft in August. Scheduled pt for an apt this week for Dr Amos to examine.   "

## 2019-11-07 ENCOUNTER — OFFICE VISIT (OUTPATIENT)
Dept: ONCOLOGY | Facility: CLINIC | Age: 24
End: 2019-11-07

## 2019-11-07 VITALS
TEMPERATURE: 97.9 F | HEART RATE: 90 BPM | WEIGHT: 131 LBS | OXYGEN SATURATION: 98 % | SYSTOLIC BLOOD PRESSURE: 106 MMHG | RESPIRATION RATE: 12 BRPM | DIASTOLIC BLOOD PRESSURE: 64 MMHG | BODY MASS INDEX: 24.11 KG/M2 | HEIGHT: 62 IN

## 2019-11-07 DIAGNOSIS — C50.011 MALIGNANT NEOPLASM OF NIPPLE OF RIGHT BREAST IN FEMALE, UNSPECIFIED ESTROGEN RECEPTOR STATUS (HCC): Primary | ICD-10-CM

## 2019-11-07 PROCEDURE — 99213 OFFICE O/P EST LOW 20 MIN: CPT | Performed by: INTERNAL MEDICINE

## 2019-11-07 NOTE — PROGRESS NOTES
PROBLEM LIST:  1. [id number] (stage IIIB) invasive ductal carcinoma of the right breast, ER-, [place] weakly +, Her2 3+.  A) presented with pain and swelling of the right breast after childbirth. Biopsy 5/8/17 showed high grade IDC, Right axillary LN biopsy positive for metastatic node involvement. Skin biopsy showed involvement of the dermis with lymphatic space involvement.  PET/CT on 5/24/17 showed hypermetabolic activity in the breast and axillary nodes,  No distant spread of disease.  B) neoadjuvant chemotherapy with TCHP started on 5/25/17.  Infusion reaction consisting of severe leg pain with the first dose of herceptin.  Taxotere dose reduced to 80% on cycle 5 for neuropathy.  C) bilateral mastectomy on 10/30/17.  Pathology showed multifocal < 1 mm high grade IDC with focal lymphovascular invasion.  4 sentinel lymph nodes with no malignancy.   [id number] (Stage yIA)  D) radiation therapy completed on 1/23/18.  Patient declined tamoxifen.  herceptin completed June 2018.  Neratinib started July 2018. Stopped Neratinib December 2018.   E) 2/11/2019 last dose of Lupron. She has declined any further Lupron as of 4/30/2019.   2. Migraines  3. Pre-diabetes    Subjective      CC: breast cancer    HISTORY OF PRESENT ILLNESS:   Angie Sutherland returns for follow-up.  She comes in today because she has noticed a nodule on the lateral side of her right implant.  She is pretty certain this is new.  She says it feels rubbery.  Otherwise she has no change in her overall health.  She continues to have intermittent headaches.  She was not able to afford the preventative medicines that neurology had prescribed for her.  She says tramadol is the only thing that really helps very much.    Past Medical History, Past Surgical History, Social History, Family History have been reviewed and are without significant changes except as mentioned.    Review of Systems   A comprehensive 14 point review of systems was performed and  "was negative except as mentioned.    Medications:  The current medication list was reviewed in the EMR    ALLERGIES:    Allergies   Allergen Reactions   • Benadryl [Diphenhydramine] Shortness Of Breath and Diarrhea       Objective      /64   Pulse 90   Temp 97.9 °F (36.6 °C) (Temporal)   Resp 12   Ht 156.2 cm (61.5\")   Wt 59.4 kg (131 lb)   SpO2 98%   BMI 24.35 kg/m²      Performance Status: 0    General: well appearing female in no acute distress  Neuro: alert and oriented  HEENT: sclera anicteric, oropharynx clear  Lymphatics: no cervical, supraclavicular Adenopathy  No palpable axillary adenopathy  Chest: Status post bilateral mastectomy with implants in place.  At 9:00 on the right breast where the implant meets the chest wall there is an approximately 1 cm rubbery flat nodule that is movable.  Cardiovascular: regular rate and rhythm, no murmurs  Lungs: clear to auscultation bilaterally  Abdomen: soft, nontender, nondistended.  No palpable organomegaly  Extremeties: no lower extremity edema  Skin: no rashes, lesions, bruising, or petechiae  Psych: mood and affect appropriate        Assessment/Plan   Angie Sutherland is a 24 y.o. year old female with stage IIIB HER-2 positive breast cancer who returns for follow-up.     Chest wall nodule: Fat necrosis versus scar tissue versus recurrence.  I will send her for a mammogram and ultrasound, and biopsy if indicated.  I will contact her with those results.    Depression and anxiety: Follow up with Cyndee Soto    Migraine headaches: Follow-up with neurology.  Currently using tramadol as needed..     Assuming her work-up is unremarkable we will see her back in another 3 months.                I spent 15 minutes with the patient. I spent > 50% percent of this time counseling and discussing prognosis, diagnostic testing, evaluation, current status and management.      Shadia Amos MD  [facility]    11/7/2019          CC: "

## 2019-11-11 ENCOUNTER — HOSPITAL ENCOUNTER (OUTPATIENT)
Dept: ULTRASOUND IMAGING | Facility: HOSPITAL | Age: 24
Discharge: HOME | End: 2019-11-11
Admitting: INTERNAL MEDICINE

## 2019-11-11 ENCOUNTER — HOSPITAL ENCOUNTER (OUTPATIENT)
Dept: MAMMOGRAPHY | Facility: HOSPITAL | Age: 24
Discharge: HOME | End: 2019-11-11

## 2019-11-11 ENCOUNTER — TRANSCRIBE ORDERS (OUTPATIENT)
Dept: MAMMOGRAPHY | Facility: HOSPITAL | Age: 24
End: 2019-11-11

## 2019-11-11 DIAGNOSIS — R92.8 ABNORMAL MAMMOGRAM: Primary | ICD-10-CM

## 2019-11-11 DIAGNOSIS — C50.011 MALIGNANT NEOPLASM OF NIPPLE OF RIGHT BREAST IN FEMALE, UNSPECIFIED ESTROGEN RECEPTOR STATUS (HCC): ICD-10-CM

## 2019-11-11 PROCEDURE — 76641 ULTRASOUND BREAST COMPLETE: CPT

## 2019-11-11 PROCEDURE — G0279 TOMOSYNTHESIS, MAMMO: HCPCS

## 2019-11-11 PROCEDURE — 77065 DX MAMMO INCL CAD UNI: CPT

## 2019-11-11 PROCEDURE — 76641 ULTRASOUND BREAST COMPLETE: CPT | Performed by: RADIOLOGY

## 2019-11-11 PROCEDURE — 77061 BREAST TOMOSYNTHESIS UNI: CPT | Performed by: RADIOLOGY

## 2019-11-11 PROCEDURE — 77065 DX MAMMO INCL CAD UNI: CPT | Performed by: RADIOLOGY

## 2019-11-23 ENCOUNTER — HOSPITAL ENCOUNTER (EMERGENCY)
Facility: HOSPITAL | Age: 24
Discharge: HOME | End: 2019-11-23
Attending: EMERGENCY MEDICINE | Admitting: EMERGENCY MEDICINE

## 2019-11-23 VITALS
HEART RATE: 75 BPM | SYSTOLIC BLOOD PRESSURE: 98 MMHG | OXYGEN SATURATION: 99 % | BODY MASS INDEX: 24.55 KG/M2 | TEMPERATURE: 98.8 F | RESPIRATION RATE: 16 BRPM | WEIGHT: 130 LBS | HEIGHT: 61 IN | DIASTOLIC BLOOD PRESSURE: 65 MMHG

## 2019-11-23 DIAGNOSIS — G43.909 MIGRAINE WITHOUT STATUS MIGRAINOSUS, NOT INTRACTABLE, UNSPECIFIED MIGRAINE TYPE: Primary | ICD-10-CM

## 2019-11-23 PROCEDURE — 25010000002 METOCLOPRAMIDE PER 10 MG: Performed by: EMERGENCY MEDICINE

## 2019-11-23 PROCEDURE — 96374 THER/PROPH/DIAG INJ IV PUSH: CPT

## 2019-11-23 PROCEDURE — 25010000002 KETOROLAC TROMETHAMINE PER 15 MG: Performed by: EMERGENCY MEDICINE

## 2019-11-23 PROCEDURE — 96375 TX/PRO/DX INJ NEW DRUG ADDON: CPT

## 2019-11-23 PROCEDURE — 99283 EMERGENCY DEPT VISIT LOW MDM: CPT

## 2019-11-23 RX ORDER — METOCLOPRAMIDE HYDROCHLORIDE 5 MG/ML
5 INJECTION INTRAMUSCULAR; INTRAVENOUS ONCE
Status: COMPLETED | OUTPATIENT
Start: 2019-11-23 | End: 2019-11-23

## 2019-11-23 RX ORDER — KETOROLAC TROMETHAMINE 15 MG/ML
15 INJECTION, SOLUTION INTRAMUSCULAR; INTRAVENOUS ONCE
Status: COMPLETED | OUTPATIENT
Start: 2019-11-23 | End: 2019-11-23

## 2019-11-23 RX ADMIN — METOCLOPRAMIDE 5 MG: 5 INJECTION, SOLUTION INTRAMUSCULAR; INTRAVENOUS at 17:33

## 2019-11-23 RX ADMIN — KETOROLAC TROMETHAMINE 15 MG: 15 INJECTION, SOLUTION INTRAMUSCULAR; INTRAVENOUS at 17:33

## 2019-11-23 NOTE — ED PROVIDER NOTES
Subjective   Ms. Noe Sutherland is a 24 y.o. female who presents to the ED with complaints of a migraine. She reports that she has a history of migraines but her current episode started a few hours ago. She has taken Excedrin for her migraine with no relief, which prompted presentation to the ED. She also complains of nausea but she denies any vomiting, neck stiffness, or sore throat. She sees Dr. Preciado, neurology, for her migraines but does not have a primary care provider. She has a history of bilateral mastectomy due to breast cancer. No other acute complaints at this time.         History provided by:  Patient  Headache   Onset quality:  Gradual  Timing:  Constant  Progression:  Unchanged  Chronicity:  Recurrent  Similar to prior headaches: yes    Associated symptoms: nausea    Associated symptoms: no neck stiffness, no sore throat and no vomiting        Review of Systems   HENT: Negative for sore throat.    Gastrointestinal: Positive for nausea. Negative for vomiting.   Musculoskeletal: Negative for neck stiffness.   Neurological: Positive for headaches.   All other systems reviewed and are negative.      Past Medical History:   Diagnosis Date   • Anxiety    • Atypical squamous cell changes of undetermined significance (ASCUS) on cervical cytology with negative high risk human papilloma virus (HPV) test result 09/17/2015   • Breast cancer (CMS/[facility]) 2017    [id number] (stage IIIB) invasive ductal carcinoma of the right breast, ER-, [place] weakly +, Her2 3+.   • Depression    • Drug therapy 2018   • Eczema    • GERD (gastroesophageal reflux disease)     during chemo   • Hx of radiation therapy 2018    right   • Migraine    • Ovarian cyst    • Prediabetes    • Pregnancy, incidental     3/2/2016: 14 weeks pregnant. She was seen in 2013 during pregnancy and received counselling and glucometer. She was lost to f/u but states baby had no complications - weighed 7lb1oz   • Wears glasses        Allergies   Allergen  Reactions   • Benadryl [Diphenhydramine] Shortness Of Breath and Diarrhea       Past Surgical History:   Procedure Laterality Date   • AUGMENTATION MAMMAPLASTY Bilateral 2019    with fat grafting   • BREAST BIOPSY     • BREAST CAPSULOTOMY, IMPLANT REVISION Bilateral 2019    Procedure: EXCHANGE BILATERAL EXPANDERS TO HIGHLY COHESIVE SILICONE IMPLANTS, BILATERAL CAPSULECTOMIES;  Surgeon: Delia Covarrubias MD;  Location:  [facility];  Service: Plastics   • BREAST RECONSTRUCTION, BREAST TISSUE EXPANDER INSERTION Bilateral 10/30/2017    Procedure: BREAST RECONSTRUCTION WITH ALLODERM, BREAST TISSUE EXPANDER INSERTION BILATERAL;  Surgeon: Delia Covarrubias MD;  Location:  [facility];  Service:    •  SECTION  2013   •  SECTION N/A 2016    Procedure:  SECTION REPEAT;  Surgeon: Malika Munoz MD;  Location:  [facility] LABOR DELIVERY;  Service:    • FAT GRAFTING Bilateral 2019    Procedure: RECONSTRUCTED BREAST REVISION WITH FAT GRAFTING;  Surgeon: Edgar Block MD;  Location:  [facility];  Service: Plastics   • MASTECTOMY     • MASTECTOMY W/ SENTINEL NODE BIOPSY Bilateral 10/30/2017    Procedure: BREAST MASTECTOMY BILATERAL WITH RIGHT SENTINEL NODE BIOPSY;  Surgeon: Carlo Paulino MD;  Location:  [facility];  Service:    • VENOUS ACCESS DEVICE (PORT) INSERTION  2017   • VENOUS ACCESS DEVICE (PORT) REMOVAL         Family History   Problem Relation Age of Onset   • Diabetes Mother    • Ovarian cancer Maternal Grandmother    • Diabetes Brother        Social History     Socioeconomic History   • Marital status: Single     Spouse name: Not on file   • Number of children: Not on file   • Years of education: Not on file   • Highest education level: Not on file   Tobacco Use   • Smoking status: Never Smoker   • Smokeless tobacco: Never Used   Substance and Sexual Activity   • Alcohol use: Yes     Comment: occasional   • Drug use: Yes     Types: Marijuana     Comment: OCCASIONALLY,  "last use 8-    • Sexual activity: Defer         Objective   Physical Exam   Constitutional: She is oriented to person, place, and time. She appears well-developed and well-nourished.  Non-toxic appearance. No distress.   Patient appears uncomfortable but is able to caldera her 2 small and active children.    HENT:   Head: Normocephalic and atraumatic.   Nose: Nose normal.   Mouth/Throat: Mucous membranes are dry.   Eyes: Conjunctivae are normal. No scleral icterus.   PERRL at 3mm.   Neck: Normal range of motion. Neck supple.   Cardiovascular: Normal rate, regular rhythm and normal heart sounds.   No murmur heard.  Pulmonary/Chest: Effort normal and breath sounds normal. No respiratory distress.   Abdominal: Soft. There is no tenderness.   Musculoskeletal: Normal range of motion.   Neurological: She is alert and oriented to person, place, and time. No cranial nerve deficit.   Cranial nerves are intact as tested.    Skin: Skin is warm and dry. She is not diaphoretic.   Psychiatric: She has a normal mood and affect. Her behavior is normal.   Nursing note and vitals reviewed.      Procedures         ED Course  ED Course as of Nov 24 1525   Sat Nov 23, 2019   1805 After medications patient is feeling much improved and ready for discharge.  [MS]      ED Course User Index  [MS] Dc Weir MD     No results found for this or any previous visit (from the past 24 hour(s)).  Note: In addition to lab results from this visit, the labs listed above may include labs taken at another facility or during a different encounter within the last 24 hours. Please correlate lab times with ED admission and discharge times for further clarification of the services performed during this visit.    No orders to display     Vitals:    11/23/19 1558   BP: 98/65   Pulse: 75   Resp: 16   Temp: 98.8 °F (37.1 °C)   SpO2: 99%   Weight: 59 kg (130 lb)   Height: 154.9 cm (61\")     Medications   metoclopramide (REGLAN) injection 5 mg (5 mg " Intravenous Given 11/23/19 1733)   ketorolac (TORADOL) injection 15 mg (15 mg Intravenous Given 11/23/19 1733)     ECG/EMG Results (last 24 hours)     ** No results found for the last 24 hours. **        No orders to display                 MDM    Final diagnoses:   Migraine without status migrainosus, not intractable, unspecified migraine type       Documentation assistance provided by lynette Wagner.  Information recorded by the scribe was done at my direction and has been verified and validated by me.     Yamileth Wagner  11/23/19 1722       Dc Weir MD  11/24/19 [id number]

## 2020-09-24 ENCOUNTER — LAB (OUTPATIENT)
Dept: LAB | Facility: HOSPITAL | Age: 25
End: 2020-09-24

## 2020-09-24 ENCOUNTER — OFFICE VISIT (OUTPATIENT)
Dept: ONCOLOGY | Facility: CLINIC | Age: 25
End: 2020-09-24

## 2020-09-24 VITALS
HEART RATE: 100 BPM | DIASTOLIC BLOOD PRESSURE: 69 MMHG | WEIGHT: 135 LBS | OXYGEN SATURATION: 99 % | RESPIRATION RATE: 16 BRPM | SYSTOLIC BLOOD PRESSURE: 111 MMHG | HEIGHT: 61 IN | TEMPERATURE: 98.9 F | BODY MASS INDEX: 25.49 KG/M2

## 2020-09-24 DIAGNOSIS — C50.011 MALIGNANT NEOPLASM OF NIPPLE OF RIGHT BREAST IN FEMALE, UNSPECIFIED ESTROGEN RECEPTOR STATUS (HCC): Primary | ICD-10-CM

## 2020-09-24 DIAGNOSIS — C50.011 MALIGNANT NEOPLASM OF NIPPLE OF RIGHT BREAST IN FEMALE, UNSPECIFIED ESTROGEN RECEPTOR STATUS (HCC): ICD-10-CM

## 2020-09-24 LAB
ALBUMIN SERPL-MCNC: 4.2 G/DL (ref 3.5–5.2)
ALBUMIN/GLOB SERPL: 1.4 G/DL
ALP SERPL-CCNC: 56 U/L (ref 39–117)
ALT SERPL W P-5'-P-CCNC: 14 U/L (ref 1–33)
ANION GAP SERPL CALCULATED.3IONS-SCNC: 8 MMOL/L (ref 5–15)
AST SERPL-CCNC: 20 U/L (ref 1–32)
BILIRUB SERPL-MCNC: 0.5 MG/DL (ref 0–1.2)
BUN SERPL-MCNC: 13 MG/DL (ref 6–20)
BUN/CREAT SERPL: 17.1 (ref 7–25)
CALCIUM SPEC-SCNC: 8.8 MG/DL (ref 8.6–10.5)
CHLORIDE SERPL-SCNC: 102 MMOL/L (ref 98–107)
CO2 SERPL-SCNC: 27 MMOL/L (ref 22–29)
CREAT SERPL-MCNC: 0.76 MG/DL (ref 0.57–1)
ERYTHROCYTE [DISTWIDTH] IN BLOOD BY AUTOMATED COUNT: 13.1 % (ref 12.3–15.4)
GFR SERPL CREATININE-BSD FRML MDRD: 111 ML/MIN/1.73
GLOBULIN UR ELPH-MCNC: 3 GM/DL
GLUCOSE SERPL-MCNC: 125 MG/DL (ref 65–99)
HCT VFR BLD AUTO: 43.1 % (ref 34–46.6)
HGB BLD-MCNC: 14.2 G/DL (ref 12–15.9)
LYMPHOCYTES # BLD AUTO: 1.6 10*3/MM3 (ref 0.7–3.1)
LYMPHOCYTES NFR BLD AUTO: 39.6 % (ref 19.6–45.3)
MCH RBC QN AUTO: 30.6 PG (ref 26.6–33)
MCHC RBC AUTO-ENTMCNC: 32.9 G/DL (ref 31.5–35.7)
MCV RBC AUTO: 92.9 FL (ref 79–97)
MONOCYTES # BLD AUTO: 0.1 10*3/MM3 (ref 0.1–0.9)
MONOCYTES NFR BLD AUTO: 2.5 % (ref 5–12)
NEUTROPHILS NFR BLD AUTO: 2.3 10*3/MM3 (ref 1.7–7)
NEUTROPHILS NFR BLD AUTO: 57.9 % (ref 42.7–76)
PLATELET # BLD AUTO: 209 10*3/MM3 (ref 140–450)
PMV BLD AUTO: 7 FL (ref 6–12)
POTASSIUM SERPL-SCNC: 3.4 MMOL/L (ref 3.5–5.2)
PROT SERPL-MCNC: 7.2 G/DL (ref 6–8.5)
RBC # BLD AUTO: 4.64 10*6/MM3 (ref 3.77–5.28)
SODIUM SERPL-SCNC: 137 MMOL/L (ref 136–145)
WBC # BLD AUTO: 4 10*3/MM3 (ref 3.4–10.8)

## 2020-09-24 PROCEDURE — 99214 OFFICE O/P EST MOD 30 MIN: CPT | Performed by: INTERNAL MEDICINE

## 2020-09-24 PROCEDURE — 85025 COMPLETE CBC W/AUTO DIFF WBC: CPT

## 2020-09-24 PROCEDURE — 36415 COLL VENOUS BLD VENIPUNCTURE: CPT

## 2020-09-24 PROCEDURE — 80053 COMPREHEN METABOLIC PANEL: CPT

## 2020-09-24 RX ORDER — AMITRIPTYLINE HYDROCHLORIDE 10 MG/1
10 TABLET, FILM COATED ORAL NIGHTLY
Qty: 30 TABLET | Refills: 5 | Status: SHIPPED | OUTPATIENT
Start: 2020-09-24 | End: 2021-11-11

## 2020-09-24 RX ORDER — RIZATRIPTAN BENZOATE 10 MG/1
10 TABLET ORAL ONCE AS NEEDED
Qty: 10 TABLET | Refills: 1 | Status: SHIPPED | OUTPATIENT
Start: 2020-09-24 | End: 2021-11-11

## 2020-09-29 ENCOUNTER — OFFICE VISIT (OUTPATIENT)
Dept: PSYCHIATRY | Facility: CLINIC | Age: 25
End: 2020-09-29

## 2020-09-29 DIAGNOSIS — F90.2 ATTENTION DEFICIT HYPERACTIVITY DISORDER, COMBINED TYPE: ICD-10-CM

## 2020-09-29 DIAGNOSIS — F41.1 GENERALIZED ANXIETY DISORDER: ICD-10-CM

## 2020-09-29 DIAGNOSIS — F33.2 SEVERE EPISODE OF RECURRENT MAJOR DEPRESSIVE DISORDER, WITHOUT PSYCHOTIC FEATURES (HCC): Primary | ICD-10-CM

## 2020-09-29 PROCEDURE — 90792 PSYCH DIAG EVAL W/MED SRVCS: CPT | Performed by: NURSE PRACTITIONER
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
## 2020-10-07 ENCOUNTER — OFFICE VISIT (OUTPATIENT)
Dept: PSYCHIATRY | Facility: CLINIC | Age: 25
End: 2020-10-07

## 2020-10-07 DIAGNOSIS — F33.1 MODERATE EPISODE OF RECURRENT MAJOR DEPRESSIVE DISORDER (HCC): ICD-10-CM

## 2020-10-07 DIAGNOSIS — F90.2 ATTENTION DEFICIT HYPERACTIVITY DISORDER, COMBINED TYPE: Primary | ICD-10-CM

## 2020-10-07 DIAGNOSIS — F41.1 GENERALIZED ANXIETY DISORDER: ICD-10-CM

## 2020-10-07 PROCEDURE — 99214 OFFICE O/P EST MOD 30 MIN: CPT | Performed by: NURSE PRACTITIONER

## 2020-10-07 RX ORDER — DEXTROAMPHETAMINE SACCHARATE, AMPHETAMINE ASPARTATE, DEXTROAMPHETAMINE SULFATE AND AMPHETAMINE SULFATE 2.5; 2.5; 2.5; 2.5 MG/1; MG/1; MG/1; MG/1
TABLET ORAL
Qty: 14 TABLET | Refills: 0 | Status: SHIPPED | OUTPATIENT
Start: 2020-10-07 | End: 2020-10-21 | Stop reason: ALTCHOICE

## 2020-10-07 NOTE — PROGRESS NOTES
Subjective   Angie Sutherland is a 25 y.o. female who is here today for medication management follow up. IN PERSON COVID PRECAUTIONS    Start Time: 4:15p   Stop Time:4:40p    Chief Complaint: ADHD, MDD, anxiety     History of Present Illness Patient presents in person for session. 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 ADHD since childhood and in adulthood, at work and at home. She has never been diagnosed or treated. Discussed obstacles she has had over her life because can't stick with conversations, feels she isn't smart, told she is lazy. Discussed behavioral changes for ADHD symptoms. Discussed challenges and mood that untreated ADHD can cause and patient has including increased anxiety and depression.    (Scales based on 0 - 10 with 10 being the worst)    The following portions of the patient's history were reviewed and updated as appropriate: allergies, current medications, past family history, past medical history, past social history, past surgical history and problem list.    Review of Systems  A 14 point review of systems was performed and is negative except as noted above.    Objective   Physical Exam  not currently breastfeeding.    Allergies   Allergen Reactions   • Benadryl [Diphenhydramine] Shortness Of Breath and Diarrhea       Current Medications:   Current Outpatient Medications   Medication Sig Dispense Refill   • amitriptyline (ELAVIL) 10 MG tablet Take 1 tablet by mouth Every Night. 30 tablet 5   • amphetamine-dextroamphetamine (ADDERALL) 10 MG tablet Take one tablet in morning and one before 3pm for focus concentration 14 tablet 0   • oxyCODONE-acetaminophen (PERCOCET) 5-325 MG per tablet Take 1-2 tablets by mouth Every 4 (Four) Hours As Needed (Pain). 20 tablet 0   • rizatriptan (Maxalt) 10  MG tablet Take 1 tablet by mouth 1 (One) Time As Needed for Migraine for up to 1 dose. May repeat in 2 hours if needed 10 tablet 1     No current facility-administered medications for this visit.        ADHD:  Screening for Adults With ADHD - (1-6)  1. How often do you have trouble wrapping up the final details of a project, once the challenging parts have been done?: Very Often  4. When you have a task that requires a lot of thought, how often do you avoide or delay getting started ?: Very Often  5. How often do you fidget or squirm with your hands or feet when you have to sit down from for a long time?: Often    Appearance:   Hygiene:  REPORTS good  Cooperation:  Cooperative  Eye Contact:    Psychomotor Behavior:  denies psychomotor agitation/retardation, No EPS, No motor tics  Mood:  within normal limits  Affect:    Hopelessness: Denies  Speech:  Normal  Thought Process:  Linear  Thought Content:  Normal  Concentration: Normal   Suicidal: denies  Homicidal:  None  Hallucinations:  None  Delusion:  None  Memory:  Intact  Orientation:  Person, Place, Time and Situation  Reliability:  good  Insight:  Fair  Judgement: good  Impulse Control: good  Estimated Intelligence: average range    PEDRO REVIEWED NO RED FLAGS    Assessment/Plan   Diagnoses and all orders for this visit:    Attention deficit hyperactivity disorder, combined type  -     amphetamine-dextroamphetamine (ADDERALL) 10 MG tablet; Take one tablet in morning and one before 3pm for focus concentration    Generalized anxiety disorder    Moderate episode of recurrent major depressive disorder (CMS/HCC)          IMPRESSION: adhd symptoms disrupting motivation, interest, ability to perform at home and work settings     PLAN:     We discussed risks, benefits, and side effects of the above medications and the patient was agreeable with the plan. Patient was educated on the importance of compliance with treatment and follow-up appointments.       As part of this  patient's treatment plan I may be prescribing controlled substances. The patient has been made aware of appropriate use of such medications, including potential risk for dependence or overdose. It has also been made clear that these medications are for use by this patient only, without concomitant use of alcohol or other substances unless prescribed.    Patient has completed prescribing agreement detailing terms of continued prescribing of controlled substances, including monitoring PEDRO reports, urine drug screening, and pill counts if necessary. The patient is aware that inappropriate use will result in cessation of prescribing such medications.         PEDRO report has been reviewed and scanned into the patient's chart   Patient was counseled on medication safety and patient responsibility of medication against theft or stolen medications.  No early refills requests will be honored for lost or stolen medication.  Patient is expected to comply with requests for random medication counts and urine drug monitoring while receiving control substance therapy.        Provide Cognitive Behavioral Therapy and Solution Focused Therapy to improve functioning, maintain stability, and avoid decompensation and the need for higher level of care.    Counseled patient regarding multimodal approach with encouragement of healthy nutrition, healthy sleep, regular physical mobility, social involvement, counseling, and medication compliance.     Assisted patient in identifying risk factors which would indicate the need for higher level of care including thoughts to harm self or others and/or self-harming behavior and encouraged patient to contact this office, call 911, or present to the nearest emergency room should any of these events occur. Discussed crisis intervention services and means to access.  Patient adamantly and convincingly denies current suicidal or homicidal ideation or perceptual disturbance.    Treatment Plan:  stabilize mood, patient will stay out of psychiatric hospital and be at optimal level of functioning with therapy and take all medication as prescribed. Patient verbalized  understanding and agreement to plan.    Instructed to call for questions or concerns and return early if necessary.     Greater than 50% time was spent in coordination of care, and counseling the patient regarding current assessment, symptoms, plan of care going forward, supportive therapy.  Answered any questions patient had regarding medications and plan of care.    Return in about 1 week (around 10/14/2020).

## 2020-10-14 ENCOUNTER — OFFICE VISIT (OUTPATIENT)
Dept: PSYCHIATRY | Facility: CLINIC | Age: 25
End: 2020-10-14

## 2020-10-14 DIAGNOSIS — F41.1 GENERALIZED ANXIETY DISORDER: ICD-10-CM

## 2020-10-14 DIAGNOSIS — F33.1 MODERATE EPISODE OF RECURRENT MAJOR DEPRESSIVE DISORDER (HCC): ICD-10-CM

## 2020-10-14 DIAGNOSIS — F90.2 ATTENTION DEFICIT HYPERACTIVITY DISORDER, COMBINED TYPE: Primary | ICD-10-CM

## 2020-10-14 PROCEDURE — 99214 OFFICE O/P EST MOD 30 MIN: CPT | Performed by: NURSE PRACTITIONER

## 2020-10-14 NOTE — PROGRESS NOTES
Subjective   Angie Sutherland is a 25 y.o. female who is here today for medication management follow up. Face to FAce with Covid precautions, screened, masked , 6 ft apart    TIME IN: 4:00p  TIME OUT: 4:30p    Chief Complaint: ADHD, depression     History of Present Illness Patient reports she took the Adderall 10mg one in morning and one in afternoon as instructed. The first two days she could really tell a difference in motivation, focus concentration and prioritizing tasks. She didn't feel so impulsive in actions. She tolerated her little girls activity. She sleep well and ate well. As the days went on she didn't feel it worked as well and even felt more emotional like before she took the adderall, ie overwhelmed, distracted, tearful . She was on medication for one week.  Denies adverse effects from medications.   (Scales based on 0 - 10 with 10 being the worst)      The following portions of the patient's history were reviewed and updated as appropriate: allergies, current medications, past family history, past medical history, past social history, past surgical history and problem list.    Review of Systems  A 14 point review of systems was performed and is negative except as noted above.    Objective   Physical Exam  not currently breastfeeding.    Allergies   Allergen Reactions   • Benadryl [Diphenhydramine] Shortness Of Breath and Diarrhea       Current Medications:   Current Outpatient Medications   Medication Sig Dispense Refill   • amitriptyline (ELAVIL) 10 MG tablet Take 1 tablet by mouth Every Night. 30 tablet 5   • amphetamine-dextroamphetamine (ADDERALL) 10 MG tablet Take one tablet in morning and one before 3pm for focus concentration 14 tablet 0   • lisdexamfetamine (Vyvanse) 30 MG capsule Take 1 capsule by mouth Every Morning 15 capsule 0   • oxyCODONE-acetaminophen (PERCOCET) 5-325 MG per tablet Take 1-2 tablets by mouth Every 4 (Four) Hours As Needed (Pain). 20 tablet 0   • rizatriptan  (Maxalt) 10 MG tablet Take 1 tablet by mouth 1 (One) Time As Needed for Migraine for up to 1 dose. May repeat in 2 hours if needed 10 tablet 1     No current facility-administered medications for this visit.        Lab Results:  Lab on 09/24/2020   Component Date Value Ref Range Status   • Glucose 09/24/2020 125* 65 - 99 mg/dL Final   • BUN 09/24/2020 13  6 - 20 mg/dL Final   • Creatinine 09/24/2020 0.76  0.57 - 1.00 mg/dL Final   • Sodium 09/24/2020 137  136 - 145 mmol/L Final   • Potassium 09/24/2020 3.4* 3.5 - 5.2 mmol/L Final    Slight hemolysis detected by analyzer. Results may be affected.   • Chloride 09/24/2020 102  98 - 107 mmol/L Final   • CO2 09/24/2020 27.0  22.0 - 29.0 mmol/L Final   • Calcium 09/24/2020 8.8  8.6 - 10.5 mg/dL Final   • Total Protein 09/24/2020 7.2  6.0 - 8.5 g/dL Final   • Albumin 09/24/2020 4.20  3.50 - 5.20 g/dL Final   • ALT (SGPT) 09/24/2020 14  1 - 33 U/L Final   • AST (SGOT) 09/24/2020 20  1 - 32 U/L Final   • Alkaline Phosphatase 09/24/2020 56  39 - 117 U/L Final   • Total Bilirubin 09/24/2020 0.5  0.0 - 1.2 mg/dL Final   • eGFR   Amer 09/24/2020 111  >60 mL/min/1.73 Final   • Globulin 09/24/2020 3.0  gm/dL Final   • A/G Ratio 09/24/2020 1.4  g/dL Final   • BUN/Creatinine Ratio 09/24/2020 17.1  7.0 - 25.0 Final   • Anion Gap 09/24/2020 8.0  5.0 - 15.0 mmol/L Final   • WBC 09/24/2020 4.00  3.40 - 10.80 10*3/mm3 Final   • RBC 09/24/2020 4.64  3.77 - 5.28 10*6/mm3 Final   • Hemoglobin 09/24/2020 14.2  12.0 - 15.9 g/dL Final   • Hematocrit 09/24/2020 43.1  34.0 - 46.6 % Final   • RDW 09/24/2020 13.1  12.3 - 15.4 % Final   • MCV 09/24/2020 92.9  79.0 - 97.0 fL Final   • MCH 09/24/2020 30.6  26.6 - 33.0 pg Final   • MCHC 09/24/2020 32.9  31.5 - 35.7 g/dL Final   • MPV 09/24/2020 7.0  6.0 - 12.0 fL Final   • Platelets 09/24/2020 209  140 - 450 10*3/mm3 Final   • Neutrophil % 09/24/2020 57.9  42.7 - 76.0 % Final   • Lymphocyte % 09/24/2020 39.6  19.6 - 45.3 % Final   • Monocyte %  09/24/2020 2.5* 5.0 - 12.0 % Final   • Neutrophils, Absolute 09/24/2020 2.30  1.70 - 7.00 10*3/mm3 Final   • Lymphocytes, Absolute 09/24/2020 1.60  0.70 - 3.10 10*3/mm3 Final   • Monocytes, Absolute 09/24/2020 0.10  0.10 - 0.90 10*3/mm3 Final         Appearance: normal   Hygiene:   good  Cooperation:  Cooperative  Eye Contact:  good  Psychomotor Behavior: no psychomotor agitation/retardation, No EPS, No motor tics  Mood:  within normal limits  Affect:  Congruent with mood  Hopelessness: Denies  Speech:  Normal  Thought Process:  Linear  Thought Content:  Normal  Concentration: fair  Suicidal: denies  Homicidal:  None  Hallucinations:  None  Delusion:  None  Memory:  Intact  Orientation:  Person, Place, Time and Situation  Reliability:  good  Insight:  Fair  Judgement: good  Impulse Control: good  Estimated Intelligence: average range    PEDRO REVIEWED NO RED FLAGS    Assessment/Plan   Diagnoses and all orders for this visit:    1. Attention deficit hyperactivity disorder, combined type (Primary)  -     lisdexamfetamine (Vyvanse) 30 MG capsule; Take 1 capsule by mouth Every Morning  Dispense: 15 capsule; Refill: 0    2. Moderate episode of recurrent major depressive disorder (CMS/HCC)    3. Generalized anxiety disorder          IMPRESSION: had good response at first from 10 mg adderall bid for ADHD management of symptoms, less effective as week went on which can occur when not stimulant naive as week went on. Was able to sleep and eat, was able to focus and concentrate with improvement in task completion, not laying in bed.     PLAN:   Will trial on extended release stimulant lisdexamfetamine (Vyvanse) 30 mg to take daily in mornings only. Won't have to dose twice a day and generally well tolerated, will do Prior auth if necessary .     We discussed risks, benefits, and side effects of the above medications and the patient was agreeable with the plan. Patient was educated on the importance of compliance with  treatment and follow-up appointments.     As part of this patient's treatment plan I have prescribed a controlled substance. The patient has been made aware of appropriate use of such medication, including potential risk for dependence or overdose. It has also been made clear that these medications are for use by this patient only, without concomitant use of alcohol or other substances unless prescribed.    Patient has completed prescribing agreement detailing terms of continued prescribing of controlled substances, including monitoring PEDRO reports, urine drug screening, and pill counts if necessary. The patient is aware that inappropriate use will result in cessation of prescribing such medications.         PEDRO report has been reviewed and in the patient's chart EPIC  Patient was counseled on medication safety and patient responsibility of medication against theft or stolen medications.  No early refills requests will be honored for lost or stolen medication.  Patient is expected to comply with requests for random medication counts and urine drug monitoring while receiving control substance therapy.    Next visit will also   Provide Cognitive Behavioral Therapy and Solution Focused Therapy to improve functioning, maintain stability, and avoid decompensation and the need for higher level of care.    Assisted patient in identifying risk factors which would indicate the need for higher level of care including thoughts to harm self or others and/or self-harming behavior and encouraged patient to contact this office, call 911, or present to the nearest emergency room should any of these events occur. Discussed crisis intervention services and means to access.  Patient adamantly and convincingly denies current suicidal or homicidal ideation or perceptual disturbance.    Treatment Plan: stabilize mood, patient will stay out of psychiatric hospital and be at optimal level of functioning with therapy and take all  medication as prescribed. Patient verbalized  understanding and agreement to plan.    Instructed to call for questions or concerns and return early if necessary.     Greater than 50% time was spent in coordination of care, and counseling the patient regarding current assessment, symptoms, plan of care going forward, supportive therapy.  Answered any questions patient had regarding medications and plan of care.    Return in about 1 week (around 10/21/2020).

## 2020-10-21 ENCOUNTER — OFFICE VISIT (OUTPATIENT)
Dept: PSYCHIATRY | Facility: CLINIC | Age: 25
End: 2020-10-21

## 2020-10-21 DIAGNOSIS — F90.2 ATTENTION DEFICIT HYPERACTIVITY DISORDER, COMBINED TYPE: ICD-10-CM

## 2020-10-21 DIAGNOSIS — F33.1 MODERATE EPISODE OF RECURRENT MAJOR DEPRESSIVE DISORDER (HCC): Primary | ICD-10-CM

## 2020-10-21 PROCEDURE — 99214 OFFICE O/P EST MOD 30 MIN: CPT | Performed by: NURSE PRACTITIONER

## 2020-10-21 NOTE — PROGRESS NOTES
Subjective   Angie Sutherland is a 25 y.o. female who is here today for medication management follow up. Face to face with Covid precautions, screening masked, distance hand washing.     TIME IN: 415p  TIME OUT: 440p    Chief Complaint: ADHD, MDD    History of Present Illness Patient presents by herself. Reports the Vyvanse 30mg didn't have any efficacy, couldn't tell she had taken it every day. Reports continued ADHD symptoms with distracted thoughts, impulsivity, poor organization, feelings of being overwhelmed. With those she feels low self esteem, feels something is wrong with her and others are able to achieve better, low mood. When she was on Adderall 10 mg twice a day she the first couple days felt more focus concentration and ability to get things done, as the day week went on it didn't feel like she was taking anything too. She is sleeping and eating well  Denies adverse effects from Vyvanse    (Scales based on 0 - 10 with 10 being the worst)    The following portions of the patient's history were reviewed and updated as appropriate: allergies, current medications, past family history, past medical history, past social history, past surgical history and problem list.    Review of Systems  A 14 point review of systems was performed and is negative except as noted above.    Objective   Physical Exam  not currently breastfeeding.    Allergies   Allergen Reactions   • Benadryl [Diphenhydramine] Shortness Of Breath and Diarrhea       Current Medications:   Current Outpatient Medications   Medication Sig Dispense Refill   • amitriptyline (ELAVIL) 10 MG tablet Take 1 tablet by mouth Every Night. 30 tablet 5   • lisdexamfetamine (VYVANSE) 50 MG capsule Take 1 capsule by mouth Every Morning 30 capsule 0   • oxyCODONE-acetaminophen (PERCOCET) 5-325 MG per tablet Take 1-2 tablets by mouth Every 4 (Four) Hours As Needed (Pain). 20 tablet 0   • rizatriptan (Maxalt) 10 MG tablet Take 1 tablet by mouth 1 (One)  Time As Needed for Migraine for up to 1 dose. May repeat in 2 hours if needed 10 tablet 1     No current facility-administered medications for this visit.        Lab Results:  Lab on 09/24/2020   Component Date Value Ref Range Status   • Glucose 09/24/2020 125* 65 - 99 mg/dL Final   • BUN 09/24/2020 13  6 - 20 mg/dL Final   • Creatinine 09/24/2020 0.76  0.57 - 1.00 mg/dL Final   • Sodium 09/24/2020 137  136 - 145 mmol/L Final   • Potassium 09/24/2020 3.4* 3.5 - 5.2 mmol/L Final    Slight hemolysis detected by analyzer. Results may be affected.   • Chloride 09/24/2020 102  98 - 107 mmol/L Final   • CO2 09/24/2020 27.0  22.0 - 29.0 mmol/L Final   • Calcium 09/24/2020 8.8  8.6 - 10.5 mg/dL Final   • Total Protein 09/24/2020 7.2  6.0 - 8.5 g/dL Final   • Albumin 09/24/2020 4.20  3.50 - 5.20 g/dL Final   • ALT (SGPT) 09/24/2020 14  1 - 33 U/L Final   • AST (SGOT) 09/24/2020 20  1 - 32 U/L Final   • Alkaline Phosphatase 09/24/2020 56  39 - 117 U/L Final   • Total Bilirubin 09/24/2020 0.5  0.0 - 1.2 mg/dL Final   • eGFR   Amer 09/24/2020 111  >60 mL/min/1.73 Final   • Globulin 09/24/2020 3.0  gm/dL Final   • A/G Ratio 09/24/2020 1.4  g/dL Final   • BUN/Creatinine Ratio 09/24/2020 17.1  7.0 - 25.0 Final   • Anion Gap 09/24/2020 8.0  5.0 - 15.0 mmol/L Final   • WBC 09/24/2020 4.00  3.40 - 10.80 10*3/mm3 Final   • RBC 09/24/2020 4.64  3.77 - 5.28 10*6/mm3 Final   • Hemoglobin 09/24/2020 14.2  12.0 - 15.9 g/dL Final   • Hematocrit 09/24/2020 43.1  34.0 - 46.6 % Final   • RDW 09/24/2020 13.1  12.3 - 15.4 % Final   • MCV 09/24/2020 92.9  79.0 - 97.0 fL Final   • MCH 09/24/2020 30.6  26.6 - 33.0 pg Final   • MCHC 09/24/2020 32.9  31.5 - 35.7 g/dL Final   • MPV 09/24/2020 7.0  6.0 - 12.0 fL Final   • Platelets 09/24/2020 209  140 - 450 10*3/mm3 Final   • Neutrophil % 09/24/2020 57.9  42.7 - 76.0 % Final   • Lymphocyte % 09/24/2020 39.6  19.6 - 45.3 % Final   • Monocyte % 09/24/2020 2.5* 5.0 - 12.0 % Final   • Neutrophils,  Absolute 09/24/2020 2.30  1.70 - 7.00 10*3/mm3 Final   • Lymphocytes, Absolute 09/24/2020 1.60  0.70 - 3.10 10*3/mm3 Final   • Monocytes, Absolute 09/24/2020 0.10  0.10 - 0.90 10*3/mm3 Final       Appearance: dressed appropriately looks stated age  Hygiene:good  Cooperation:  Cooperative  Eye Contact: good   Psychomotor Behavior:  nopsychomotor agitation/retardation, No EPS, No motor tics  Mood:  within normal limits  Affect:  appropriate  Hopelessness: Denies  Speech:  Normal  Thought Process:  Linear  Thought Content:  Normal  Concentration: distracted easily   Suicidal: denies  Homicidal:  None  Hallucinations:  None  Delusion:  None  Memory:  Intact  Orientation:  Person, Place, Time and Situation  Reliability:  good  Insight:  Fair  Judgement: good  Impulse Control: fair  Estimated Intelligence: average range    PEDRO REVIEWED NO RED FLAGS    Assessment/Plan   Diagnoses and all orders for this visit:    1. Moderate episode of recurrent major depressive disorder (CMS/HCC) (Primary)    2. Attention deficit hyperactivity disorder, combined type  -     lisdexamfetamine (VYVANSE) 50 MG capsule; Take 1 capsule by mouth Every Morning  Dispense: 30 capsule; Refill: 0          IMPRESSION: no efficacy with Vyvanse 30mg for ADHD symptoms     PLAN: increase Vyvanse to 50mg po one QAM for ADHD, goals discussed, not energy but focus, concentration , organizing skills, task completion.   Pill is not a skill, discussed behaviors to help the above mentioned, placing things in same place, organizing home, writing lists, having schedule, looking at schedule of commitments, girls school schedule, assignment schedule     We discussed risks, benefits, and side effects of the above medications and the patient was agreeable with the plan. Patient was educated on the importance of compliance with treatment and follow-up appointments.     Provide Cognitive Behavioral Therapy and Solution Focused Therapy to improve functioning, maintain  stability, and avoid decompensation and the need for higher level of care.    Counseled patient regarding multimodal approach with encouragement of healthy nutrition, healthy sleep, regular physical mobility, social involvement, counseling, and medication compliance.     Assisted patient in identifying risk factors which would indicate the need for higher level of care including thoughts to harm self or others and/or self-harming behavior and encouraged patient to contact this office, call 911, or present to the nearest emergency room should any of these events occur. Discussed crisis intervention services and means to access.  Patient adamantly and convincingly denies current suicidal or homicidal ideation or perceptual disturbance.    Treatment Plan: stabilize mood, patient will stay out of psychiatric hospital and be at optimal level of functioning with therapy and take all medication as prescribed. Patient verbalized  understanding and agreement to plan.    Instructed to call for questions or concerns and return early if necessary.     Greater than 50% time was spent in coordination of care, and counseling the patient regarding current assessment, symptoms, plan of care going forward, supportive therapy.  Answered any questions patient had regarding medications and plan of care.    Return in about 1 week (around 10/28/2020).

## 2020-11-04 ENCOUNTER — HOSPITAL ENCOUNTER (OUTPATIENT)
Dept: ULTRASOUND IMAGING | Facility: HOSPITAL | Age: 25
Discharge: HOME | End: 2020-11-04
Admitting: INTERNAL MEDICINE

## 2020-11-04 DIAGNOSIS — R92.8 ABNORMAL MAMMOGRAM: ICD-10-CM

## 2020-11-04 PROCEDURE — 76642 ULTRASOUND BREAST LIMITED: CPT | Performed by: RADIOLOGY

## 2020-11-04 PROCEDURE — 76642 ULTRASOUND BREAST LIMITED: CPT

## 2020-11-10 ENCOUNTER — OFFICE VISIT (OUTPATIENT)
Dept: PSYCHIATRY | Facility: CLINIC | Age: 25
End: 2020-11-10

## 2020-11-10 DIAGNOSIS — F33.1 MODERATE EPISODE OF RECURRENT MAJOR DEPRESSIVE DISORDER (HCC): ICD-10-CM

## 2020-11-10 DIAGNOSIS — F90.2 ATTENTION DEFICIT HYPERACTIVITY DISORDER, COMBINED TYPE: Primary | ICD-10-CM

## 2020-11-10 PROCEDURE — 90833 PSYTX W PT W E/M 30 MIN: CPT | Performed by: NURSE PRACTITIONER

## 2020-11-10 PROCEDURE — 99212 OFFICE O/P EST SF 10 MIN: CPT | Performed by: NURSE PRACTITIONER

## 2020-11-10 NOTE — PROGRESS NOTES
"  Subjective   Angie Sutherland is a 25 y.o. female who is here today for medication management follow up. and therapy face to face in person with Covid precautions     TIME IN: 3:15pm   TIME OUT: 3:55pm     Chief Complaint: ADHD, MDD     History of Present Illness Patient presents by herself for session. She reports the Vyvanse 50mg daily in am has been much more beneficial. She reports being more focused, engaged with others, following conversations, getting tasks completed around the house. She is not sleeping except at night. She is eating but not as much in quantity, has an appetite. She is adopting a dog from a friend and is house trained and very loveable and states that too has improved her mood. Her younger \"brother\" is still staying with her and not a stressor. \"I like to help others\". Has been able to see friends since roommate is there, he does not contribute to bills so will babysit her daughters .  Denies adverse effects from medications.   (Scales based on 0 - 10 with 10 being the worst)    Therapy:  Start Time:  3:15  Stop Time: 3:45p    (30 ) minutes was spent for psychotherapy. Assisted patient in processing patient's ADHD, MDD. Acknowledged and normalized patient's thoughts, feelings, and concerns. Utilized cognitive behavioral therapy to assist the patient in recognizing more appropriate coping mechanisms when she becomes agitated/anxious/sad demotivated which are proven effective in reducing the severity of frequency of symptoms as well as skills for ADHD symptoms.     CLINICAL MANUEVERING/INTERVENTION:   Patient talked about current phase of life raising two young daughters one a  and other 5yo on low income and financial strain.   Feelings were processed and validated, both negative and positive. Flushing out worries and concerns was conducted in order for her to problem solve. Discussed ADHD symptoms and benefits of routine, organizing home so phone and keys go same place " everyday, meals planned, having each day be for a purpose ie Wednesdays being grocery day, Thursday laundry day etc. Ways in which patient may take time for herself in a purposeful manner was discussed. Patient was assisted in 'talking out' what she may do if money continues to be a challenge, keeping in mind the notion that there is typically a solution to any given problem. She doesn't want to go to work because of Covid and the two young girls then would have to go into .  Venting of concerns continued regarding her parents lack of emotional support.  The patient expressed gratitude for today's session and said that counseling helps her feel better.      The following portions of the patient's history were reviewed and updated as appropriate: allergies, current medications, past family history, past medical history, past social history, past surgical history and problem list.    Review of Systems  A 14 point review of systems was performed and is negative except as noted above.    Objective   Physical Exam  Last menstrual period 10/28/2020, not currently breastfeeding.    Allergies   Allergen Reactions   • Benadryl [Diphenhydramine] Shortness Of Breath and Diarrhea       Current Medications:   Current Outpatient Medications   Medication Sig Dispense Refill   • amitriptyline (ELAVIL) 10 MG tablet Take 1 tablet by mouth Every Night. 30 tablet 5   • lisdexamfetamine (VYVANSE) 50 MG capsule Take 1 capsule by mouth Every Morning 30 capsule 0   • oxyCODONE-acetaminophen (PERCOCET) 5-325 MG per tablet Take 1-2 tablets by mouth Every 4 (Four) Hours As Needed (Pain). 20 tablet 0   • rizatriptan (Maxalt) 10 MG tablet Take 1 tablet by mouth 1 (One) Time As Needed for Migraine for up to 1 dose. May repeat in 2 hours if needed 10 tablet 1     No current facility-administered medications for this visit.        Lab Results:  Lab on 09/24/2020   Component Date Value Ref Range Status   • Glucose 09/24/2020 125* 65 - 99  mg/dL Final   • BUN 09/24/2020 13  6 - 20 mg/dL Final   • Creatinine 09/24/2020 0.76  0.57 - 1.00 mg/dL Final   • Sodium 09/24/2020 137  136 - 145 mmol/L Final   • Potassium 09/24/2020 3.4* 3.5 - 5.2 mmol/L Final    Slight hemolysis detected by analyzer. Results may be affected.   • Chloride 09/24/2020 102  98 - 107 mmol/L Final   • CO2 09/24/2020 27.0  22.0 - 29.0 mmol/L Final   • Calcium 09/24/2020 8.8  8.6 - 10.5 mg/dL Final   • Total Protein 09/24/2020 7.2  6.0 - 8.5 g/dL Final   • Albumin 09/24/2020 4.20  3.50 - 5.20 g/dL Final   • ALT (SGPT) 09/24/2020 14  1 - 33 U/L Final   • AST (SGOT) 09/24/2020 20  1 - 32 U/L Final   • Alkaline Phosphatase 09/24/2020 56  39 - 117 U/L Final   • Total Bilirubin 09/24/2020 0.5  0.0 - 1.2 mg/dL Final   • eGFR   Amer 09/24/2020 111  >60 mL/min/1.73 Final   • Globulin 09/24/2020 3.0  gm/dL Final   • A/G Ratio 09/24/2020 1.4  g/dL Final   • BUN/Creatinine Ratio 09/24/2020 17.1  7.0 - 25.0 Final   • Anion Gap 09/24/2020 8.0  5.0 - 15.0 mmol/L Final   • WBC 09/24/2020 4.00  3.40 - 10.80 10*3/mm3 Final   • RBC 09/24/2020 4.64  3.77 - 5.28 10*6/mm3 Final   • Hemoglobin 09/24/2020 14.2  12.0 - 15.9 g/dL Final   • Hematocrit 09/24/2020 43.1  34.0 - 46.6 % Final   • RDW 09/24/2020 13.1  12.3 - 15.4 % Final   • MCV 09/24/2020 92.9  79.0 - 97.0 fL Final   • MCH 09/24/2020 30.6  26.6 - 33.0 pg Final   • MCHC 09/24/2020 32.9  31.5 - 35.7 g/dL Final   • MPV 09/24/2020 7.0  6.0 - 12.0 fL Final   • Platelets 09/24/2020 209  140 - 450 10*3/mm3 Final   • Neutrophil % 09/24/2020 57.9  42.7 - 76.0 % Final   • Lymphocyte % 09/24/2020 39.6  19.6 - 45.3 % Final   • Monocyte % 09/24/2020 2.5* 5.0 - 12.0 % Final   • Neutrophils, Absolute 09/24/2020 2.30  1.70 - 7.00 10*3/mm3 Final   • Lymphocytes, Absolute 09/24/2020 1.60  0.70 - 3.10 10*3/mm3 Final   • Monocytes, Absolute 09/24/2020 0.10  0.10 - 0.90 10*3/mm3 Final       Appearance: good  Hygiene:  good  Cooperation:  Cooperative  Eye Contact:   good  Psychomotor Behavior: nopsychomotor agitation/retardation, No EPS, No motor tics  Mood:  within normal limits  Affect:  appropriate  Hopelessness: Denies  Speech:  Normal  Thought Process:  Linear  Thought Content:  Normal  Concentration: Normal   Suicidal: denies  Homicidal:  None  Hallucinations:  None  Delusion:  None  Memory:  Intact  Orientation:  Person, Place, Time and Situation  Reliability:  good  Insight:  Fair  Judgement: good  Impulse Control: good  Estimated Intelligence: average range    PEDRO REVIEWED NO RED FLAGS    Assessment/Plan   Diagnoses and all orders for this visit:    1. Attention deficit hyperactivity disorder, combined type (Primary)    2. Moderate episode of recurrent major depressive disorder (CMS/HCC)          IMPRESSION: decreased ADHD symptoms and depression on Vyvanse 50mg daily    PLAN:   Cont Vyvanse 50 mg one capsule daily for ADHD and helping with cognition, focus concentration task completion which in turn improves confidence  Wrote letter for her apt management for her to have emotional support animal ie dog. Proven to elevate mood and lower anxiety     We discussed risks, benefits, and side effects of the above medications and the patient was agreeable with the plan. Patient was educated on the importance of compliance with treatment and follow-up appointments.     Provide Cognitive Behavioral Therapy and Solution Focused Therapy to improve functioning, maintain stability, and avoid decompensation and the need for higher level of care.    Assisted patient in identifying risk factors which would indicate the need for higher level of care including thoughts to harm self or others and/or self-harming behavior and encouraged patient to contact this office, call 911, or present to the nearest emergency room should any of these events occur. Discussed crisis intervention services and means to access.  Patient adamantly and convincingly denies current suicidal or homicidal  ideation or perceptual disturbance.    Treatment Plan: stabilize mood, patient will stay out of psychiatric hospital and be at optimal level of functioning with therapy and take all medication as prescribed. Patient verbalized  understanding and agreement to plan.    Instructed to call for questions or concerns and return early if necessary.     Greater than 50% time was spent in coordination of care, and counseling the patient regarding current assessment, symptoms, plan of care going forward, supportive therapy.  Answered any questions patient had regarding medications and plan of care.    Return in about 2 weeks (around 11/24/2020).

## 2020-12-23 ENCOUNTER — TELEPHONE (OUTPATIENT)
Dept: ONCOLOGY | Facility: CLINIC | Age: 25
End: 2020-12-23

## 2020-12-23 DIAGNOSIS — F90.2 ATTENTION DEFICIT HYPERACTIVITY DISORDER, COMBINED TYPE: ICD-10-CM

## 2020-12-23 NOTE — TELEPHONE ENCOUNTER
Pt called stating she only has one Vyvanse left and needs a refill. Cyndee is out until Monday, can somebody send in a script for her?    Pt can be reached at

## 2021-01-18 ENCOUNTER — TELEPHONE (OUTPATIENT)
Dept: ONCOLOGY | Facility: CLINIC | Age: 26
End: 2021-01-18

## 2021-01-18 NOTE — TELEPHONE ENCOUNTER
I spoke to UYEN Minaya and she said that patient should see her ob/gyn.  Patient has not kept her followup appt with us that was in November and I will check with her on re-scheduling appt.  I callled patient back and she said she used to see an ob about a year ago and has no pcp.  I encouraged her to contact that OB's office and insist that she has this bleeding problem to see if they will give her an appt.  I told her we could make a referral but that might take a bit.  She verbalized understanding and will call the OB.  I also asked if we could reschedule her appt and she said yes, so I scheduled her for Feb. 4 to see UYEN Minaya.

## 2021-01-18 NOTE — TELEPHONE ENCOUNTER
Pt wanted to call and ask Dr. Amos about some irregular bleeding she has been having, she says her periods are on and off and lately she has been bleeding for 18 days now, with it being on/off on heaviness.    Please call pt back at

## 2021-02-04 ENCOUNTER — TELEPHONE (OUTPATIENT)
Dept: ONCOLOGY | Facility: CLINIC | Age: 26
End: 2021-02-04

## 2021-02-04 ENCOUNTER — OFFICE VISIT (OUTPATIENT)
Dept: ONCOLOGY | Facility: CLINIC | Age: 26
End: 2021-02-04

## 2021-02-04 ENCOUNTER — LAB (OUTPATIENT)
Dept: LAB | Facility: HOSPITAL | Age: 26
End: 2021-02-04

## 2021-02-04 VITALS
BODY MASS INDEX: 23.03 KG/M2 | RESPIRATION RATE: 14 BRPM | OXYGEN SATURATION: 98 % | SYSTOLIC BLOOD PRESSURE: 116 MMHG | WEIGHT: 122 LBS | HEART RATE: 81 BPM | TEMPERATURE: 98.4 F | HEIGHT: 61 IN | DIASTOLIC BLOOD PRESSURE: 62 MMHG

## 2021-02-04 DIAGNOSIS — C50.011 MALIGNANT NEOPLASM OF NIPPLE OF RIGHT BREAST IN FEMALE, UNSPECIFIED ESTROGEN RECEPTOR STATUS (HCC): Primary | ICD-10-CM

## 2021-02-04 DIAGNOSIS — N63.0 BREAST NODULE: ICD-10-CM

## 2021-02-04 LAB
ALBUMIN SERPL-MCNC: 4.2 G/DL (ref 3.5–5.2)
ALBUMIN/GLOB SERPL: 1.2 G/DL
ALP SERPL-CCNC: 64 U/L (ref 39–117)
ALT SERPL W P-5'-P-CCNC: 17 U/L (ref 1–33)
ANION GAP SERPL CALCULATED.3IONS-SCNC: 10 MMOL/L (ref 5–15)
AST SERPL-CCNC: 20 U/L (ref 1–32)
BASOPHILS # BLD AUTO: 0.02 10*3/MM3 (ref 0–0.2)
BASOPHILS NFR BLD AUTO: 0.4 % (ref 0–1.5)
BILIRUB SERPL-MCNC: 0.3 MG/DL (ref 0–1.2)
BUN SERPL-MCNC: 8 MG/DL (ref 6–20)
BUN/CREAT SERPL: 11 (ref 7–25)
CALCIUM SPEC-SCNC: 9.1 MG/DL (ref 8.6–10.5)
CHLORIDE SERPL-SCNC: 104 MMOL/L (ref 98–107)
CO2 SERPL-SCNC: 23 MMOL/L (ref 22–29)
CREAT SERPL-MCNC: 0.73 MG/DL (ref 0.57–1)
DEPRECATED RDW RBC AUTO: 42 FL (ref 37–54)
EOSINOPHIL # BLD AUTO: 0.16 10*3/MM3 (ref 0–0.4)
EOSINOPHIL NFR BLD AUTO: 3.4 % (ref 0.3–6.2)
ERYTHROCYTE [DISTWIDTH] IN BLOOD BY AUTOMATED COUNT: 12.3 % (ref 12.3–15.4)
GFR SERPL CREATININE-BSD FRML MDRD: 117 ML/MIN/1.73
GLOBULIN UR ELPH-MCNC: 3.6 GM/DL
GLUCOSE SERPL-MCNC: 89 MG/DL (ref 65–99)
HCT VFR BLD AUTO: 46.5 % (ref 34–46.6)
HGB BLD-MCNC: 14.7 G/DL (ref 12–15.9)
IMM GRANULOCYTES # BLD AUTO: 0.01 10*3/MM3 (ref 0–0.05)
IMM GRANULOCYTES NFR BLD AUTO: 0.2 % (ref 0–0.5)
LYMPHOCYTES # BLD AUTO: 1.78 10*3/MM3 (ref 0.7–3.1)
LYMPHOCYTES NFR BLD AUTO: 38.2 % (ref 19.6–45.3)
MCH RBC QN AUTO: 29.3 PG (ref 26.6–33)
MCHC RBC AUTO-ENTMCNC: 31.6 G/DL (ref 31.5–35.7)
MCV RBC AUTO: 92.6 FL (ref 79–97)
MONOCYTES # BLD AUTO: 0.29 10*3/MM3 (ref 0.1–0.9)
MONOCYTES NFR BLD AUTO: 6.2 % (ref 5–12)
NEUTROPHILS NFR BLD AUTO: 2.4 10*3/MM3 (ref 1.7–7)
NEUTROPHILS NFR BLD AUTO: 51.6 % (ref 42.7–76)
NRBC BLD AUTO-RTO: 0 /100 WBC (ref 0–0.2)
PLATELET # BLD AUTO: 222 10*3/MM3 (ref 140–450)
PMV BLD AUTO: 9.6 FL (ref 6–12)
POTASSIUM SERPL-SCNC: 3.3 MMOL/L (ref 3.5–5.2)
PROT SERPL-MCNC: 7.8 G/DL (ref 6–8.5)
RBC # BLD AUTO: 5.02 10*6/MM3 (ref 3.77–5.28)
SODIUM SERPL-SCNC: 137 MMOL/L (ref 136–145)
WBC # BLD AUTO: 4.66 10*3/MM3 (ref 3.4–10.8)

## 2021-02-04 PROCEDURE — 80053 COMPREHEN METABOLIC PANEL: CPT | Performed by: NURSE PRACTITIONER

## 2021-02-04 PROCEDURE — 85025 COMPLETE CBC W/AUTO DIFF WBC: CPT | Performed by: NURSE PRACTITIONER

## 2021-02-04 PROCEDURE — 36415 COLL VENOUS BLD VENIPUNCTURE: CPT | Performed by: NURSE PRACTITIONER

## 2021-02-04 PROCEDURE — 99214 OFFICE O/P EST MOD 30 MIN: CPT | Performed by: NURSE PRACTITIONER

## 2021-02-04 NOTE — PROGRESS NOTES
PROBLEM LIST:  1. [id number] (stage IIIB) invasive ductal carcinoma of the right breast, ER-, [place] weakly +, Her2 3+.  A) presented with pain and swelling of the right breast after childbirth. Biopsy 5/8/17 showed high grade IDC, Right axillary LN biopsy positive for metastatic node involvement. Skin biopsy showed involvement of the dermis with lymphatic space involvement.  PET/CT on 5/24/17 showed hypermetabolic activity in the breast and axillary nodes,  No distant spread of disease.  B) neoadjuvant chemotherapy with TCHP started on 5/25/17.  Infusion reaction consisting of severe leg pain with the first dose of herceptin.  Taxotere dose reduced to 80% on cycle 5 for neuropathy.  C) bilateral mastectomy on 10/30/17.  Pathology showed multifocal < 1 mm high grade IDC with focal lymphovascular invasion.  4 sentinel lymph nodes with no malignancy.   [id number] (Stage yIA)  D) radiation therapy completed on 1/23/18.  Patient declined tamoxifen.  herceptin completed June 2018.  Neratinib started July 2018. Stopped Neratinib December 2018.   E) 2/11/2019 last dose of Lupron. She has declined any further Lupron as of 4/30/2019.   2. Migraines  3. Pre-diabetes    Subjective      CC: breast cancer    HISTORY OF PRESENT ILLNESS:   Angie Sutherland returns for follow-up. She saw a doctor at [facility] several weeks ago regarding vaginal bleeding.  She had a transvaginal ultrasound that she says was unremarkable.  No follow-up appointment was made.  She has had no further bleeding since the ultrasound.  She does complain of a tender spot at the 12 o'clock position of her right breast implant.  She states the area has had a nodule for several months and has not gotten any larger in size.  She has occasional sharp shooting pain in bilateral breast that last for a few seconds and then is gone.  She denies any other pain or complaints.  She is doing yoga and stretching exercises as well as going to the  "chiropractor for back pain.      Past Medical History, Past Surgical History, Social History, Family History have been reviewed and are without significant changes except as mentioned.    Review of Systems   A comprehensive 14 point review of systems was performed and was negative except as mentioned.    Medications:  The current medication list was reviewed in the EMR    ALLERGIES:    Allergies   Allergen Reactions   • Benadryl [Diphenhydramine] Shortness Of Breath and Diarrhea       Objective      /62   Pulse 81   Temp 98.4 °F (36.9 °C)   Resp 14   Ht 154.9 cm (61\")   Wt 55.3 kg (122 lb)   SpO2 98%   BMI 23.05 kg/m²    Vitals:    02/04/21 1426   PainSc: 0-No pain             Performance Status: 0    General: well appearing female in no acute distress  Neuro: alert and oriented  HEENT: sclera anicteric, oropharynx clear  Lymphatics: no cervical, supraclavicular or axillary nodes.   Chest: Status post bilateral mastectomy with implants in place. Right breast nodule noted at 12 o'clock at top of implant firm nodule less than 1 cm.  Cardiovascular: regular rate and rhythm, no murmurs  Lungs: clear to auscultation bilaterally  Abdomen: soft, nontender, nondistended.  No palpable organomegaly  Extremeties: no lower extremity edema  Skin: no rashes, lesions, bruising, or petechiae  Psych: mood and affect appropriate        Assessment/Plan   Angie Sutherland is a 25 y.o. year old female with stage IIIB HER-2 positive breast cancer who returns for follow-up.     Right breast nodule noted at 12 o'clock at top of implant firm nodule less than 1 cm. Will order diagnostic mammogram of right breast.     Depression and anxiety: Follow up with Cyndee Soto -will request appointment ASAP.      Follow-up in 3 months. Will obtain CBC and CMP today.         I spent 35 minutes caring for Angie on this date of service. This time includes time spent by me in the following activities: preparing for the visit, reviewing " tests, obtaining and/or reviewing a separately obtained history, performing a medically appropriate examination and/or evaluation, counseling and educating the patient/family/caregiver, ordering medications, tests, or procedures and documenting information in the medical record.       Ana Jade APRN  [facility]    2/4/2021          CC:

## 2021-02-04 NOTE — TELEPHONE ENCOUNTER
CALLER: ANT    REASON:    PT WILL BE ABOUT 10 MORE MINUTES LATE    BEST C/B: [phone]    HUB ATTEMPTED WARM TRANSFER

## 2021-02-04 NOTE — TELEPHONE ENCOUNTER
I talked with UYEN Minaya and she had left patient a voicemail that she is ordering a diag mammogram.  I called patient and told her that and she can check her msgs.  Patient verbalized understanding.

## 2021-02-04 NOTE — TELEPHONE ENCOUNTER
Angie is returning a call, she's not sure to who or what for. She thinks it may be for her US and/or lab results.     Please give her a call back @ [phone]

## 2021-03-08 ENCOUNTER — HOSPITAL ENCOUNTER (OUTPATIENT)
Dept: ULTRASOUND IMAGING | Facility: HOSPITAL | Age: 26
Discharge: HOME | End: 2021-03-08

## 2021-03-08 ENCOUNTER — HOSPITAL ENCOUNTER (OUTPATIENT)
Dept: MAMMOGRAPHY | Facility: HOSPITAL | Age: 26
Discharge: HOME | End: 2021-03-08

## 2021-03-08 DIAGNOSIS — C50.011 MALIGNANT NEOPLASM OF NIPPLE OF RIGHT BREAST IN FEMALE, UNSPECIFIED ESTROGEN RECEPTOR STATUS (HCC): ICD-10-CM

## 2021-03-08 DIAGNOSIS — N63.0 BREAST NODULE: ICD-10-CM

## 2021-03-08 PROCEDURE — 76642 ULTRASOUND BREAST LIMITED: CPT | Performed by: RADIOLOGY

## 2021-03-08 PROCEDURE — 77065 DX MAMMO INCL CAD UNI: CPT

## 2021-03-08 PROCEDURE — 77061 BREAST TOMOSYNTHESIS UNI: CPT | Performed by: RADIOLOGY

## 2021-03-08 PROCEDURE — G0279 TOMOSYNTHESIS, MAMMO: HCPCS

## 2021-03-08 PROCEDURE — 76642 ULTRASOUND BREAST LIMITED: CPT

## 2021-03-08 PROCEDURE — 77065 DX MAMMO INCL CAD UNI: CPT | Performed by: RADIOLOGY

## 2021-03-09 ENCOUNTER — TELEPHONE (OUTPATIENT)
Dept: ONCOLOGY | Facility: CLINIC | Age: 26
End: 2021-03-09

## 2021-03-09 DIAGNOSIS — R92.8 ABNORMAL MAMMOGRAM: Primary | ICD-10-CM

## 2021-03-09 NOTE — TELEPHONE ENCOUNTER
Reviewed results of diagnostic mammogram with patient and radiologist recommendation to follow up further with a MRI of breast. Patient would like to proceed with MRI of breast. Order placed.

## 2021-03-19 ENCOUNTER — TELEPHONE (OUTPATIENT)
Dept: ONCOLOGY | Facility: CLINIC | Age: 26
End: 2021-03-19

## 2021-03-19 DIAGNOSIS — F90.2 ATTENTION DEFICIT HYPERACTIVITY DISORDER, COMBINED TYPE: ICD-10-CM

## 2021-03-19 NOTE — TELEPHONE ENCOUNTER
PATSYM for patient letting her know she is scheduled for Cyndee on 3/30/21 at 11:30am. Then gave her the phone # to call Marlene to get MRI of the Breast scheduled.

## 2021-03-19 NOTE — TELEPHONE ENCOUNTER
Caller: PT    Relationship to patient: PT    Best call back number: [phone]    PT CALLING TO SCHED WITH LIAN GALO AND VERIFY SCHEDULING FOR MRI.    PLEASE RETURN CALL THANK YOU

## 2021-03-19 NOTE — TELEPHONE ENCOUNTER
Caller: PT    Relationship: PT    Best call back number: [phone]    Medication needed:   lisdexamfetamine (VYVANSE) 50 MG capsule [[id number]] (Order [id number])        When do you need the refill by: 3/19/21    Does the patient have less than a 3 day supply:  [x] Yes  [] No    What is the patient's preferred pharmacy:        Pharmacy    [facility] [address] - [address] [place] [address] AT [facility] & [address] - [phone]  - [phone] [place] [address]   Phone:  [phone]  Fax:  [phone]

## 2021-04-15 ENCOUNTER — TELEPHONE (OUTPATIENT)
Dept: ONCOLOGY | Facility: CLINIC | Age: 26
End: 2021-04-15

## 2021-04-15 DIAGNOSIS — F90.2 ATTENTION DEFICIT HYPERACTIVITY DISORDER, COMBINED TYPE: ICD-10-CM

## 2021-04-15 NOTE — TELEPHONE ENCOUNTER
I refilled Vyvanse last on 3.22.2021. Patient early by one week. Will send in for dispense on 4.21.21

## 2021-11-08 ENCOUNTER — TELEPHONE (OUTPATIENT)
Dept: ONCOLOGY | Facility: CLINIC | Age: 26
End: 2021-11-08

## 2021-11-08 DIAGNOSIS — C50.011 MALIGNANT NEOPLASM OF NIPPLE OF RIGHT BREAST IN FEMALE, UNSPECIFIED ESTROGEN RECEPTOR STATUS (HCC): Primary | ICD-10-CM

## 2021-11-08 NOTE — TELEPHONE ENCOUNTER
Provider: DR CALDERA    Caller: ANT    Relationship to Patient: SELF    Reason for Call: ANT SAW DR CALDERA ON 2-4-21. ANT WOULD LIKE TO MAKE ANOTHER APPOINTMENT TO SEE HER.  SHE STATES THAT SHE HAS NOT FELT LIKE HERSELF LATELY. SHE HAS BEEN REALLY TIRED, FATIGUE AND STOMACH ISSUES. SHE HAS HAD MUSCLE WEAKNESS. SHE STATES THAT SHE FEELS LIKE SHE IS HAVING ISSUES WITH HER LUNGS AND IT IS NOT COVID RELATED.     WILL SHE NEED LABS OR ANYTHING BEFORE SHE SCHEDULES.    PLEASE ADVISE.

## 2021-11-08 NOTE — TELEPHONE ENCOUNTER
"I called patient back and told her we could see her this week on Nov. 11 at 10:15. She had \"no showed\" for an appt in May.  She said she had a lot going on then and wanted to get back to see Dr. Amos.  She said she was feeling out of sorts and asked if we could check labs and per last visit, I told her we could.  I let scheduling know about the appt.    "

## 2021-11-11 ENCOUNTER — LAB (OUTPATIENT)
Dept: LAB | Facility: HOSPITAL | Age: 26
End: 2021-11-11

## 2021-11-11 ENCOUNTER — OFFICE VISIT (OUTPATIENT)
Dept: ONCOLOGY | Facility: CLINIC | Age: 26
End: 2021-11-11

## 2021-11-11 VITALS
WEIGHT: 130 LBS | HEIGHT: 61 IN | HEART RATE: 78 BPM | DIASTOLIC BLOOD PRESSURE: 66 MMHG | SYSTOLIC BLOOD PRESSURE: 106 MMHG | TEMPERATURE: 96.8 F | BODY MASS INDEX: 24.55 KG/M2 | RESPIRATION RATE: 16 BRPM

## 2021-11-11 DIAGNOSIS — C50.011 MALIGNANT NEOPLASM OF NIPPLE OF RIGHT BREAST IN FEMALE, UNSPECIFIED ESTROGEN RECEPTOR STATUS (HCC): ICD-10-CM

## 2021-11-11 DIAGNOSIS — C50.011 MALIGNANT NEOPLASM OF NIPPLE OF RIGHT BREAST IN FEMALE, UNSPECIFIED ESTROGEN RECEPTOR STATUS (HCC): Primary | ICD-10-CM

## 2021-11-11 LAB
ALBUMIN SERPL-MCNC: 4.4 G/DL (ref 3.5–5.2)
ALBUMIN/GLOB SERPL: 1.4 G/DL
ALP SERPL-CCNC: 58 U/L (ref 39–117)
ALT SERPL W P-5'-P-CCNC: 13 U/L (ref 1–33)
ANION GAP SERPL CALCULATED.3IONS-SCNC: 11 MMOL/L (ref 5–15)
AST SERPL-CCNC: 18 U/L (ref 1–32)
BILIRUB SERPL-MCNC: 0.2 MG/DL (ref 0–1.2)
BUN SERPL-MCNC: 11 MG/DL (ref 6–20)
BUN/CREAT SERPL: 14.5 (ref 7–25)
CALCIUM SPEC-SCNC: 8.8 MG/DL (ref 8.6–10.5)
CHLORIDE SERPL-SCNC: 103 MMOL/L (ref 98–107)
CO2 SERPL-SCNC: 23 MMOL/L (ref 22–29)
CREAT SERPL-MCNC: 0.76 MG/DL (ref 0.57–1)
ERYTHROCYTE [DISTWIDTH] IN BLOOD BY AUTOMATED COUNT: 12 % (ref 12.3–15.4)
GFR SERPL CREATININE-BSD FRML MDRD: 111 ML/MIN/1.73
GLOBULIN UR ELPH-MCNC: 3.2 GM/DL
GLUCOSE SERPL-MCNC: 113 MG/DL (ref 65–99)
HCT VFR BLD AUTO: 39.7 % (ref 34–46.6)
HGB BLD-MCNC: 12.8 G/DL (ref 12–15.9)
LYMPHOCYTES # BLD AUTO: 1.4 10*3/MM3 (ref 0.7–3.1)
LYMPHOCYTES NFR BLD AUTO: 46.1 % (ref 19.6–45.3)
MCH RBC QN AUTO: 29.2 PG (ref 26.6–33)
MCHC RBC AUTO-ENTMCNC: 32.2 G/DL (ref 31.5–35.7)
MCV RBC AUTO: 90.7 FL (ref 79–97)
MONOCYTES # BLD AUTO: 0.1 10*3/MM3 (ref 0.1–0.9)
MONOCYTES NFR BLD AUTO: 4.2 % (ref 5–12)
NEUTROPHILS NFR BLD AUTO: 1.5 10*3/MM3 (ref 1.7–7)
NEUTROPHILS NFR BLD AUTO: 49.7 % (ref 42.7–76)
PLATELET # BLD AUTO: 228 10*3/MM3 (ref 140–450)
PMV BLD AUTO: 7.3 FL (ref 6–12)
POTASSIUM SERPL-SCNC: 3.6 MMOL/L (ref 3.5–5.2)
PROT SERPL-MCNC: 7.6 G/DL (ref 6–8.5)
RBC # BLD AUTO: 4.37 10*6/MM3 (ref 3.77–5.28)
SODIUM SERPL-SCNC: 137 MMOL/L (ref 136–145)
WBC # BLD AUTO: 3.1 10*3/MM3 (ref 3.4–10.8)

## 2021-11-11 PROCEDURE — 85025 COMPLETE CBC W/AUTO DIFF WBC: CPT

## 2021-11-11 PROCEDURE — 36415 COLL VENOUS BLD VENIPUNCTURE: CPT

## 2021-11-11 PROCEDURE — 99214 OFFICE O/P EST MOD 30 MIN: CPT | Performed by: INTERNAL MEDICINE

## 2021-11-11 PROCEDURE — 80053 COMPREHEN METABOLIC PANEL: CPT

## 2021-11-11 RX ORDER — FAMOTIDINE 20 MG/1
20 TABLET, FILM COATED ORAL DAILY
Qty: 30 TABLET | Refills: 5 | Status: SHIPPED | OUTPATIENT
Start: 2021-11-11 | End: 2022-04-08

## 2021-11-11 NOTE — PROGRESS NOTES
PROBLEM LIST:  1. [id number] (stage IIIB) invasive ductal carcinoma of the right breast, ER-, CT weakly +, Her2 3+.  A) presented with pain and swelling of the right breast after childbirth. Biopsy 5/8/17 showed high grade IDC, Right axillary LN biopsy positive for metastatic node involvement. Skin biopsy showed involvement of the dermis with lymphatic space involvement.  PET/CT on 5/24/17 showed hypermetabolic activity in the breast and axillary nodes,  No distant spread of disease.  B) neoadjuvant chemotherapy with TCHP started on 5/25/17.  Infusion reaction consisting of severe leg pain with the first dose of herceptin.  Taxotere dose reduced to 80% on cycle 5 for neuropathy.  C) bilateral mastectomy on 10/30/17.  Pathology showed multifocal < 1 mm high grade IDC with focal lymphovascular invasion.  4 sentinel lymph nodes with no malignancy.   [id number] (Stage yIA)  D) radiation therapy completed on 1/23/18.  Patient declined tamoxifen.  herceptin completed June 2018.  Neratinib started July 2018. Stopped Neratinib December 2018.   E) 2/11/2019 last dose of Lupron. She has declined any further Lupron as of 4/30/2019.   2. Migraines  3. Pre-diabetes    Subjective      CC: breast cancer    HISTORY OF PRESENT ILLNESS:   Angie Sutherland returns for follow-up.   She has several issues she wants to discuss.  She says that she is just struggling trying to feel like she can move on with her life when her body in her mind feels so different since the cancer diagnosis.  She has aches and pains in her shoulders, chest wall, and upper abdomen.  She has heartburn and takes Tums very frequently.  She has not tried any other medications for this.  She has thought about going back to work but is concerned she would be a very reliable employee.  She has thought about exercising but just has not been able to get over the hump with this.    Objective      /66   Pulse 78   Temp 96.8 °F (36 °C) (Temporal)   Resp  "16   Ht 154.9 cm (60.98\")   Wt 59 kg (130 lb)   BMI 24.58 kg/m²    Vitals:    11/11/21 1033   PainSc: 0-No pain             Performance Status: 0    General: well appearing female in no acute distress  Neuro: alert and oriented  HEENT: sclera anicteric, oropharynx clear  Lymphatics: no cervical, supraclavicular or axillary nodes.   Chest: Status post bilateral mastectomy with implants in place. Right breast nodule noted at 12 o'clock at top of implant firm nodule less than 1 cm.  Cardiovascular: regular rate and rhythm, no murmurs  Lungs: clear to auscultation bilaterally  Abdomen: soft, nontender, nondistended.  No palpable organomegaly  Extremeties: no lower extremity edema  Skin: no rashes, lesions, bruising, or petechiae  Psych: mood and affect appropriate    Lab Results   Component Value Date    WBC 3.10 (L) 11/11/2021    HGB 12.8 11/11/2021    HCT 39.7 11/11/2021    MCV 90.7 11/11/2021     11/11/2021     Lab Results   Component Value Date    GLUCOSE 89 02/04/2021    BUN 8 02/04/2021    CREATININE 0.73 02/04/2021    EGFRIFAFRI 117 02/04/2021    BCR 11.0 02/04/2021    K 3.3 (L) 02/04/2021    CO2 23.0 02/04/2021    CALCIUM 9.1 02/04/2021    ALBUMIN 4.20 02/04/2021    AST 20 02/04/2021    ALT 17 02/04/2021         Assessment/Plan   Angie Sutherland is a 26 y.o. year old female with stage IIIB HER-2 positive breast cancer who returns for follow-up.     Right breast nodule noted at 12 o'clock at top of implant firm nodule less than 1 cm.  Previous mammogram consistent with fat necrosis.  She is due for repeat ultrasound which I will order today.    Depression and anxiety: She would prefer to be [facility] outside at [facility].  We will try to get her set up with psychiatry.    Heartburn: Prescription sent for Pepcid 20 mg daily.  She will let us know if this is not effective.    Bone pain and chest wall pain, shortness of breath: We will do CT chest abdomen pelvis to make sure there is no evidence of " cancer recurrence.  Assuming there is not I think she may benefit from evaluation and treatment by physical therapy to work on improving her strength and stamina and generally support her recovery from her cancer treatment.    We will contact her by phone with test results.  Otherwise follow-up in 6 months.              Shadia Amos MD  [facility]ology [facility] On[facility]    11/11/2021          CC:

## 2021-11-16 ENCOUNTER — TELEPHONE (OUTPATIENT)
Dept: ONCOLOGY | Facility: CLINIC | Age: 26
End: 2021-11-16

## 2021-11-16 NOTE — TELEPHONE ENCOUNTER
April FROM  CALLED TO VERIFY INS.AS DARLING STATED HER INS. TERMED 12/31/2020 BUT SHE IS GOING TO CALL THEM BACK AS IT EVERIFIED IN HER CHART.

## 2022-04-08 ENCOUNTER — INITIAL PRENATAL (OUTPATIENT)
Dept: OBSTETRICS AND GYNECOLOGY | Facility: CLINIC | Age: 27
End: 2022-04-08

## 2022-04-08 VITALS — BODY MASS INDEX: 22.91 KG/M2 | WEIGHT: 121.2 LBS | SYSTOLIC BLOOD PRESSURE: 114 MMHG | DIASTOLIC BLOOD PRESSURE: 70 MMHG

## 2022-04-08 DIAGNOSIS — Z98.891 PREVIOUS CESAREAN SECTION: ICD-10-CM

## 2022-04-08 DIAGNOSIS — Z3A.15 15 WEEKS GESTATION OF PREGNANCY: Primary | ICD-10-CM

## 2022-04-08 DIAGNOSIS — O92.29 OTHER DISORDERS OF BREAST ASSOCIATED WITH PREGNANCY AND THE PUERPERIUM: ICD-10-CM

## 2022-04-08 PROBLEM — R11.0 NAUSEA: Status: RESOLVED | Noted: 2018-08-03 | Resolved: 2022-04-08

## 2022-04-08 PROBLEM — E86.0 DEHYDRATION: Status: RESOLVED | Noted: 2018-08-03 | Resolved: 2022-04-08

## 2022-04-08 PROBLEM — Z34.90 PREGNANCY: Status: ACTIVE | Noted: 2022-04-08

## 2022-04-08 PROBLEM — F41.9 ANXIETY: Status: RESOLVED | Noted: 2018-08-13 | Resolved: 2022-04-08

## 2022-04-08 PROCEDURE — 99204 OFFICE O/P NEW MOD 45 MIN: CPT | Performed by: OBSTETRICS & GYNECOLOGY

## 2022-04-08 NOTE — PROGRESS NOTES
Initial ob visit     CC- Here for care of pregnancy        Angie Sutherland is a 26 y.o. female, , who presents for her first obstetrical visit.  Her last LMP was Patient's last menstrual period was 2022..    OB History    Para Term  AB Living   3 2 2 0 0 2   SAB IAB Ectopic Molar Multiple Live Births   0 0 0   0 2      # Outcome Date GA Lbr Barry/2nd Weight Sex Delivery Anes PTL Lv   3 Current            2 Term 16 39w3d  2996 g (6 lb 9.7 oz) F CS-LTranv Spinal  [place]   1 Term 12/10/13 39w0d  3430 g (7 lb 9 oz) F CS-LTranv EPI N [place]      Complications: Failure to Progress in First Stage       Initial positive test date : 2022, UPT          Prior obstetric issues, potential pregnancy concerns: Hx of breast cancer   Family history of genetic issues (includes FOB): Denies  Prior infections concerning in pregnancy (Rash, fever in last 2 weeks): Denies  Varicella Hx -Hx of chicken pox  Prior testing for Cystic Fibrosis Carrier or Sickle Cell Trait- Denies  Prepregnancy BMI - Body mass index is 22.91 kg/m².  History of STD: no  Ultrasound Today: Yes.  Findings showed viable iup.  I have personally evaluated the U/S and agree with the findings. Carolina Amos MD    Additional Pertinent History   Last Pap :   Last Completed Pap Smear     This patient has no relevant Health Maintenance data.        History of abnormal Pap smear: no  Family history of uterine, colon, breast, or ovarian cancer: yes - MGM- Colon cancer  Feelings of Anxiety or Depression: yes - Anxiety and Depression   Tobacco Usage?: No   Alcohol/Drug Use?: NO  Over the age of 35 at delivery: no  Desires Genetic Screening: None  Flu Status: Declines    PMH  Past Medical History:   Diagnosis Date   • Anxiety    • Atypical squamous cell changes of undetermined significance (ASCUS) on cervical cytology with negative high risk human papilloma virus (HPV) test result 2015   • Breast cancer (HCC) 2017   • Depression    •  Drug therapy 2018   • Eczema    • GERD (gastroesophageal reflux disease)    • Hx of radiation therapy 2018   • Migraine    • Ovarian cyst    • Prediabetes    • Pregnancy, incidental    • Wears glasses      No current outpatient medications on file.    The additional following portions of the patient's history were reviewed and updated as appropriate: allergies, current medications, past family history, past medical history, past social history, past surgical history and problem list.    Review of Systems   Review of Systems  Current obstetric complaints : Nausea and Vomiting and Vaginal Spotting   All systems reviewed and otherwise normal.    I have reviewed and agree with the HPI, ROS, and historical information as entered above. Carolina Amos MD    /70   Wt 55 kg (121 lb 3.2 oz)   LMP 01/01/2022   BMI 22.91 kg/m²     Physical Exam  General:  well developed; well nourished  no acute distress   Chest/Respiratory: No labored breathing, normal respiratory effort, normal appearance, no respiratory noises noted   Heart:  normal rate, regular rhythm,  no murmurs, rubs, or gallops   Thyroid: normal to inspection and palpation   Breasts:  Not performed.   Abdomen: soft, non-tender; no masses  no umbilical or inguinal hernias are present  no hepato-splenomegaly   Pelvis: Clinical staff was present for exam  External genitalia:  normal appearance of the external genitalia including Bartholin's and [place]'s glands.  :  urethral meatus normal;  Vaginal:  normal pink mucosa without prolapse or lesions.  Cervix:  normal appearance.        Assessment and Plan    Problem List Items Addressed This Visit     Pregnancy - Primary    Overview     hisotry GDM x2, early glucola           Relevant Orders    Obstetric Panel    HIV-1 / O / 2 Ag / Antibody 4th Generation    Urinalysis With Microscopic - Urine, Clean Catch    Urine Culture - Urine, Urine, Clean Catch    Urine Drug Screen - Urine, Clean Catch    Pap IG, Ct-Ng TV Rfx  HPV All    TSH    Previous  section    Overview     x2           Relevant Orders    US Ob 14 + Weeks Single or First Gestation      Other Visit Diagnoses     Other disorders of breast associated with pregnancy and the puerperium         Relevant Orders    TSH          1. Pregnancy at 15w1d  2. Reviewed routine prenatal care with the office and educational materials given  3. Lab(s) Ordered  4. Discussed options for genetic testing including first trimester nuchal translucency screen, genetic disease carrier testing, quadruple screen, and [place] [facility].  5. Nausea/Vomiting - desires medication.  Options discussed and encouraged to be proactive to avoid constipation if on Zofran.  6. Patient is on Prenatal vitamins  Return in about 1 month (around 2022) for F/U Prenatal, U/S Next Visit, and glucola.      Carolina Amos MD  2022

## 2022-04-11 LAB
ABO GROUP BLD: NORMAL
AMPHETAMINES UR QL SCN: NEGATIVE NG/ML
APPEARANCE UR: CLEAR
BACTERIA #/AREA URNS HPF: ABNORMAL /[HPF]
BACTERIA UR CULT: ABNORMAL
BACTERIA UR CULT: ABNORMAL
BARBITURATES UR QL SCN: NEGATIVE NG/ML
BASOPHILS # BLD AUTO: 0 X10E3/UL (ref 0–0.2)
BASOPHILS NFR BLD AUTO: 0 %
BENZODIAZ UR QL SCN: NEGATIVE NG/ML
BILIRUB UR QL STRIP: NEGATIVE
BLD GP AB SCN SERPL QL: NEGATIVE
BZE UR QL SCN: NEGATIVE NG/ML
CANNABINOIDS UR QL SCN: POSITIVE NG/ML
CASTS URNS QL MICRO: ABNORMAL /LPF
COLOR UR: YELLOW
CREAT UR-MCNC: 250.3 MG/DL (ref 20–300)
EOSINOPHIL # BLD AUTO: 0 X10E3/UL (ref 0–0.4)
EOSINOPHIL NFR BLD AUTO: 1 %
EPI CELLS #/AREA URNS HPF: >10 /HPF (ref 0–10)
ERYTHROCYTE [DISTWIDTH] IN BLOOD BY AUTOMATED COUNT: 13.5 % (ref 11.7–15.4)
GLUCOSE UR QL STRIP: NEGATIVE
HBV SURFACE AG SERPL QL IA: NEGATIVE
HCT VFR BLD AUTO: 43.6 % (ref 34–46.6)
HCV AB S/CO SERPL IA: <0.1 S/CO RATIO (ref 0–0.9)
HGB BLD-MCNC: 14.6 G/DL (ref 11.1–15.9)
HGB UR QL STRIP: NEGATIVE
HIV 1+2 AB+HIV1 P24 AG SERPL QL IA: NON REACTIVE
IMM GRANULOCYTES # BLD AUTO: 0 X10E3/UL (ref 0–0.1)
IMM GRANULOCYTES NFR BLD AUTO: 0 %
KETONES UR QL STRIP: NEGATIVE
LABORATORY COMMENT REPORT: ABNORMAL
LEUKOCYTE ESTERASE UR QL STRIP: ABNORMAL
LYMPHOCYTES # BLD AUTO: 1 X10E3/UL (ref 0.7–3.1)
LYMPHOCYTES NFR BLD AUTO: 22 %
MCH RBC QN AUTO: 29.4 PG (ref 26.6–33)
MCHC RBC AUTO-ENTMCNC: 33.5 G/DL (ref 31.5–35.7)
MCV RBC AUTO: 88 FL (ref 79–97)
METHADONE UR QL SCN: NEGATIVE NG/ML
MICRO URNS: ABNORMAL
MONOCYTES # BLD AUTO: 0.3 X10E3/UL (ref 0.1–0.9)
MONOCYTES NFR BLD AUTO: 7 %
MUCOUS THREADS URNS QL MICRO: PRESENT
NEUTROPHILS # BLD AUTO: 3.1 X10E3/UL (ref 1.4–7)
NEUTROPHILS NFR BLD AUTO: 70 %
NITRITE UR QL STRIP: NEGATIVE
OPIATES UR QL SCN: NEGATIVE NG/ML
OXYCODONE+OXYMORPHONE UR QL SCN: NEGATIVE NG/ML
PCP UR QL: NEGATIVE NG/ML
PH UR STRIP: 6.5 [PH] (ref 5–7.5)
PH UR: 6.4 [PH] (ref 4.5–8.9)
PLATELET # BLD AUTO: 279 X10E3/UL (ref 150–450)
PROPOXYPH UR QL SCN: NEGATIVE NG/ML
PROT UR QL STRIP: ABNORMAL
RBC # BLD AUTO: 4.96 X10E6/UL (ref 3.77–5.28)
RBC #/AREA URNS HPF: ABNORMAL /HPF (ref 0–2)
RH BLD: POSITIVE
RPR SER QL: NON REACTIVE
RUBV IGG SERPL IA-ACNC: 1.74 INDEX
SP GR UR STRIP: 1.02 (ref 1–1.03)
TSH SERPL DL<=0.005 MIU/L-ACNC: 0.61 UIU/ML (ref 0.45–4.5)
UROBILINOGEN UR STRIP-MCNC: 0.2 MG/DL (ref 0.2–1)
WBC # BLD AUTO: 4.4 X10E3/UL (ref 3.4–10.8)
WBC #/AREA URNS HPF: ABNORMAL /HPF (ref 0–5)

## 2022-04-12 ENCOUNTER — TELEPHONE (OUTPATIENT)
Dept: OBSTETRICS AND GYNECOLOGY | Facility: CLINIC | Age: 27
End: 2022-04-12

## 2022-04-12 RX ORDER — AMOXICILLIN 500 MG/1
500 CAPSULE ORAL 3 TIMES DAILY
Qty: 20 CAPSULE | Refills: 0 | Status: SHIPPED | OUTPATIENT
Start: 2022-04-12 | End: 2022-04-19

## 2022-04-12 NOTE — TELEPHONE ENCOUNTER
Patient called to review pap results that had a positive trich, spoke with Geovanny QIU, per CEDRIC James rx needed for Flagyl 500 mg BID for 7 days. Rx not ordered, wanted to discuss with the patient first.     Ruy Case

## 2022-04-13 ENCOUNTER — TELEPHONE (OUTPATIENT)
Dept: OBSTETRICS AND GYNECOLOGY | Facility: CLINIC | Age: 27
End: 2022-04-13

## 2022-04-13 DIAGNOSIS — A59.01 TRICHOMONAL VAGINITIS: Primary | ICD-10-CM

## 2022-04-13 RX ORDER — METRONIDAZOLE 500 MG/1
500 TABLET ORAL 2 TIMES DAILY
Qty: 14 TABLET | Refills: 0 | Status: SHIPPED | OUTPATIENT
Start: 2022-04-13 | End: 2022-04-20

## 2022-04-13 NOTE — TELEPHONE ENCOUNTER
Patient notified of + trichomonas on pap smear and need for treatment.  Per Graciela James, 500mg Flagyl bid x 7 days.  She is also on amoxicillin for + GBS in NOB urine sample. She is aware that her partner needs treatment and that we will do a test at her next visit to make sure the infection has cleared and to abstain.  If she can't tolerate both antibiotics at the same time instructed her to take amoxicillin first per Peri.

## 2022-04-15 DIAGNOSIS — Z3A.15 15 WEEKS GESTATION OF PREGNANCY: ICD-10-CM

## 2022-04-27 ENCOUNTER — TELEPHONE (OUTPATIENT)
Dept: OBSTETRICS AND GYNECOLOGY | Facility: CLINIC | Age: 27
End: 2022-04-27

## 2022-04-27 ENCOUNTER — ROUTINE PRENATAL (OUTPATIENT)
Dept: OBSTETRICS AND GYNECOLOGY | Facility: CLINIC | Age: 27
End: 2022-04-27

## 2022-04-27 VITALS — SYSTOLIC BLOOD PRESSURE: 100 MMHG | WEIGHT: 127 LBS | DIASTOLIC BLOOD PRESSURE: 70 MMHG | BODY MASS INDEX: 24.01 KG/M2

## 2022-04-27 DIAGNOSIS — R10.9 CRAMPING AFFECTING PREGNANCY, ANTEPARTUM: ICD-10-CM

## 2022-04-27 DIAGNOSIS — Z3A.17 17 WEEKS GESTATION OF PREGNANCY: Primary | ICD-10-CM

## 2022-04-27 DIAGNOSIS — O26.899 CRAMPING AFFECTING PREGNANCY, ANTEPARTUM: ICD-10-CM

## 2022-04-27 DIAGNOSIS — Z20.2 TRICHOMONAS CONTACT, TREATED: ICD-10-CM

## 2022-04-27 LAB
EXPIRATION DATE: 0
GLUCOSE UR STRIP-MCNC: NEGATIVE MG/DL
Lab: 0
PROT UR STRIP-MCNC: NEGATIVE MG/DL

## 2022-04-27 PROCEDURE — 99214 OFFICE O/P EST MOD 30 MIN: CPT | Performed by: NURSE PRACTITIONER

## 2022-04-27 RX ORDER — VITAMIN A ACETATE, .BETA.-CAROTENE, ASCORBIC ACID, CHOLECALCIFEROL, .ALPHA.-TOCOPHEROL ACETATE, DL-, THIAMINE MONONITRATE, RIBOFLAVIN, NIACINAMIDE, PYRIDOXINE HYDROCHLORIDE, FOLIC ACID, CYANOCOBALAMIN, CALCIUM CARBONATE, FERROUS FUMARATE, ZINC OXIDE, AND CUPRIC OXIDE 2000; 2000; 120; 400; 22; 1.84; 3; 20; 10; 1; 12; 200; 27; 25; 2 [IU]/1; [IU]/1; MG/1; [IU]/1; MG/1; MG/1; MG/1; MG/1; MG/1; MG/1; UG/1; MG/1; MG/1; MG/1; MG/1
1 TABLET ORAL DAILY
COMMUNITY
Start: 2022-04-15

## 2022-04-27 NOTE — PROGRESS NOTES
OB FOLLOW UP  CC- Here for care of pregnancy        Angie Sutherland is a 26 y.o.  17w6d patient being seen today for her obstetrical follow up visit. Patient reports pelvic pressure, cramping. Pt states the cramping is moderate in severity. Denies constipation. Denies vaginal bleeding, dysuria.     Her prenatal care is complicated by (and status) :  none  Patient Active Problem List   Diagnosis   • Prediabetes   • Malignant neoplasm of right female breast (HCC)   • Peripheral neuropathy due to chemotherapy (HCC)   • Migraine without aura and without status migrainosus, not intractable   • Episode of recurrent major depressive disorder (HCC)   • Pregnancy   • Previous  section       Flu Status: Declines  Ultrasound Today: No.    ROS -   Patient Reports : cramping, pelvic pressure   Patient Denies: Loss of Fluid, Vaginal Spotting, Vision Changes and Headaches  Fetal Movement : normal  All other systems reviewed and are negative.       The additional following portions of the patient's history were reviewed and updated as appropriate: allergies, current medications, past family history, past medical history, past social history, past surgical history and problem list.    I have reviewed and agree with the HPI, ROS, and historical information as entered above. Graciela James, APRN    /70   Wt 57.6 kg (127 lb)   LMP 2022   BMI 24.01 kg/m²       EXAM:     FHT: 162 BPM   Pelvic Exam: Yes Dilation: Closed    Urine glucose/protein: See prenatal flowsheet       Assessment and Plan    Problem List Items Addressed This Visit        Gravid and     Pregnancy - Primary    Overview     hisotry GDM x2, early glucola           Relevant Orders    POC Protein, Urine, Qualitative, Dipstick (Completed)    POC Glucose, Urine, Qualitative, Dipstick (Completed)    Trichomonas vaginalis, PCR - Swab, Cervix    Urine Culture - Urine, Urine, Clean Catch      Other Visit Diagnoses     Cramping  affecting pregnancy, antepartum        Relevant Orders    Trichomonas vaginalis, PCR - Swab, Cervix    Urine Culture - Urine, Urine, Clean Catch    Trichomonas contact, treated        Relevant Orders    Trichomonas vaginalis, PCR - Swab, Cervix          1. Pregnancy at 17w6d  2. Fetal status reassuring. CCUA negative. U/S today, cervical length 4.0 cm, no funneling. Encouraged rest, hydration, tylenol prn, warm tub bath.   3. Trich test of cure today, pt states she completed treatment.   Return if symptoms worsen or fail to improve.    Graciela James, APRN  04/27/2022

## 2022-04-27 NOTE — TELEPHONE ENCOUNTER
Pt called, having a lot of cramping pain. Intense pressure, but no bleeding. Would like to speak to nurse.

## 2022-04-27 NOTE — TELEPHONE ENCOUNTER
Dr. Amos OB pt.   17w6d  MBT: O +  Previously on amox and flagyl for +GBS and +TRICH.     S/w pt she complains of stomach tightness, uterine pain/pressure that has been present since last night and has been having mild headaches , has taken tylenol but it has not helped.     Patient denies recent I/C, heavy lifting, increased exercise, vaginal bleeding, vision changes, LOF.     I advised patient to come into the office for evaluation she v/u.     appt has been made.

## 2022-04-29 LAB
BACTERIA UR CULT: NO GROWTH
BACTERIA UR CULT: NORMAL
T VAGINALIS RRNA SPEC QL NAA+PROBE: POSITIVE

## 2022-04-29 RX ORDER — METRONIDAZOLE 500 MG/1
500 TABLET ORAL 2 TIMES DAILY
Qty: 14 TABLET | Refills: 0 | Status: SHIPPED | OUTPATIENT
Start: 2022-04-29 | End: 2022-05-06

## 2022-05-06 ENCOUNTER — ROUTINE PRENATAL (OUTPATIENT)
Dept: OBSTETRICS AND GYNECOLOGY | Facility: CLINIC | Age: 27
End: 2022-05-06

## 2022-05-06 VITALS — WEIGHT: 125.4 LBS | SYSTOLIC BLOOD PRESSURE: 105 MMHG | DIASTOLIC BLOOD PRESSURE: 62 MMHG | BODY MASS INDEX: 23.71 KG/M2

## 2022-05-06 DIAGNOSIS — O09.299 HISTORY OF GESTATIONAL DIABETES IN PRIOR PREGNANCY, CURRENTLY PREGNANT: ICD-10-CM

## 2022-05-06 DIAGNOSIS — Z13.1 ENCOUNTER FOR SCREENING FOR DIABETES MELLITUS: ICD-10-CM

## 2022-05-06 DIAGNOSIS — Z86.32 HISTORY OF GESTATIONAL DIABETES IN PRIOR PREGNANCY, CURRENTLY PREGNANT: ICD-10-CM

## 2022-05-06 DIAGNOSIS — Z20.2 EXPOSURE TO STD: ICD-10-CM

## 2022-05-06 DIAGNOSIS — Z3A.19 19 WEEKS GESTATION OF PREGNANCY: Primary | ICD-10-CM

## 2022-05-06 LAB
EXPIRATION DATE: 0
GLUCOSE 1H P 50 G GLC PO SERPL-MCNC: 105 MG/DL (ref 65–139)
GLUCOSE UR STRIP-MCNC: NEGATIVE MG/DL
Lab: 0
PROT UR STRIP-MCNC: NEGATIVE MG/DL

## 2022-05-06 PROCEDURE — 99213 OFFICE O/P EST LOW 20 MIN: CPT | Performed by: NURSE PRACTITIONER

## 2022-05-06 NOTE — PROGRESS NOTES
OB FOLLOW UP  CC- Here for care of pregnancy        Angie Sutherland is a 26 y.o.  19w1d patient being seen today for her obstetrical follow up visit. Patient reports intense cramping and pressure that has been consistent for a few weeks now.  Patient was retreated with Flagyl following positive CHRIS at last visit. Patient reports she just started the medication on Monday due to being sick.  She denies dysuria, frequency, bleeding.  CCUA was neg.    Her prenatal care is complicated by (and status) :    Patient Active Problem List   Diagnosis   • Prediabetes   • Malignant neoplasm of right female breast (HCC)   • Peripheral neuropathy due to chemotherapy (HCC)   • Migraine without aura and without status migrainosus, not intractable   • Episode of recurrent major depressive disorder (HCC)   • Pregnancy   • Previous  section       Ultrasound Today: Yes.  Findings showed normal fetal anatomy, cephalic presentation, female fetus.  I have personally evaluated the U/S and agree with the findings. Vanda Dill, UYEN    ROS -   Patient Reports : intense cramping and pressure  Patient Denies: Loss of Fluid, Vaginal Spotting, Vision Changes, Headaches, Nausea , Vomiting , Contractions and Epigastric pain  Fetal Movement : normal  All other systems reviewed and are negative.       The additional following portions of the patient's history were reviewed and updated as appropriate: allergies, current medications, past family history, past medical history, past social history, past surgical history and problem list.    I have reviewed and agree with the HPI, ROS, and historical information as entered above. Vanda Dill, APRN    /62   Wt 56.9 kg (125 lb 6.4 oz)   LMP 2022   BMI 23.71 kg/m²       EXAM:     FHT: see US   Urine glucose/protein: See prenatal flowsheet       Assessment and Plan    Problem List Items Addressed This Visit        Gravid and     Pregnancy - Primary     Overview     hisotry GDM x2, early glucola           Relevant Orders    POC Protein, Urine, Qualitative, Dipstick (Completed)    POC Glucose, Urine, Qualitative, Dipstick (Completed)      Other Visit Diagnoses     Exposure to STD              1. Pregnancy at 19w1d  2. Fetal status reassuring.  US wnl today.  3. Activity and Exercise discussed.  Push water.  Cramping could be related to trich.  Take meds as directed.  Call prn worsening or new symptoms.  Return in about 4 weeks (around 6/3/2022) for CHRIS trich.    Vanda Dill, APRN  05/06/2022

## 2022-06-03 ENCOUNTER — ROUTINE PRENATAL (OUTPATIENT)
Dept: OBSTETRICS AND GYNECOLOGY | Facility: CLINIC | Age: 27
End: 2022-06-03

## 2022-06-03 VITALS — SYSTOLIC BLOOD PRESSURE: 98 MMHG | BODY MASS INDEX: 24.42 KG/M2 | WEIGHT: 129.2 LBS | DIASTOLIC BLOOD PRESSURE: 62 MMHG

## 2022-06-03 DIAGNOSIS — Z3A.23 23 WEEKS GESTATION OF PREGNANCY: Primary | ICD-10-CM

## 2022-06-03 DIAGNOSIS — A59.01 TRICHOMONAL VAGINITIS: ICD-10-CM

## 2022-06-03 DIAGNOSIS — N94.10 FEMALE DYSPAREUNIA: ICD-10-CM

## 2022-06-03 LAB
EXPIRATION DATE: 0
GLUCOSE UR STRIP-MCNC: NEGATIVE MG/DL
Lab: 0
PROT UR STRIP-MCNC: NEGATIVE MG/DL

## 2022-06-03 PROCEDURE — 0502F SUBSEQUENT PRENATAL CARE: CPT | Performed by: NURSE PRACTITIONER

## 2022-06-03 NOTE — PROGRESS NOTES
OB FOLLOW UP    Angie Sutherland is a 26 y.o.  23w1d patient being seen today for her obstetrical follow up visit. Patient reports headaches, pain/pressure in lower abdomen, and dyspareunia. .     Her prenatal care is complicated by (and status) :    Patient Active Problem List   Diagnosis   • Prediabetes   • Malignant neoplasm of right female breast (HCC)   • Peripheral neuropathy due to chemotherapy (HCC)   • Migraine without aura and without status migrainosus, not intractable   • Episode of recurrent major depressive disorder (HCC)   • Pregnancy   • Previous  section   • Trichomonal vaginitis       ROS -   Patient Reports : headaches, pain/pressure in lower abdomen, and dyspareunia.   Patient Denies: Loss of Fluid, Vaginal Spotting, Vision Changes and Cramping/Contractions   Fetal Movement : normal      BP 98/62   Wt 58.6 kg (129 lb 3.2 oz)   LMP 2022   BMI 24.42 kg/m²     Ultrasound Today: no    EXAM:  Vitals: See prenatal flowsheet   Abdomen: See prenatal flowsheet   Urine glucose/protein: See prenatal flowsheet   Pelvic: See prenatal flowsheet     Assessment    1. Pregnancy at 23w1d  2. Fetal status reassuring     Problem List Items Addressed This Visit        Gravid and     Pregnancy - Primary    Overview     hisotry GDM x2, early glucola           Relevant Orders    POC Glucose, Urine, Qualitative, Dipstick (Completed)    POC Protein, Urine, Qualitative, Dipstick (Completed)       Other    Trichomonal vaginitis    Overview     CHRIS sent 6/3/22           Relevant Orders    Chlamydia trachomatis, Neisseria gonorrhoeae, Trichomonas vaginalis, PCR - Swab, Cervix    Bacterial Vaginosis, FREDERIC - Swab, Cervix      Other Visit Diagnoses     Female dyspareunia        Relevant Orders    Chlamydia trachomatis, Neisseria gonorrhoeae, Trichomonas vaginalis, PCR - Swab, Cervix    Bacterial Vaginosis, FREDERIC - Swab, Cervix          Plan    1. Fetal movement/ Labor  Precautions  Follow Up: Return in about 4 weeks (around 7/1/2022) for [place] ARABELLA.  3. CHRIS Trich sent today. WP/KOH negative.     Melissa Begum, APRN  06/03/2022

## 2022-06-07 LAB
A VAGINAE DNA VAG QL NAA+PROBE: NORMAL SCORE
BVAB2 DNA VAG QL NAA+PROBE: NORMAL SCORE
C TRACH RRNA SPEC QL NAA+PROBE: NEGATIVE
MEGA1 DNA VAG QL NAA+PROBE: NORMAL SCORE
N GONORRHOEA RRNA SPEC QL NAA+PROBE: NEGATIVE
T VAGINALIS RRNA SPEC QL NAA+PROBE: NEGATIVE

## 2022-07-01 ENCOUNTER — ROUTINE PRENATAL (OUTPATIENT)
Dept: OBSTETRICS AND GYNECOLOGY | Facility: CLINIC | Age: 27
End: 2022-07-01

## 2022-07-01 VITALS — SYSTOLIC BLOOD PRESSURE: 100 MMHG | DIASTOLIC BLOOD PRESSURE: 58 MMHG | WEIGHT: 131.8 LBS | BODY MASS INDEX: 24.92 KG/M2

## 2022-07-01 DIAGNOSIS — Z3A.27 27 WEEKS GESTATION OF PREGNANCY: Primary | ICD-10-CM

## 2022-07-01 DIAGNOSIS — Z98.891 PREVIOUS CESAREAN SECTION: ICD-10-CM

## 2022-07-01 DIAGNOSIS — Z86.32 HISTORY OF GESTATIONAL DIABETES IN PRIOR PREGNANCY, CURRENTLY PREGNANT: ICD-10-CM

## 2022-07-01 DIAGNOSIS — Z13.1 ENCOUNTER FOR SCREENING FOR DIABETES MELLITUS: ICD-10-CM

## 2022-07-01 DIAGNOSIS — O09.299 HISTORY OF GESTATIONAL DIABETES IN PRIOR PREGNANCY, CURRENTLY PREGNANT: ICD-10-CM

## 2022-07-01 LAB
GLUCOSE UR STRIP-MCNC: NEGATIVE MG/DL
PROT UR STRIP-MCNC: NEGATIVE MG/DL

## 2022-07-01 PROCEDURE — 0502F SUBSEQUENT PRENATAL CARE: CPT | Performed by: OBSTETRICS & GYNECOLOGY

## 2022-07-01 RX ORDER — HYDROXYZINE HYDROCHLORIDE 10 MG/1
TABLET, FILM COATED ORAL
Status: ON HOLD | COMMUNITY
Start: 2022-06-27 | End: 2022-09-23 | Stop reason: SINTOL

## 2022-07-01 NOTE — PROGRESS NOTES
OB FOLLOW UP  CC- Here for care of pregnancy        Angie Sutherland is a 26 y.o.  27w1d patient being seen today for her obstetrical follow up. Patient reports occasional contractions. CHRIS negative last visit for trich.     Patient undergoing Glucola testing today. She is due for her testing at 12:10.     MBT: O+  Rhogam Given: N/A  TDAP: declines   Pt has had bilateral mastectomy      Ultrasound Today: No.    Her prenatal care is complicated by (and status) :    Patient Active Problem List   Diagnosis   • Prediabetes   • Malignant neoplasm of right female breast (HCC)   • Peripheral neuropathy due to chemotherapy (HCC)   • Migraine without aura and without status migrainosus, not intractable   • Episode of recurrent major depressive disorder (HCC)   • Pregnancy   • Previous  section   • Trichomonal vaginitis         ROS -   Patient Denies: Loss of Fluid, Vaginal Spotting, Vision Changes, Headaches, Nausea , Vomiting  and Epigastric pain  Fetal Movement : normal    The additional following portions of the patient's history were reviewed and updated as appropriate: allergies, current medications, past family history, past medical history, past social history, past surgical history and problem list.    I have reviewed and agree with the HPI, ROS, and historical information as entered above. aCrolina Amos MD    /58   Wt 59.8 kg (131 lb 12.8 oz)   LMP 2022   BMI 24.92 kg/m²         EXAM:     FHT: + BPM   Uterine Size: size equals dates  Pelvic Exam: No       Assessment and Plan    Problem List Items Addressed This Visit     Pregnancy - Primary    Overview     hisotry GDM x2, early glucola  Third trimester growth 33 wks           Relevant Orders    POC Urinalysis Dipstick (Completed)    Antibody Screen    CBC (No Diff)    Gestational Screen 1 Hr ([facility])    Previous  section    Overview     x2             Other Visit Diagnoses     History of gestational diabetes in prior  pregnancy, currently pregnant         Relevant Orders    CBC (No Diff)    Encounter for screening for diabetes mellitus         Relevant Orders    Gestational Screen 1 Hr ([facility])          1. Pregnancy at 27w1d. glucola today.  2. Fetal status reassuring.   3. Activity and Exercise discussed.  Return in about 1 month (around 8/1/2022) for F/U Prenatal.    Carolina Amos MD  07/01/2022

## 2022-07-02 LAB
BLD GP AB SCN SERPL QL: NEGATIVE
ERYTHROCYTE [DISTWIDTH] IN BLOOD BY AUTOMATED COUNT: 12.7 % (ref 12.3–15.4)
GLUCOSE 1H P 50 G GLC PO SERPL-MCNC: 110 MG/DL (ref 65–139)
HCT VFR BLD AUTO: 35.9 % (ref 34–46.6)
HGB BLD-MCNC: 12 G/DL (ref 12–15.9)
MCH RBC QN AUTO: 30.5 PG (ref 26.6–33)
MCHC RBC AUTO-ENTMCNC: 33.4 G/DL (ref 31.5–35.7)
MCV RBC AUTO: 91.3 FL (ref 79–97)
PLATELET # BLD AUTO: 217 10*3/MM3 (ref 140–450)
RBC # BLD AUTO: 3.93 10*6/MM3 (ref 3.77–5.28)
WBC # BLD AUTO: 5.19 10*3/MM3 (ref 3.4–10.8)

## 2022-07-27 ENCOUNTER — ROUTINE PRENATAL (OUTPATIENT)
Dept: OBSTETRICS AND GYNECOLOGY | Facility: CLINIC | Age: 27
End: 2022-07-27

## 2022-07-27 ENCOUNTER — PATIENT MESSAGE (OUTPATIENT)
Dept: OBSTETRICS AND GYNECOLOGY | Facility: CLINIC | Age: 27
End: 2022-07-27

## 2022-07-27 ENCOUNTER — TELEPHONE (OUTPATIENT)
Dept: OBSTETRICS AND GYNECOLOGY | Facility: CLINIC | Age: 27
End: 2022-07-27

## 2022-07-27 VITALS — DIASTOLIC BLOOD PRESSURE: 62 MMHG | SYSTOLIC BLOOD PRESSURE: 106 MMHG | WEIGHT: 136 LBS | BODY MASS INDEX: 25.71 KG/M2

## 2022-07-27 DIAGNOSIS — Z3A.30 30 WEEKS GESTATION OF PREGNANCY: Primary | ICD-10-CM

## 2022-07-27 DIAGNOSIS — O26.899 CRAMPING AFFECTING PREGNANCY, ANTEPARTUM: ICD-10-CM

## 2022-07-27 DIAGNOSIS — R10.9 CRAMPING AFFECTING PREGNANCY, ANTEPARTUM: ICD-10-CM

## 2022-07-27 DIAGNOSIS — O47.00 PRETERM UTERINE CONTRACTIONS, ANTEPARTUM: ICD-10-CM

## 2022-07-27 LAB
BILIRUB BLD-MCNC: NEGATIVE MG/DL
CLARITY, POC: CLEAR
COLOR UR: YELLOW
EXPIRATION DATE: NORMAL
GLUCOSE UR STRIP-MCNC: NEGATIVE MG/DL
GLUCOSE UR STRIP-MCNC: NEGATIVE MG/DL
KETONES UR QL: ABNORMAL
LEUKOCYTE EST, POC: NEGATIVE
Lab: NORMAL
NITRITE UR-MCNC: NEGATIVE MG/ML
PH UR: 7.5 [PH] (ref 5–8)
PROT UR STRIP-MCNC: NEGATIVE MG/DL
PROT UR STRIP-MCNC: NEGATIVE MG/DL
RBC # UR STRIP: NEGATIVE /UL
SP GR UR: 1.02 (ref 1–1.03)
UROBILINOGEN UR QL: NORMAL

## 2022-07-27 PROCEDURE — 59025 FETAL NON-STRESS TEST: CPT

## 2022-07-27 PROCEDURE — 0502F SUBSEQUENT PRENATAL CARE: CPT

## 2022-07-27 NOTE — PROGRESS NOTES
OB FOLLOW UP  CC- Here for care of pregnancy        Angie uStherland is a 26 y.o.  30w6d patient being seen today for contractions.  Patient reports that she has been having intense contractions, that are consistent.  They have been occurring through out the day, but have been becoming more frequent and painful.  She c/o pelvic pain and pressure.  Also has been nauseated with contractions.     Her prenatal care is complicated by (and status) :    Patient Active Problem List   Diagnosis   • Prediabetes   • Malignant neoplasm of right female breast (HCC)   • Peripheral neuropathy due to chemotherapy (HCC)   • Migraine without aura and without status migrainosus, not intractable   • Episode of recurrent major depressive disorder (HCC)   • Pregnancy   • Previous  section   • Trichomonal vaginitis         Ultrasound Today: No.  Non Stress Test: Yes minutes >20 mins  non-stress test: NST: Reactive  indication: premature contractions vs celi pozo contractions    ROS -   Patient Reports : Contractions and pain, pressure, nausea  Patient Denies: Loss of Fluid and Vaginal Spotting  Fetal Movement : adequate  All other systems reviewed and are negative.       The additional following portions of the patient's history were reviewed and updated as appropriate: allergies, current medications, past family history, past medical history, past social history, past surgical history and problem list.    I have reviewed and agree with the HPI, ROS, and historical information as entered above. Shena Do, APRN    /62   Wt 61.7 kg (136 lb)   LMP 2022   BMI 25.71 kg/m²       EXAM:     Pelvic Exam: Yes Dilation: Closed  Effacement: Long    Urine glucose/protein: See prenatal flowsheet       Assessment and Plan    Problem List Items Addressed This Visit        Gravid and     Pregnancy - Primary    Overview     hisotry GDM x2, early glucola  Third trimester growth 33 wks          Relevant Orders    POC Protein, Urine, Qualitative, Dipstick (Completed)    POC Glucose, Urine, Qualitative, Dipstick (Completed)    Fetal Nonstress Test      Other Visit Diagnoses      uterine contractions, antepartum        Relevant Orders    Fetal Nonstress Test    POC Urinalysis Dipstick (Completed)    Cramping affecting pregnancy, antepartum        Relevant Orders    Urine Culture - Urine, Urine, Clean Catch          1. Pregnancy at 30w6d  2. Fetal status reassuring.   3. NST reactive today.  4. Kick ct and labor precautions reviewed  5. Advised pelvic rest, avoid heavy lifting, bleeding precautions reviewed.  6. CCUA negative, will send for culture. Will notify patient if results are positive for abx.   7. Increase PO fluids- 2-3L/day  8. Return for regularly scheduled OB appointment.  9. RTC for worsening symptoms  10. Return in about 2 days (around 2022) for ARABELLA Do, APRN  2022

## 2022-07-29 ENCOUNTER — ROUTINE PRENATAL (OUTPATIENT)
Dept: OBSTETRICS AND GYNECOLOGY | Facility: CLINIC | Age: 27
End: 2022-07-29

## 2022-07-29 VITALS — WEIGHT: 136 LBS | BODY MASS INDEX: 25.71 KG/M2 | SYSTOLIC BLOOD PRESSURE: 104 MMHG | DIASTOLIC BLOOD PRESSURE: 64 MMHG

## 2022-07-29 DIAGNOSIS — Z98.891 PREVIOUS CESAREAN SECTION: Primary | ICD-10-CM

## 2022-07-29 DIAGNOSIS — Z3A.31 31 WEEKS GESTATION OF PREGNANCY: ICD-10-CM

## 2022-07-29 LAB
BACTERIA UR CULT: NO GROWTH
BACTERIA UR CULT: NORMAL

## 2022-07-29 PROCEDURE — 0502F SUBSEQUENT PRENATAL CARE: CPT | Performed by: OBSTETRICS & GYNECOLOGY

## 2022-07-29 NOTE — PROGRESS NOTES
OB FOLLOW UP  CC- Here for care of pregnancy        Angie Sutherland is a 26 y.o.  31w1d patient being seen today for her obstetrical follow up visit. Patient reports contractions. Patient stated that the contractions are happening all the time. Patient stated that everything she does brings them on. Patient stated that she is not able to lay down due to having 2 kids and a dog. Patient stated that she tries to drink water.      Her prenatal care is complicated by (and status) :  None  Patient Active Problem List   Diagnosis   • Prediabetes   • Malignant neoplasm of right female breast (HCC)   • Peripheral neuropathy due to chemotherapy (HCC)   • Migraine without aura and without status migrainosus, not intractable   • Episode of recurrent major depressive disorder (HCC)   • Pregnancy   • Previous  section   • Trichomonal vaginitis       TDAP status: declines  Ultrasound Today: No    ROS -   Patient Reports : Contractions  Patient Denies: Loss of Fluid, Vaginal Spotting, Vision Changes, Headaches, Nausea , Vomiting  and Epigastric pain  Fetal Movement : normal  All other systems reviewed and are negative.       The additional following portions of the patient's history were reviewed and updated as appropriate: allergies, current medications, past family history, past medical history, past social history, past surgical history and problem list.    I have reviewed and agree with the HPI, ROS, and historical information as entered above. Carolina Amos MD    /64   Wt 61.7 kg (136 lb)   LMP 2022   BMI 25.71 kg/m²       EXAM:     Prenatal Vitals  BP: 104/64  Weight: 61.7 kg (136 lb)   Fetal Heart Rate: +      Fundal Height (cm): 30 cm                Assessment and Plan    Problem List Items Addressed This Visit     Pregnancy    Overview     hisotry GDM x2, early glucola  Third trimester growth 33 wks         Previous  section - Primary    Overview     x2         Relevant  Orders    US Ob Follow Up Transabdominal Approach          1. Pregnancy at 31w1d.  2. Fetal status reassuring.  3. 28 week labs reviewed.    4. Activity and Exercise discussed.  5. Fetal movement/PTL or Labor precautions   6. sched her repeat c/s.   Return in about 2 weeks (around 8/12/2022) for F/U Prenatal, U/S Next Visit.    Carolina Amos MD  07/29/2022

## 2022-08-12 ENCOUNTER — ROUTINE PRENATAL (OUTPATIENT)
Dept: OBSTETRICS AND GYNECOLOGY | Facility: CLINIC | Age: 27
End: 2022-08-12

## 2022-08-12 VITALS — BODY MASS INDEX: 26.13 KG/M2 | SYSTOLIC BLOOD PRESSURE: 108 MMHG | DIASTOLIC BLOOD PRESSURE: 66 MMHG | WEIGHT: 138.2 LBS

## 2022-08-12 DIAGNOSIS — Z3A.33 33 WEEKS GESTATION OF PREGNANCY: Primary | ICD-10-CM

## 2022-08-12 LAB
GLUCOSE UR STRIP-MCNC: NEGATIVE MG/DL
PROT UR STRIP-MCNC: NEGATIVE MG/DL

## 2022-08-12 PROCEDURE — 99213 OFFICE O/P EST LOW 20 MIN: CPT

## 2022-08-12 NOTE — PROGRESS NOTES
OB FOLLOW UP  CC- Here for care of pregnancy        Angie Sutherland is a 27 y.o.  33w1d patient being seen today for her obstetrical follow up visit. Patient reports no complaints..     Her prenatal care is complicated by (and status) :   Patient Active Problem List   Diagnosis   • Prediabetes   • Malignant neoplasm of right female breast (HCC)   • Peripheral neuropathy due to chemotherapy (HCC)   • Migraine without aura and without status migrainosus, not intractable   • Episode of recurrent major depressive disorder (HCC)   • Pregnancy   • Previous  section   • Trichomonal vaginitis       TDAP status: declines  Ultrasound Today: Yes. Findings showed ASIF 17.4cm, AC 55%, EFW 50%    ROS -   Patient Reports : No Problems  Patient Denies: Loss of Fluid, Vaginal Spotting, Vision Changes, Headaches, Nausea , Vomiting , Contractions and Epigastric pain  Fetal Movement : normal  All other systems reviewed and are negative.       The additional following portions of the patient's history were reviewed and updated as appropriate: allergies, current medications, past family history, past medical history, past social history, past surgical history and problem list.    I have reviewed and agree with the HPI, ROS, and historical information as entered above. Shena Do, APRN    /66   Wt 62.7 kg (138 lb 3.2 oz)   LMP 2022   BMI 26.13 kg/m²       EXAM:     Prenatal Vitals  BP: 108/66  Weight: 62.7 kg (138 lb 3.2 oz)   FHR 144bpm    Urine Glucose Read-only: Negative  Urine Protein Read-only: Negative       Assessment and Plan    Problem List Items Addressed This Visit        Gravid and     Pregnancy - Primary    Overview     hisotry GDM x2, early glucola  Third trimester growth 33 wks- US 22 @ 33w1d ASIF 17.4cm, AC 55%, EFW 50%, Cephalic presentation, placenta posterior fudal         Relevant Orders    POC Urinalysis Dipstick (Completed)          1. Pregnancy at  33w1d  2. Fetal status reassuring.  3. 28 week labs reviewed.    4. Activity and Exercise discussed.  5. Fetal movement/PTL or Labor precautions  6. Patient is on Prenatal vitamins  7. Reviewed Pre-eclampsia signs/symptoms   8. C/S scheduled 9/23/22 and PAT scheduled 9/20/22  Return in about 2 weeks (around 8/26/2022) for ARABELLA ang/ [place].    Shena Do, APRN  08/12/2022

## 2022-08-25 ENCOUNTER — ROUTINE PRENATAL (OUTPATIENT)
Dept: OBSTETRICS AND GYNECOLOGY | Facility: CLINIC | Age: 27
End: 2022-08-25

## 2022-08-25 VITALS — BODY MASS INDEX: 26.5 KG/M2 | SYSTOLIC BLOOD PRESSURE: 98 MMHG | WEIGHT: 140.2 LBS | DIASTOLIC BLOOD PRESSURE: 62 MMHG

## 2022-08-25 DIAGNOSIS — Z98.891 PREVIOUS CESAREAN SECTION: ICD-10-CM

## 2022-08-25 DIAGNOSIS — Z3A.35 35 WEEKS GESTATION OF PREGNANCY: Primary | ICD-10-CM

## 2022-08-25 LAB
GLUCOSE UR STRIP-MCNC: NEGATIVE MG/DL
PROT UR STRIP-MCNC: NEGATIVE MG/DL

## 2022-08-25 PROCEDURE — 99213 OFFICE O/P EST LOW 20 MIN: CPT | Performed by: OBSTETRICS & GYNECOLOGY

## 2022-08-25 NOTE — PROGRESS NOTES
OB FOLLOW UP  CC- Here for care of pregnancy        Angie Sutherland is a 27 y.o.  35w0d patient being seen today for her obstetrical follow up visit. Patient reports headaches that are intermittent, no relief with tylenol. HA occur only on occasion, she states they are similar to her past migraines. States they do subside after a few hours.     Her prenatal care is complicated by (and status) : None  Patient Active Problem List   Diagnosis   • Prediabetes   • Malignant neoplasm of right female breast (HCC)   • Peripheral neuropathy due to chemotherapy (HCC)   • Migraine without aura and without status migrainosus, not intractable   • Episode of recurrent major depressive disorder (HCC)   • Pregnancy   • Previous  section   • Trichomonal vaginitis         Ultrasound Today: No    ROS -   Patient Reports : Headaches  Patient Denies: Loss of Fluid, Vision Changes and Contractions  Fetal Movement : normal  All other systems reviewed and are negative.       The additional following portions of the patient's history were reviewed and updated as appropriate: allergies and current medications.    I have reviewed and agree with the HPI, ROS, and historical information as entered above. Carolina Amos MD    BP 98/62   Wt 63.6 kg (140 lb 3.2 oz)   LMP 2022   BMI 26.50 kg/m²       EXAM:     Prenatal Vitals  BP: 98/62  Weight: 63.6 kg (140 lb 3.2 oz)   Fetal Heart Rate: +               Urine Glucose Read-only: Negative  Urine Protein Read-only: Negative       Assessment and Plan    Problem List Items Addressed This Visit     Pregnancy - Primary    Overview     hisotry GDM x2, early glucola  Third trimester growth 33 wks- US 22 @ 33w1d ASIF 17.4cm, AC 55%, EFW 50%, Cephalic presentation, placenta posterior fudal         Relevant Orders    POC Urinalysis Dipstick (Completed)    Previous  section    Overview     x2               1. Pregnancy at 35w0d  2. Fetal status reassuring.    3. Activity and Exercise discussed.  4. Fetal movement/PTL or Labor precautions  5. GBS next visit  Return in about 1 week (around 9/1/2022) for F/U Prenatal.    Carolina Amos MD  08/25/2022

## 2022-08-31 ENCOUNTER — LAB (OUTPATIENT)
Dept: LAB | Facility: HOSPITAL | Age: 27
End: 2022-08-31

## 2022-08-31 ENCOUNTER — ROUTINE PRENATAL (OUTPATIENT)
Dept: OBSTETRICS AND GYNECOLOGY | Facility: CLINIC | Age: 27
End: 2022-08-31

## 2022-08-31 VITALS — WEIGHT: 140 LBS | DIASTOLIC BLOOD PRESSURE: 62 MMHG | BODY MASS INDEX: 26.47 KG/M2 | SYSTOLIC BLOOD PRESSURE: 102 MMHG

## 2022-08-31 DIAGNOSIS — O26.893 VAGINAL DISCHARGE DURING PREGNANCY IN THIRD TRIMESTER: ICD-10-CM

## 2022-08-31 DIAGNOSIS — N89.8 VAGINAL DISCHARGE DURING PREGNANCY IN THIRD TRIMESTER: ICD-10-CM

## 2022-08-31 DIAGNOSIS — N89.8 VAGINAL ITCHING: ICD-10-CM

## 2022-08-31 DIAGNOSIS — Z3A.35 35 WEEKS GESTATION OF PREGNANCY: Primary | ICD-10-CM

## 2022-08-31 DIAGNOSIS — N94.9 VAGINAL BURNING: ICD-10-CM

## 2022-08-31 DIAGNOSIS — Z3A.35 35 WEEKS GESTATION OF PREGNANCY: ICD-10-CM

## 2022-08-31 LAB
CLARITY, POC: CLEAR
COLOR UR: YELLOW
GLUCOSE UR STRIP-MCNC: NEGATIVE MG/DL
KOH PREP NAIL: NORMAL
PROT UR STRIP-MCNC: NEGATIVE MG/DL
WET PREP GENITAL: NORMAL

## 2022-08-31 PROCEDURE — 87220 TISSUE EXAM FOR FUNGI: CPT

## 2022-08-31 PROCEDURE — 87081 CULTURE SCREEN ONLY: CPT

## 2022-08-31 PROCEDURE — 0502F SUBSEQUENT PRENATAL CARE: CPT

## 2022-08-31 PROCEDURE — 87210 SMEAR WET MOUNT SALINE/INK: CPT

## 2022-08-31 NOTE — PROGRESS NOTES
OB FOLLOW UP  CC- Here for care of pregnancy        Angie Sutherladn is a 27 y.o.  35w6d patient being seen today for her obstetrical follow up visit. Patient reports cramping, contractions, and tightness. Patient stated that she gets headaches but they are normal. Patient stated that she is experiencing vaginal itching and burning.     Her prenatal care is complicated by (and status) :   Patient Active Problem List   Diagnosis   • Prediabetes   • Malignant neoplasm of right female breast (HCC)   • Peripheral neuropathy due to chemotherapy (HCC)   • Migraine without aura and without status migrainosus, not intractable   • Episode of recurrent major depressive disorder (HCC)   • Pregnancy   • Previous  section   • Trichomonal vaginitis       Flu Status: Declines  TDAP status: declines  Ultrasound Today: No    ROS -   Patient Reports : Cramping, Contractions and Tightness  Patient Denies: Loss of Fluid, Vaginal Spotting, Vision Changes, Headaches, Nausea  and Vomiting   Fetal Movement : normal  All other systems reviewed and are negative.       The additional following portions of the patient's history were reviewed and updated as appropriate: allergies and current medications.    I have reviewed and agree with the HPI, ROS, and historical information as entered above. Shena Do, APRN    /62   Wt 63.5 kg (140 lb)   LMP 2022   BMI 26.47 kg/m²       EXAM:     Prenatal Vitals  BP: 102/62  Weight: 63.5 kg (140 lb)   Fetal Heart Rate: 160   Dilation/Effacement/Station  Dilation: Fingertip  Effacement (%): 20         Urine Glucose Read-only: Negative  Urine Protein Read-only: Negative       Assessment and Plan  1. Desires STD testing. FOB gave her Trich at the beginning of this pregnancy.   Problem List Items Addressed This Visit        Gravid and     Pregnancy - Primary    Overview     hisotry GDM x2, early glucola  Third trimester growth 33 wks- US 22 @  33w1d ASIF 17.4cm, AC 55%, EFW 50%, Cephalic presentation, placenta posterior fudal         Relevant Orders    POC Urinalysis Dipstick (Completed)    Group B Streptococcus Culture - Swab, Vaginal/Rectum    NuSwab VG+ - Swab, Vagina    POC Wet Prep (Completed)    POC KOH Prep (Completed)      Other Visit Diagnoses     Vaginal itching        Relevant Orders    NuSwab VG+ - Swab, Vagina    POC Wet Prep (Completed)    POC KOH Prep (Completed)    Vaginal discharge during pregnancy in third trimester        Relevant Orders    NuSwab VG+ - Swab, Vagina    POC Wet Prep (Completed)    POC KOH Prep (Completed)    Vaginal burning        Relevant Orders    NuSwab VG+ - Swab, Vagina    POC Wet Prep (Completed)    POC KOH Prep (Completed)          1. Pregnancy at 35w6d  2. Fetal status reassuring.  3. 28 week labs reviewed.    4. Activity and Exercise discussed.  5. Fetal movement/PTL or Labor precautions  6. Increase H20 intake to 2-3L/day  7. NuSwab and GBS today.   8. Medication(s) Ordered- Terazol  9. Patient is on Prenatal vitamins  Return in about 1 week (around 9/7/2022) for ARABELLA MEDRANO.    Shena Do, APRN  08/31/2022

## 2022-09-02 LAB
A VAGINAE DNA VAG QL NAA+PROBE: ABNORMAL SCORE
BACTERIA SPEC AEROBE CULT: ABNORMAL
BVAB2 DNA VAG QL NAA+PROBE: ABNORMAL SCORE
C ALBICANS DNA VAG QL NAA+PROBE: POSITIVE
C GLABRATA DNA VAG QL NAA+PROBE: NEGATIVE
C TRACH DNA VAG QL NAA+PROBE: NEGATIVE
MEGA1 DNA VAG QL NAA+PROBE: ABNORMAL SCORE
N GONORRHOEA DNA VAG QL NAA+PROBE: NEGATIVE
T VAGINALIS DNA VAG QL NAA+PROBE: NEGATIVE

## 2022-09-08 ENCOUNTER — ROUTINE PRENATAL (OUTPATIENT)
Dept: OBSTETRICS AND GYNECOLOGY | Facility: CLINIC | Age: 27
End: 2022-09-08

## 2022-09-08 VITALS — DIASTOLIC BLOOD PRESSURE: 62 MMHG | SYSTOLIC BLOOD PRESSURE: 110 MMHG | WEIGHT: 140.4 LBS | BODY MASS INDEX: 26.54 KG/M2

## 2022-09-08 DIAGNOSIS — Z3A.37 37 WEEKS GESTATION OF PREGNANCY: Primary | ICD-10-CM

## 2022-09-08 DIAGNOSIS — Z98.891 PREVIOUS CESAREAN SECTION: ICD-10-CM

## 2022-09-08 LAB
GLUCOSE UR STRIP-MCNC: NEGATIVE MG/DL
PROT UR STRIP-MCNC: ABNORMAL MG/DL

## 2022-09-08 PROCEDURE — 99213 OFFICE O/P EST LOW 20 MIN: CPT | Performed by: OBSTETRICS & GYNECOLOGY

## 2022-09-08 RX ORDER — FLUCONAZOLE 150 MG/1
150 TABLET ORAL ONCE
Qty: 1 TABLET | Refills: 0 | Status: SHIPPED | OUTPATIENT
Start: 2022-09-08 | End: 2022-09-08

## 2022-09-08 NOTE — PROGRESS NOTES
OB FOLLOW UP  CC- Here for care of pregnancy        Angie Sutherland is a 27 y.o.  37w0d patient being seen today for her obstetrical follow up visit. Patient reports occasional HA's and contractions.     Pt was seen by Shena Do at last visit. Pt did swab that confirmed yeast infection and was prescribed cream. Pt has not used the cream and is asking if she can take anything for it PO instead.     Her prenatal care is complicated by (and status) :   Patient Active Problem List   Diagnosis   • Prediabetes   • Malignant neoplasm of right female breast (HCC)   • Peripheral neuropathy due to chemotherapy (HCC)   • Migraine without aura and without status migrainosus, not intractable   • Episode of recurrent major depressive disorder (HCC)   • Pregnancy   • Previous  section   • Trichomonal vaginitis       GBS Status:   Group B Strep Culture   Date Value Ref Range Status   2022 Streptococcus agalactiae (Group B) (A)  Final     Comment:     This organism is considered to be universally susceptible to penicillin.  No further antibiotic testing will be performed. If Clindamycin or Erythromycin is the drug of choice, notify the laboratory within 7 days to request susceptibility testing.         Allergies   Allergen Reactions   • Benadryl [Diphenhydramine] Shortness Of Breath and Diarrhea          Her Delivery Plan is: Repeat . Scheduled on   History of COVID: Yes, Dec 2021 and early   US today: no    ROS -   Patient Denies: Loss of Fluid, Vaginal Spotting, Vision Changes, Nausea , Vomiting  and Epigastric pain  Fetal Movement : normal  All other systems reviewed and are negative.       The additional following portions of the patient's history were reviewed and updated as appropriate: allergies, current medications, past family history, past medical history, past social history, past surgical history and problem list.    I have reviewed and agree with the HPI, ROS, and  historical information as entered above. Carolina Amos MD      EXAM:     Prenatal Vitals  BP: 110/62  Weight: 63.7 kg (140 lb 6.4 oz)   Fetal Heart Rate: +       Dilation/Effacement/Station  Dilation: Fingertip      Urine Glucose Read-only: Negative  Urine Protein Read-only: (!) Trace       Assessment and Plan    Problem List Items Addressed This Visit     Pregnancy - Primary    Overview     hisotry GDM x2, early glucola  Third trimester growth 33 wks- US 22 @ 33w1d ASIF 17.4cm, AC 55%, EFW 50%, Cephalic presentation, placenta posterior fudal         Relevant Orders    POC Urinalysis Dipstick (Completed)    Previous  section    Overview     x2               1. Pregnancy at 37w0d  2. Fetal status reassuring.   3. Reviewed Pre-eclampsia signs/symptoms  4. rx for diflucan.   5. Delivery options reviewed with patient  6. Signs of labor reviewed  7. Kick counts reviewed  8. Activity and Exercise discussed.  Return in about 1 week (around 9/15/2022) for F/U Prenatal.    Carolina Amos MD  2022

## 2022-09-16 ENCOUNTER — ROUTINE PRENATAL (OUTPATIENT)
Dept: OBSTETRICS AND GYNECOLOGY | Facility: CLINIC | Age: 27
End: 2022-09-16

## 2022-09-16 VITALS — DIASTOLIC BLOOD PRESSURE: 64 MMHG | SYSTOLIC BLOOD PRESSURE: 104 MMHG | WEIGHT: 143 LBS | BODY MASS INDEX: 27.03 KG/M2

## 2022-09-16 DIAGNOSIS — Z3A.38 38 WEEKS GESTATION OF PREGNANCY: Primary | ICD-10-CM

## 2022-09-16 DIAGNOSIS — Z98.891 PREVIOUS CESAREAN SECTION: ICD-10-CM

## 2022-09-16 LAB
GLUCOSE UR STRIP-MCNC: NEGATIVE MG/DL
PROT UR STRIP-MCNC: NEGATIVE MG/DL

## 2022-09-16 PROCEDURE — 99213 OFFICE O/P EST LOW 20 MIN: CPT | Performed by: NURSE PRACTITIONER

## 2022-09-16 NOTE — PROGRESS NOTES
OB FOLLOW UP  CC- Here for care of pregnancy        Angie Sutherland is a 27 y.o.  38w1d patient being seen today for her obstetrical follow up visit. Patient reports headache. That is not relieved by Tylenol or rest.  Patient also reports occasional contractions and nausea without vomiting.     Her prenatal care is complicated by (and status) : None  Patient Active Problem List   Diagnosis   • Prediabetes   • Malignant neoplasm of right female breast (HCC)   • Peripheral neuropathy due to chemotherapy (HCC)   • Migraine without aura and without status migrainosus, not intractable   • Episode of recurrent major depressive disorder (HCC)   • Pregnancy   • Previous  section   • Trichomonal vaginitis       GBS Status:   Group B Strep Culture   Date Value Ref Range Status   2022 Streptococcus agalactiae (Group B) (A)  Final     Comment:     This organism is considered to be universally susceptible to penicillin.  No further antibiotic testing will be performed. If Clindamycin or Erythromycin is the drug of choice, notify the laboratory within 7 days to request susceptibility testing.         Allergies   Allergen Reactions   • Benadryl [Diphenhydramine] Shortness Of Breath and Diarrhea          Her Delivery Plan is: Repeat . Scheduled    History of COVID: Yes, date 2022  US today: no  Non Stress Test: No      ROS -   Patient Reports : Headaches, Nausea, and Contractions.  Patient Denies: Loss of Fluid, Vaginal Spotting, Vision Changes, Vomiting  and Epigastric pain  Fetal Movement : normal  All other systems reviewed and are negative.       The additional following portions of the patient's history were reviewed and updated as appropriate: allergies, current medications, past family history, past medical history, past social history, past surgical history and problem list.    I have reviewed and agree with the HPI, ROS, and historical information as entered above. Vanda Hubbard  Aniyah, APRN      EXAM:     Prenatal Vitals  BP: 104/64  Weight: 64.9 kg (143 lb)   Fetal Heart Rate: +   Fundal Height (cm): 37 cm          Urine Glucose Read-only: Negative  Urine Protein Read-only: Negative       Assessment and Plan    Problem List Items Addressed This Visit        Gravid and     Pregnancy - Primary    Overview     hisotry GDM x2, early glucola  Third trimester growth 33 wks- US 22 @ 33w1d ASIF 17.4cm, AC 55%, EFW 50%, Cephalic presentation, placenta posterior fudal         Relevant Orders    POC Urinalysis Dipstick (Completed)    Previous  section    Overview     x2               1. Pregnancy at 38w1d  2. Fetal status reassuring.   3. Reviewed Pre-eclampsia signs/symptoms  4. Delivery options reviewed with patient  5. Signs of labor reviewed  6. Kick counts reviewed  7. Activity and Exercise discussed.  Instructions reviewed for PAT and rc/s.    Vanda Dill, APRN  2022

## 2022-09-20 ENCOUNTER — PRE-ADMISSION TESTING (OUTPATIENT)
Dept: PREADMISSION TESTING | Facility: HOSPITAL | Age: 27
End: 2022-09-20

## 2022-09-20 VITALS — WEIGHT: 142.42 LBS | BODY MASS INDEX: 26.89 KG/M2 | HEIGHT: 61 IN

## 2022-09-20 DIAGNOSIS — Z01.818 PRE-OP TESTING: Primary | ICD-10-CM

## 2022-09-20 LAB
ABO GROUP BLD: NORMAL
BLD GP AB SCN SERPL QL: NEGATIVE
DEPRECATED RDW RBC AUTO: 42.5 FL (ref 37–54)
ERYTHROCYTE [DISTWIDTH] IN BLOOD BY AUTOMATED COUNT: 13.1 % (ref 12.3–15.4)
HCT VFR BLD AUTO: 37.7 % (ref 34–46.6)
HGB BLD-MCNC: 13 G/DL (ref 12–15.9)
MCH RBC QN AUTO: 31 PG (ref 26.6–33)
MCHC RBC AUTO-ENTMCNC: 34.5 G/DL (ref 31.5–35.7)
MCV RBC AUTO: 89.8 FL (ref 79–97)
PLATELET # BLD AUTO: 156 10*3/MM3 (ref 140–450)
PMV BLD AUTO: 9.6 FL (ref 6–12)
RBC # BLD AUTO: 4.2 10*6/MM3 (ref 3.77–5.28)
RH BLD: POSITIVE
T&S EXPIRATION DATE: NORMAL
WBC NRBC COR # BLD: 5.68 10*3/MM3 (ref 3.4–10.8)

## 2022-09-20 PROCEDURE — 86901 BLOOD TYPING SEROLOGIC RH(D): CPT

## 2022-09-20 PROCEDURE — 85027 COMPLETE CBC AUTOMATED: CPT

## 2022-09-20 PROCEDURE — 36415 COLL VENOUS BLD VENIPUNCTURE: CPT

## 2022-09-20 PROCEDURE — 86900 BLOOD TYPING SEROLOGIC ABO: CPT

## 2022-09-20 PROCEDURE — 86850 RBC ANTIBODY SCREEN: CPT

## 2022-09-23 ENCOUNTER — ANESTHESIA (OUTPATIENT)
Dept: LABOR AND DELIVERY | Facility: HOSPITAL | Age: 27
End: 2022-09-23

## 2022-09-23 ENCOUNTER — HOSPITAL ENCOUNTER (INPATIENT)
Facility: HOSPITAL | Age: 27
LOS: 2 days | Discharge: HOME | End: 2022-09-25
Attending: OBSTETRICS & GYNECOLOGY | Admitting: OBSTETRICS & GYNECOLOGY

## 2022-09-23 ENCOUNTER — ANESTHESIA EVENT (OUTPATIENT)
Dept: LABOR AND DELIVERY | Facility: HOSPITAL | Age: 27
End: 2022-09-23

## 2022-09-23 PROBLEM — O34.219 PREVIOUS CESAREAN DELIVERY AFFECTING PREGNANCY: Status: ACTIVE | Noted: 2022-09-23

## 2022-09-23 LAB
AMPHET+METHAMPHET UR QL: NEGATIVE
AMPHETAMINES UR QL: NEGATIVE
BARBITURATES UR QL SCN: NEGATIVE
BENZODIAZ UR QL SCN: NEGATIVE
BUPRENORPHINE SERPL-MCNC: NEGATIVE NG/ML
CANNABINOIDS SERPL QL: NEGATIVE
COCAINE UR QL: NEGATIVE
METHADONE UR QL SCN: NEGATIVE
OPIATES UR QL: NEGATIVE
OXYCODONE UR QL SCN: NEGATIVE
PCP UR QL SCN: NEGATIVE
PROPOXYPH UR QL: NEGATIVE
TRICYCLICS UR QL SCN: NEGATIVE

## 2022-09-23 PROCEDURE — 25010000002 KETOROLAC TROMETHAMINE PER 15 MG: Performed by: OBSTETRICS & GYNECOLOGY

## 2022-09-23 PROCEDURE — 80306 DRUG TEST PRSMV INSTRMNT: CPT | Performed by: OBSTETRICS & GYNECOLOGY

## 2022-09-23 PROCEDURE — 59510 CESAREAN DELIVERY: CPT | Performed by: OBSTETRICS & GYNECOLOGY

## 2022-09-23 PROCEDURE — 0 MORPHINE PER 10 MG: Performed by: NURSE ANESTHETIST, CERTIFIED REGISTERED

## 2022-09-23 PROCEDURE — 25010000002 ONDANSETRON PER 1 MG: Performed by: NURSE ANESTHETIST, CERTIFIED REGISTERED

## 2022-09-23 PROCEDURE — 25010000002 CEFAZOLIN IN DEXTROSE 2-4 GM/100ML-% SOLUTION: Performed by: OBSTETRICS & GYNECOLOGY

## 2022-09-23 PROCEDURE — 25010000002 FENTANYL CITRATE (PF) 50 MCG/ML SOLUTION: Performed by: NURSE ANESTHETIST, CERTIFIED REGISTERED

## 2022-09-23 PROCEDURE — 59025 FETAL NON-STRESS TEST: CPT

## 2022-09-23 PROCEDURE — 94799 UNLISTED PULMONARY SVC/PX: CPT

## 2022-09-23 PROCEDURE — 25010000002 MIDAZOLAM PER 1 MG: Performed by: NURSE ANESTHETIST, CERTIFIED REGISTERED

## 2022-09-23 DEVICE — HEMOST ABS SURGICEL PWDR 3GM: Type: IMPLANTABLE DEVICE | Site: UTERUS | Status: FUNCTIONAL

## 2022-09-23 RX ORDER — FAMOTIDINE 10 MG/ML
INJECTION, SOLUTION INTRAVENOUS AS NEEDED
Status: DISCONTINUED | OUTPATIENT
Start: 2022-09-23 | End: 2022-09-23 | Stop reason: SURG

## 2022-09-23 RX ORDER — ACETAMINOPHEN 325 MG/1
650 TABLET ORAL EVERY 6 HOURS
Status: DISCONTINUED | OUTPATIENT
Start: 2022-09-24 | End: 2022-09-25 | Stop reason: HOSPADM

## 2022-09-23 RX ORDER — IBUPROFEN 600 MG/1
600 TABLET ORAL EVERY 6 HOURS
Status: DISCONTINUED | OUTPATIENT
Start: 2022-09-25 | End: 2022-09-25 | Stop reason: HOSPADM

## 2022-09-23 RX ORDER — ACETAMINOPHEN 500 MG
1000 TABLET ORAL EVERY 6 HOURS
Status: COMPLETED | OUTPATIENT
Start: 2022-09-23 | End: 2022-09-24

## 2022-09-23 RX ORDER — DOCUSATE SODIUM 100 MG/1
100 CAPSULE, LIQUID FILLED ORAL 2 TIMES DAILY PRN
Status: DISCONTINUED | OUTPATIENT
Start: 2022-09-23 | End: 2022-09-25 | Stop reason: HOSPADM

## 2022-09-23 RX ORDER — BUPIVACAINE HYDROCHLORIDE 7.5 MG/ML
INJECTION, SOLUTION INTRASPINAL AS NEEDED
Status: DISCONTINUED | OUTPATIENT
Start: 2022-09-23 | End: 2022-09-23 | Stop reason: SURG

## 2022-09-23 RX ORDER — ONDANSETRON 2 MG/ML
4 INJECTION INTRAMUSCULAR; INTRAVENOUS ONCE AS NEEDED
Status: COMPLETED | OUTPATIENT
Start: 2022-09-23 | End: 2022-09-23

## 2022-09-23 RX ORDER — PRENATAL VIT/IRON FUM/FOLIC AC 27MG-0.8MG
1 TABLET ORAL DAILY
Status: DISCONTINUED | OUTPATIENT
Start: 2022-09-23 | End: 2022-09-25 | Stop reason: HOSPADM

## 2022-09-23 RX ORDER — ACETAMINOPHEN 500 MG
1000 TABLET ORAL ONCE
Status: COMPLETED | OUTPATIENT
Start: 2022-09-23 | End: 2022-09-23

## 2022-09-23 RX ORDER — NALBUPHINE HCL 10 MG/ML
3 AMPUL (ML) INJECTION ONCE AS NEEDED
Status: DISCONTINUED | OUTPATIENT
Start: 2022-09-23 | End: 2022-09-25 | Stop reason: HOSPADM

## 2022-09-23 RX ORDER — MORPHINE SULFATE 0.5 MG/ML
INJECTION, SOLUTION EPIDURAL; INTRATHECAL; INTRAVENOUS AS NEEDED
Status: DISCONTINUED | OUTPATIENT
Start: 2022-09-23 | End: 2022-09-23 | Stop reason: SURG

## 2022-09-23 RX ORDER — MIDAZOLAM HYDROCHLORIDE 1 MG/ML
INJECTION INTRAMUSCULAR; INTRAVENOUS AS NEEDED
Status: DISCONTINUED | OUTPATIENT
Start: 2022-09-23 | End: 2022-09-23 | Stop reason: SURG

## 2022-09-23 RX ORDER — PROMETHAZINE HYDROCHLORIDE 25 MG/1
25 TABLET ORAL EVERY 6 HOURS PRN
Status: DISCONTINUED | OUTPATIENT
Start: 2022-09-23 | End: 2022-09-25 | Stop reason: HOSPADM

## 2022-09-23 RX ORDER — SODIUM CHLORIDE, SODIUM LACTATE, POTASSIUM CHLORIDE, CALCIUM CHLORIDE 600; 310; 30; 20 MG/100ML; MG/100ML; MG/100ML; MG/100ML
125 INJECTION, SOLUTION INTRAVENOUS CONTINUOUS
Status: DISCONTINUED | OUTPATIENT
Start: 2022-09-23 | End: 2022-09-23

## 2022-09-23 RX ORDER — BUPIVACAINE HCL/0.9 % NACL/PF 0.125 %
PLASTIC BAG, INJECTION (ML) EPIDURAL AS NEEDED
Status: DISCONTINUED | OUTPATIENT
Start: 2022-09-23 | End: 2022-09-23 | Stop reason: SURG

## 2022-09-23 RX ORDER — OXYCODONE HYDROCHLORIDE 5 MG/1
10 TABLET ORAL EVERY 4 HOURS PRN
Status: DISCONTINUED | OUTPATIENT
Start: 2022-09-23 | End: 2022-09-25 | Stop reason: HOSPADM

## 2022-09-23 RX ORDER — FENTANYL CITRATE 50 UG/ML
50 INJECTION, SOLUTION INTRAMUSCULAR; INTRAVENOUS
Status: DISCONTINUED | OUTPATIENT
Start: 2022-09-23 | End: 2022-09-25 | Stop reason: HOSPADM

## 2022-09-23 RX ORDER — NALOXONE HCL 0.4 MG/ML
0.4 VIAL (ML) INJECTION
Status: ACTIVE | OUTPATIENT
Start: 2022-09-23 | End: 2022-09-24

## 2022-09-23 RX ORDER — SIMETHICONE 80 MG
80 TABLET,CHEWABLE ORAL 4 TIMES DAILY PRN
Status: DISCONTINUED | OUTPATIENT
Start: 2022-09-23 | End: 2022-09-25 | Stop reason: HOSPADM

## 2022-09-23 RX ORDER — CARBOPROST TROMETHAMINE 250 UG/ML
250 INJECTION, SOLUTION INTRAMUSCULAR AS NEEDED
Status: DISCONTINUED | OUTPATIENT
Start: 2022-09-23 | End: 2022-09-25 | Stop reason: HOSPADM

## 2022-09-23 RX ORDER — ALUMINA, MAGNESIA, AND SIMETHICONE 2400; 2400; 240 MG/30ML; MG/30ML; MG/30ML
15 SUSPENSION ORAL EVERY 4 HOURS PRN
Status: DISCONTINUED | OUTPATIENT
Start: 2022-09-23 | End: 2022-09-25 | Stop reason: HOSPADM

## 2022-09-23 RX ORDER — CALCIUM CARBONATE 200(500)MG
1 TABLET,CHEWABLE ORAL EVERY 4 HOURS PRN
Status: DISCONTINUED | OUTPATIENT
Start: 2022-09-23 | End: 2022-09-25 | Stop reason: HOSPADM

## 2022-09-23 RX ORDER — METHYLERGONOVINE MALEATE 0.2 MG/ML
200 INJECTION INTRAVENOUS AS NEEDED
Status: DISCONTINUED | OUTPATIENT
Start: 2022-09-23 | End: 2022-09-25 | Stop reason: HOSPADM

## 2022-09-23 RX ORDER — FENTANYL CITRATE 50 UG/ML
INJECTION, SOLUTION INTRAMUSCULAR; INTRAVENOUS AS NEEDED
Status: DISCONTINUED | OUTPATIENT
Start: 2022-09-23 | End: 2022-09-23 | Stop reason: SURG

## 2022-09-23 RX ORDER — OXYTOCIN/0.9 % SODIUM CHLORIDE 30/500 ML
PLASTIC BAG, INJECTION (ML) INTRAVENOUS AS NEEDED
Status: DISCONTINUED | OUTPATIENT
Start: 2022-09-23 | End: 2022-09-23 | Stop reason: SURG

## 2022-09-23 RX ORDER — ONDANSETRON 2 MG/ML
INJECTION INTRAMUSCULAR; INTRAVENOUS AS NEEDED
Status: DISCONTINUED | OUTPATIENT
Start: 2022-09-23 | End: 2022-09-23 | Stop reason: SURG

## 2022-09-23 RX ORDER — KETOROLAC TROMETHAMINE 30 MG/ML
30 INJECTION, SOLUTION INTRAMUSCULAR; INTRAVENOUS ONCE
Status: COMPLETED | OUTPATIENT
Start: 2022-09-23 | End: 2022-09-23

## 2022-09-23 RX ORDER — GLYCOPYRROLATE 0.2 MG/ML
INJECTION INTRAMUSCULAR; INTRAVENOUS AS NEEDED
Status: DISCONTINUED | OUTPATIENT
Start: 2022-09-23 | End: 2022-09-23 | Stop reason: SURG

## 2022-09-23 RX ORDER — TRISODIUM CITRATE DIHYDRATE AND CITRIC ACID MONOHYDRATE 500; 334 MG/5ML; MG/5ML
30 SOLUTION ORAL ONCE
Status: COMPLETED | OUTPATIENT
Start: 2022-09-23 | End: 2022-09-23

## 2022-09-23 RX ORDER — PROMETHAZINE HYDROCHLORIDE 12.5 MG/1
12.5 SUPPOSITORY RECTAL EVERY 6 HOURS PRN
Status: DISCONTINUED | OUTPATIENT
Start: 2022-09-23 | End: 2022-09-25 | Stop reason: HOSPADM

## 2022-09-23 RX ORDER — MISOPROSTOL 200 UG/1
600 TABLET ORAL AS NEEDED
Status: DISCONTINUED | OUTPATIENT
Start: 2022-09-23 | End: 2022-09-25 | Stop reason: HOSPADM

## 2022-09-23 RX ORDER — KETOROLAC TROMETHAMINE 15 MG/ML
15 INJECTION, SOLUTION INTRAMUSCULAR; INTRAVENOUS EVERY 6 HOURS
Status: COMPLETED | OUTPATIENT
Start: 2022-09-24 | End: 2022-09-24

## 2022-09-23 RX ORDER — CEFAZOLIN SODIUM 2 G/100ML
2 INJECTION, SOLUTION INTRAVENOUS ONCE
Status: COMPLETED | OUTPATIENT
Start: 2022-09-23 | End: 2022-09-23

## 2022-09-23 RX ORDER — OXYCODONE HYDROCHLORIDE 5 MG/1
5 TABLET ORAL EVERY 4 HOURS PRN
Status: DISCONTINUED | OUTPATIENT
Start: 2022-09-23 | End: 2022-09-25 | Stop reason: HOSPADM

## 2022-09-23 RX ORDER — HYDROCORTISONE 25 MG/G
1 CREAM TOPICAL AS NEEDED
Status: DISCONTINUED | OUTPATIENT
Start: 2022-09-23 | End: 2022-09-25 | Stop reason: HOSPADM

## 2022-09-23 RX ADMIN — MIDAZOLAM 1 MG: 1 INJECTION INTRAMUSCULAR; INTRAVENOUS at 14:46

## 2022-09-23 RX ADMIN — PRENATAL VITAMINS-IRON FUMARATE 27 MG IRON-FOLIC ACID 0.8 MG TABLET 1 TABLET: at 18:19

## 2022-09-23 RX ADMIN — FAMOTIDINE 10 MG: 10 INJECTION, SOLUTION INTRAVENOUS at 14:47

## 2022-09-23 RX ADMIN — DOCUSATE SODIUM 100 MG: 100 CAPSULE, LIQUID FILLED ORAL at 20:00

## 2022-09-23 RX ADMIN — SODIUM CHLORIDE, POTASSIUM CHLORIDE, SODIUM LACTATE AND CALCIUM CHLORIDE: 600; 310; 30; 20 INJECTION, SOLUTION INTRAVENOUS at 15:07

## 2022-09-23 RX ADMIN — SIMETHICONE 80 MG: 80 TABLET, CHEWABLE ORAL at 20:00

## 2022-09-23 RX ADMIN — KETOROLAC TROMETHAMINE 30 MG: 30 INJECTION, SOLUTION INTRAMUSCULAR; INTRAVENOUS at 16:29

## 2022-09-23 RX ADMIN — Medication 500 ML: at 15:11

## 2022-09-23 RX ADMIN — FENTANYL CITRATE 20 MCG: 50 INJECTION, SOLUTION INTRAMUSCULAR; INTRAVENOUS at 14:54

## 2022-09-23 RX ADMIN — SODIUM CITRATE AND CITRIC ACID MONOHYDRATE 30 ML: 500; 334 SOLUTION ORAL at 14:39

## 2022-09-23 RX ADMIN — SODIUM CHLORIDE, POTASSIUM CHLORIDE, SODIUM LACTATE AND CALCIUM CHLORIDE 4000 ML/HR: 600; 310; 30; 20 INJECTION, SOLUTION INTRAVENOUS at 14:17

## 2022-09-23 RX ADMIN — CEFAZOLIN SODIUM 2 G: 2 INJECTION, SOLUTION INTRAVENOUS at 14:37

## 2022-09-23 RX ADMIN — BUPIVACAINE HYDROCHLORIDE IN DEXTROSE 1.4 ML: 7.5 INJECTION, SOLUTION SUBARACHNOID at 14:54

## 2022-09-23 RX ADMIN — Medication 100 MCG: at 15:03

## 2022-09-23 RX ADMIN — KETOROLAC TROMETHAMINE 15 MG: 15 INJECTION, SOLUTION INTRAMUSCULAR; INTRAVENOUS at 23:26

## 2022-09-23 RX ADMIN — SODIUM CHLORIDE, POTASSIUM CHLORIDE, SODIUM LACTATE AND CALCIUM CHLORIDE 125 ML/HR: 600; 310; 30; 20 INJECTION, SOLUTION INTRAVENOUS at 14:22

## 2022-09-23 RX ADMIN — ACETAMINOPHEN 1000 MG: 500 TABLET, FILM COATED ORAL at 14:14

## 2022-09-23 RX ADMIN — MORPHINE SULFATE 0.2 MG: 0.5 INJECTION, SOLUTION EPIDURAL; INTRATHECAL; INTRAVENOUS at 14:54

## 2022-09-23 RX ADMIN — ACETAMINOPHEN 1000 MG: 500 TABLET, FILM COATED ORAL at 20:00

## 2022-09-23 RX ADMIN — ONDANSETRON 4 MG: 2 INJECTION INTRAMUSCULAR; INTRAVENOUS at 17:14

## 2022-09-23 RX ADMIN — ONDANSETRON 4 MG: 2 INJECTION INTRAMUSCULAR; INTRAVENOUS at 14:47

## 2022-09-23 RX ADMIN — GLYCOPYRROLATE 0.2 MG: 0.2 INJECTION INTRAMUSCULAR; INTRAVENOUS at 14:47

## 2022-09-23 NOTE — ANESTHESIA PREPROCEDURE EVALUATION
Anesthesia Evaluation     NPO Solid Status: > 8 hours  NPO Liquid Status: > 8 hours           Airway   Dental      Pulmonary    Cardiovascular         Neuro/Psych  (+) headaches (Migraines), numbness (Peripheral neuropathy R/T past chemotherapy), psychiatric history Anxiety and Depression,    GI/Hepatic/Renal/Endo    (+)  GERD,      Musculoskeletal     Abdominal    Substance History   (+) drug use (remote history)     OB/GYN    (+) Pregnant,         Other      history of cancer (Breast CA right breast-s/p mastectomy with sentinel node biopsy) remission                    Anesthesia Plan    ASA 3     spinal and ITN       Anesthetic plan, risks, benefits, and alternatives have been provided, discussed and informed consent has been obtained with: patient.        CODE STATUS:

## 2022-09-23 NOTE — ANESTHESIA POSTPROCEDURE EVALUATION
Patient: Angie Suthreland    Procedure Summary     Date: 22 Room / Location: [facility] LABOR DELIVERY   [facility]    Anesthesia Start: 1444 Anesthesia Stop: 153    Procedure:  SECTION REPEAT (N/A Abdomen) Diagnosis:     Surgeons: Carolina Amos MD Provider: Bandar Vaughn DO    Anesthesia Type: spinal, ITN ASA Status: 3          Anesthesia Type: spinal, ITN    Vitals  Vitals Value Taken Time   BP 93/57 22 1536   Temp 97.5 F    Pulse 85 22 1540   Resp 16    SpO2 99 % 22 1541   Vitals shown include unvalidated device data.        Post Anesthesia Care and Evaluation    Patient location during evaluation: bedside  Patient participation: complete - patient participated  Level of consciousness: awake  Pain score: 0  Pain management: satisfactory to patient    Airway patency: patent  Anesthetic complications: No anesthetic complications  PONV Status: none  Cardiovascular status: acceptable and hemodynamically stable  Respiratory status: acceptable  Hydration status: acceptable

## 2022-09-24 LAB
BASOPHILS # BLD AUTO: 0.02 10*3/MM3 (ref 0–0.2)
BASOPHILS NFR BLD AUTO: 0.2 % (ref 0–1.5)
DEPRECATED RDW RBC AUTO: 44.6 FL (ref 37–54)
EOSINOPHIL # BLD AUTO: 0.04 10*3/MM3 (ref 0–0.4)
EOSINOPHIL NFR BLD AUTO: 0.5 % (ref 0.3–6.2)
ERYTHROCYTE [DISTWIDTH] IN BLOOD BY AUTOMATED COUNT: 13.4 % (ref 12.3–15.4)
HCT VFR BLD AUTO: 36.8 % (ref 34–46.6)
HGB BLD-MCNC: 12.1 G/DL (ref 12–15.9)
IMM GRANULOCYTES # BLD AUTO: 0.05 10*3/MM3 (ref 0–0.05)
IMM GRANULOCYTES NFR BLD AUTO: 0.6 % (ref 0–0.5)
LYMPHOCYTES # BLD AUTO: 1.1 10*3/MM3 (ref 0.7–3.1)
LYMPHOCYTES NFR BLD AUTO: 13.5 % (ref 19.6–45.3)
MCH RBC QN AUTO: 30.3 PG (ref 26.6–33)
MCHC RBC AUTO-ENTMCNC: 32.9 G/DL (ref 31.5–35.7)
MCV RBC AUTO: 92 FL (ref 79–97)
MONOCYTES # BLD AUTO: 0.62 10*3/MM3 (ref 0.1–0.9)
MONOCYTES NFR BLD AUTO: 7.6 % (ref 5–12)
NEUTROPHILS NFR BLD AUTO: 6.29 10*3/MM3 (ref 1.7–7)
NEUTROPHILS NFR BLD AUTO: 77.6 % (ref 42.7–76)
NRBC BLD AUTO-RTO: 0 /100 WBC (ref 0–0.2)
PLATELET # BLD AUTO: 175 10*3/MM3 (ref 140–450)
PMV BLD AUTO: 10.2 FL (ref 6–12)
RBC # BLD AUTO: 4 10*6/MM3 (ref 3.77–5.28)
WBC NRBC COR # BLD: 8.12 10*3/MM3 (ref 3.4–10.8)

## 2022-09-24 PROCEDURE — 0503F POSTPARTUM CARE VISIT: CPT | Performed by: NURSE PRACTITIONER

## 2022-09-24 PROCEDURE — 25010000002 KETOROLAC TROMETHAMINE PER 15 MG: Performed by: OBSTETRICS & GYNECOLOGY

## 2022-09-24 PROCEDURE — 85025 COMPLETE CBC W/AUTO DIFF WBC: CPT | Performed by: OBSTETRICS & GYNECOLOGY

## 2022-09-24 RX ADMIN — OXYCODONE 5 MG: 5 TABLET ORAL at 23:10

## 2022-09-24 RX ADMIN — IBUPROFEN 600 MG: 600 TABLET, FILM COATED ORAL at 23:10

## 2022-09-24 RX ADMIN — ACETAMINOPHEN 650 MG: 325 TABLET, FILM COATED ORAL at 20:56

## 2022-09-24 RX ADMIN — KETOROLAC TROMETHAMINE 15 MG: 15 INJECTION, SOLUTION INTRAMUSCULAR; INTRAVENOUS at 11:49

## 2022-09-24 RX ADMIN — ACETAMINOPHEN 1000 MG: 500 TABLET, FILM COATED ORAL at 08:05

## 2022-09-24 RX ADMIN — PRENATAL VITAMINS-IRON FUMARATE 27 MG IRON-FOLIC ACID 0.8 MG TABLET 1 TABLET: at 08:05

## 2022-09-24 RX ADMIN — KETOROLAC TROMETHAMINE 15 MG: 15 INJECTION, SOLUTION INTRAMUSCULAR; INTRAVENOUS at 17:13

## 2022-09-24 RX ADMIN — SIMETHICONE 80 MG: 80 TABLET, CHEWABLE ORAL at 08:05

## 2022-09-24 RX ADMIN — SIMETHICONE 80 MG: 80 TABLET, CHEWABLE ORAL at 23:09

## 2022-09-24 RX ADMIN — ACETAMINOPHEN 1000 MG: 500 TABLET, FILM COATED ORAL at 14:22

## 2022-09-24 RX ADMIN — KETOROLAC TROMETHAMINE 15 MG: 15 INJECTION, SOLUTION INTRAMUSCULAR; INTRAVENOUS at 06:11

## 2022-09-24 RX ADMIN — DOCUSATE SODIUM 100 MG: 100 CAPSULE, LIQUID FILLED ORAL at 08:05

## 2022-09-24 RX ADMIN — ACETAMINOPHEN 1000 MG: 500 TABLET, FILM COATED ORAL at 02:58

## 2022-09-25 VITALS
RESPIRATION RATE: 16 BRPM | HEART RATE: 72 BPM | DIASTOLIC BLOOD PRESSURE: 58 MMHG | TEMPERATURE: 98 F | OXYGEN SATURATION: 100 % | SYSTOLIC BLOOD PRESSURE: 108 MMHG

## 2022-09-25 PROBLEM — Z34.90 PREGNANCY: Status: RESOLVED | Noted: 2022-04-08 | Resolved: 2022-09-25

## 2022-09-25 PROCEDURE — 0503F POSTPARTUM CARE VISIT: CPT | Performed by: NURSE PRACTITIONER

## 2022-09-25 RX ORDER — IBUPROFEN 600 MG/1
600 TABLET ORAL EVERY 6 HOURS
Qty: 30 TABLET | Refills: 0 | Status: SHIPPED | OUTPATIENT
Start: 2022-09-25

## 2022-09-25 RX ORDER — OXYCODONE HYDROCHLORIDE 5 MG/1
5 TABLET ORAL EVERY 4 HOURS PRN
Qty: 18 TABLET | Refills: 0 | Status: SHIPPED | OUTPATIENT
Start: 2022-09-25 | End: 2022-09-29

## 2022-09-25 RX ADMIN — ACETAMINOPHEN 650 MG: 325 TABLET, FILM COATED ORAL at 08:37

## 2022-09-25 RX ADMIN — IBUPROFEN 600 MG: 600 TABLET, FILM COATED ORAL at 12:08

## 2022-09-25 RX ADMIN — OXYCODONE 10 MG: 5 TABLET ORAL at 12:11

## 2022-09-25 RX ADMIN — PRENATAL VITAMINS-IRON FUMARATE 27 MG IRON-FOLIC ACID 0.8 MG TABLET 1 TABLET: at 08:38

## 2022-09-25 RX ADMIN — IBUPROFEN 600 MG: 600 TABLET, FILM COATED ORAL at 06:37

## 2022-09-25 RX ADMIN — DOCUSATE SODIUM 100 MG: 100 CAPSULE, LIQUID FILLED ORAL at 08:38

## 2022-09-25 RX ADMIN — OXYCODONE 10 MG: 5 TABLET ORAL at 03:04

## 2022-09-25 RX ADMIN — ACETAMINOPHEN 650 MG: 325 TABLET, FILM COATED ORAL at 03:04

## 2022-09-26 ENCOUNTER — TELEPHONE (OUTPATIENT)
Dept: OBSTETRICS AND GYNECOLOGY | Facility: CLINIC | Age: 27
End: 2022-09-26

## 2022-09-26 NOTE — TELEPHONE ENCOUNTER
Dr Amos pt  Delivered 22        S/w pt and she is taking the Ibuprofen every 6 hrs and oxycodone every 4 hrs and is still in a lot of pain. She is wanting to know if there is something else she can take to help with the pain. I explained that Dr Amos is out of office today but that I would speak with one of the other providers and call back with a plan of care. Pt v/u

## 2022-09-26 NOTE — TELEPHONE ENCOUNTER
S/w pt and per B. Dyson pt can increase her Tylenol to 1000 mg every 6 hours and continue to take Ibuprofen and oxycodone and see if that helps. Also recommended rest, increase hydration and to apply ice pack to abdomen.  Explained to pt that the 3rd day is usually the worst day and her pain should start to improve over the next few days. Pt v/u

## 2022-09-26 NOTE — TELEPHONE ENCOUNTER
Pt has a question about medications that she was sent home with. She states that even with taking the medication as directed she is in a lot of pain and would like to know if there is something different that she can try.

## 2022-09-27 ENCOUNTER — TELEPHONE (OUTPATIENT)
Dept: OBSTETRICS AND GYNECOLOGY | Facility: CLINIC | Age: 27
End: 2022-09-27

## 2022-09-27 NOTE — TELEPHONE ENCOUNTER
S/w pt. She states that last night she noticed that she has fluid around her breast implant on her left breast. She states that it a little painful. Denies redness, warm to touch or fever. Pt is concerned because she cannot express milk due to not having nipples. Would like to know what to do. I let pt know that I would speak to Dr Amos and call back with plan of care.     Hx of double mastectomy for breast cancer in Right breast.

## 2022-09-27 NOTE — TELEPHONE ENCOUNTER
Per Dr Amos pt should contact her breast surgeon regarding the fluid build up because she should not have any breast tissue after having a double mastectomy and this could be an issue with her implant.  Explained this to the patient and she will contact her surgeon for an appt.

## 2022-09-27 NOTE — TELEPHONE ENCOUNTER
Pt stated delivered 9-23 and has had a previous double mastectomy stated no nipples on either pt stated the left side that did not previously have cancer is now swollen and engorged with milk. Pt stated she is very concerned because she cannot express the milk due to not having a nipple and pt does not know what do pt stated her breast filled with the milk last night and has not had a fever yet

## 2022-09-28 ENCOUNTER — TELEPHONE (OUTPATIENT)
Dept: OBSTETRICS AND GYNECOLOGY | Facility: CLINIC | Age: 27
End: 2022-09-28

## 2022-09-28 ENCOUNTER — POSTPARTUM VISIT (OUTPATIENT)
Dept: OBSTETRICS AND GYNECOLOGY | Facility: CLINIC | Age: 27
End: 2022-09-28

## 2022-09-28 ENCOUNTER — PREP FOR SURGERY (OUTPATIENT)
Dept: OTHER | Facility: HOSPITAL | Age: 27
End: 2022-09-28

## 2022-09-28 ENCOUNTER — HOSPITAL ENCOUNTER (OUTPATIENT)
Facility: HOSPITAL | Age: 27
Setting detail: OBSERVATION
Discharge: HOME | End: 2022-09-29
Attending: OBSTETRICS & GYNECOLOGY | Admitting: OBSTETRICS & GYNECOLOGY

## 2022-09-28 VITALS
SYSTOLIC BLOOD PRESSURE: 102 MMHG | DIASTOLIC BLOOD PRESSURE: 62 MMHG | WEIGHT: 136 LBS | BODY MASS INDEX: 25.68 KG/M2 | HEIGHT: 61 IN

## 2022-09-28 DIAGNOSIS — N71.9 ENDOMETRITIS: ICD-10-CM

## 2022-09-28 DIAGNOSIS — R30.9 PAINFUL URINATION: Primary | ICD-10-CM

## 2022-09-28 DIAGNOSIS — N71.9 ENDOMETRITIS: Primary | ICD-10-CM

## 2022-09-28 LAB
BILIRUB BLD-MCNC: NEGATIVE MG/DL
CLARITY, POC: CLEAR
COLOR UR: YELLOW
GLUCOSE UR STRIP-MCNC: NEGATIVE MG/DL
KETONES UR QL: NEGATIVE
LEUKOCYTE EST, POC: NEGATIVE
NITRITE UR-MCNC: NEGATIVE MG/ML
PH UR: 7 [PH] (ref 5–8)
PROT UR STRIP-MCNC: NEGATIVE MG/DL
RBC # UR STRIP: ABNORMAL /UL
SP GR UR: 1.02 (ref 1–1.03)
UROBILINOGEN UR QL: ABNORMAL

## 2022-09-28 PROCEDURE — G0378 HOSPITAL OBSERVATION PER HR: HCPCS

## 2022-09-28 PROCEDURE — G0379 DIRECT REFER HOSPITAL OBSERV: HCPCS

## 2022-09-28 PROCEDURE — 99214 OFFICE O/P EST MOD 30 MIN: CPT | Performed by: NURSE PRACTITIONER

## 2022-09-28 RX ORDER — ACETAMINOPHEN 325 MG/1
650 TABLET ORAL EVERY 4 HOURS PRN
Status: DISCONTINUED | OUTPATIENT
Start: 2022-09-28 | End: 2022-09-29 | Stop reason: HOSPADM

## 2022-09-28 RX ORDER — AMOXICILLIN AND CLAVULANATE POTASSIUM 875; 125 MG/1; MG/1
1 TABLET, FILM COATED ORAL EVERY 12 HOURS
Qty: 20 TABLET | Refills: 0 | Status: SHIPPED | OUTPATIENT
Start: 2022-09-28 | End: 2022-09-28

## 2022-09-28 RX ORDER — CLINDAMYCIN PHOSPHATE 900 MG/50ML
900 INJECTION INTRAVENOUS EVERY 8 HOURS
Status: CANCELLED | OUTPATIENT
Start: 2022-09-28 | End: 2022-09-30

## 2022-09-28 RX ORDER — SODIUM CHLORIDE 0.9 % (FLUSH) 0.9 %
10 SYRINGE (ML) INJECTION AS NEEDED
Status: DISCONTINUED | OUTPATIENT
Start: 2022-09-28 | End: 2022-09-29 | Stop reason: HOSPADM

## 2022-09-28 RX ORDER — CLINDAMYCIN HYDROCHLORIDE 300 MG/1
300 CAPSULE ORAL 3 TIMES DAILY
Qty: 30 CAPSULE | Refills: 0 | Status: SHIPPED | OUTPATIENT
Start: 2022-09-28 | End: 2022-09-28

## 2022-09-28 RX ORDER — SODIUM CHLORIDE 0.9 % (FLUSH) 0.9 %
10 SYRINGE (ML) INJECTION EVERY 12 HOURS SCHEDULED
Status: DISCONTINUED | OUTPATIENT
Start: 2022-09-28 | End: 2022-09-29 | Stop reason: HOSPADM

## 2022-09-28 RX ORDER — OXYCODONE HYDROCHLORIDE AND ACETAMINOPHEN 5; 325 MG/1; MG/1
1 TABLET ORAL EVERY 4 HOURS PRN
Status: CANCELLED | OUTPATIENT
Start: 2022-09-28 | End: 2022-10-05

## 2022-09-28 RX ORDER — OXYCODONE HYDROCHLORIDE AND ACETAMINOPHEN 5; 325 MG/1; MG/1
1 TABLET ORAL EVERY 4 HOURS PRN
Status: DISCONTINUED | OUTPATIENT
Start: 2022-09-28 | End: 2022-09-29 | Stop reason: HOSPADM

## 2022-09-28 RX ORDER — CLINDAMYCIN PHOSPHATE 900 MG/50ML
900 INJECTION INTRAVENOUS EVERY 8 HOURS
Status: DISCONTINUED | OUTPATIENT
Start: 2022-09-28 | End: 2022-09-29 | Stop reason: HOSPADM

## 2022-09-28 RX ORDER — ACETAMINOPHEN 325 MG/1
650 TABLET ORAL EVERY 4 HOURS PRN
Status: CANCELLED | OUTPATIENT
Start: 2022-09-28

## 2022-09-28 RX ORDER — KETOROLAC TROMETHAMINE 30 MG/ML
30 INJECTION, SOLUTION INTRAMUSCULAR; INTRAVENOUS EVERY 6 HOURS PRN
Status: DISCONTINUED | OUTPATIENT
Start: 2022-09-28 | End: 2022-09-29 | Stop reason: HOSPADM

## 2022-09-28 RX ORDER — SODIUM CHLORIDE 0.9 % (FLUSH) 0.9 %
10 SYRINGE (ML) INJECTION AS NEEDED
Status: CANCELLED | OUTPATIENT
Start: 2022-09-28

## 2022-09-28 RX ORDER — KETOROLAC TROMETHAMINE 30 MG/ML
30 INJECTION, SOLUTION INTRAMUSCULAR; INTRAVENOUS EVERY 6 HOURS PRN
Status: CANCELLED | OUTPATIENT
Start: 2022-09-28 | End: 2022-09-30

## 2022-09-28 RX ORDER — SODIUM CHLORIDE 0.9 % (FLUSH) 0.9 %
10 SYRINGE (ML) INJECTION EVERY 12 HOURS SCHEDULED
Status: CANCELLED | OUTPATIENT
Start: 2022-09-28

## 2022-09-28 RX ORDER — ACETAMINOPHEN 325 MG/1
650 TABLET ORAL EVERY 6 HOURS PRN
COMMUNITY

## 2022-09-28 RX ADMIN — CLINDAMYCIN PHOSPHATE 900 MG: 900 INJECTION, SOLUTION INTRAVENOUS at 23:21

## 2022-09-28 RX ADMIN — Medication 10 ML: at 23:28

## 2022-09-28 NOTE — TELEPHONE ENCOUNTER
C/S on 09/23/2022 and D/C 09/25/2022    S/w patient- patient reports urinary urgency, urinary frequency, intense pelvic pressure, R low back pain, and chills since discharge. Patient states that her temperature is 99.0 and has not been higher. Patient also states that she has intense pain around her incision site. Patient states that her pain in unbearable. Patient states that she has been taking Tylenol 1000mg, Ibuprofen 600mg, and oxycodone as needed for break through pain. Patient states that she is out of her oxycodone and is requesting a refill.     Per [facility]- patient needs to be seen in OV today with [facility]. Informed patient of this and she v/u.

## 2022-09-28 NOTE — PROGRESS NOTES
Chief Complaint   Patient presents with   • Postpartum Care     5 day visit       5 Days Postpartum Visit         Angie Sutherland is a 27 y.o.  who presents today for a 5 days postpartum check.        , Low Transverse    Information for the patient's :  Onofre Sutherland [[id number]]   2022   female   Onofre Sutherland   3721 g (8 lb 3.3 oz)   Gestational Age: 39w1d      Baby Discharged with Mom  Delivering MD: Carolina Amos MD.    Pregnancy complications: no known issues    Patient reports her incision is clean, dry, intact. Patient denies any redness or drainage. Patient stated that she has pain around her incision. Patient describes vaginal bleeding as light.  She is bottle feeding. Patient denies bowel or bladder issues.     Patient stated that its painful when her bladder is full. Patient stated that when she is done urinating she has severe sharp pain. Patient stated that the pain is internal and not in her vagina area. Patient rates her pain a 7-8/10. Patient stated that she is always in pain. Patient stated that she has some relief but she is a mother of 2 other children and thinks she may be doing to much. She is taking ibuprofen/tylenol scheduled and was taking garrick without relief until she ran out.     She desires contraceptive methods: None for contraception.    Patient denies postpartum depression. Postpartum Depression Screening Questionnaire: 2022, no treatment indicated.      The additional following portions of the patient's history were reviewed and updated as appropriate: allergies and current medications.      Review of Systems   Constitutional: Negative.  Negative for fever.   Respiratory: Negative.    Cardiovascular: Negative.    Gastrointestinal: Negative.    Genitourinary: Positive for pelvic pain.       I have reviewed and agree with the HPI, ROS, and historical information as entered above. Melissa Begum, APRN    Objective   BP  "102/62   Ht 154.9 cm (61\")   Wt 61.7 kg (136 lb)   LMP 2022   Breastfeeding No   BMI 25.70 kg/m²  Temp 99.4    Physical Exam  Vitals and nursing note reviewed. Exam conducted with a chaperone present.   Constitutional:       Appearance: Normal appearance.   Pulmonary:      Effort: Pulmonary effort is normal.   Abdominal:      Palpations: Abdomen is soft.      Tenderness: There is abdominal tenderness.      Comments: Incision healing well. Incision very sore at the right edge and firm/tender just above the incision extending 2 x 4 cm.   Genitourinary:     Labia:         Right: No rash, tenderness or lesion.         Left: No rash, tenderness or lesion.       Vagina: Bleeding present. No lesions.      Cervix: Cervical motion tenderness present. No discharge, lesion or cervical bleeding.      Uterus: Tender. Not enlarged and not fixed.       Adnexa:         Right: No mass or tenderness.          Left: No mass or tenderness.        Comments: Chaperone Present  Neurological:      Mental Status: She is alert.              Assessment and Plan    Problem List Items Addressed This Visit    None     Visit Diagnoses     Painful urination    -  Primary    Relevant Orders    POC Urinalysis Dipstick (Completed)    Urine Culture - Urine, Urine, Clean Catch    Endometritis            [facility] with blood only. Urine sent for culture.     1. S/p , 5 days postpartum.    2. Low grade temp despite scheduled motrin and tylenol, pain out of proportion for 5 days post op. + uterine and CMT on exam. Fundal tenderness as well. D/W [facility] who also spoke with the patient. Suspected endometritis and will be admit to [facility] for IV antibiotics.     Melissa Begum, APRN  2022  "

## 2022-09-29 ENCOUNTER — APPOINTMENT (OUTPATIENT)
Dept: CT IMAGING | Facility: HOSPITAL | Age: 27
End: 2022-09-29

## 2022-09-29 VITALS
TEMPERATURE: 98.7 F | DIASTOLIC BLOOD PRESSURE: 78 MMHG | OXYGEN SATURATION: 98 % | SYSTOLIC BLOOD PRESSURE: 121 MMHG | RESPIRATION RATE: 18 BRPM | HEART RATE: 74 BPM

## 2022-09-29 LAB
ALBUMIN SERPL-MCNC: 2.7 G/DL (ref 3.5–5.2)
ALBUMIN/GLOB SERPL: 0.8 G/DL
ALP SERPL-CCNC: 110 U/L (ref 39–117)
ALT SERPL W P-5'-P-CCNC: 29 U/L (ref 1–33)
ANION GAP SERPL CALCULATED.3IONS-SCNC: 10 MMOL/L (ref 5–15)
AST SERPL-CCNC: 17 U/L (ref 1–32)
BACTERIA UR QL AUTO: ABNORMAL /HPF
BASOPHILS # BLD AUTO: 0.01 10*3/MM3 (ref 0–0.2)
BASOPHILS NFR BLD AUTO: 0.1 % (ref 0–1.5)
BILIRUB SERPL-MCNC: 0.3 MG/DL (ref 0–1.2)
BILIRUB UR QL STRIP: NEGATIVE
BUN SERPL-MCNC: 10 MG/DL (ref 6–20)
BUN/CREAT SERPL: 21.3 (ref 7–25)
CALCIUM SPEC-SCNC: 7.9 MG/DL (ref 8.6–10.5)
CHLORIDE SERPL-SCNC: 103 MMOL/L (ref 98–107)
CLARITY UR: CLEAR
CO2 SERPL-SCNC: 24 MMOL/L (ref 22–29)
COLOR UR: YELLOW
CREAT SERPL-MCNC: 0.47 MG/DL (ref 0.57–1)
DEPRECATED RDW RBC AUTO: 45.6 FL (ref 37–54)
EGFRCR SERPLBLD CKD-EPI 2021: 133.2 ML/MIN/1.73
EOSINOPHIL # BLD AUTO: 0.3 10*3/MM3 (ref 0–0.4)
EOSINOPHIL NFR BLD AUTO: 4.2 % (ref 0.3–6.2)
ERYTHROCYTE [DISTWIDTH] IN BLOOD BY AUTOMATED COUNT: 13.6 % (ref 12.3–15.4)
GLOBULIN UR ELPH-MCNC: 3.4 GM/DL
GLUCOSE SERPL-MCNC: 144 MG/DL (ref 65–99)
GLUCOSE UR STRIP-MCNC: NEGATIVE MG/DL
HCT VFR BLD AUTO: 35.9 % (ref 34–46.6)
HGB BLD-MCNC: 11.7 G/DL (ref 12–15.9)
HGB UR QL STRIP.AUTO: ABNORMAL
HYALINE CASTS UR QL AUTO: ABNORMAL /LPF
IMM GRANULOCYTES # BLD AUTO: 0.04 10*3/MM3 (ref 0–0.05)
IMM GRANULOCYTES NFR BLD AUTO: 0.6 % (ref 0–0.5)
KETONES UR QL STRIP: NEGATIVE
LEUKOCYTE ESTERASE UR QL STRIP.AUTO: ABNORMAL
LYMPHOCYTES # BLD AUTO: 1.09 10*3/MM3 (ref 0.7–3.1)
LYMPHOCYTES NFR BLD AUTO: 15.2 % (ref 19.6–45.3)
MCH RBC QN AUTO: 29.8 PG (ref 26.6–33)
MCHC RBC AUTO-ENTMCNC: 32.6 G/DL (ref 31.5–35.7)
MCV RBC AUTO: 91.3 FL (ref 79–97)
MONOCYTES # BLD AUTO: 0.67 10*3/MM3 (ref 0.1–0.9)
MONOCYTES NFR BLD AUTO: 9.3 % (ref 5–12)
NEUTROPHILS NFR BLD AUTO: 5.07 10*3/MM3 (ref 1.7–7)
NEUTROPHILS NFR BLD AUTO: 70.6 % (ref 42.7–76)
NITRITE UR QL STRIP: NEGATIVE
NRBC BLD AUTO-RTO: 0 /100 WBC (ref 0–0.2)
PH UR STRIP.AUTO: 6.5 [PH] (ref 5–8)
PLATELET # BLD AUTO: 268 10*3/MM3 (ref 140–450)
PMV BLD AUTO: 9.1 FL (ref 6–12)
POTASSIUM SERPL-SCNC: 3.4 MMOL/L (ref 3.5–5.2)
PROT SERPL-MCNC: 6.1 G/DL (ref 6–8.5)
PROT UR QL STRIP: ABNORMAL
RBC # BLD AUTO: 3.93 10*6/MM3 (ref 3.77–5.28)
RBC # UR STRIP: ABNORMAL /HPF
REF LAB TEST METHOD: ABNORMAL
SODIUM SERPL-SCNC: 137 MMOL/L (ref 136–145)
SP GR UR STRIP: 1.02 (ref 1–1.03)
SQUAMOUS #/AREA URNS HPF: ABNORMAL /HPF
UROBILINOGEN UR QL STRIP: ABNORMAL
WBC # UR STRIP: ABNORMAL /HPF
WBC NRBC COR # BLD: 7.18 10*3/MM3 (ref 3.4–10.8)

## 2022-09-29 PROCEDURE — 99217 PR OBSERVATION CARE DISCHARGE MANAGEMENT: CPT | Performed by: OBSTETRICS & GYNECOLOGY

## 2022-09-29 PROCEDURE — 25010000002 AMPICILLIN PER 500 MG: Performed by: OBSTETRICS & GYNECOLOGY

## 2022-09-29 PROCEDURE — 25010000002 GENTAMICIN PER 80 MG: Performed by: OBSTETRICS & GYNECOLOGY

## 2022-09-29 PROCEDURE — 81001 URINALYSIS AUTO W/SCOPE: CPT | Performed by: OBSTETRICS & GYNECOLOGY

## 2022-09-29 PROCEDURE — 96375 TX/PRO/DX INJ NEW DRUG ADDON: CPT

## 2022-09-29 PROCEDURE — G0378 HOSPITAL OBSERVATION PER HR: HCPCS

## 2022-09-29 PROCEDURE — 0 DIATRIZOATE MEGLUMINE & SODIUM PER 1 ML

## 2022-09-29 PROCEDURE — 87086 URINE CULTURE/COLONY COUNT: CPT | Performed by: OBSTETRICS & GYNECOLOGY

## 2022-09-29 PROCEDURE — 96365 THER/PROPH/DIAG IV INF INIT: CPT

## 2022-09-29 PROCEDURE — 80053 COMPREHEN METABOLIC PANEL: CPT | Performed by: OBSTETRICS & GYNECOLOGY

## 2022-09-29 PROCEDURE — 96367 TX/PROPH/DG ADDL SEQ IV INF: CPT

## 2022-09-29 PROCEDURE — 25010000002 IOPAMIDOL 61 % SOLUTION: Performed by: OBSTETRICS & GYNECOLOGY

## 2022-09-29 PROCEDURE — 85025 COMPLETE CBC W/AUTO DIFF WBC: CPT | Performed by: OBSTETRICS & GYNECOLOGY

## 2022-09-29 PROCEDURE — 96366 THER/PROPH/DIAG IV INF ADDON: CPT

## 2022-09-29 PROCEDURE — 74177 CT ABD & PELVIS W/CONTRAST: CPT

## 2022-09-29 PROCEDURE — 25010000002 KETOROLAC TROMETHAMINE PER 15 MG: Performed by: OBSTETRICS & GYNECOLOGY

## 2022-09-29 RX ORDER — AMOXICILLIN AND CLAVULANATE POTASSIUM 875; 125 MG/1; MG/1
1 TABLET, FILM COATED ORAL EVERY 12 HOURS
Qty: 14 TABLET | Refills: 0 | Status: SHIPPED | OUTPATIENT
Start: 2022-09-29 | End: 2022-10-06

## 2022-09-29 RX ORDER — METRONIDAZOLE 500 MG/1
500 TABLET ORAL 2 TIMES DAILY
Qty: 14 TABLET | Refills: 0 | Status: SHIPPED | OUTPATIENT
Start: 2022-09-29 | End: 2022-10-06

## 2022-09-29 RX ADMIN — AMPICILLIN SODIUM 2 G: 2 INJECTION, POWDER, FOR SOLUTION INTRAMUSCULAR; INTRAVENOUS at 04:31

## 2022-09-29 RX ADMIN — Medication 10 ML: at 08:55

## 2022-09-29 RX ADMIN — CLINDAMYCIN PHOSPHATE 900 MG: 900 INJECTION, SOLUTION INTRAVENOUS at 05:51

## 2022-09-29 RX ADMIN — CLINDAMYCIN PHOSPHATE 900 MG: 900 INJECTION, SOLUTION INTRAVENOUS at 14:03

## 2022-09-29 RX ADMIN — AMPICILLIN SODIUM 2 G: 2 INJECTION, POWDER, FOR SOLUTION INTRAMUSCULAR; INTRAVENOUS at 11:54

## 2022-09-29 RX ADMIN — GENTAMICIN SULFATE 270 MG: 40 INJECTION, SOLUTION INTRAMUSCULAR; INTRAVENOUS at 01:06

## 2022-09-29 RX ADMIN — AMPICILLIN SODIUM 2 G: 2 INJECTION, POWDER, FOR SOLUTION INTRAMUSCULAR; INTRAVENOUS at 00:31

## 2022-09-29 RX ADMIN — OXYCODONE HYDROCHLORIDE AND ACETAMINOPHEN 1 TABLET: 5; 325 TABLET ORAL at 09:00

## 2022-09-29 RX ADMIN — DIATRIZOATE MEGLUMINE AND DIATRIZOATE SODIUM 15 ML: 660; 100 LIQUID ORAL; RECTAL at 04:06

## 2022-09-29 RX ADMIN — KETOROLAC TROMETHAMINE 30 MG: 30 INJECTION, SOLUTION INTRAMUSCULAR; INTRAVENOUS at 04:57

## 2022-09-29 RX ADMIN — AMPICILLIN SODIUM 2 G: 2 INJECTION, POWDER, FOR SOLUTION INTRAMUSCULAR; INTRAVENOUS at 17:34

## 2022-09-29 RX ADMIN — IOPAMIDOL 85 ML: 612 INJECTION, SOLUTION INTRAVENOUS at 05:25

## 2022-09-29 RX ADMIN — OXYCODONE HYDROCHLORIDE AND ACETAMINOPHEN 1 TABLET: 5; 325 TABLET ORAL at 02:28

## 2022-09-30 LAB
BACTERIA SPEC AEROBE CULT: NO GROWTH
BACTERIA UR CULT: NO GROWTH
BACTERIA UR CULT: NORMAL

## 2022-10-07 ENCOUNTER — POSTPARTUM VISIT (OUTPATIENT)
Dept: OBSTETRICS AND GYNECOLOGY | Facility: CLINIC | Age: 27
End: 2022-10-07

## 2022-10-07 VITALS
WEIGHT: 133.8 LBS | BODY MASS INDEX: 25.26 KG/M2 | DIASTOLIC BLOOD PRESSURE: 62 MMHG | HEIGHT: 61 IN | SYSTOLIC BLOOD PRESSURE: 108 MMHG

## 2022-10-07 PROCEDURE — 0503F POSTPARTUM CARE VISIT: CPT

## 2022-10-07 NOTE — PROGRESS NOTES
Chief Complaint   Patient presents with   • Postpartum Care     2 wk pp         2 Week Postpartum Visit         Angie Sutherland is a 27 y.o.  who presents today for a 2 week postpartum check.        , Low Transverse    Information for the patient's :  Stu Perez [[id number]]   2022   female   Stu Perez   3721 g (8 lb 3.3 oz)   Gestational Age: 39w1d      Baby Discharged with Mom  Delivering MD: Carolina Amos MD.    Pregnancy complications: no known issues    Patient reports her incision is clean, dry, intact. Patient describes vaginal bleeding as light.  She is bottle feeding. Patient denies bowel or bladder issues.    Pt has sharp internal pain with urination in lower abdomen.     She desires contraceptive methods: None for contraception.    Patient denies postpartum depression. Postpartum Depression Screening Questionnaire: 14, treatment indicated. Patient states she is not currently taking medication and she does not desire to start medication. She is not interested in therapy at this time. She denies thoughts of harm to others or self harm. She states she is doing good.       The additional following portions of the patient's history were reviewed and updated as appropriate: allergies, current medications, past family history, past medical history, past social history, past surgical history and problem list.      Review of Systems   Constitutional: Negative.  Negative for fever.   Respiratory: Negative.  Negative for shortness of breath.    Cardiovascular: Negative.  Negative for chest pain.   Gastrointestinal: Negative.  Negative for abdominal distention and abdominal pain.   Genitourinary: Positive for vaginal bleeding (light). Negative for breast discharge, breast lump, breast pain, dysuria, pelvic pain, pelvic pressure, vaginal discharge and vaginal pain.   Psychiatric/Behavioral: Negative.  Negative for depressed mood.  "The patient is not nervous/anxious.        I have reviewed and agree with the HPI, ROS, and historical information as entered above. Shena OLVERA Hi, APRN    Objective   /62   Ht 154.9 cm (61\")   Wt 60.7 kg (133 lb 12.8 oz)   LMP 2022   BMI 25.28 kg/m²     Physical Exam  Vitals and nursing note reviewed.   Constitutional:       Appearance: Normal appearance.   HENT:      Head: Normocephalic.   Cardiovascular:      Rate and Rhythm: Normal rate and regular rhythm.      Pulses: Normal pulses.      Heart sounds: Normal heart sounds.   Pulmonary:      Effort: Pulmonary effort is normal.      Breath sounds: Normal breath sounds.   Abdominal:      General: There is no distension.      Palpations: There is no mass.      Tenderness: There is abdominal tenderness (Nml post op tenderness @ cs incision).      Comments: LTCS incision well approximated; no drainage, bleeding, or redness.      Neurological:      Mental Status: She is alert and oriented to person, place, and time.   Psychiatric:         Mood and Affect: Mood normal.         Behavior: Behavior normal.              Assessment and Plan    Problem List Items Addressed This Visit        Gravid and     Delivery by  section using transverse incision of lower segment of uterus - Primary          1. S/p , 2 weeks postpartum.  Doing well.    2. Continued pelvic rest with a return to driving and light physical activity.  3. Baby doing well.  4. Bottlefeeding going well.  5. Signs of postpartum depression - discussed options.  Encouraged consideration of counseling and encouraged to consider SSRI.  6. Contraception: contraceptive methods: Abstinence  No follow-ups on file.    Shena Do, UYEN  10/07/2022  "

## 2022-11-11 ENCOUNTER — PREP FOR SURGERY (OUTPATIENT)
Dept: OTHER | Facility: HOSPITAL | Age: 27
End: 2022-11-11

## 2022-11-11 ENCOUNTER — POSTPARTUM VISIT (OUTPATIENT)
Dept: OBSTETRICS AND GYNECOLOGY | Facility: CLINIC | Age: 27
End: 2022-11-11

## 2022-11-11 VITALS
BODY MASS INDEX: 25.3 KG/M2 | DIASTOLIC BLOOD PRESSURE: 60 MMHG | WEIGHT: 134 LBS | SYSTOLIC BLOOD PRESSURE: 90 MMHG | HEIGHT: 61 IN

## 2022-11-11 PROBLEM — Z98.891 PREVIOUS CESAREAN SECTION: Status: RESOLVED | Noted: 2022-04-08 | Resolved: 2022-11-11

## 2022-11-11 PROBLEM — A59.01 TRICHOMONAL VAGINITIS: Status: RESOLVED | Noted: 2022-06-03 | Resolved: 2022-11-11

## 2022-11-11 PROBLEM — O34.219 PREVIOUS CESAREAN DELIVERY AFFECTING PREGNANCY: Status: RESOLVED | Noted: 2022-09-23 | Resolved: 2022-11-11

## 2022-11-11 PROCEDURE — 0503F POSTPARTUM CARE VISIT: CPT | Performed by: OBSTETRICS & GYNECOLOGY

## 2022-11-11 NOTE — PROGRESS NOTES
Chief Complaint   Patient presents with   • Postpartum Care     6wk        6 Week Postpartum Visit         Angie Sutherland is a 27 y.o.  who presents today for a 6 week postpartum check.     C/S: repeat     , Low Transverse    Information for the patient's :  Stu Perez [[id number]]   2023   female   Stu Perez   3721 g (8 lb 3.3 oz)   Gestational Age: 39w1d          Baby Discharged: Discharged with Mom  Delivering Physician: Carolina Amos MD    The incision is CDI.  Patient describes vaginal bleeding as moderate.  Patient is bottle feeding.  She desires contraceptive methods: Condoms  Patient reports RLQ pain when she bears down to have a BM or urinates since delivery.        Patient reports postpartum depression. Postpartum Depression Screening Questionnaire: 13, Treatment indicated; She has been treated in the past for depression with medication and therapy when she had breast cancer.  She tried numerous medications without success.   She does have a therapist that she has seen    Last Completed Pap Smear          Ordered - PAP SMEAR (Every 3 Years) Ordered on 4/15/2022    2022  SCANNED - PAP SMEAR    2015  Patient-Reported (Performed Externally)              Is the patient due for a pap today? No  pap neg, + trich    The additional following portions of the patient's history were reviewed and updated as appropriate: allergies, current medications, past family history, past medical history, past social history, past surgical history and problem list.    Review of Systems   Constitutional: Negative.    HENT: Negative.    Eyes: Negative.    Respiratory: Negative.    Cardiovascular: Negative.    Gastrointestinal: Negative.    Endocrine: Negative.    Genitourinary: Negative.    Musculoskeletal: Negative.    Skin: Negative.    Allergic/Immunologic: Negative.    Neurological: Negative.    Hematological: Negative.   "  Psychiatric/Behavioral: Negative.      All other systems reviewed and are negative.     I have reviewed and agree with the HPI, ROS, and historical information as entered above. Carolina Amos MD    BP 90/60   Ht 154.9 cm (61\")   Wt 60.8 kg (134 lb)   LMP  (LMP Unknown)   Breastfeeding No   BMI 25.32 kg/m²     Physical Exam  Vitals and nursing note reviewed. Exam conducted with a chaperone present.   Constitutional:       Appearance: She is well-developed.   HENT:      Head: Normocephalic and atraumatic.   Pulmonary:      Effort: Pulmonary effort is normal.   Abdominal:      General: A surgical scar is present.      Palpations: Abdomen is soft. Abdomen is not rigid.      Comments: Clean, Dry, and Intact.  No erythema.    Genitourinary:     Vagina: No lesions or prolapsed vaginal walls.      Cervix: No lesion or erythema.      Uterus: Enlarged. Not tender and no uterine prolapse.       Adnexa:         Right: No mass, tenderness or fullness.          Left: No mass, tenderness or fullness.     Musculoskeletal:      Cervical back: Normal range of motion.   Neurological:      Mental Status: She is alert and oriented to person, place, and time.   Psychiatric:         Mood and Affect: Mood normal.         Behavior: Behavior normal.     point tenderness over rectus right of umbilicus, with rectus flexion        Assessment and Plan    Problem List Items Addressed This Visit     Delivery by  section using transverse incision of lower segment of uterus - Primary   Other Visit Diagnoses     Postpartum exam              1. S/p , 6 weeks postpartum.  Doing well.    2. Return to normal physical activity.  No pelvic restrictions.   3. Baby doing well.  4. Bottlefeeding going well.  5. Signs of postpartum depression - discussed options.  Encouraged consideration of counseling and encouraged to consider SSRI. she will touch base with her therapist, declines meds for now.   6. Musculoskeletal pain, discussed mgt " options, referal to PT if desires.   7. Contraception: contraceptive methods: Condoms  8. Return for Annual physical.     Carolina Amos MD  11/11/2022

## 2023-02-22 ENCOUNTER — TELEPHONE (OUTPATIENT)
Dept: OBSTETRICS AND GYNECOLOGY | Facility: CLINIC | Age: 28
End: 2023-02-22
Payer: COMMERCIAL

## 2023-02-22 NOTE — TELEPHONE ENCOUNTER
Patient is calling because she has a appt today at 4 for PPD, Patient isnt sure if she will make it to her appt today due to car issues, Patient wants to know if there any recommendations she can do. Belinda suggested ER, and I informed patient that. But patient wants to speak to the nurse. Thanks     Please refer to phone call encounter earlier today. Thanks

## 2023-02-22 NOTE — TELEPHONE ENCOUNTER
"KS pt   Pt delivered R C/S 9/23/22 via [place]     PP Screening 2 wk: 14  PP Screening 6 wk: 13    Pt has struggled with depression in past. Has been on multiple medications and seen therapist. SW pt and appt made for angelica. She denies thoughts of harming herself or others. Pt stated she is very overwhelmed and has \" a lot\" going on. Emergency information provided. Pt VU.   "

## 2023-02-22 NOTE — TELEPHONE ENCOUNTER
PT DELIVERED IN September AND STATES SHE HAS BEEN HAVING REALLY BAD POST PARTUM DEPRESSION SYMPTOMS FOR A WHILE. SHE THOUGHT IT WOULD JUST GO AWAY WITH TIME BUT IT IS NOT GETTING BETTER. STATES NO THOUGHTS OF HARMING HER CHILDREN BUT DID NOT ANSWER ABOUT HERSELF. SHE STATES SHE WAS NOT SURE HOW TO ANSWER IF SHE IS HAVING SUICIDAL THOUGHTS BECAUSE SHE KNOWS SHE DOES NOT WANT TO LEAVE HER KIDS BUT IT IS HARD. SHE WAS TEARFUL ON THE PHONE     PLEASE ADVISE

## 2023-02-23 ENCOUNTER — TELEPHONE (OUTPATIENT)
Dept: OBSTETRICS AND GYNECOLOGY | Facility: CLINIC | Age: 28
End: 2023-02-23
Payer: COMMERCIAL

## 2023-07-24 NOTE — TELEPHONE ENCOUNTER
Last seen 2/13/2023  Next appt not found See previous encounter.     Pt unable to keep appt today. Scheduled for tomorrow. Strongly encouraged pt to call 911 or go to ER for SI. Pt MINISTERIO.

## 2023-09-08 NOTE — ANESTHESIA PROCEDURE NOTES
Spinal Block      Patient reassessed immediately prior to procedure    Indication:procedure for pain  Performed By  CRNA/CAA: Bonnie Irene CRNA  Preanesthetic Checklist  Completed: patient identified, IV checked, risks and benefits discussed, surgical consent, monitors and equipment checked, pre-op evaluation and timeout performed  Spinal Block Prep:  Patient Position:sitting  Sterile Tech:cap, gloves, mask and sterile barriers  Prep:Betadine  Patient Monitoring:blood pressure monitoring, continuous pulse oximetry and EKG  Spinal Block Procedure  Approach:midline  Guidance:palpation technique  Location:L3-L4  Needle Type:Pau  Needle Gauge:25 G  Placement of Spinal needle event:cerebrospinal fluid aspirated  Paresthesia: no  Fluid Appearance:clear     Post Assessment  Patient Tolerance:patient tolerated the procedure well with no apparent complications  Complications no             Detail Level: Zone

## 2023-10-06 ENCOUNTER — HOSPITAL ENCOUNTER (EMERGENCY)
Facility: HOSPITAL | Age: 28
Discharge: HOME | End: 2023-10-06
Attending: EMERGENCY MEDICINE
Payer: MEDICARE

## 2023-10-06 VITALS
TEMPERATURE: 98.3 F | OXYGEN SATURATION: 100 % | BODY MASS INDEX: 25.3 KG/M2 | WEIGHT: 134 LBS | HEIGHT: 61 IN | DIASTOLIC BLOOD PRESSURE: 77 MMHG | SYSTOLIC BLOOD PRESSURE: 108 MMHG | RESPIRATION RATE: 16 BRPM | HEART RATE: 91 BPM

## 2023-10-06 DIAGNOSIS — L30.9 DERMATITIS OF FACE: Primary | ICD-10-CM

## 2023-10-06 PROCEDURE — 99282 EMERGENCY DEPT VISIT SF MDM: CPT

## 2023-10-06 NOTE — Clinical Note
[facility] EMERGENCY DEPARTMENT  [address]  [facility] [address]  Phone: [phone]    Denzel Perez accompanied Angie Sutherland to the emergency department on 10/6/2023. They may return to work on 10/06/2023.        Thank you for choosing [facility].    [place], BOB Lerma

## 2023-10-07 NOTE — ED PROVIDER NOTES
Subjective   History of Present Illness  28-year-old female presents for evaluation of pruritic facial rash.  She notes that the rash has been present for the past 3 to 4 days.  The rash is not painful.  She denies any new environmental exposures or new cosmetic products that could have caused her rash.  She denies any new medications.  She has a history of eczema but notes that this seems more severe.  As a result of her ongoing rash, she came here to the ED to be evaluated.  Aside from her facial rash, she has no other complaints at this time.    Review of Systems   Skin:  Positive for rash.   All other systems reviewed and are negative.    Past Medical History:   Diagnosis Date   • Anxiety    • Atypical squamous cell changes of undetermined significance (ASCUS) on cervical cytology with negative high risk human papilloma virus (HPV) test result 09/17/2015   • Breast cancer 2017    [id number] (stage IIIB) invasive ductal carcinoma of the right breast, ER-, [place] weakly +, Her2 3+.   • Depression    • Drug therapy 2018   • Eczema    • GERD (gastroesophageal reflux disease)     during chemo   • Hx of radiation therapy 2018    right   • Migraine    • Ovarian cyst    • Prediabetes    • Pregnancy, incidental     3/2/2016: 14 weeks pregnant. She was seen in 2013 during pregnancy and received counselling and glucometer. She was lost to f/u but states baby had no complications - weighed 7lb1oz   • Wears glasses        Allergies   Allergen Reactions   • Benadryl [Diphenhydramine] Shortness Of Breath and Diarrhea   • Latex Unknown - Low Severity     HAS SOME IRRITATION AFTER CATHETER INSERTION       Past Surgical History:   Procedure Laterality Date   • AUGMENTATION MAMMAPLASTY Bilateral 01/2019    with fat grafting   • BREAST BIOPSY     • BREAST CAPSULOTOMY, IMPLANT REVISION Bilateral 1/14/2019    Procedure: EXCHANGE BILATERAL EXPANDERS TO HIGHLY COHESIVE SILICONE IMPLANTS, BILATERAL CAPSULECTOMIES;  Surgeon: Delia Covarrubias  MD Ana;  Location:  [facility];  Service: Plastics   • BREAST RECONSTRUCTION, BREAST TISSUE EXPANDER INSERTION Bilateral 10/30/2017    Procedure: BREAST RECONSTRUCTION WITH ALLODERM, BREAST TISSUE EXPANDER INSERTION BILATERAL;  Surgeon: Delia Covarrubias MD;  Location:  [facility];  Service:    •  SECTION     •  SECTION N/A 2016    Procedure:  SECTION REPEAT;  Surgeon: Malika Munoz MD;  Location:  [facility];  Service:    •  SECTION N/A 2022    Procedure:  SECTION REPEAT;  Surgeon: Carolina Amos MD;  Location:  [facility];  Service: Obstetrics/Gynecology;  Laterality: N/A;   • FAT GRAFTING Bilateral 2019    Procedure: RECONSTRUCTED BREAST REVISION WITH FAT GRAFTING;  Surgeon: Edgar Block MD;  Location:  [facility];  Service: Plastics   • MASTECTOMY     • MASTECTOMY W/ SENTINEL NODE BIOPSY Bilateral 10/30/2017    Procedure: BREAST MASTECTOMY BILATERAL WITH RIGHT SENTINEL NODE BIOPSY;  Surgeon: Carlo Paulino MD;  Location: [facility] OR;  Service:    • VENOUS ACCESS DEVICE (PORT) INSERTION  2017   • VENOUS ACCESS DEVICE (PORT) REMOVAL         Family History   Problem Relation Age of Onset   • Diabetes Mother    • Ovarian cancer Maternal Grandmother    • Diabetes Brother    • Breast cancer Neg Hx        Social History     Socioeconomic History   • Marital status: Single   Tobacco Use   • Smoking status: Never   • Smokeless tobacco: Never   Substance and Sexual Activity   • Alcohol use: Not Currently     Comment: occasional   • Drug use: Not Currently     Types: Marijuana     Comment: OCCASIONALLY, last use 2019    • Sexual activity: Defer           Objective   Physical Exam  Vitals and nursing note reviewed.   Constitutional:       General: She is not in acute distress.     Appearance: She is well-developed. She is not diaphoretic.      Comments: Nontoxic-appearing female   HENT:      Head: Normocephalic and atraumatic.       Comments: No mucous membrane lesions present  Eyes:      Pupils: Pupils are equal, round, and reactive to light.   Cardiovascular:      Rate and Rhythm: Normal rate and regular rhythm.      Heart sounds: Normal heart sounds. No murmur heard.    No friction rub. No gallop.   Pulmonary:      Effort: Pulmonary effort is normal. No respiratory distress.      Breath sounds: Normal breath sounds. No wheezing or rales.   Abdominal:      General: Bowel sounds are normal. There is no distension.      Palpations: Abdomen is soft. There is no mass.      Tenderness: There is no abdominal tenderness. There is no guarding or rebound.   Musculoskeletal:         General: Normal range of motion.   Skin:     Comments: Dried but somewhat oily appearing pruritic, nonpainful rash noted to both cheeks and forehead, most prominently to left forehead, no warmth, no erythema, no purulent drainage, no pain out of proportion to exam   Neurological:      Mental Status: She is alert and oriented to person, place, and time.   Psychiatric:         Mood and Affect: Mood normal.         Thought Content: Thought content normal.         Judgment: Judgment normal.       Procedures           ED Course  ED Course as of 10/06/23 2133   Fri Oct 06, 2023   1331 28-year-old female presents for evaluation of pruritic facial rash.  She notes that the rash has been present for the past 3 to 4 days.  The rash is not painful.  She denies any new environmental exposures or new cosmetic products that could have caused her rash.  On arrival to the ED, the patient is nontoxic-appearing.  No mucous membrane lesions present.  On exam, the patient has a dry but somewhat oily pruritic, nonpainful rash noted to both cheeks and forehead.  Differential diagnosis is quite broad but includes seborrheic dermatitis versus eczema.  No infectious or cellulitic component present.  No indication for antibiotics.  We discussed cessation of current cosmetic products, thorough  "cleansing with soap and water, and using moisturizers for the time being.  We will refrain from any topical facial steroids for the time being.  Reassured.  She will follow-up with dermatology in 1 to 2 weeks if she fails conservative measures.  Referral given.  Agreeable with plan and given appropriate strict return precautions. [DD]      ED Course User Index  [DD] Leland Easton MD                                       No results found for this or any previous visit (from the past 24 hour(s)).  Note: In addition to lab results from this visit, the labs listed above may include labs taken at another facility or during a different encounter within the last 24 hours. Please correlate lab times with ED admission and discharge times for further clarification of the services performed during this visit.    No orders to display     Vitals:    10/06/23 1306   BP: 108/77   BP Location: Left arm   Patient Position: Sitting   Pulse: 91   Resp: 16   Temp: 98.3 °F (36.8 °C)   TempSrc: Oral   SpO2: 100%   Weight: 60.8 kg (134 lb)   Height: 154.9 cm (61\")     Medications - No data to display  ECG/EMG Results (last 24 hours)       ** No results found for the last 24 hours. **          No orders to display           Medical Decision Making  Problems Addressed:  Dermatitis of face: acute illness or injury        Final diagnoses:   Dermatitis of face       ED Disposition  ED Disposition       ED Disposition   Discharge    Condition   Stable    Comment   --               [facility] [place] [address]  [facility] [address] [phone]  In 2 weeks  As needed         Medication List      No changes were made to your prescriptions during this visit.            Leland Easton MD  10/06/23 2133    "

## 2023-11-21 ENCOUNTER — TELEPHONE (OUTPATIENT)
Dept: ONCOLOGY | Facility: CLINIC | Age: 28
End: 2023-11-21
Payer: MEDICARE

## 2023-11-21 NOTE — TELEPHONE ENCOUNTER
Caller: Angie Sutherland    Relationship to patient: Self    Best call back number: [phone]    Type of visit: FOLLOW UP    Requested date: PLEASE CALL TO SCHEDULE.     Additional notes:SHE LAST SAW DR. CALDERA FOR A FOLLOW UP ON 11/11/2021.

## 2023-11-21 NOTE — PROGRESS NOTES
"      PROBLEM LIST:  1. [id number] (stage IIIB) invasive ductal carcinoma of the right breast, ER-, [place] weakly +, Her2 3+.  A) presented with pain and swelling of the right breast after childbirth. Biopsy 5/8/17 showed high grade IDC, Right axillary LN biopsy positive for metastatic node involvement. Skin biopsy showed involvement of the dermis with lymphatic space involvement.  PET/CT on 5/24/17 showed hypermetabolic activity in the breast and axillary nodes,  No distant spread of disease.  B) neoadjuvant chemotherapy with TCHP started on 5/25/17.  Infusion reaction consisting of severe leg pain with the first dose of herceptin.  Taxotere dose reduced to 80% on cycle 5 for neuropathy.  C) bilateral mastectomy on 10/30/17.  Pathology showed multifocal < 1 mm high grade IDC with focal lymphovascular invasion.  4 sentinel lymph nodes with no malignancy.   [id number] (Stage yIA)  D) radiation therapy completed on 1/23/18.  Patient declined tamoxifen.  herceptin completed June 2018.  Neratinib started July 2018. Stopped Neratinib December 2018.   E) 2/11/2019 last dose of Lupron. She has declined any further Lupron as of 4/30/2019.   2. Migraines  3. Pre-diabetes    Subjective      CC: breast cancer    HISTORY OF PRESENT ILLNESS:   Angie Sutherland returns for follow-up.   I last saw her 2 years ago.  Since then, she had a baby in October 2022.    She says she is doing okay.  She has a few symptoms she wants to discuss.  She has a lot of tightness and spasms around her implants.  She has numbness and tingling in her hands and feet, sometimes painful.  She is not able to tolerate going barefoot.      Objective      /73   Pulse 79   Temp 97.1 °F (36.2 °C) (Infrared)   Resp 16   Ht 154.9 cm (60.98\")   Wt 67.6 kg (149 lb)   SpO2 99%   BMI 28.17 kg/m²    Vitals:    11/22/23 1000   PainSc: 0-No pain               Performance Status: 0    General: well appearing female in no acute distress  Neuro: alert and " oriented  HEENT: sclera anicteric, oropharynx clear  Extremeties: no lower extremity edema  Skin: no rashes, lesions, bruising, or petechiae  Psych: mood and affect appropriate    Lab Results   Component Value Date    WBC 7.18 09/29/2022    HGB 11.7 (L) 09/29/2022    HCT 35.9 09/29/2022    MCV 91.3 09/29/2022     09/29/2022     Lab Results   Component Value Date    GLUCOSE 144 (H) 09/29/2022    BUN 10 09/29/2022    CREATININE 0.47 (L) 09/29/2022    EGFRIFAFRI 111 11/11/2021    BCR 21.3 09/29/2022    K 3.4 (L) 09/29/2022    CO2 24.0 09/29/2022    CALCIUM 7.9 (L) 09/29/2022    ALBUMIN 2.70 (L) 09/29/2022    AST 17 09/29/2022    ALT 29 09/29/2022         Assessment & Plan   Angie Sutherland is a 28 y.o. year old female with stage IIIB HER-2 positive breast cancer who returns for follow-up.   She is now over 6 years out from her diagnosis.    She is overdue for breast imaging.  I will order a breast MRI given her symptoms of spasms and tightness around the implants.  She has a known area of fat necrosis, last imaged in 2021.    Chest wall tightness: Refer to physical therapy    Peripheral neuropathy: She will likely have this permanently.  She is not interested in medication at this time but does it is available if needed.    Follow-up in 1 year.              Shadia Amos MD  [facility]    11/22/2023          CC:

## 2023-11-22 ENCOUNTER — OFFICE VISIT (OUTPATIENT)
Dept: ONCOLOGY | Facility: CLINIC | Age: 28
End: 2023-11-22
Payer: MEDICARE

## 2023-11-22 VITALS
BODY MASS INDEX: 28.13 KG/M2 | WEIGHT: 149 LBS | TEMPERATURE: 97.1 F | OXYGEN SATURATION: 99 % | HEART RATE: 79 BPM | DIASTOLIC BLOOD PRESSURE: 73 MMHG | RESPIRATION RATE: 16 BRPM | SYSTOLIC BLOOD PRESSURE: 124 MMHG | HEIGHT: 61 IN

## 2023-11-22 DIAGNOSIS — C50.011 MALIGNANT NEOPLASM OF NIPPLE OF RIGHT BREAST IN FEMALE, UNSPECIFIED ESTROGEN RECEPTOR STATUS: Primary | ICD-10-CM

## 2023-11-22 PROCEDURE — 1126F AMNT PAIN NOTED NONE PRSNT: CPT | Performed by: INTERNAL MEDICINE

## 2023-11-22 PROCEDURE — 99214 OFFICE O/P EST MOD 30 MIN: CPT | Performed by: INTERNAL MEDICINE

## 2023-11-27 ENCOUNTER — OFFICE VISIT (OUTPATIENT)
Dept: FAMILY MEDICINE CLINIC | Facility: CLINIC | Age: 28
End: 2023-11-27
Payer: MEDICARE

## 2023-11-27 VITALS
DIASTOLIC BLOOD PRESSURE: 72 MMHG | HEIGHT: 61 IN | WEIGHT: 151 LBS | HEART RATE: 86 BPM | BODY MASS INDEX: 28.51 KG/M2 | OXYGEN SATURATION: 97 % | SYSTOLIC BLOOD PRESSURE: 100 MMHG | TEMPERATURE: 98 F

## 2023-11-27 DIAGNOSIS — F33.2 SEVERE EPISODE OF RECURRENT MAJOR DEPRESSIVE DISORDER, WITHOUT PSYCHOTIC FEATURES: ICD-10-CM

## 2023-11-27 DIAGNOSIS — F90.2 ATTENTION DEFICIT HYPERACTIVITY DISORDER (ADHD), COMBINED TYPE: ICD-10-CM

## 2023-11-27 DIAGNOSIS — G43.009 MIGRAINE WITHOUT AURA AND WITHOUT STATUS MIGRAINOSUS, NOT INTRACTABLE: Primary | ICD-10-CM

## 2023-11-27 RX ORDER — ESCITALOPRAM OXALATE 5 MG/1
5 TABLET ORAL DAILY
Qty: 90 TABLET | Refills: 1 | Status: SHIPPED | OUTPATIENT
Start: 2023-11-27 | End: 2023-11-27 | Stop reason: SDUPTHER

## 2023-11-27 RX ORDER — SUMATRIPTAN 25 MG/1
25 TABLET, FILM COATED ORAL
Qty: 9 TABLET | Refills: 3 | Status: SHIPPED | OUTPATIENT
Start: 2023-11-27

## 2023-11-27 RX ORDER — ESCITALOPRAM OXALATE 5 MG/1
5 TABLET ORAL DAILY
Qty: 30 TABLET | Refills: 0 | Status: SHIPPED | OUTPATIENT
Start: 2023-11-27 | End: 2023-12-27

## 2023-11-27 RX ORDER — ONDANSETRON 4 MG/1
4 TABLET, ORALLY DISINTEGRATING ORAL EVERY 8 HOURS PRN
Qty: 30 TABLET | Refills: 0 | Status: SHIPPED | OUTPATIENT
Start: 2023-11-27

## 2023-11-27 NOTE — PATIENT INSTRUCTIONS
Major Depressive Disorder, Adult  Major depressive disorder (MDD) is a mental health condition. It may also be called clinical depression or unipolar depression. MDD causes symptoms of sadness, hopelessness, and loss of interest in things. These symptoms last most of the day, almost every day, for 2 weeks. MDD can also cause physical symptoms. It can interfere with relationships and activities, such as work, school, and activities that are usually pleasant.  MDD may be mild, moderate, or severe. It may be single-episode MDD, which happens once, or recurrent MDD, which may occur many times.  What are the causes?  The exact cause of this condition is not known.  What increases the risk?  The following factors may make someone more likely to develop MDD:  A family history of depression.  Being female.  Long-term (chronic) stress, physical illness, other mental health disorders, or substance misuse.  Trauma, including:  Family problems.  Violence or abuse.  Loss of a parent or close family member.  Experiencing discrimination.  What are the signs or symptoms?  The main symptoms of MDD usually include:  Constant depressed or irritable mood.  A loss of interest in activities.  Sleeping or eating too much or too little.  Tiredness or low energy.  Other symptoms include:  Unexplained weight gain or weight loss.  Being agitated, restless, or weak.  Feeling hopeless, worthless, or guilty.  Trouble thinking clearly or making decisions.  Thoughts of suicide or harming others.  Spending a lot of time alone.  Not being able to complete daily tasks or work.  Severe symptoms of this condition may include:  Psychotic depression.This may include false beliefs or delusions. It may also include seeing, hearing, tasting, smelling, or feeling things that are not real (hallucinations).  Chronic depression or persistent depressive disorder. This is low-level depression that lasts for at least 2 years.  Melancholic depression, or feeling  extremely sad and hopeless.  Catatonic depression, which includes trouble speaking and trouble moving.  Seasonal depression, which is caused by changes in the seasons.  How is this diagnosed?  This condition may be diagnosed based on:  Your symptoms.  Your medical and mental health history.  A physical exam.  Blood tests to rule out other conditions.  MDD is confirmed if you have either a depressed mood or loss of interest and at least four other MDD symptoms, most of the day, nearly every day, in a 2-week period.  How is this treated?  This condition is usually treated by mental health professionals, such as psychologists, psychiatrists, and clinical social workers. You may need more than one type of treatment. Treatment may include:  Psychotherapy, also called talk therapy or counseling. Types of psychotherapy include:  Cognitive behavioral therapy (CBT). This teaches you to recognize unhealthy feelings, thoughts, and behaviors, and replace them with positive thoughts and actions.  Interpersonal therapy (IPT). This helps you to improve the way you communicate with others or relate to them.  Family therapy. This treatment includes members of your family.  Medicines to treat anxiety and depression. These medicines help to balance the brain chemicals that affect your emotions.  Lifestyle changes. You may be asked to:  Limit alcohol use and avoid drug use.  Get regular exercise.  Get plenty of sleep.  Make healthy eating choices.  Spend more time outdoors.  Brain stimulation. This may be done if symptoms are very severe and other treatments have not worked. Examples of this treatment are electroconvulsive therapy and transcranial magnetic stimulation.  Follow these instructions at home:  Alcohol use  Do not drink alcohol if:  Your health care provider tells you not to drink.  You are pregnant, may be pregnant, or are planning to become pregnant.  If you drink alcohol:  Limit how much you have to:  0-1 drink a day for  women  0-2 drinks a day for men.  Know how much alcohol is in your drink. In the [place] one drink equals one 12 oz bottle of beer (355 mL), one 5 oz glass of wine (148 mL), or one 1½ oz glass of hard liquor (44 mL).  Activity  Exercise regularly and spend time outdoors.  Find activities that you enjoy and make time to do them.  Find healthy ways to manage stress, such as:  Meditation or deep breathing.  Spending time in nature.  Journaling.  Return to your normal activities as told by your health care provider. Ask your health care provider what activities are safe for you.  General instructions    Take over-the-counter and prescription medicines only as told by your health care provider.  Discuss alcohol use with your health care provider. Alcohol can affect any antidepressant medicines you are taking.  Discuss any drug use with your health care provider.  Eat a healthy diet and get enough sleep.  Consider joining a support group. Your health care provider may be able to recommend one.  Keep all follow-up visits. It is important for your health care provider to check on your mood, behavior, and medicines. Your health care provider will make changes to your treatment as needed.  Where to find more information  National [place] on Mental Illness: chinyere.org  National [place] of Mental Health: nimh.nih.gov  American Psychiatric Association: psychiatry.org  Contact a health care provider if:  Your symptoms get worse.  You develop new symptoms.  Get help right away if:  You hurt yourself on purpose (self-harm).  You have thoughts about hurting yourself or others.  You have hallucinations.  Get help right away if you feel like you may hurt yourself or others, or have thoughts about taking your own life. Go to your nearest emergency room or:  Call 911.  Call the National Suicide Prevention Lifeline at 1-319.811.1689 or 510. This is open 24 hours a day.  Text the Crisis Text Line at 830237.  This information is not  intended to replace advice given to you by your health care provider. Make sure you discuss any questions you have with your health care provider.  Document Revised: 04/25/2023 Document Reviewed: 04/25/2023  Elsevier Patient Education © 2023 [employer]

## 2023-11-27 NOTE — PROGRESS NOTES
New Patient Office Visit      Date: 2023   Patient Name: Angie Sutherland  : 1995   MRN: [id number]     Chief Complaint:  migraines and  depression   Chief Complaint   Patient presents with    medical history     Patient states she is here to establish care and discuss medical history.     Migraine     Has had bad migraines for several years. Has had to go to the hospital multiple times for it. Excedrin is all that helps but she does not like taking it.     Constipation     Says she is more often than not constipated. Also says she feels bloated frequently.     nerve damage     Was on chemo that caused nerve damage in both hands and feet. Her hands and feet go numb and she says it feels like pins and needles.     Chest Pain     Has breast implants and feels pain around the implants. Says the pain comes on sporadically and feels similar to a heart attack.     Depression     Says it comes hand in hand with her adhd.    ADHD     Feels that she has adhd her entire life but was recently diagnosed. Feels that she is not managing her adhd well.    hormonal issues     Says she believes she has low libido caused by hormonal issues.        History of Present Illness: Angie Sutherland is a 28 y.o. female who is here today to establish care.  Patient has a past medical history of breast cancer, depression, migraines and ADHD.  Patient states that she has not been seen by primary care in several years because she had some medical anxiety after being seen extremely frequently for her breast cancer diagnosis.  Patient states she lives here in [place] with her children who are 10 years old, 7 years old and a 1-year-old.  She is currently unemployed.  Patient states that she was diagnosed with breast cancer stage III when she was 21 years old.  She states that she still follows with Dr. Amos regarding her diagnosis.  She states that she sees her once a year.  Patient states that she has had  "some chest pain around her breast implant.  She states that Dr. Herrera has ordered an MRI for her to have done soon. Patient denies constant chest pain or shortness of breath or palpitations.    Patient has a history of migraines.  She states that she was previously seen for these.  She states that when she took a brief hiatus from seeing providers she stopped taking medicine.  She states that the migraines have gotten worse as she has grown older.  Patient states that she has about 2 migraines a week.  Patient states that the only thing that helps her is Excedrin.  She states that she does not like to take the Excedrin because she feels like she is extra sensitive to the caffeine and that sometimes her heart rate will increase.  She states she feels this way when she has a strong coffee drink too.  Patient denies any known triggers.  Patient endorses occasional nausea, but no vomiting.  Patient endorses sound and light sensitivity.  Patient states that the pain can be behind her eyes, on one side of her head or sometimes feels like a band effect around her entire head.  She states she also has tension in her neck muscles sometimes 2.  Patient cannot remember what she was previously on or what she tolerated.  Patient denies any cardiac history.      Patient does admit to feelings of wishing she was \"not alive sometimes.\"  Patient states that she does not have any plans of suicide, but sometimes does think about self-harm.  Patient states that her main reason for staying around is her 3 children.  She states that she knows that they need her mom and that they need her around.  Patient states that she solid video online about a mom who had ended her life and the bad things that happened to her children following that.  Patient states that her kids are her reason why she would \"not do anything.\"  Patient denies any active attempts or active plans to harm herself.  Patient states that there are no weapons in her home.  " Patient states that she has been seen for depression in the past by psychiatry.  She states that she was also being treated for her ADHD and depression by psychiatry.  She states that when she was being treated for both she felt more stable.  Patient would like to see psychiatry for her ADHD, but would also like to see someone for more talk therapy to discuss past traumas and triggers.      Subjective      Review of Systems:   Review of Systems   Constitutional:  Negative for fever.   HENT:  Negative for congestion.    Respiratory:  Negative for chest tightness and shortness of breath.    Cardiovascular:  Negative for chest pain and palpitations.   Gastrointestinal:  Negative for abdominal pain.   Psychiatric/Behavioral:  Positive for decreased concentration, depressed mood and stress. Negative for self-injury. The patient is nervous/anxious.        Past Medical History:   Past Medical History:   Diagnosis Date    Anxiety     Atypical squamous cell changes of undetermined significance (ASCUS) on cervical cytology with negative high risk human papilloma virus (HPV) test result 09/17/2015    Breast cancer 2017    [id number] (stage IIIB) invasive ductal carcinoma of the right breast, ER-, [place] weakly +, Her2 3+.    Depression     Drug therapy 2018    Eczema     GERD (gastroesophageal reflux disease)     during chemo    Hx of radiation therapy 2018    right    Migraine     Ovarian cyst     Prediabetes     Pregnancy, incidental     3/2/2016: 14 weeks pregnant. She was seen in 2013 during pregnancy and received counselling and glucometer. She was lost to f/u but states baby had no complications - weighed 7lb1oz    Wears glasses        Past Surgical History:   Past Surgical History:   Procedure Laterality Date    AUGMENTATION MAMMAPLASTY Bilateral 01/2019    with fat grafting    BREAST BIOPSY      BREAST CAPSULOTOMY, IMPLANT REVISION Bilateral 1/14/2019    Procedure: EXCHANGE BILATERAL EXPANDERS TO HIGHLY COHESIVE SILICONE  IMPLANTS, BILATERAL CAPSULECTOMIES;  Surgeon: Delia Covarrubias MD;  Location:  [facility];  Service: Plastics    BREAST RECONSTRUCTION, BREAST TISSUE EXPANDER INSERTION Bilateral 10/30/2017    Procedure: BREAST RECONSTRUCTION WITH ALLODERM, BREAST TISSUE EXPANDER INSERTION BILATERAL;  Surgeon: Delia Covarrubias MD;  Location:  [facility];  Service:      SECTION       SECTION N/A 2016    Procedure:  SECTION REPEAT;  Surgeon: Malika Munoz MD;  Location:  [facility];  Service:      SECTION N/A 2022    Procedure:  SECTION REPEAT;  Surgeon: Carolina Amos MD;  Location:  [facility];  Service: Obstetrics/Gynecology;  Laterality: N/A;    FAT GRAFTING Bilateral 2019    Procedure: RECONSTRUCTED BREAST REVISION WITH FAT GRAFTING;  Surgeon: Edgar Block MD;  Location:  [facility];  Service: Plastics    MASTECTOMY      MASTECTOMY W/ SENTINEL NODE BIOPSY Bilateral 10/30/2017    Procedure: BREAST MASTECTOMY BILATERAL WITH RIGHT SENTINEL NODE BIOPSY;  Surgeon: Carlo Paulino MD;  Location:  [facility];  Service:     VENOUS ACCESS DEVICE (PORT) INSERTION  2017    VENOUS ACCESS DEVICE (PORT) REMOVAL         Family History:   Family History   Problem Relation Age of Onset    Arthritis Mother     Hypertension Mother     Diabetes Mother     Diabetes Brother     Ovarian cancer Maternal Grandmother     Breast cancer Neg Hx        Social History:   Social History     Socioeconomic History    Marital status: Single   Tobacco Use    Smoking status: Never    Smokeless tobacco: Never   Substance and Sexual Activity    Alcohol use: Not Currently     Comment: occasional    Drug use: Not Currently     Types: Marijuana     Comment: OCCASIONALLY, last use 2019     Sexual activity: Defer       Medications:     Current Outpatient Medications:     acetaminophen (TYLENOL) 325 MG tablet, Take 650 mg by mouth Every 6 (Six) Hours As Needed for Mild Pain.  "(Patient not taking: Reported on 11/27/2023), Disp: , Rfl:     escitalopram (LEXAPRO) 5 MG tablet, Take 1 tablet by mouth Daily for 30 days., Disp: 30 tablet, Rfl: 0    ibuprofen (ADVIL,MOTRIN) 600 MG tablet, Take 1 tablet by mouth Every 6 (Six) Hours. (Patient not taking: Reported on 11/27/2023), Disp: 30 tablet, Rfl: 0    ondansetron ODT (ZOFRAN-ODT) 4 MG disintegrating tablet, Place 1 tablet on the tongue Every 8 (Eight) Hours As Needed for Nausea or Vomiting., Disp: 30 tablet, Rfl: 0    Prenatal Vit-Fe Fumarate-FA (PNV Prenatal Plus Multivitamin) 27-1 MG tablet, Take 1 tablet by mouth Daily. (Patient not taking: Reported on 11/27/2023), Disp: , Rfl:     SUMAtriptan (IMITREX) 25 MG tablet, Take 1 tablet by mouth Every 2 (Two) Hours As Needed for Migraine. Max dose 200 mg per day, Disp: 9 tablet, Rfl: 3    Allergies:   Allergies   Allergen Reactions    Benadryl [Diphenhydramine] Shortness Of Breath and Diarrhea    Latex Unknown - Low Severity     HAS SOME IRRITATION AFTER CATHETER INSERTION       Objective     Physical Exam:  Vital Signs:   Vitals:    11/27/23 1004   BP: 100/72   Pulse: 86   Temp: 98 °F (36.7 °C)   TempSrc: Oral   SpO2: 97%   Weight: 68.5 kg (151 lb)   Height: 154.9 cm (60.98\")     Body mass index is 28.55 kg/m².      Physical Exam  Constitutional:       Appearance: Normal appearance.   HENT:      Head: Normocephalic and atraumatic.      Right Ear: Tympanic membrane and ear canal normal.      Left Ear: Tympanic membrane and ear canal normal.   Eyes:      Pupils: Pupils are equal, round, and reactive to light.   Cardiovascular:      Rate and Rhythm: Normal rate and regular rhythm.      Pulses: Normal pulses.      Heart sounds: Normal heart sounds.   Pulmonary:      Effort: Pulmonary effort is normal.      Breath sounds: Normal breath sounds.   Abdominal:      General: Abdomen is flat. Bowel sounds are normal.   Skin:     General: Skin is warm.   Neurological:      General: No focal deficit present. "      Mental Status: She is alert and oriented to person, place, and time.   Psychiatric:         Thought Content: Thought content normal.         Judgment: Judgment normal.         Procedures    Results:   PHQ-9 Total Score: 16          Assessment / Plan      Assessment/Plan:   Diagnoses and all orders for this visit:    1. Migraine without aura and without status migrainosus, not intractable (Primary)  Assessment & Plan:  Headaches are worsening.  Advised to keep a headache diary.  Medication changes per orders.  Follow up in 4 week, or sooner should new symptoms or problems arise.      Patient to start sumatriptan as needed for her migraines.  Patient was advised on side effects to look for.  Patient to reach out if she has any questions or concerns regarding the medication.      Orders:  -     SUMAtriptan (IMITREX) 25 MG tablet; Take 1 tablet by mouth Every 2 (Two) Hours As Needed for Migraine. Max dose 200 mg per day  Dispense: 9 tablet; Refill: 3  -     ondansetron ODT (ZOFRAN-ODT) 4 MG disintegrating tablet; Place 1 tablet on the tongue Every 8 (Eight) Hours As Needed for Nausea or Vomiting.  Dispense: 30 tablet; Refill: 0    2. Severe episode of recurrent major depressive disorder, without psychotic features  Assessment & Plan:  Patient's depression is recurrent and is severe without psychosis. Their depression is currently active and the condition is unchanged. This will be reassessed in 4 weeks. F/U as described:patient was prescribed an antidepressant medicine and patient referred to Mental Health Specialist.    Discussed with patient that if anything changes in the meantime between now and her next appointment with me to please reach back out.  I placed a referral for psychiatry and for behavioral health.    Orders:  -     Discontinue: escitalopram (Lexapro) 5 MG tablet; Take 1 tablet by mouth Daily.  Dispense: 90 tablet; Refill: 1  -     Ambulatory Referral to Behavioral Health  -     escitalopram  "(LEXAPRO) 5 MG tablet; Take 1 tablet by mouth Daily for 30 days.  Dispense: 30 tablet; Refill: 0    3. Attention deficit hyperactivity disorder (ADHD), combined type  -     Ambulatory Referral to Psychiatry         Follow Up:   Return in about 4 weeks (around 12/25/2023).    Gabby Fernandez PA-C   [facility]   Angie Sutherland is a 28 y.o. female who presents for follow up of depression. Current symptoms include depressed mood and suicidal thoughts without plan. Symptoms have been gradually worsening since that time. Patient denies hopelessness, hypersomnia, impaired memory, insomnia, psychomotor agitation, suicidal thoughts with specific plan, weight gain, and weight loss. Previous treatment includes: individual therapy and medication. She complains of the following side effects from the treatment: none.    The following portions of the patient's history were reviewed and updated as appropriate: allergies, current medications, past family history, past medical history, past social history, past surgical history, and problem list.    Review of Systems  Pertinent items are noted in HPI.     Objective    /72   Pulse 86   Temp 98 °F (36.7 °C) (Oral)   Ht 154.9 cm (60.98\")   Wt 68.5 kg (151 lb)   SpO2 97%   BMI 28.55 kg/m²    General:  alert, appears stated age, cooperative, and no distress   Affect & Behavior:  full facial expressions      Assessment & Plan   Depression, gradually worsening       Medications: Lexapro.    Behavioral Health and Psychiatry Referrals placed.         "

## 2023-11-27 NOTE — ASSESSMENT & PLAN NOTE
Headaches are worsening.  Advised to keep a headache diary.  Medication changes per orders.  Follow up in 4 week, or sooner should new symptoms or problems arise.      Patient to start sumatriptan as needed for her migraines.  Patient was advised on side effects to look for.  Patient to reach out if she has any questions or concerns regarding the medication.

## 2023-11-27 NOTE — ASSESSMENT & PLAN NOTE
Patient's depression is recurrent and is severe without psychosis. Their depression is currently active and the condition is unchanged. This will be reassessed in 4 weeks. F/U as described:patient was prescribed an antidepressant medicine and patient referred to Mental Health Specialist.    Discussed with patient that if anything changes in the meantime between now and her next appointment with me to please reach back out.  I placed a referral for psychiatry and for behavioral health.

## 2023-11-30 ENCOUNTER — PATIENT ROUNDING (BHMG ONLY) (OUTPATIENT)
Dept: FAMILY MEDICINE CLINIC | Facility: CLINIC | Age: 28
End: 2023-11-30
Payer: MEDICARE

## 2023-12-05 DIAGNOSIS — F90.2 ATTENTION DEFICIT HYPERACTIVITY DISORDER (ADHD), COMBINED TYPE: Primary | ICD-10-CM

## 2023-12-05 DIAGNOSIS — F33.2 SEVERE EPISODE OF RECURRENT MAJOR DEPRESSIVE DISORDER, WITHOUT PSYCHOTIC FEATURES: ICD-10-CM

## 2023-12-28 ENCOUNTER — HOSPITAL ENCOUNTER (OUTPATIENT)
Dept: MRI IMAGING | Facility: HOSPITAL | Age: 28
Discharge: HOME | End: 2023-12-28
Admitting: INTERNAL MEDICINE
Payer: MEDICARE

## 2023-12-28 DIAGNOSIS — C50.011 MALIGNANT NEOPLASM OF NIPPLE OF RIGHT BREAST IN FEMALE, UNSPECIFIED ESTROGEN RECEPTOR STATUS: ICD-10-CM

## 2023-12-28 PROCEDURE — C8937 CAD BREAST MRI: HCPCS

## 2023-12-28 PROCEDURE — 0 GADOBENATE DIMEGLUMINE 529 MG/ML SOLUTION: Performed by: INTERNAL MEDICINE

## 2023-12-28 PROCEDURE — A9577 INJ MULTIHANCE: HCPCS | Performed by: INTERNAL MEDICINE

## 2023-12-28 PROCEDURE — C8908 MRI W/O FOL W/CONT, BREAST,: HCPCS

## 2023-12-28 RX ADMIN — GADOBENATE DIMEGLUMINE 14 ML: 529 INJECTION, SOLUTION INTRAVENOUS at 09:56

## 2024-03-21 ENCOUNTER — HOSPITAL ENCOUNTER (EMERGENCY)
Facility: HOSPITAL | Age: 29
Discharge: HOME | End: 2024-03-21
Attending: EMERGENCY MEDICINE
Payer: MEDICARE

## 2024-03-21 ENCOUNTER — APPOINTMENT (OUTPATIENT)
Dept: CT IMAGING | Facility: HOSPITAL | Age: 29
End: 2024-03-21
Payer: MEDICARE

## 2024-03-21 VITALS
DIASTOLIC BLOOD PRESSURE: 75 MMHG | TEMPERATURE: 98.5 F | HEIGHT: 62 IN | OXYGEN SATURATION: 100 % | WEIGHT: 140 LBS | SYSTOLIC BLOOD PRESSURE: 115 MMHG | BODY MASS INDEX: 25.76 KG/M2 | HEART RATE: 76 BPM | RESPIRATION RATE: 16 BRPM

## 2024-03-21 DIAGNOSIS — R07.89 ACUTE CHEST WALL PAIN: Primary | ICD-10-CM

## 2024-03-21 LAB
ALBUMIN SERPL-MCNC: 4.4 G/DL (ref 3.5–5.2)
ALBUMIN/GLOB SERPL: 1.2 G/DL
ALP SERPL-CCNC: 67 U/L (ref 39–117)
ALT SERPL W P-5'-P-CCNC: 15 U/L (ref 1–33)
ANION GAP SERPL CALCULATED.3IONS-SCNC: 9 MMOL/L (ref 5–15)
AST SERPL-CCNC: 18 U/L (ref 1–32)
BASOPHILS # BLD AUTO: 0.03 10*3/MM3 (ref 0–0.2)
BASOPHILS NFR BLD AUTO: 0.5 % (ref 0–1.5)
BILIRUB SERPL-MCNC: 0.2 MG/DL (ref 0–1.2)
BUN SERPL-MCNC: 12 MG/DL (ref 6–20)
BUN/CREAT SERPL: 17.9 (ref 7–25)
CALCIUM SPEC-SCNC: 8.6 MG/DL (ref 8.6–10.5)
CHLORIDE SERPL-SCNC: 101 MMOL/L (ref 98–107)
CO2 SERPL-SCNC: 24 MMOL/L (ref 22–29)
CREAT SERPL-MCNC: 0.67 MG/DL (ref 0.57–1)
DEPRECATED RDW RBC AUTO: 39.6 FL (ref 37–54)
EGFRCR SERPLBLD CKD-EPI 2021: 121.5 ML/MIN/1.73
EOSINOPHIL # BLD AUTO: 0.11 10*3/MM3 (ref 0–0.4)
EOSINOPHIL NFR BLD AUTO: 1.9 % (ref 0.3–6.2)
ERYTHROCYTE [DISTWIDTH] IN BLOOD BY AUTOMATED COUNT: 12.1 % (ref 12.3–15.4)
GLOBULIN UR ELPH-MCNC: 3.6 GM/DL
GLUCOSE SERPL-MCNC: 133 MG/DL (ref 65–99)
HCT VFR BLD AUTO: 45.1 % (ref 34–46.6)
HGB BLD-MCNC: 14.4 G/DL (ref 12–15.9)
IMM GRANULOCYTES # BLD AUTO: 0.01 10*3/MM3 (ref 0–0.05)
IMM GRANULOCYTES NFR BLD AUTO: 0.2 % (ref 0–0.5)
LYMPHOCYTES # BLD AUTO: 2.05 10*3/MM3 (ref 0.7–3.1)
LYMPHOCYTES NFR BLD AUTO: 35.1 % (ref 19.6–45.3)
MCH RBC QN AUTO: 28.9 PG (ref 26.6–33)
MCHC RBC AUTO-ENTMCNC: 31.9 G/DL (ref 31.5–35.7)
MCV RBC AUTO: 90.6 FL (ref 79–97)
MONOCYTES # BLD AUTO: 0.43 10*3/MM3 (ref 0.1–0.9)
MONOCYTES NFR BLD AUTO: 7.4 % (ref 5–12)
NEUTROPHILS NFR BLD AUTO: 3.21 10*3/MM3 (ref 1.7–7)
NEUTROPHILS NFR BLD AUTO: 54.9 % (ref 42.7–76)
NRBC BLD AUTO-RTO: 0 /100 WBC (ref 0–0.2)
PLATELET # BLD AUTO: 232 10*3/MM3 (ref 140–450)
PMV BLD AUTO: 9.9 FL (ref 6–12)
POTASSIUM SERPL-SCNC: 3.7 MMOL/L (ref 3.5–5.2)
PROT SERPL-MCNC: 8 G/DL (ref 6–8.5)
RBC # BLD AUTO: 4.98 10*6/MM3 (ref 3.77–5.28)
SODIUM SERPL-SCNC: 134 MMOL/L (ref 136–145)
WBC NRBC COR # BLD AUTO: 5.84 10*3/MM3 (ref 3.4–10.8)

## 2024-03-21 PROCEDURE — 93005 ELECTROCARDIOGRAM TRACING: CPT | Performed by: EMERGENCY MEDICINE

## 2024-03-21 PROCEDURE — 99284 EMERGENCY DEPT VISIT MOD MDM: CPT

## 2024-03-21 PROCEDURE — 80053 COMPREHEN METABOLIC PANEL: CPT | Performed by: EMERGENCY MEDICINE

## 2024-03-21 PROCEDURE — 85025 COMPLETE CBC W/AUTO DIFF WBC: CPT | Performed by: EMERGENCY MEDICINE

## 2024-03-21 PROCEDURE — 71250 CT THORAX DX C-: CPT

## 2024-03-21 RX ORDER — SODIUM CHLORIDE 0.9 % (FLUSH) 0.9 %
10 SYRINGE (ML) INJECTION AS NEEDED
Status: DISCONTINUED | OUTPATIENT
Start: 2024-03-21 | End: 2024-03-21 | Stop reason: HOSPADM

## 2024-03-21 RX ORDER — TRAMADOL HYDROCHLORIDE 50 MG/1
50 TABLET ORAL EVERY 6 HOURS PRN
Qty: 12 TABLET | Refills: 0 | Status: SHIPPED | OUTPATIENT
Start: 2024-03-21

## 2024-03-21 NOTE — ED PROVIDER NOTES
Subjective   History of Present Illness  28-year-old female who presents with complaint of right lateral chest wall pain.  The patient reports that it has been present for the last 2 or 3 days.  It is exacerbated with deep breathing or movement.  She denies trauma or injury to that area.  She denies new activity that could have strained or injured the muscles of the chest.  She does have a preceding history of breast cancer and had a previous bilateral mastectomy.  She states that in the past, prior to this current complaint, she has complaint of spasm around the right breast implant.  She actually states that she had a PET scan performed because of this at the beginning of this year.  She is unsure what the results of the PET scan showed.  She denies fever or infectious symptoms.  She denies rash or redness over the chest wall.  No abdominal pain.  No nausea or vomiting.  No change in bowel or urinary function.  No other acute complaints.      Review of Systems   Constitutional:  Negative for chills, fatigue and fever.   HENT:  Negative for congestion, ear pain, postnasal drip, sinus pressure and sore throat.    Eyes:  Negative for pain, redness and visual disturbance.   Respiratory:  Negative for cough, chest tightness and shortness of breath.    Cardiovascular:  Positive for chest pain. Negative for palpitations and leg swelling.   Gastrointestinal:  Negative for abdominal pain, anal bleeding, blood in stool, diarrhea, nausea and vomiting.   Endocrine: Negative for polydipsia and polyuria.   Genitourinary:  Negative for difficulty urinating, dysuria, frequency and urgency.   Musculoskeletal:  Negative for arthralgias, back pain and neck pain.   Skin:  Negative for pallor and rash.   Allergic/Immunologic: Negative for environmental allergies and immunocompromised state.   Neurological:  Negative for dizziness, weakness and headaches.   Hematological:  Negative for adenopathy.   Psychiatric/Behavioral:  Negative  for confusion, self-injury and suicidal ideas. The patient is not nervous/anxious.    All other systems reviewed and are negative.      Past Medical History:   Diagnosis Date    Anxiety     Atypical squamous cell changes of undetermined significance (ASCUS) on cervical cytology with negative high risk human papilloma virus (HPV) test result 2015    Breast cancer 2017    [id number] (stage IIIB) invasive ductal carcinoma of the right breast, ER-, [place] weakly +, Her2 3+.    Depression     Drug therapy 2018    Eczema     GERD (gastroesophageal reflux disease)     during chemo    Hx of radiation therapy 2018    right    Migraine     Ovarian cyst     Prediabetes     Pregnancy, incidental     3/2/2016: 14 weeks pregnant. She was seen in 2013 during pregnancy and received counselling and glucometer. She was lost to f/u but states baby had no complications - weighed 7lb1oz    Wears glasses        Allergies   Allergen Reactions    Benadryl [Diphenhydramine] Shortness Of Breath and Diarrhea    Latex Unknown - Low Severity     HAS SOME IRRITATION AFTER CATHETER INSERTION       Past Surgical History:   Procedure Laterality Date    AUGMENTATION MAMMAPLASTY Bilateral 2019    with fat grafting    BREAST BIOPSY      BREAST CAPSULOTOMY, IMPLANT REVISION Bilateral 2019    Procedure: EXCHANGE BILATERAL EXPANDERS TO HIGHLY COHESIVE SILICONE IMPLANTS, BILATERAL CAPSULECTOMIES;  Surgeon: Delia Covarrubias MD;  Location: [facility] OR;  Service: Plastics    BREAST RECONSTRUCTION, BREAST TISSUE EXPANDER INSERTION Bilateral 10/30/2017    Procedure: BREAST RECONSTRUCTION WITH ALLODERM, BREAST TISSUE EXPANDER INSERTION BILATERAL;  Surgeon: Delia Covarrubias MD;  Location: [facility] OR;  Service:      SECTION       SECTION N/A 2016    Procedure:  SECTION REPEAT;  Surgeon: Malika Munoz MD;  Location: [facility] LABOR DELIVERY;  Service:      SECTION N/A 2022    Procedure:  SECTION  REPEAT;  Surgeon: Carolina Amos MD;  Location:  [facility] LABOR DELIVERY;  Service: Obstetrics/Gynecology;  Laterality: N/A;    FAT GRAFTING Bilateral 8/23/2019    Procedure: RECONSTRUCTED BREAST REVISION WITH FAT GRAFTING;  Surgeon: Edgar Block MD;  Location:  [facility] OR;  Service: Plastics    MASTECTOMY      MASTECTOMY W/ SENTINEL NODE BIOPSY Bilateral 10/30/2017    Procedure: BREAST MASTECTOMY BILATERAL WITH RIGHT SENTINEL NODE BIOPSY;  Surgeon: Carlo Paulino MD;  Location:  [facility] OR;  Service:     VENOUS ACCESS DEVICE (PORT) INSERTION  05/18/2017    VENOUS ACCESS DEVICE (PORT) REMOVAL         Family History   Problem Relation Age of Onset    Arthritis Mother     Hypertension Mother     Diabetes Mother     Diabetes Brother     Ovarian cancer Maternal Grandmother     Breast cancer Neg Hx        Social History     Socioeconomic History    Marital status: Single   Tobacco Use    Smoking status: Never    Smokeless tobacco: Never   Vaping Use    Vaping status: Never Used   Substance and Sexual Activity    Alcohol use: Yes     Alcohol/week: 2.0 standard drinks of alcohol     Types: 2 Glasses of wine per week     Comment: occasional    Drug use: Yes     Types: Marijuana     Comment: OCCASIONALLY, last use 8-     Sexual activity: Defer           Objective   Physical Exam  Vitals and nursing note reviewed.   Constitutional:       General: She is not in acute distress.     Appearance: Normal appearance. She is well-developed. She is not toxic-appearing or diaphoretic.   HENT:      Head: Normocephalic and atraumatic.      Right Ear: External ear normal.      Left Ear: External ear normal.      Nose: Nose normal.   Eyes:      General: Lids are normal.      Pupils: Pupils are equal, round, and reactive to light.   Neck:      Trachea: No tracheal deviation.   Cardiovascular:      Rate and Rhythm: Regular rhythm. Tachycardia present.      Pulses: No decreased pulses.      Heart sounds: Normal heart sounds. No  murmur heard.     No friction rub. No gallop.   Pulmonary:      Effort: Pulmonary effort is normal. No respiratory distress.      Breath sounds: Normal breath sounds. No decreased breath sounds, wheezing, rhonchi or rales.      Comments: Lungs clear to auscultation diffusely.  Chest:      Chest wall: Tenderness present. No mass, deformity, crepitus or edema.      Comments: Reproducible tenderness to palpation of the right lateral chest wall.  No rash, redness, swelling, or edema.    The area of tenderness is actually inferior to where the breast plan is.  There is no associated lymphadenopathy in the right axillary region because the patient states the lymph nodes were all removed.  Abdominal:      General: Bowel sounds are normal.      Palpations: Abdomen is soft.      Tenderness: There is no abdominal tenderness. There is no guarding or rebound.   Musculoskeletal:         General: No deformity. Normal range of motion.      Cervical back: Normal range of motion and neck supple.   Lymphadenopathy:      Cervical: No cervical adenopathy.   Skin:     General: Skin is warm and dry.      Findings: No rash.   Neurological:      Mental Status: She is alert and oriented to person, place, and time.      Cranial Nerves: No cranial nerve deficit.      Sensory: No sensory deficit.   Psychiatric:         Speech: Speech normal.         Behavior: Behavior normal.         Thought Content: Thought content normal.         Judgment: Judgment normal.         Procedures           ED Course                HEART Score: 0                              Medical Decision Making  Differential diagnosis includes chest wall pain, shingles, rib fracture, pneumonia, other unspecified etiology.    Normal nonconcerning labs.    EKG independently interpreted social sinus rhythm without acute ischemic changes.    CT scan of the chestReports new area of probable fat necrosis from the subcutaneous fat along the upper intermargin of the right breast  implant which may be palpable on exam.  I am unsure if this is contributing to current pain.  The patient will be advised to take Tylenol ibuprofen as needed for pain and follow-up with primary care physician for recheck in 1 week.    Problems Addressed:  Acute chest wall pain: complicated acute illness or injury with systemic symptoms    Amount and/or Complexity of Data Reviewed  Independent Historian: spouse     Details:  provides additional information.  External Data Reviewed: labs, radiology and ECG.  Labs: ordered. Decision-making details documented in ED Course.  Radiology: ordered and independent interpretation performed. Decision-making details documented in ED Course.  ECG/medicine tests: ordered and independent interpretation performed. Decision-making details documented in ED Course.    Risk  Prescription drug management.        Final diagnoses:   Acute chest wall pain       ED Disposition  ED Disposition       ED Disposition   Discharge    Condition   Stable    Comment   --               Gabby Fernandez PA-C  [address] [facility] [address]  [phone]    In 1 week           Medication List      No changes were made to your prescriptions during this visit.            Sandeep Harrington MD  03/21/24 [id number]

## 2024-03-21 NOTE — Clinical Note
[facility] EMERGENCY DEPARTMENT  [address]  [facility] [address]  Phone: [phone]    Angie Sutherland was seen and treated in our emergency department on 3/21/2024.  She may return to work on 03/25/2024.         Thank you for choosing [facility].    Sandeep Harrington MD

## 2024-03-21 NOTE — DISCHARGE INSTRUCTIONS
Take Tylenol or ibuprofen to help with pain.    Follow-up with primary care physician for recheck in 1 week.

## 2024-03-22 LAB
QT INTERVAL: 362 MS
QTC INTERVAL: 417 MS

## 2024-03-25 ENCOUNTER — APPOINTMENT (OUTPATIENT)
Dept: GENERAL RADIOLOGY | Facility: HOSPITAL | Age: 29
End: 2024-03-25
Payer: MEDICARE

## 2024-03-25 ENCOUNTER — APPOINTMENT (OUTPATIENT)
Dept: CT IMAGING | Facility: HOSPITAL | Age: 29
End: 2024-03-25
Payer: MEDICARE

## 2024-03-25 ENCOUNTER — HOSPITAL ENCOUNTER (EMERGENCY)
Facility: HOSPITAL | Age: 29
Discharge: HOME | End: 2024-03-25
Attending: EMERGENCY MEDICINE | Admitting: EMERGENCY MEDICINE
Payer: MEDICARE

## 2024-03-25 VITALS
HEIGHT: 61 IN | SYSTOLIC BLOOD PRESSURE: 111 MMHG | WEIGHT: 140 LBS | DIASTOLIC BLOOD PRESSURE: 72 MMHG | TEMPERATURE: 98.4 F | OXYGEN SATURATION: 98 % | RESPIRATION RATE: 18 BRPM | HEART RATE: 79 BPM | BODY MASS INDEX: 26.43 KG/M2

## 2024-03-25 DIAGNOSIS — J90 PLEURISY WITH EFFUSION: Primary | ICD-10-CM

## 2024-03-25 DIAGNOSIS — J18.9 PNEUMONIA OF RIGHT UPPER LOBE DUE TO INFECTIOUS ORGANISM: ICD-10-CM

## 2024-03-25 LAB
ALBUMIN SERPL-MCNC: 4 G/DL (ref 3.5–5.2)
ALBUMIN/GLOB SERPL: 1.3 G/DL
ALP SERPL-CCNC: 68 U/L (ref 39–117)
ALT SERPL W P-5'-P-CCNC: 13 U/L (ref 1–33)
ANION GAP SERPL CALCULATED.3IONS-SCNC: 6 MMOL/L (ref 5–15)
AST SERPL-CCNC: 18 U/L (ref 1–32)
B-HCG UR QL: NEGATIVE
BASOPHILS # BLD AUTO: 0.03 10*3/MM3 (ref 0–0.2)
BASOPHILS NFR BLD AUTO: 0.5 % (ref 0–1.5)
BILIRUB SERPL-MCNC: 0.2 MG/DL (ref 0–1.2)
BUN SERPL-MCNC: 13 MG/DL (ref 6–20)
BUN/CREAT SERPL: 18.3 (ref 7–25)
CALCIUM SPEC-SCNC: 8.5 MG/DL (ref 8.6–10.5)
CHLORIDE SERPL-SCNC: 99 MMOL/L (ref 98–107)
CO2 SERPL-SCNC: 26 MMOL/L (ref 22–29)
CREAT SERPL-MCNC: 0.71 MG/DL (ref 0.57–1)
D DIMER PPP FEU-MCNC: 0.7 MCGFEU/ML (ref 0–0.5)
DEPRECATED RDW RBC AUTO: 40.6 FL (ref 37–54)
EGFRCR SERPLBLD CKD-EPI 2021: 118.2 ML/MIN/1.73
EOSINOPHIL # BLD AUTO: 0.3 10*3/MM3 (ref 0–0.4)
EOSINOPHIL NFR BLD AUTO: 5.3 % (ref 0.3–6.2)
ERYTHROCYTE [DISTWIDTH] IN BLOOD BY AUTOMATED COUNT: 12.3 % (ref 12.3–15.4)
EXPIRATION DATE: NORMAL
GLOBULIN UR ELPH-MCNC: 3.2 GM/DL
GLUCOSE SERPL-MCNC: 122 MG/DL (ref 65–99)
HCT VFR BLD AUTO: 41.4 % (ref 34–46.6)
HGB BLD-MCNC: 13.3 G/DL (ref 12–15.9)
HOLD SPECIMEN: NORMAL
HOLD SPECIMEN: NORMAL
IMM GRANULOCYTES # BLD AUTO: 0.01 10*3/MM3 (ref 0–0.05)
IMM GRANULOCYTES NFR BLD AUTO: 0.2 % (ref 0–0.5)
INTERNAL NEGATIVE CONTROL: NORMAL
INTERNAL POSITIVE CONTROL: NORMAL
LIPASE SERPL-CCNC: 53 U/L (ref 13–60)
LYMPHOCYTES # BLD AUTO: 2.24 10*3/MM3 (ref 0.7–3.1)
LYMPHOCYTES NFR BLD AUTO: 39.8 % (ref 19.6–45.3)
Lab: NORMAL
MCH RBC QN AUTO: 29.2 PG (ref 26.6–33)
MCHC RBC AUTO-ENTMCNC: 32.1 G/DL (ref 31.5–35.7)
MCV RBC AUTO: 91 FL (ref 79–97)
MONOCYTES # BLD AUTO: 0.45 10*3/MM3 (ref 0.1–0.9)
MONOCYTES NFR BLD AUTO: 8 % (ref 5–12)
NEUTROPHILS NFR BLD AUTO: 2.6 10*3/MM3 (ref 1.7–7)
NEUTROPHILS NFR BLD AUTO: 46.2 % (ref 42.7–76)
NRBC BLD AUTO-RTO: 0 /100 WBC (ref 0–0.2)
NT-PROBNP SERPL-MCNC: <36 PG/ML (ref 0–450)
PLATELET # BLD AUTO: 227 10*3/MM3 (ref 140–450)
PMV BLD AUTO: 9.6 FL (ref 6–12)
POTASSIUM SERPL-SCNC: 3.7 MMOL/L (ref 3.5–5.2)
PROT SERPL-MCNC: 7.2 G/DL (ref 6–8.5)
QT INTERVAL: 368 MS
QTC INTERVAL: 429 MS
RBC # BLD AUTO: 4.55 10*6/MM3 (ref 3.77–5.28)
SODIUM SERPL-SCNC: 131 MMOL/L (ref 136–145)
TROPONIN T SERPL HS-MCNC: <6 NG/L
WBC NRBC COR # BLD AUTO: 5.63 10*3/MM3 (ref 3.4–10.8)
WHOLE BLOOD HOLD COAG: NORMAL
WHOLE BLOOD HOLD SPECIMEN: NORMAL

## 2024-03-25 PROCEDURE — 71045 X-RAY EXAM CHEST 1 VIEW: CPT

## 2024-03-25 PROCEDURE — 84484 ASSAY OF TROPONIN QUANT: CPT | Performed by: EMERGENCY MEDICINE

## 2024-03-25 PROCEDURE — 93005 ELECTROCARDIOGRAM TRACING: CPT | Performed by: EMERGENCY MEDICINE

## 2024-03-25 PROCEDURE — 83690 ASSAY OF LIPASE: CPT | Performed by: EMERGENCY MEDICINE

## 2024-03-25 PROCEDURE — 81025 URINE PREGNANCY TEST: CPT | Performed by: EMERGENCY MEDICINE

## 2024-03-25 PROCEDURE — 99285 EMERGENCY DEPT VISIT HI MDM: CPT

## 2024-03-25 PROCEDURE — 80053 COMPREHEN METABOLIC PANEL: CPT | Performed by: EMERGENCY MEDICINE

## 2024-03-25 PROCEDURE — 71275 CT ANGIOGRAPHY CHEST: CPT

## 2024-03-25 PROCEDURE — 25510000001 IOPAMIDOL PER 1 ML: Performed by: EMERGENCY MEDICINE

## 2024-03-25 PROCEDURE — 85025 COMPLETE CBC W/AUTO DIFF WBC: CPT | Performed by: EMERGENCY MEDICINE

## 2024-03-25 PROCEDURE — 85379 FIBRIN DEGRADATION QUANT: CPT | Performed by: EMERGENCY MEDICINE

## 2024-03-25 PROCEDURE — 25010000002 KETOROLAC TROMETHAMINE PER 15 MG: Performed by: EMERGENCY MEDICINE

## 2024-03-25 PROCEDURE — 96374 THER/PROPH/DIAG INJ IV PUSH: CPT

## 2024-03-25 PROCEDURE — 83880 ASSAY OF NATRIURETIC PEPTIDE: CPT | Performed by: EMERGENCY MEDICINE

## 2024-03-25 RX ORDER — SODIUM CHLORIDE 0.9 % (FLUSH) 0.9 %
10 SYRINGE (ML) INJECTION AS NEEDED
Status: DISCONTINUED | OUTPATIENT
Start: 2024-03-25 | End: 2024-03-26 | Stop reason: HOSPADM

## 2024-03-25 RX ORDER — ASPIRIN 81 MG/1
324 TABLET, CHEWABLE ORAL ONCE
Status: DISCONTINUED | OUTPATIENT
Start: 2024-03-25 | End: 2024-03-25

## 2024-03-25 RX ORDER — KETOROLAC TROMETHAMINE 15 MG/ML
15 INJECTION, SOLUTION INTRAMUSCULAR; INTRAVENOUS ONCE
Status: COMPLETED | OUTPATIENT
Start: 2024-03-25 | End: 2024-03-25

## 2024-03-25 RX ORDER — DOXYCYCLINE 100 MG/1
100 CAPSULE ORAL 2 TIMES DAILY
Qty: 10 CAPSULE | Refills: 0 | Status: SHIPPED | OUTPATIENT
Start: 2024-03-25 | End: 2024-03-30

## 2024-03-25 RX ORDER — DOXYCYCLINE 100 MG/1
100 CAPSULE ORAL ONCE
Status: COMPLETED | OUTPATIENT
Start: 2024-03-25 | End: 2024-03-25

## 2024-03-25 RX ORDER — ACETAMINOPHEN 500 MG
1000 TABLET ORAL ONCE
Status: DISCONTINUED | OUTPATIENT
Start: 2024-03-25 | End: 2024-03-26 | Stop reason: HOSPADM

## 2024-03-25 RX ADMIN — KETOROLAC TROMETHAMINE 15 MG: 15 INJECTION, SOLUTION INTRAMUSCULAR; INTRAVENOUS at 21:51

## 2024-03-25 RX ADMIN — DOXYCYCLINE 100 MG: 100 CAPSULE ORAL at 23:20

## 2024-03-25 RX ADMIN — IOPAMIDOL 84 ML: 755 INJECTION, SOLUTION INTRAVENOUS at 22:51

## 2024-03-26 LAB — HOLD SPECIMEN: NORMAL

## 2024-03-26 NOTE — DISCHARGE INSTRUCTIONS
Tonight you were diagnosed with pneumonia and pleurisy in the emergency department.  Prescribed antibiotic doxycycline for treatment of infection. I recommend you take Tylenol 650 mg every 6 hours and ibuprofen 400 mg every 6 hours as needed for pain and to bring down inflammation related to pleurisy.  You may take prescribed tramadol as needed for more severe pain.  Call to schedule a close follow-up appointment with your primary care doctor.  Return to the ER as needed for new or worsening symptoms

## 2024-03-26 NOTE — ED PROVIDER NOTES
Subjective   History of Present Illness  Patient presents for evaluation of pain on the right side of her chest.  It started about 4 to 5 days ago.  Started on the right lower chest and now it spread to the right upper chest and radiates towards her back.  Her pain is worse with deep breathing.  She has never had pain like this before.  She has been taking tramadol as prescribed by a previous emergency physician but is not having any relief.  She tried a warm compress without much relief either.  No fevers, chills, cough.    History provided by:  Patient      Review of Systems    Past Medical History:   Diagnosis Date    Anxiety     Atypical squamous cell changes of undetermined significance (ASCUS) on cervical cytology with negative high risk human papilloma virus (HPV) test result 09/17/2015    Breast cancer 2017    [id number] (stage IIIB) invasive ductal carcinoma of the right breast, ER-, [place] weakly +, Her2 3+.    Depression     Drug therapy 2018    Eczema     GERD (gastroesophageal reflux disease)     during chemo    Hx of radiation therapy 2018    right    Migraine     Ovarian cyst     Prediabetes     Pregnancy, incidental     3/2/2016: 14 weeks pregnant. She was seen in 2013 during pregnancy and received counselling and glucometer. She was lost to f/u but states baby had no complications - weighed 7lb1oz    Wears glasses        Allergies   Allergen Reactions    Benadryl [Diphenhydramine] Shortness Of Breath and Diarrhea    Latex Unknown - Low Severity     HAS SOME IRRITATION AFTER CATHETER INSERTION       Past Surgical History:   Procedure Laterality Date    AUGMENTATION MAMMAPLASTY Bilateral 01/2019    with fat grafting    BREAST BIOPSY      BREAST CAPSULOTOMY, IMPLANT REVISION Bilateral 1/14/2019    Procedure: EXCHANGE BILATERAL EXPANDERS TO HIGHLY COHESIVE SILICONE IMPLANTS, BILATERAL CAPSULECTOMIES;  Surgeon: Delia Covarrubias MD;  Location: [facility];  Service: Plastics    BREAST RECONSTRUCTION, BREAST  TISSUE EXPANDER INSERTION Bilateral 10/30/2017    Procedure: BREAST RECONSTRUCTION WITH ALLODERM, BREAST TISSUE EXPANDER INSERTION BILATERAL;  Surgeon: Delia Covarrubias MD;  Location:  [facility];  Service:      SECTION       SECTION N/A 2016    Procedure:  SECTION REPEAT;  Surgeon: Malika Munoz MD;  [facility];  Service:      SECTION N/A 2022    Procedure:  SECTION REPEAT;  Surgeon: Carolina Amos MD;  [facility];  Service: Obstetrics/Gynecology;  Laterality: N/A;    FAT GRAFTING Bilateral 2019    Procedure: RECONSTRUCTED BREAST REVISION WITH FAT GRAFTING;  Surgeon: Edgar Block MD;  Location:  [facility];  Service: Plastics    MASTECTOMY      MASTECTOMY W/ SENTINEL NODE BIOPSY Bilateral 10/30/2017    Procedure: BREAST MASTECTOMY BILATERAL WITH RIGHT SENTINEL NODE BIOPSY;  Surgeon: Carlo Paulino MD;  Location:  [facility];  Service:     VENOUS ACCESS DEVICE (PORT) INSERTION  2017    VENOUS ACCESS DEVICE (PORT) REMOVAL         Family History   Problem Relation Age of Onset    Arthritis Mother     Hypertension Mother     Diabetes Mother     Diabetes Brother     Ovarian cancer Maternal Grandmother     Breast cancer Neg Hx        Social History     Socioeconomic History    Marital status: Single   Tobacco Use    Smoking status: Never    Smokeless tobacco: Never   Vaping Use    Vaping status: Never Used   Substance and Sexual Activity    Alcohol use: Yes     Alcohol/week: 2.0 standard drinks of alcohol     Types: 2 Glasses of wine per week     Comment: occasional    Drug use: Yes     Types: Marijuana     Comment: OCCASIONALLY, last use 2019     Sexual activity: Defer           Objective   Physical Exam  Constitutional:       General: She is not in acute distress.  HENT:      Head: Normocephalic and atraumatic.   Eyes:      Conjunctiva/sclera: Conjunctivae normal.      Pupils: Pupils are equal,  round, and reactive to light.   Cardiovascular:      Rate and Rhythm: Normal rate and regular rhythm.      Pulses: Normal pulses.      Heart sounds: No murmur heard.     No gallop.   Pulmonary:      Effort: Pulmonary effort is normal. No respiratory distress.   Chest:      Chest wall: No tenderness.   Abdominal:      General: Abdomen is flat. There is no distension.      Tenderness: There is no abdominal tenderness. There is no guarding.   Musculoskeletal:         General: No swelling or deformity. Normal range of motion.      Right lower leg: No edema.      Left lower leg: No edema.   Skin:     General: Skin is warm and dry.      Capillary Refill: Capillary refill takes less than 2 seconds.   Neurological:      General: No focal deficit present.      Mental Status: She is alert and oriented to person, place, and time.   Psychiatric:         Mood and Affect: Mood normal.         Behavior: Behavior normal.         Procedures           ED Course  ED Course as of 03/26/24 0039   Mon Mar 25, 2024   2131 Twelve-lead ECG independently interpreted by myself demonstrates normal sinus rhythm, no ST segment elevation or depression [KB]   2207 Chest x-ray independently interpreted by myself demonstrates no acute cardiopulmonary abnormality. [KB]   2309 CT scan of the chest demonstrates no evidence of pulmonary embolism, nonspecific patchy opacity in the right lung apex with small right pleural effusion [KB]   Tue Mar 26, 2024   0038 Laboratory workup independently interpreted by myself notable only for positive D-dimer, no other substantial abnormalities.  Pregnancy test negative.  Single troponin within normal limits is adequate to rule out acute myocardial ischemia. [KB]      ED Course User Index  [KB] Todd Andrews MD                                             Medical Decision Making  Differential diagnosis includes pulmonary embolism, pleurisy, pneumonia, pneumothorax, acute coronary syndrome, chest wall strain.  Lab and  imaging studies were conducted.    Patient reassessed and feeling somewhat improved.  I believe patient's symptoms may be related to multiple diagnosis, possibly a developing pneumonia, effusion due to pleurisy.  He was counseled on anti-inflammatory use, treated with a course of antibiotics, was counseled to follow-up with primary doctor and also counseled on return precautions to the ER.  Discharged in good condition    Problems Addressed:  Pleurisy with effusion: complicated acute illness or injury  Pneumonia of right upper lobe due to infectious organism: complicated acute illness or injury    Amount and/or Complexity of Data Reviewed  External Data Reviewed: labs, radiology, ECG and notes.     Details: 3/21/2024 reviewed prior ER visit for chest pain where patient had normal CBC and CMP, a CT scan showing a small area of fat necrosis along the upper intermargin of her right breast implant, no other acute abnormalities  Labs: ordered. Decision-making details documented in ED Course.  Radiology: ordered and independent interpretation performed. Decision-making details documented in ED Course.  ECG/medicine tests: ordered and independent interpretation performed. Decision-making details documented in ED Course.    Risk  OTC drugs.  Prescription drug management.        Final diagnoses:   Pleurisy with effusion   Pneumonia of right upper lobe due to infectious organism       ED Disposition  ED Disposition       ED Disposition   Discharge    Condition   Stable    Comment   --             Recent Results (from the past 24 hour(s))   ECG 12 Lead ED Triage Standing Order; Chest Pain    Collection Time: 03/25/24  9:27 PM   Result Value Ref Range    QT Interval 368 ms    QTC Interval 429 ms   High Sensitivity Troponin T    Collection Time: 03/25/24  9:49 PM    Specimen: Blood   Result Value Ref Range    HS Troponin T <6 <14 ng/L   Comprehensive Metabolic Panel    Collection Time: 03/25/24  9:49 PM    Specimen: Blood    Result Value Ref Range    Glucose 122 (H) 65 - 99 mg/dL    BUN 13 6 - 20 mg/dL    Creatinine 0.71 0.57 - 1.00 mg/dL    Sodium 131 (L) 136 - 145 mmol/L    Potassium 3.7 3.5 - 5.2 mmol/L    Chloride 99 98 - 107 mmol/L    CO2 26.0 22.0 - 29.0 mmol/L    Calcium 8.5 (L) 8.6 - 10.5 mg/dL    Total Protein 7.2 6.0 - 8.5 g/dL    Albumin 4.0 3.5 - 5.2 g/dL    ALT (SGPT) 13 1 - 33 U/L    AST (SGOT) 18 1 - 32 U/L    Alkaline Phosphatase 68 39 - 117 U/L    Total Bilirubin 0.2 0.0 - 1.2 mg/dL    Globulin 3.2 gm/dL    A/G Ratio 1.3 g/dL    BUN/Creatinine Ratio 18.3 7.0 - 25.0    Anion Gap 6.0 5.0 - 15.0 mmol/L    eGFR 118.2 >60.0 mL/min/1.73   Lipase    Collection Time: 03/25/24  9:49 PM    Specimen: Blood   Result Value Ref Range    Lipase 53 13 - 60 U/L   BNP    Collection Time: 03/25/24  9:49 PM    Specimen: Blood   Result Value Ref Range    proBNP <36.0 0.0 - 450.0 pg/mL   Green Top (Gel)    Collection Time: 03/25/24  9:49 PM   Result Value Ref Range    Extra Tube Hold for add-ons.    Lavender Top    Collection Time: 03/25/24  9:49 PM   Result Value Ref Range    Extra Tube hold for add-on    Gold Top - SST    Collection Time: 03/25/24  9:49 PM   Result Value Ref Range    Extra Tube Hold for add-ons.    Light Blue Top    Collection Time: 03/25/24  9:49 PM   Result Value Ref Range    Extra Tube Hold for add-ons.    CBC Auto Differential    Collection Time: 03/25/24  9:49 PM    Specimen: Blood   Result Value Ref Range    WBC 5.63 3.40 - 10.80 10*3/mm3    RBC 4.55 3.77 - 5.28 10*6/mm3    Hemoglobin 13.3 12.0 - 15.9 g/dL    Hematocrit 41.4 34.0 - 46.6 %    MCV 91.0 79.0 - 97.0 fL    MCH 29.2 26.6 - 33.0 pg    MCHC 32.1 31.5 - 35.7 g/dL    RDW 12.3 12.3 - 15.4 %    RDW-SD 40.6 37.0 - 54.0 fl    MPV 9.6 6.0 - 12.0 fL    Platelets 227 140 - 450 10*3/mm3    Neutrophil % 46.2 42.7 - 76.0 %    Lymphocyte % 39.8 19.6 - 45.3 %    Monocyte % 8.0 5.0 - 12.0 %    Eosinophil % 5.3 0.3 - 6.2 %    Basophil % 0.5 0.0 - 1.5 %    Immature Grans %  0.2 0.0 - 0.5 %    Neutrophils, Absolute 2.60 1.70 - 7.00 10*3/mm3    Lymphocytes, Absolute 2.24 0.70 - 3.10 10*3/mm3    Monocytes, Absolute 0.45 0.10 - 0.90 10*3/mm3    Eosinophils, Absolute 0.30 0.00 - 0.40 10*3/mm3    Basophils, Absolute 0.03 0.00 - 0.20 10*3/mm3    Immature Grans, Absolute 0.01 0.00 - 0.05 10*3/mm3    nRBC 0.0 0.0 - 0.2 /100 WBC   D-dimer, Quantitative    Collection Time: 03/25/24  9:49 PM    Specimen: Blood   Result Value Ref Range    D-Dimer, Quantitative 0.70 (H) 0.00 - 0.50 MCGFEU/mL   POC Urine Pregnancy    Collection Time: 03/25/24 11:08 PM    Specimen: Urine   Result Value Ref Range    HCG, Urine, QL Negative Negative    Lot Number [id number]     Internal Positive Control Passed Positive, Passed    Internal Negative Control Passed Negative, Passed    Expiration Date 2025/01/29      Note: In addition to lab results from this visit, the labs listed above may include labs taken at another facility or during a different encounter within the last 24 hours. Please correlate lab times with ED admission and discharge times for further clarification of the services performed during this visit.    CT Angiogram Chest Pulmonary Embolism   Final Result   Impression:   No evidence of pulmonary embolism.      Nonspecific subtle patchy opacities in the right lung apex is unchanged and may represent scarring. An infectious/inflammatory process is not completely excluded. New trace right pleural effusion.            Electronically Signed: Kyle Keith DO     3/25/2024 10:57 PM EDT     Workstation ID: [id number]      XR Chest 1 View   Final Result   Impression:   No active cardiopulmonary disease         Electronically Signed: Lloyd Calhoun     3/25/2024 9:44 PM EDT     Workstation ID: [id number]        Vitals:    03/25/24 2305 03/25/24 2312 03/25/24 2316 03/25/24 2317   BP:       BP Location:       Patient Position:       Pulse: 75 79 77 79   Resp:       Temp:       TempSrc:       SpO2: 98%      Weight:        Height:         Medications   sodium chloride 0.9 % flush 10 mL (has no administration in time range)   acetaminophen (TYLENOL) tablet 1,000 mg (1,000 mg Oral Not Given 3/25/24 2154)   ketorolac (TORADOL) injection 15 mg (15 mg Intravenous Given 3/25/24 2151)   iopamidol (ISOVUE-370) 76 % injection 100 mL (84 mL Intravenous Given 3/25/24 2251)   doxycycline (MONODOX) capsule 100 mg (100 mg Oral Given 3/25/24 2320)     ECG/EMG Results (last 24 hours)       Procedure Component Value Units Date/Time    ECG 12 Lead ED Triage Standing Order; Chest Pain [[id number]] Collected: 03/25/24 2127     Updated: 03/25/24 2131     QT Interval 368 ms      QTC Interval 429 ms     Narrative:      Test Reason : ED Triage Standing Order~  Blood Pressure :   */*   mmHG  Vent. Rate :  82 BPM     Atrial Rate :  82 BPM     P-R Int : 166 ms          QRS Dur :  70 ms      QT Int : 368 ms       P-R-T Axes :  79  70  58 degrees     QTc Int : 429 ms    Normal sinus rhythm  Normal ECG  When compared with ECG of 21-MAR-2024 15:42, (Unconfirmed)  No significant change was found  Confirmed by MD PRADHAN KYLE ([id number]) on 3/25/2024 9:31:43 PM    Referred By:            Confirmed By: CHRISTIANO PRADHAN MD          ECG 12 Lead ED Triage Standing Order; Chest Pain   Final Result   Test Reason : ED Triage Standing Order~   Blood Pressure :   */*   mmHG   Vent. Rate :  82 BPM     Atrial Rate :  82 BPM      P-R Int : 166 ms          QRS Dur :  70 ms       QT Int : 368 ms       P-R-T Axes :  79  70  58 degrees      QTc Int : 429 ms      Normal sinus rhythm   Normal ECG   When compared with ECG of 21-MAR-2024 15:42, (Unconfirmed)   No significant change was found   Confirmed by MD PRADHAN KYLE ([id number]) on 3/25/2024 9:31:43 PM      Referred By:            Confirmed By: MD Jim ROJAS Isabella F, PA-C  [address] [id number] [phone]               Medication List        New Prescriptions      doxycycline 100 MG  capsule  Commonly known as: MONODOX  Take 1 capsule by mouth 2 (Two) Times a Day for 5 days.               Where to Get Your Medications        These medications were sent to [facility] [id number] - [place], [place] - [address]  [address] [facility] [address] [facility] [facility] [address] - [phone]  - [phone] [address] [facility] [address]      Phone: [phone]   doxycycline 100 MG capsule            Todd Andrews MD  03/26/24 0039

## 2024-03-29 LAB
QT INTERVAL: 362 MS
QTC INTERVAL: 417 MS

## 2024-04-01 ENCOUNTER — OFFICE VISIT (OUTPATIENT)
Dept: FAMILY MEDICINE CLINIC | Facility: CLINIC | Age: 29
End: 2024-04-01
Payer: MEDICARE

## 2024-04-01 ENCOUNTER — HOSPITAL ENCOUNTER (EMERGENCY)
Facility: HOSPITAL | Age: 29
Discharge: LEFT BEFORE DISCHARGE | End: 2024-04-01
Payer: MEDICARE

## 2024-04-01 VITALS
WEIGHT: 145.5 LBS | HEART RATE: 98 BPM | SYSTOLIC BLOOD PRESSURE: 104 MMHG | HEIGHT: 61 IN | OXYGEN SATURATION: 99 % | BODY MASS INDEX: 27.47 KG/M2 | DIASTOLIC BLOOD PRESSURE: 68 MMHG

## 2024-04-01 DIAGNOSIS — R07.9 RIGHT-SIDED CHEST PAIN: Primary | ICD-10-CM

## 2024-04-01 PROCEDURE — 99211 OFF/OP EST MAY X REQ PHY/QHP: CPT

## 2024-04-01 NOTE — PROGRESS NOTES
"    Office Note     Name: Angie Sutherland    : 1995     MRN: [id number]     Chief Complaint  Hospital Follow Up Visit (Pt was in the hospital for chest pain. Pt was told that they have inflammation and fluid on right lung, she took antibiotics and pain meds, once finished with antibiotics the pain has now come back. ) and Wrist Pain (Right wrist pain for 2 weeks, hurts to move. )    Subjective     History of Present Illness:  Angie Sutherland is a 28 y.o. female who presents today for hospital follow-up.  She has a past medical history of breast cancer on the right breast with treatment of chemotherapy and radiation in 2018.   Patient was seen at [facility] ER on 3/25/2024 for pleurisy with effusion and possible right lower quadrant pneumonia.  On CT they found trace right pleural effusion.  She was started on doxycycline twice daily and tramadol and improved for a couple of days.  Patient states when she stopped taking the antibiotics the pain returned.  Patient states she has just pain all the time.  She states it is not worse with deep breaths.  She states that when she is laying down in bed she cannot get comfortable as \"everywhere hurts.\"  She states \"it hurts all the way through my chest to my back.\" Patient describes the pain as a \"pulsating squeezing pain.\"  Pain is only localized to the right side of her chest.  She states she has been having increasing shortness of breath which causes her anxiety to worsen because she is scared that she cannot breathe.  Patient states that pain is not worse with exertion, but stays the same all the time.Antiinflammatories helped some, but does not always. She denies any palpitations.  Denies headache or visual changes. Denies fevers, chills. She state she was nauseous yesterday, but denies abdominal pain. She states she wanted to come here before going back to the ER.   Patient states she has a family history that includes pulmonary embolisms, but is " unaware of any genetic clotting disorders.           Review of Systems:   Review of Systems   Constitutional:  Negative for chills, diaphoresis and fever.   HENT:  Negative for congestion, ear pain, rhinorrhea and sore throat.    Eyes:  Negative for visual disturbance.   Respiratory:  Positive for chest tightness and shortness of breath. Negative for cough.    Cardiovascular:  Positive for chest pain. Negative for palpitations and leg swelling.   Gastrointestinal:  Positive for nausea. Negative for vomiting.   Neurological:  Negative for numbness, headache and confusion.   Psychiatric/Behavioral:  The patient is nervous/anxious.        Past Medical History:   Past Medical History:   Diagnosis Date    Anxiety     Atypical squamous cell changes of undetermined significance (ASCUS) on cervical cytology with negative high risk human papilloma virus (HPV) test result 09/17/2015    Breast cancer 2017    [id number] (stage IIIB) invasive ductal carcinoma of the right breast, ER-, [place] weakly +, Her2 3+.    Depression     Drug therapy 2018    Eczema     GERD (gastroesophageal reflux disease)     during chemo    Hx of radiation therapy 2018    right    Migraine     Ovarian cyst     Prediabetes     Pregnancy, incidental     3/2/2016: 14 weeks pregnant. She was seen in 2013 during pregnancy and received counselling and glucometer. She was lost to f/u but states baby had no complications - weighed 7lb1oz    Wears glasses        Past Surgical History:   Past Surgical History:   Procedure Laterality Date    AUGMENTATION MAMMAPLASTY Bilateral 01/2019    with fat grafting    BREAST BIOPSY      BREAST CAPSULOTOMY, IMPLANT REVISION Bilateral 1/14/2019    Procedure: EXCHANGE BILATERAL EXPANDERS TO HIGHLY COHESIVE SILICONE IMPLANTS, BILATERAL CAPSULECTOMIES;  Surgeon: Delia Covarrubias MD;  Location: [facility];  Service: Plastics    BREAST RECONSTRUCTION, BREAST TISSUE EXPANDER INSERTION Bilateral 10/30/2017    Procedure: BREAST  RECONSTRUCTION WITH ALLODERM, BREAST TISSUE EXPANDER INSERTION BILATERAL;  Surgeon: Delia Covarrubias MD;  Location:  [facility];  Service:      SECTION       SECTION N/A 2016    Procedure:  SECTION REPEAT;  Surgeon: Malika Munoz MD;  [facility];  Service:      SECTION N/A 2022    Procedure:  SECTION REPEAT;  Surgeon: Carolina Amos MD;  [facility];  Service: Obstetrics/Gynecology;  Laterality: N/A;    FAT GRAFTING Bilateral 2019    Procedure: RECONSTRUCTED BREAST REVISION WITH FAT GRAFTING;  Surgeon: Edgar Block MD;  Location:  [facility];  Service: Plastics    MASTECTOMY      MASTECTOMY W/ SENTINEL NODE BIOPSY Bilateral 10/30/2017    Procedure: BREAST MASTECTOMY BILATERAL WITH RIGHT SENTINEL NODE BIOPSY;  Surgeon: Carlo Paulino MD;  Location:  [facility];  Service:     VENOUS ACCESS DEVICE (PORT) INSERTION  2017    VENOUS ACCESS DEVICE (PORT) REMOVAL         Family History:   Family History   Problem Relation Age of Onset    Arthritis Mother     Hypertension Mother     Diabetes Mother     Diabetes Brother     Ovarian cancer Maternal Grandmother     Breast cancer Neg Hx        Social History:   Social History     Socioeconomic History    Marital status: Single   Tobacco Use    Smoking status: Never    Smokeless tobacco: Never   Vaping Use    Vaping status: Never Used   Substance and Sexual Activity    Alcohol use: Yes     Alcohol/week: 2.0 standard drinks of alcohol     Types: 2 Glasses of wine per week     Comment: occasional    Drug use: Yes     Types: Marijuana     Comment: OCCASIONALLY, last use 2019     Sexual activity: Defer       Immunizations:   Immunization History   Administered Date(s) Administered    HPV Quadrivalent 2018    Tdap 2018        Medications:     Current Outpatient Medications:     traMADol (ULTRAM) 50 MG tablet, Take 1 tablet by mouth Every 6 (Six) Hours As  "Needed for Moderate Pain or Severe Pain for up to 12 doses. (Patient not taking: Reported on 4/1/2024), Disp: 12 tablet, Rfl: 0    Allergies:   Allergies   Allergen Reactions    Benadryl [Diphenhydramine] Shortness Of Breath and Diarrhea    Latex Unknown - Low Severity     HAS SOME IRRITATION AFTER CATHETER INSERTION       Objective     Vital Signs  /68   Pulse 98   Ht 154.9 cm (60.98\")   Wt 66 kg (145 lb 8 oz)   SpO2 99%   BMI 27.51 kg/m²   Estimated body mass index is 27.51 kg/m² as calculated from the following:    Height as of this encounter: 154.9 cm (60.98\").    Weight as of this encounter: 66 kg (145 lb 8 oz).        Physical Exam  Constitutional:       Appearance: Normal appearance.   HENT:      Head: Normocephalic and atraumatic.      Right Ear: Tympanic membrane and ear canal normal.      Left Ear: Tympanic membrane and ear canal normal.      Nose: Nose normal.      Mouth/Throat:      Mouth: Mucous membranes are moist.      Pharynx: No posterior oropharyngeal erythema.   Eyes:      Pupils: Pupils are equal, round, and reactive to light.   Cardiovascular:      Rate and Rhythm: Regular rhythm. Tachycardia present.      Pulses: Normal pulses.      Heart sounds: Normal heart sounds.   Pulmonary:      Effort: Pulmonary effort is normal.      Breath sounds: No wheezing.      Comments: Decreased breath sounds on right lung compared to left in all lobes.   Abdominal:      General: Abdomen is flat. Bowel sounds are normal.      Tenderness: There is no abdominal tenderness.   Skin:     General: Skin is warm.   Neurological:      General: No focal deficit present.      Mental Status: She is alert and oriented to person, place, and time.   Psychiatric:         Mood and Affect: Mood normal.         Results:  No results found for this or any previous visit (from the past 24 hour(s)).     Assessment and Plan     Assessment/Plan:  Diagnoses and all orders for this visit:    1. Right-sided chest pain " (Primary)  Assessment & Plan:  After patient's physical exam and presentation today I did recommend that she go to the ER to be reevaluated as her symptoms have returned and have not improved.  She had decreased breath sounds on the right side where her effusion had been on the CT.  With her history of being treated for breast cancer and family history she is at higher risk for developing blood clots. I am also concerned for possible worsening effusion.   Patient states she will go to [facility] ER today for reevaluation.  She verbalizes understanding and agrees to this plan.          Follow Up  Return if symptoms worsen or fail to improve.    Gabby Fernandez PA-C   [facility]

## 2024-04-02 NOTE — ASSESSMENT & PLAN NOTE
After patient's physical exam and presentation today I did recommend that she go to the ER to be reevaluated as her symptoms have returned and have not improved.  She had decreased breath sounds on the right side where her effusion had been on the CT.  With her history of being treated for breast cancer and family history she is at higher risk for developing blood clots. I am also concerned for possible worsening effusion.   Patient states she will go to [facility] ER today for reevaluation.  She verbalizes understanding and agrees to this plan.

## 2024-04-08 ENCOUNTER — TELEPHONE (OUTPATIENT)
Dept: FAMILY MEDICINE CLINIC | Facility: CLINIC | Age: 29
End: 2024-04-08

## 2024-04-11 ENCOUNTER — OFFICE VISIT (OUTPATIENT)
Age: 29
End: 2024-04-11
Payer: MEDICARE

## 2024-04-11 ENCOUNTER — READMISSION MANAGEMENT (OUTPATIENT)
Dept: CALL CENTER | Facility: HOSPITAL | Age: 29
End: 2024-04-11
Payer: MEDICARE

## 2024-04-11 DIAGNOSIS — F90.2 ATTENTION DEFICIT HYPERACTIVITY DISORDER (ADHD), COMBINED TYPE: Primary | ICD-10-CM
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
## 2024-04-11 NOTE — OUTREACH NOTE
Prep Survey      Flowsheet Row Responses   [facility] discharged [facility]? Non-[facility]   Is LACE score < 7 ? Non- Discharge   Eligibility [facility]   Date of Admission 04/08/24   Date of Discharge 04/10/24   Discharge Disposition Home or Self Care   Discharge diagnosis Chest pain   Does the patient have one of the following disease processes/diagnoses(primary or secondary)? Other   Prep survey completed? Yes            Marisela QUINTANA - Registered Nurse

## 2024-04-12 ENCOUNTER — TRANSITIONAL CARE MANAGEMENT TELEPHONE ENCOUNTER (OUTPATIENT)
Dept: CALL CENTER | Facility: HOSPITAL | Age: 29
End: 2024-04-12
Payer: MEDICARE

## 2024-04-12 NOTE — OUTREACH NOTE
Call Center TCM Note      Flowsheet Row Responses   [facility] patient discharged from? Non-  [[facility]]   Does the patient have one of the following disease processes/diagnoses(primary or secondary)? Other   TCM attempt successful? Yes   Call start time 1225   Call end time 1228   Discharge diagnosis Chest pain   Person spoke with today (if not patient) and relationship Patient   Meds reviewed with patient/caregiver? Yes   Does the patient have all medications ordered at discharge? Yes   Is the patient taking all medications as directed (includes completed medication regime)? Yes   Medication comments patient will bring discharge med list to appt on Monday   Comments PCP [facility]. Hospital follow up appt scheduled for Monday 4/15  230pm with PCP.   Does the patient have an appointment with their PCP within 7-14 days of discharge? No   Nursing Interventions Assisted patient with making appointment per protocol, Routed TCM call to PCP office   Has home health visited the patient within 72 hours of discharge? N/A   Psychosocial issues? No   Did the patient receive a copy of their discharge instructions? Yes   Nursing interventions Reviewed instructions with patient   What is the patient's perception of their health status since discharge? Improving   Is the patient/caregiver able to teach back signs and symptoms related to disease process for when to call PCP? Yes   Is the patient/caregiver able to teach back signs and symptoms related to disease process for when to call 911? Yes   Is the patient/caregiver able to teach back the hierarchy of who to call/visit for symptoms/problems? PCP, Specialist, Home health nurse, Urgent Care, ED, 911 Yes   If the patient is a current smoker, are they able to teach back resources for cessation? Not a smoker   TCM call completed? Yes   Call end time 1228   Would this patient benefit from a Referral to [facility]? No   Is the patient interested in  additional calls from an ambulatory ? No            Swati Kaur RN    4/12/2024, 12:29 EDT

## 2024-04-15 ENCOUNTER — OFFICE VISIT (OUTPATIENT)
Dept: FAMILY MEDICINE CLINIC | Facility: CLINIC | Age: 29
End: 2024-04-15
Payer: MEDICARE

## 2024-04-15 VITALS
WEIGHT: 149.8 LBS | DIASTOLIC BLOOD PRESSURE: 68 MMHG | HEIGHT: 61 IN | SYSTOLIC BLOOD PRESSURE: 116 MMHG | OXYGEN SATURATION: 98 % | HEART RATE: 117 BPM | BODY MASS INDEX: 28.28 KG/M2

## 2024-04-15 DIAGNOSIS — F90.2 ATTENTION DEFICIT HYPERACTIVITY DISORDER (ADHD), COMBINED TYPE: ICD-10-CM

## 2024-04-15 DIAGNOSIS — R73.03 PREDIABETES: Chronic | ICD-10-CM

## 2024-04-15 DIAGNOSIS — R07.9 RIGHT-SIDED CHEST PAIN: Primary | ICD-10-CM

## 2024-04-15 PROBLEM — R07.89 OTHER CHEST PAIN: Status: ACTIVE | Noted: 2024-04-15

## 2024-04-15 PROCEDURE — 99213 OFFICE O/P EST LOW 20 MIN: CPT

## 2024-04-15 PROCEDURE — 1159F MED LIST DOCD IN RCRD: CPT

## 2024-04-15 PROCEDURE — 1111F DSCHRG MED/CURRENT MED MERGE: CPT

## 2024-04-15 PROCEDURE — 1160F RVW MEDS BY RX/DR IN RCRD: CPT

## 2024-04-15 RX ORDER — COLCHICINE 0.6 MG/1
0.6 TABLET ORAL
COMMUNITY
Start: 2024-04-10 | End: 2024-07-09

## 2024-04-15 RX ORDER — AZITHROMYCIN 500 MG/1
TABLET, FILM COATED ORAL
COMMUNITY
Start: 2024-04-02

## 2024-04-15 NOTE — ASSESSMENT & PLAN NOTE
A1c was 6% in the hospital considering her prediabetes.  Appears she has been prediabetic, once in a pregnancy in 2016.  Recommend dietary and lifestyle changes for now to help bring her blood sugar down.  Recommend 150 minutes of moderate intensity exercise per week.  Recommend the Mediterranean diet and low carb options for food.  Recommend low sugar beverages and drinking more water.  Will recheck A1c in 3 months.

## 2024-04-15 NOTE — ASSESSMENT & PLAN NOTE
Patient currently seeing a counselor for talk therapy, that her therapist believes that she needs to be back on her ADHD medication which she needs a separate referral for.

## 2024-04-15 NOTE — ASSESSMENT & PLAN NOTE
Have referred to cardiology for continued monitoring and workup.  Patient's troponins were trending down when in the hospital.  She is no longer having symptoms.  Patient to notify me of any return of symptoms.  Recommend continuing ibuprofen and colchicine at this time.  Recommended not to take with any other NSAIDs.  And notify me of any issues with the medication.  She verbalized understanding and agreed to plan.      In terms of the fat necrosis on CT, recommend following up with her oncologist.  Patient states she will call make an appointment.

## 2024-04-15 NOTE — PROGRESS NOTES
"Transitional Care Follow Up Visit  Subjective     Angie Sutherland is a 28 y.o. female who presents for a transitional care management visit.    Within 48 business hours after discharge our office contacted her via telephone to coordinate her care and needs.      I reviewed and discussed the details of that call along with the discharge summary, hospital problems, inpatient lab results, inpatient diagnostic studies, and consultation reports with Angie.     Current outpatient and discharge medications have been reconciled for the patient.  Reviewed by: Gabby Fernandez PA-C          4/11/2024     9:37 AM   Date of TCM Phone Call   [facility]   Date of Admission 4/8/2024   Date of Discharge 4/10/2024   Discharge Disposition Home or Self Care     Risk for Readmission (LACE) No data recorded    History of Present Illness    Patient presents today following hospitalization for 1 month history of chest pain.  Patient was inpatient for 3 days approximately 1 week ago.  She was diagnosed with possible idiopathic pericarditis even though her EKG and other imaging was normal.  Patient was discharged on ibuprofen for 1 month and colchicine for 3 months.  Patient states that she is not having any chest pain anymore or shortness of breath. She states she feels better and is happy to have an answer even though the answer is vague.  Patient still does have some right lower sided chest \"discomfort\" under her right breast.  On 3/21 one of her  ER visits a CT of the chest did show \"New areas of probable fat necrosis involving the subcutaneous fat along the upper inner margin of a right breast implant which may be palpable on exam. No suspicious chest wall mass nor acute process identified.\"  She was advised to see her oncologist and PCP upon discharge for further evaluation of this.      Denies chest pain, shortness of breath, numbness or tingling.    Patient also states that she has had an increase in anxiety and " ADHD symptoms with her health concerns.  She states that her therapist recommended getting a separate referral for ADHD management. Denies SI or HI.      Course During Hospital Stay:    HPI forwarded from H&P:  Angie Sutherland is a 28 y.o. female with a past medical history of stage 3 breast cancer (diagnosed 2017, s/p bilateral mastectomy and chemotherapy, now in remission), prediabetes and marijuana user who presents with 1 month of chest pain, difficult to characterize. Aching in nature and nonexertional. Over the past month she's had 4 ER visits for this same problem and once was diagnosed with pleurisy. States she's been on two courses of antibiotics, the second course was with steroids and treatments did seem to help but pain would recur after completing course of treatment. Workup in the ED includes unremarkable cbc and cmp, troponin I HS 15-> 187, ekg nsr no overt ischemic changes,     ct abd/pel 4/8/24  1. The solid organs and kidneys are unremarkable.   2. Normal gallbladder and appendix.   3. Fluid in the endometrium with a small left ovarian cyst.   4. There is no free air, free fluid or inflammatory process.   5. Moderate gas and stool throughout the colon.      Cta chest pe protocol 4/8/24  1. Postoperative and posttherapy changes with no acute abnormalities.   2. There is no aneurysm, dissection, or pulmonary embolism.      She denies any history of heart disease or any recent cardiac workup.     Hospital Course:    Acute chest pain  Elevated troponin level  -presented with one month of chest pain  -troponin I HS 15-> 187 ->216 -> 117, ekg nsr no overt ischemic changes, no amarilis or pr depressions  -ct chest/abd/pel - no acute abnormality  -echo normal lvef, no pericardial effusion, essentially unremarkable  -The patient may have idiopathic pericarditis. Her pain is relieved by sitting forward, and troponin levels elevated. CRP was within normal limits, physical exam, EKG and echocardiogram without  indicators of pericarditis. She was started on ibuprofen and colchicine and will need to f/u with her PCP and her Oncologist      stage 3 breast cancer   -diagnosed 2017, s/p bilateral mastectomy and chemotherapy, now in remission. Primary Oncologist is Dr. Shadia Amos whom she sees annually.     Prediabetes  -a1c 6     Marijuana user    Studies Performed:  CT ABDOMEN/PELVIS WITH IV CONTRAST [[id number]] Collected: 04/08/24 1713   Order Status: Completed Updated: 04/08/24 1729   Narrative:     CT ABDOMEN AND PELVIS WITH IV CONTRAST     HISTORY:  History of breast cancer, chest pain, shortness of breath.     TECHNIQUE: Thin-section axial images from the lung bases through the   symphysis pubis were performed with the administration of intravenous   contrast. This study was performed with techniques to keep radiation   doses as low as reasonably achievable, (ALARA). Individualized dose   reduction techniques using automated exposure control or adjustment of   mA and/or kV according to the patient size were employed.     COMPARISON: 12/13/2011     FINDINGS: The lung bases are clear. The gallbladder is unremarkable. The   liver, spleen, pancreas, and adrenal glands demonstrate normal   enhancement. The organ contours are normal. There is no evidence of a   mass. The kidneys demonstrate symmetrical enhancement and excretion.   There are no renal stones or hydronephrosis. The renal contours are   normal. The aortic contours are normal. The stomach is unremarkable. The   duodenum appears unremarkable. The urinary bladder is unremarkable.   There is fluid within the endometrium. There is a small left ovarian   cyst which is likely physiologic. This measures 1.5 cm. There is rectus   diastases. There is no hernia identified. There is gas and stool   throughout the colon. The small bowel is unremarkable. There is no free   air or free fluid. There are no lytic or blastic bony abnormalities.   Impression:     1. The solid organs  "and kidneys are unremarkable.   2. Normal gallbladder and appendix.   3. Fluid in the endometrium with a small left ovarian cyst.   4. There is no free air, free fluid or inflammatory process.   5. Moderate gas and stool throughout the colon.     Images reviewed, interpreted, dictated and electronically signed by Desmond Morris MD     Voice transcription technology (Power Scribe) is used for the dictation   of this note and \"sound-alike\" words might be erroneously placed despite   reviewing this note for accuracy. Errors in dictation may reflect use of   voice recognition software and not all errors in transcription may have   been detected prior to signing.   CT chest for pulmonary embolus [[id number]] Collected: 04/08/24 1707   Order Status: Completed Updated: 04/08/24 1724   Narrative:     CT ANGIOGRAPHY OF THE CHEST: PE PROTOCOL     HISTORY: Elevated troponin, anterior chest pain, shortness of breath for   4 weeks, history of breast cancer.     TECHNIQUE: Thin-section axial images through the chest were performed   following the administration of intravenous contrast. Coronal oblique 3D   MIP images were performed and reviewed. This study was performed with   techniques to keep radiation doses as low as reasonably achievable,   (ALARA). Individualized dose reduction techniques using automated   exposure control or adjustment of mA and/or kV according to the patient   size were employed.     COMPARISON: None     FINDINGS: The thoracic inlet is unremarkable. The patient has undergone   breast reconstruction. Axillary clips are seen on the right. There is no   axillary, mediastinal, or hilar adenopathy. The aortic contours are   normal. There is no aneurysm or dissection. The heart is normal in size.   There are no pleural or pericardial effusions. Apical fibrotic changes   are noted on the right which are likely postradiation change related.   There is no edema. There is no pulmonary nodule or mass. There is mild " "  disc space narrowing of the thoracic spine. The facets overlap at all   levels. Spinous processes are unremarkable. There is no lytic or blastic   bony abnormality.   Impression:     1. Postoperative and posttherapy changes with no acute abnormalities.   2. There is no aneurysm, dissection, or pulmonary embolism.     Images reviewed, interpreted, dictated and electronically signed by Desmond Morris MD     Voice transcription technology (Power Scribe) is used for the dictation   of this note and \"sound-alike\" words might be erroneously placed despite   reviewing this note for accuracy. Errors in dictation may reflect use of   voice recognition software and not all errors in transcription may have   been detected prior to signing.   XR chest AP portable [[id number]] Collected: 24 1311   Order Status: Completed Updated: 24 1336   Narrative:     PORTABLE CHEST.    2024 10:46 AM     HISTORY: Precordial chest pain and pressure, breast cancer.     COMPARISON: 2024.     FINDINGS: The cardiac silhouette is normal in size. The mediastinum is   unremarkable. The lungs are clear. There is no pneumothorax.   Impression:     No acute cardiopulmonary process.     TRANSTHORACIC ECHOCARDIOGRAPHY REPORT  Demographics  Patient Name: LEAH CAIN : 1995  Medical Record Number: [id number] Age: 28 year(s)  Corporate ID Number: [id number] Gender Female  Account Number: [id number]  Sonographer: SOFIE VARELA RDCS Height: 61 inches  Referring Physician: MAX CALVO Weight: 145 pounds  Interpreting Physician: DAVEY RAMIREZ MD BMI: 27.4 kg/m^2  Date of Service: 2024 Blood Pressure: 111/68 mmHg  Room Number: 306  Type of Study:  TTE procedure: Echo Doppler Color Flow Velocity, Echo Pulsed +/OR  Continuous Wave, Echo 2D W/or W/O M-Mode Imaging , ECHO COMPLETE (DOPPLER  / COLOR) W OR WO CONTRAST.  HR: 78 bpmRhythm: Regular Patient Status: Routine IP  [facility] " [facility]  Indications:Elevated troponin.  Impression:  Indication: chest pain R07.9  Normal sized left ventricle.  Normal left ventricular wall thickness.  Normal left ventricular systolic function.  Ejection fraction 55%.  Normal diastolic function.  No hemodynamically significant valvular heart disease.  Measurements Summary:  LVEDd: 3.37 cm LVESd: 2.41 cm IVSEd: 0.79 cm  AO Root:2.1 cm LVPWd: 0.84 cm  Contractility Score  LV regional wall motion: (0-Not visualized 1-Normal 2-Hypokinesis  3-Akinesis 4-Dyskinesis 5-Aneurysm)  Left Ventricle  Peak E-wave: 0.61 m/s Peak A-wave: 0.52 m/s E/A ratio: 1.18  E' Velocity: 0.11 m/s Volume yzbzdywvd57.3 ml E/E' ratio:5.5  Volume [id number] ml  Normal sized left ventricle.  Normal left ventricular wall thickness.  Normal left ventricular systolic function.  Ejection fraction 55%.  Normal diastolic function.  Right Ventricle  Normal sized right ventricle.  Normal TAPSE c/w normal right ventricular function.  Left Atrium  LA area: 10.1 cm^2 LA volume:21 ml  Normal left atrial volume index 13ml/m2.  Intact atrial septum.  Right Atrium  Normal sized right atrium.  Intact atrial septum.  Mitral Valve  Deceleration time: 180 msec  Structurally normal mitral valve.  No mitral regurgitation.  No mitral stenosis.  Aortic Valve  Three cusped aortic valve  No aortic regurgitation.  No aortic stenosis.  Tricuspid Valve  Structurally normal tricuspid valve.  No tricuspid regurgitation.  No tricuspid stenosis.  Pulmonic Valve  Structurally normal pulmonic valve.  No pulmonic regurgitation.  No pulmonic stenosis.  Great Vessels  Aorta  Aortic Root: 2.1 cm  Visualized aorta is normal.  Normal aortic root.  No evidence of dissection.  Normal IVC with appropriate collapse.  Pericardium / Pleura  No pericardial effusion.  Other  Technically difficult study with limited acoustic windows due to implants.  History of breast cancer.    Procedures Performed: none    Discharge  Medications:    Your medication list     START taking these medications   Instructions Comments Quantity Refills   colchicine 0.6 mg tablet  Commonly known as: COLCRYS    Take 1 tablet (0.6 mg total) by mouth 2 (two) times daily for 90 days.  180 tablet  0    ibuprofen 200 MG tablet  Commonly known as: ADVIL,MOTRIN  Start taking on: April 10, 2024    Take 4 tablets (800 mg total) by mouth every 8 (eight) hours for 10 days, THEN 3 tablets (600 mg total) every 8 (eight) hours for 7 days, THEN 2 tablets (400 mg total) every 8 (eight) hours for 7 days, THEN 1 tablet (200 mg total) every 8 (eight) hours for 7 days.  246 tablet  0    pantoprazole 40 MG tablet  Commonly known as: PROTONIX    Take 1 tablet (40 mg total) by mouth daily.  31 tablet  0        Where to Get Your Medications     These medications were sent to [facility] [id number] [address]  AT [facility] [address] , [place] [id number]   Phone: [phone]   · colchicine 0.6 mg tablet  · ibuprofen 200 MG tablet  · pantoprazole 40 MG tablet        Physical Exam   General appearance: no acute distress  HEENT: normocephalic and atraumatic, EOMI, no conjunctival injection  Neck: symmetrical, trachea midline   Lungs: clear to auscultation bilaterally, symmetric chest rise  Heart: s1 and s2 normal, no murmur, gallop or rub  GI: soft and nontender  Musculoskeletal: no myoclonus, normal range of motion  Skin: warm and dry  Neurologic: alert and oriented    Discharge Instructions    Discharge Diet: regular  Discharge Activity: as tolerated  Discharge Follow UP:  Contact information for follow-up   YURIDIA Shah   Specialty: Physician Assistant   Relationship: PCP - General   [address] [place] [address]   Phone: [phone]     Next Steps: Follow up in 1 week(s)   Instructions: Follow up after hospitalization for chest pain, possibly pericarditis   Shadia Amos MD    Specialty: Hematology and Oncology   [facility]  [address] [facility]  [address]  [place] [address]   Phone: [phone]     Next Steps: Follow up in 2 week(s)   Instructions: f/u after hospitalization for chest pain, possibly pericarditis     The following portions of the patient's history were reviewed and updated as appropriate: allergies, current medications, past family history, past medical history, past social history, past surgical history, and problem list.    Review of Systems   Constitutional:  Negative for chills and fever.   HENT:  Negative for congestion and sore throat.    Respiratory:  Negative for cough, chest tightness and shortness of breath.    Cardiovascular:  Negative for chest pain and palpitations.   Gastrointestinal:  Negative for abdominal pain, constipation and diarrhea.   Musculoskeletal:  Negative for back pain.   Neurological:  Negative for light-headedness, numbness and headaches.   Psychiatric/Behavioral:  The patient is nervous/anxious.        Objective   Physical Exam  Vitals reviewed.   Constitutional:       General: She is not in acute distress.     Appearance: Normal appearance. She is not ill-appearing.   HENT:      Head: Normocephalic and atraumatic.      Nose: Nose normal.      Mouth/Throat:      Mouth: Mucous membranes are moist.      Pharynx: No posterior oropharyngeal erythema.   Eyes:      Pupils: Pupils are equal, round, and reactive to light.   Cardiovascular:      Rate and Rhythm: Normal rate and regular rhythm. Tachycardia present.      Pulses: Normal pulses.      Heart sounds: Normal heart sounds.   Pulmonary:      Effort: Pulmonary effort is normal.      Breath sounds: Normal breath sounds.   Abdominal:      General: Abdomen is flat. Bowel sounds are normal.   Skin:     General: Skin is warm.   Neurological:      General: No focal deficit present.      Mental Status: She is alert and oriented to person, place, and time.   Psychiatric:         Mood  and Affect: Mood normal.         Assessment & Plan   Problems Addressed this Visit       Prediabetes (Chronic)     A1c was 6% in the hospital considering her prediabetes.  Appears she has been prediabetic, once in a pregnancy in 2016.  Recommend dietary and lifestyle changes for now to help bring her blood sugar down.  Recommend 150 minutes of moderate intensity exercise per week.  Recommend the Mediterranean diet and low carb options for food.  Recommend low sugar beverages and drinking more water.  Will recheck A1c in 3 months.          Attention deficit hyperactivity disorder (ADHD), combined type     Patient currently seeing a counselor for talk therapy, that her therapist believes that she needs to be back on her ADHD medication which she needs a separate referral for.         Relevant Orders    Ambulatory Referral to Psychiatry    Other chest pain - Primary     Have referred to cardiology for continued monitoring and workup.  Patient's troponins were trending down when in the hospital.  She is no longer having symptoms.  Patient to notify me of any return of symptoms.  Recommend continuing ibuprofen and colchicine at this time.  Recommended not to take with any other NSAIDs.  And notify me of any issues with the medication.  She verbalized understanding and agreed to plan.      In terms of the fat necrosis on CT, recommend following up with her oncologist.  Patient states she will call make an appointment.            Diagnoses         Codes Comments    Right-sided chest pain    -  Primary ICD-10-CM: R07.9  ICD-9-CM: 786.50     Attention deficit hyperactivity disorder (ADHD), combined type     ICD-10-CM: F90.2  ICD-9-CM: 314.01     Prediabetes     ICD-10-CM: R73.03  ICD-9-CM: 790.29

## 2024-04-16 ENCOUNTER — OFFICE VISIT (OUTPATIENT)
Age: 29
End: 2024-04-16
Payer: MEDICARE

## 2024-04-16 DIAGNOSIS — F90.2 ATTENTION DEFICIT HYPERACTIVITY DISORDER (ADHD), COMBINED TYPE: Primary | ICD-10-CM

## 2024-04-16 PROCEDURE — 90834 PSYTX W PT 45 MINUTES: CPT

## 2024-04-16 NOTE — PROGRESS NOTES
"     Follow Up Adult Note     Date:2024   Client Name: Angie Sutherland  : 1995   MRN: [id number]   Time IN: 11:06am    Time OUT: 11:52am     Referring Provider: Gabby Fernandez PA-C    Chief Complaint:      ICD-10-CM ICD-9-CM   1. Attention deficit hyperactivity disorder (ADHD), combined type  F90.2 314.01        History of Present Illness:   Angie Sutherland is a 28 y.o. female who is being seen today for follow up individual psychotherapy counseling for ongoing difficulties managing anxiety and ADHD symptoms.  Pt shared that she is having difficulty managing household due to stress and lack of support provided by others.  Pt states that she is unable to \"relax\" and expressed difficulty knowing that relaxation would look like for her.  Pt states that her mind feels like \"chaos\" most days.        Objective     PHQ-9 Depression Screening  Little interest or pleasure in doing things? 3-->nearly every day   Feeling down, depressed, or hopeless? 2-->more than half the days   Trouble falling or staying asleep, or sleeping too much? 2-->more than half the days   Feeling tired or having little energy? 3-->nearly every day   Poor appetite or overeating? 1-->several days   Feeling bad about yourself - or that you are a failure or have let yourself or your family down? 3-->nearly every day   Trouble concentrating on things, such as reading the newspaper or watching television? 3-->nearly every day   Moving or speaking so slowly that other people could have noticed? Or the opposite - being so fidgety or restless that you have been moving around a lot more than usual? 3-->nearly every day   Thoughts that you would be better off dead, or of hurting yourself in some way? 0-->not at all   PHQ-9 Total Score 20   If you checked off any problems, how difficult have these problems made it for you to do your work, take care of things at home, or get along with other people? extremely difficult    "   MANDIE-7  Feeling nervous, anxious or on edge: Nearly every day  Not being able to stop or control worrying: More than half the days  Worrying too much about different things: Nearly every day  Trouble Relaxing: Nearly every day  Being so restless that it is hard to sit still: Nearly every day  Feeling afraid as if something awful might happen: Several days  Becoming easily annoyed or irritable: Nearly every day  MANDIE 7 Total Score: 18  If you checked any problems, how difficult have these problems made it for you to do your work, take care of things at home, or get along with other people: Very difficult      Medications:     Current Outpatient Medications:     azithromycin (ZITHROMAX) 500 MG tablet, , Disp: , Rfl:     colchicine 0.6 MG tablet, Take 1 tablet by mouth., Disp: , Rfl:     traMADol (ULTRAM) 50 MG tablet, Take 1 tablet by mouth Every 6 (Six) Hours As Needed for Moderate Pain or Severe Pain for up to 12 doses. (Patient not taking: Reported on 4/1/2024), Disp: 12 tablet, Rfl: 0    Allergies:   Allergies   Allergen Reactions    Benadryl [Diphenhydramine] Shortness Of Breath and Diarrhea    Latex Unknown - Low Severity     HAS SOME IRRITATION AFTER CATHETER INSERTION         Subjective     Mental Status Exam:   MENTAL STATUS EXAM   General Appearance:  Cleanly groomed and dressed  Eye Contact:  Good eye contact  Attitude:  Cooperative and polite  Motor Activity:  Normal gait, posture  Speech:  Normal rate, tone, volume  Mood and affect:  Normal, pleasant  Thought Process:  Logical  Associations/ Thought Content:  No delusions  Hallucinations:  None  Suicidal Ideations:  Not present  Homicidal Ideation:  Not present  Sensorium:  Alert  Orientation:  Place, situation, time and person  Immediate Recall, Recent, and Remote Memory:  Intact  Attention Span/ Concentration:  Good  Insight:  Good  Judgement:  Good  Reliability:  Good  Impulse Control:  Good       Client's Support Network Includes:   limited  social/family support    Functional Status: Mild impairment     Progress toward goal: Not at goal    Prognosis: Good with Ongoing Treatment     Assessment / Plan / Progress     Visit Diagnosis/Orders Placed This Visit:    ICD-10-CM ICD-9-CM   1. Attention deficit hyperactivity disorder (ADHD), combined type  F90.2 314.01        SUICIDE RISK ASSESSMENT/CSSRS:  1. Does client have thoughts of suicide? Patient denies  2. Does client have intent for suicide? Patient denies  3. Does client have a current plan for suicide? Patient denies   4. History of suicide attempts: Patient denies   5. Family history of suicide or attempts: Patient denies   6. History of violent behaviors towards others or property or thoughts of committing suicide: Patient denies   7. History of sexual aggression toward others: Patient denies   8. Access to firearms or weapons: Patient denies     PLAN:  Safety: No acute safety concerns  Risk Assessment: Risk of self-harm acutely is low. Risk of self-harm chronically is also low, but could be further elevated in the event of treatment noncompliance and/or AODA.    Treatment Plan/Goals & Progress: Continue supportive psychotherapy efforts and medications as indicated. Treatment options discussed during today's visit. Client ackowledged and verbally consented to continue with current treatment plan and was educated on the importance of compliance with treatment and follow-up appointments. Client seems reasonably able to adhere to treatment plan.      Assisted client in processing above session content; acknowledged and normalized client’s thoughts, feelings, and concerns.  Rationalized client's thought process regarding awareness of ruminating thoughts and lack of concentration available to focus on activities. Pt provided details surrounding pt's relationship with mother and events that have contributed to establishing boundaries.  Therapist and pt discussed how insight of past experiences can be  "helpful to understand our current responses to stressful situations or relationships with others. Pt acknowledged several events that have lead to feelings of disappointment and anger towards others when expectations have not been met or understood.  Therapist encouraged pt to consider how past defenses and boundaries can create issues with establishing new relationships or experiencing positive alternative events.  Therapist encouraged pt to spend time reflecting and further defining what \"support\" would look like in a relationship to assist with clarifying needs as well as possibilities in others.          Allowed client to freely discuss issues without interruption or judgement with unconditional positive regard, active listening skills, and empathy. Clinician provided a safe, confidential environment to facilitate the development of a positive therapeutic relationship and encouraged open, honest communication. Assisted client in identifying risk factors which would indicate the need for higher level of care including thoughts to harm self or others and/or self-harming behavior and encouraged client to contact this office, call 911, or present to the nearest emergency room should any of these events occur. Discussed crisis intervention services and means to access. Client adamantly and convincingly denies current suicidal or homicidal ideation or perceptual disturbance. Assisted client in processing session content; acknowledged and normalized client’s thoughts, feelings, and concerns by utilizing a person-centered approach in efforts to build appropriate rapport and a positive therapeutic relationship with open and honest communication.     Quality Measures:     TOBACCO USE:  Tobacco Use: Low Risk  (4/15/2024)    Patient History     Smoking Tobacco Use: Never     Smokeless Tobacco Use: Never     Passive Exposure: Not on file      Never smoker    I advised Angie of the risks of tobacco use.     Follow Up: "   Return in about 2 weeks (around 4/30/2024).    RADHA SalamancaW  [facility] [address]

## 2024-04-17 ENCOUNTER — TELEPHONE (OUTPATIENT)
Dept: ONCOLOGY | Facility: CLINIC | Age: 29
End: 2024-04-17
Payer: MEDICARE

## 2024-04-17 NOTE — TELEPHONE ENCOUNTER
I called patient back and told her that Dr. Amos had reviewed her records from [place] and there is no evidence of cancer recurrence.  We can see patient in the next month.  I asked patient if she could come on May 15 at 11:30am.  Patient stated she  could.  I scheduled appt.

## 2024-04-17 NOTE — TELEPHONE ENCOUNTER
Caller: Angie Sutherland    Relationship: Self    Best call back number: [phone]    What is the best time to reach you: ANYTIME    Who are you requesting to speak with (clinical staff, provider,  specific staff member): CLINICAL         What was the call regarding:     RECENTLY SEEN IN  [facility] 04/08-04/10  AND HAD A LOT OF STUFF GOING ON , SEVERAL TEST WERE RAN , SEEN FOR CHEST PAIN, AND HAD PLEURISY, AND HAD CT SCAN THAT SHOWED NECROSIS THAT COULD BE PROBLEMATIC AND ALSO DX WITH PERCARDIATIS    THEY WERE UNSURE WHAT WAS GOING ON  IF AND ADVISED DUE PREVIOUS BREAST CANCER NEEDED TO FOLLOW UP WITH ONCOLOGIST.         Is it okay if the provider responds through MyChart: NO

## 2024-04-22 ENCOUNTER — OFFICE VISIT (OUTPATIENT)
Age: 29
End: 2024-04-22
Payer: MEDICARE

## 2024-04-22 VITALS
BODY MASS INDEX: 28.28 KG/M2 | HEART RATE: 105 BPM | WEIGHT: 149.8 LBS | DIASTOLIC BLOOD PRESSURE: 74 MMHG | HEIGHT: 61 IN | OXYGEN SATURATION: 99 % | SYSTOLIC BLOOD PRESSURE: 116 MMHG

## 2024-04-22 DIAGNOSIS — Z13.39 ADHD (ATTENTION DEFICIT HYPERACTIVITY DISORDER) EVALUATION: ICD-10-CM

## 2024-04-22 DIAGNOSIS — Z51.81 ENCOUNTER FOR THERAPEUTIC DRUG MONITORING: ICD-10-CM

## 2024-04-22 DIAGNOSIS — F33.2 SEVERE EPISODE OF RECURRENT MAJOR DEPRESSIVE DISORDER, WITHOUT PSYCHOTIC FEATURES: Primary | ICD-10-CM

## 2024-04-22 RX ORDER — LAMOTRIGINE 25 MG/1
TABLET ORAL
Qty: 46 TABLET | Refills: 0 | Status: SHIPPED | OUTPATIENT
Start: 2024-04-22 | End: 2024-05-22

## 2024-04-22 NOTE — PROGRESS NOTES
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 [address]

## 2024-04-22 NOTE — PROGRESS NOTES
[facility] Cardiology    Encounter Date: 2024    Patient ID: Angie Sutherland is a 28 y.o. female.  : 1995     PCP: Gabby Fernandez PA-C       Chief Complaint: Chest Pain      PROBLEM LIST:  Chest pain  Echo with nomral LVE, no effision, unremarkable  History of gestational diabetes  Breast cancer    History of Present Illness: Angie Sutherland is a 28 y.o. female who presents to the cardiology office today by referral from PCP to be seen in consultation for possible pericarditis.    She was seen in our ED on 3/25/2024 with chest pain that was made worse with deep inspiration. Workup was unremarkable and she was discharged home with follow up with cardiology.     Today, patient reports that she has had multiple ED visits for the same issue.  At her most recent ED visit at [facility] she was found with elevated troponin trending up that ultimately trended down.  She did have an echocardiogram there that showed normal LVEF with no pericardial effusion.  She has not had any viral illnesses to her knowledge.  Has had breast cancer and is status post chemo and radiation and subsequent bilateral mastectomy.  She states that over the last couple of weeks her pain has been getting a little better.  She still have some discomfort with deep breaths and with lying flat but overall this has improved.  She was prescribed colchicine and NSAID by [facility] and patient did not wish to take this due to upset stomach and the need for a second opinion.  She states that she is willing to take the medicine if that is what she needs to do.  She will see her oncologist soon for further workup from an oncology standpoint.    Past Medical History:   Past Medical History:   Diagnosis Date    Anxiety     Atypical squamous cell changes of undetermined significance (ASCUS) on cervical cytology with negative high risk human papilloma virus (HPV) test result 2015    Breast  cancer 2017    [id number] (stage IIIB) invasive ductal carcinoma of the right breast, ER-, MA weakly +, Her2 3+.    Depression     Drug therapy 2018    Eczema     GERD (gastroesophageal reflux disease)     during chemo    Hx of radiation therapy 2018    right    Migraine     Ovarian cyst     Pleurisy     Prediabetes     Pregnancy, incidental     3/2/2016: 14 weeks pregnant. She was seen in 2013 during pregnancy and received counselling and glucometer. She was lost to f/u but states baby had no complications - weighed 7lb1oz    Wears glasses        Past Surgical History:   Past Surgical History:   Procedure Laterality Date    AUGMENTATION MAMMAPLASTY Bilateral 2019    with fat grafting    BREAST BIOPSY      BREAST CAPSULOTOMY, IMPLANT REVISION Bilateral 2019    Procedure: EXCHANGE BILATERAL EXPANDERS TO HIGHLY COHESIVE SILICONE IMPLANTS, BILATERAL CAPSULECTOMIES;  Surgeon: Delia Covarrubias MD;  Location:  [facility] OR;  Service: Plastics    BREAST RECONSTRUCTION, BREAST TISSUE EXPANDER INSERTION Bilateral 10/30/2017    Procedure: BREAST RECONSTRUCTION WITH ALLODERM, BREAST TISSUE EXPANDER INSERTION BILATERAL;  Surgeon: Dleia Covarrubias MD;  Location:  [facility] OR;  Service:      SECTION  2013     SECTION N/A 2016    Procedure:  SECTION REPEAT;  Surgeon: Malika Munoz MD;  Location: [facility] LABOR DELIVERY;  Service:      SECTION N/A 2022    Procedure:  SECTION REPEAT;  Surgeon: Carolina Amos MD;  Location:  [facility] LABOR DELIVERY;  Service: Obstetrics/Gynecology;  Laterality: N/A;    FAT GRAFTING Bilateral 2019    Procedure: RECONSTRUCTED BREAST REVISION WITH FAT GRAFTING;  Surgeon: Edgar Block MD;  Location:  [facility] OR;  Service: Plastics    MASTECTOMY      MASTECTOMY W/ SENTINEL NODE BIOPSY Bilateral 10/30/2017    Procedure: BREAST MASTECTOMY BILATERAL WITH RIGHT SENTINEL NODE BIOPSY;  Surgeon: Carlo Paulino MD;  Location:  [facility] OR;  Service:   "   VENOUS ACCESS DEVICE (PORT) INSERTION  05/18/2017    VENOUS ACCESS DEVICE (PORT) REMOVAL         Family History:   Family History   Problem Relation Age of Onset    Arthritis Mother     Hypertension Mother     Diabetes Mother     Diabetes Brother     Ovarian cancer Maternal Grandmother     Breast cancer Neg Hx        Social History:   Social History     Socioeconomic History    Marital status: Single   Tobacco Use    Smoking status: Never     Passive exposure: Never    Smokeless tobacco: Never   Vaping Use    Vaping status: Never Used   Substance and Sexual Activity    Alcohol use: Yes     Alcohol/week: 2.0 standard drinks of alcohol     Types: 2 Glasses of wine per week     Comment: occasional    Drug use: Yes     Types: Marijuana     Comment: OCCASIONALLY, last use 8-     Sexual activity: Defer       Tobacco History:   Social History     Tobacco Use   Smoking Status Never    Passive exposure: Never   Smokeless Tobacco Never       Medications:     Current Outpatient Medications:     lamoTRIgine (LaMICtal) 25 MG tablet, Take 1 tablet by mouth Daily for 14 days, THEN 2 tablets Daily for 16 days., Disp: 46 tablet, Rfl: 0    Allergies:   Allergies   Allergen Reactions    Benadryl [Diphenhydramine] Shortness Of Breath and Diarrhea    Diphenhydramine Hcl Diarrhea and Shortness Of Breath    Latex Unknown - Low Severity and Rash     HAS SOME IRRITATION AFTER CATHETER INSERTION     The following portions of the patient's history were reviewed and updated as appropriate: allergies, current medications, past family history, past medical history, past social history, past surgical history and problem list.          Objective:     /80 (BP Location: Left arm, Patient Position: Sitting)   Pulse 96   Ht 156.2 cm (61.5\")   Wt 69.7 kg (153 lb 9.6 oz)   SpO2 98%   BMI 28.55 kg/m²      Physical Exam  Constitutional: Patient appears well-developed and well-nourished.   HENT: HEENT exam unremarkable.   Neck: Neck " "supple. No JVD present. No carotid bruits.   Cardiovascular: Normal rate, regular rhythm and normal heart sounds. No murmur heard.   2+ symmetric pulses.   Pulmonary/Chest: Breath sounds normal. Does not exhibit tenderness.   Musculoskeletal: Does not exhibit edema.   Neurological: Neurological exam unremarkable.   Vitals reviewed.    Data Review:   Lab Results   Component Value Date    GLUCOSE 122 (H) 03/25/2024    BUN 13 03/25/2024    CREATININE 0.71 03/25/2024    EGFRIFAFRI 111 11/11/2021    BCR 18.3 03/25/2024    K 3.7 03/25/2024    CO2 26.0 03/25/2024    CALCIUM 8.5 (L) 03/25/2024    ALBUMIN 4.0 03/25/2024    AST 18 03/25/2024    ALT 13 03/25/2024     No results found for: \"CHOL\", \"CHLPL\", \"TRIG\", \"HDL\", \"LDL\", \"LDLDIRECT\"   Lab Results   Component Value Date    WBC 5.63 03/25/2024    RBC 4.55 03/25/2024    HGB 13.3 03/25/2024    HCT 41.4 03/25/2024    MCV 91.0 03/25/2024     03/25/2024     Lab Results   Component Value Date    TSH 0.615 04/08/2022     Lab Results   Component Value Date    HGBA1C 6.3 07/25/2016        Procedures           Assessment:      Diagnosis   1. Acute idiopathic pericarditis       2. Right-sided chest pain           Plan:   Discussed that symptoms are most consistent with pericarditis.  She did have a recent echocardiogram without any evidence of pericardial effusion.  There is no need to repeat at this time.  Did discuss that she was prescribed the correct medications for treatment of pericarditis but since her pain is improving she would like to wait until she sees oncology to determine if start colchicine and NSAID therapy.  She has these medications on hand and does know how to take them appropriately if her pain gets worse.  Continue current medications.   FU in 6 weeks, sooner as needed.  Thank you for allowing us to participate in the care of your patient.       Clementina Silva PA-C      Part of this note may be an electronic transcription/translation of spoken " language to printed text using the Dragon Dictation System.

## 2024-04-25 ENCOUNTER — OFFICE VISIT (OUTPATIENT)
Dept: CARDIOLOGY | Facility: CLINIC | Age: 29
End: 2024-04-25
Payer: MEDICARE

## 2024-04-25 VITALS
HEART RATE: 96 BPM | WEIGHT: 153.6 LBS | OXYGEN SATURATION: 98 % | BODY MASS INDEX: 28.26 KG/M2 | SYSTOLIC BLOOD PRESSURE: 130 MMHG | HEIGHT: 62 IN | DIASTOLIC BLOOD PRESSURE: 80 MMHG

## 2024-04-25 DIAGNOSIS — R07.9 RIGHT-SIDED CHEST PAIN: ICD-10-CM

## 2024-04-25 DIAGNOSIS — I30.0 ACUTE IDIOPATHIC PERICARDITIS: Primary | ICD-10-CM

## 2024-04-25 PROBLEM — I30.9 ACUTE PERICARDITIS: Status: ACTIVE | Noted: 2024-04-25

## 2024-04-25 PROCEDURE — 99204 OFFICE O/P NEW MOD 45 MIN: CPT

## 2024-04-25 PROCEDURE — 1159F MED LIST DOCD IN RCRD: CPT

## 2024-04-25 PROCEDURE — 1160F RVW MEDS BY RX/DR IN RCRD: CPT

## 2024-04-29 ENCOUNTER — OFFICE VISIT (OUTPATIENT)
Age: 29
End: 2024-04-29
Payer: MEDICARE

## 2024-04-29 DIAGNOSIS — Z13.39 ADHD (ATTENTION DEFICIT HYPERACTIVITY DISORDER) EVALUATION: Primary | ICD-10-CM

## 2024-04-29 DIAGNOSIS — F33.2 SEVERE EPISODE OF RECURRENT MAJOR DEPRESSIVE DISORDER, WITHOUT PSYCHOTIC FEATURES: ICD-10-CM

## 2024-04-29 DIAGNOSIS — Z51.81 ENCOUNTER FOR THERAPEUTIC DRUG MONITORING: ICD-10-CM

## 2024-04-29 LAB
1OH-MIDAZOLAM UR QL SCN: NOT DETECTED NG/MG CREAT
6MAM UR QL SCN: NEGATIVE NG/ML
7AMINOCLONAZEPAM/CREAT UR: NOT DETECTED NG/MG CREAT
A-OH ALPRAZ/CREAT UR: NOT DETECTED NG/MG CREAT
A-OH-TRIAZOLAM/CREAT UR CFM: NOT DETECTED NG/MG CREAT
ACP UR QL CFM: NOT DETECTED
ALPRAZ/CREAT UR CFM: NOT DETECTED NG/MG CREAT
AMPHETAMINES UR QL SCN: NEGATIVE NG/ML
APAP UR QL SCN: NEGATIVE UG/ML
BARBITURATES UR QL SCN: NEGATIVE NG/ML
BENZODIAZ SCN METH UR: NEGATIVE
BUPRENORPHINE UR QL SCN: NEGATIVE
BUPRENORPHINE/CREAT UR: NOT DETECTED NG/MG CREAT
CANNABINOIDS UR QL CFM: NORMAL
CANNABINOIDS UR QL SCN: NORMAL NG/ML
CARBOXYTHC UR CFM-MCNC: >405 NG/MG CREAT
CARISOPRODOL UR QL: NEGATIVE NG/ML
CLONAZEPAM/CREAT UR CFM: NOT DETECTED NG/MG CREAT
COCAINE+BZE UR QL SCN: NEGATIVE NG/ML
CREAT UR-MCNC: 247 MG/DL
D-METHORPHAN UR-MCNC: NOT DETECTED NG/ML
D-METHORPHAN+LEVORPHANOL UR QL: NOT DETECTED
DESALKYLFLURAZ/CREAT UR: NOT DETECTED NG/MG CREAT
DIAZEPAM/CREAT UR: NOT DETECTED NG/MG CREAT
ETHANOL UR QL SCN: NEGATIVE G/DL
ETHANOL UR QL SCN: NEGATIVE NG/ML
FENTANYL CTO UR SCN-MCNC: NEGATIVE NG/ML
FENTANYL/CREAT UR: NOT DETECTED NG/MG CREAT
FLUNITRAZEPAM UR QL SCN: NOT DETECTED NG/MG CREAT
GABAPENTIN UR-MCNC: NEGATIVE UG/ML
HYPNOTICS UR QL SCN: NEGATIVE
KETAMINE UR QL: NOT DETECTED
LORAZEPAM/CREAT UR: NOT DETECTED NG/MG CREAT
MEPERIDINE UR QL SCN: NEGATIVE NG/ML
METHADONE UR QL SCN: NEGATIVE NG/ML
METHADONE+METAB UR QL SCN: NEGATIVE NG/ML
MIDAZOLAM/CREAT UR CFM: NOT DETECTED NG/MG CREAT
MISCELLANEOUS, UR: NEGATIVE
NORBUPRENORPHINE/CREAT UR: NOT DETECTED NG/MG CREAT
NORDIAZEPAM/CREAT UR: NOT DETECTED NG/MG CREAT
NORFENTANYL/CREAT UR: NOT DETECTED NG/MG CREAT
NORFLUNITRAZEPAM UR-MCNC: NOT DETECTED NG/MG CREAT
NORKETAMINE UR-MCNC: NOT DETECTED UG/ML
OPIATES UR SCN-MCNC: NEGATIVE NG/ML
OTHER HALLUCINOGENS UR: NEGATIVE
OXAZEPAM/CREAT UR: NOT DETECTED NG/MG CREAT
OXYCODONE CTO UR SCN-MCNC: NEGATIVE NG/ML
PCP UR QL SCN: NEGATIVE NG/ML
PRESCRIBED MEDICATIONS: NORMAL
PROPOXYPH UR QL SCN: NEGATIVE NG/ML
TAPENTADOL CTO UR SCN-MCNC: NEGATIVE NG/ML
TEMAZEPAM/CREAT UR: NOT DETECTED NG/MG CREAT
TRAMADOL UR QL SCN: NEGATIVE NG/ML
ZALEPLON UR-MCNC: NOT DETECTED NG/ML
ZOLPIDEM PHENYL-4-CARB UR QL SCN: NOT DETECTED
ZOLPIDEM UR QL SCN: NOT DETECTED
ZOPICLONE-N-OXIDE UR-MCNC: NOT DETECTED NG/ML

## 2024-04-29 PROCEDURE — 90834 PSYTX W PT 45 MINUTES: CPT

## 2024-04-29 NOTE — PROGRESS NOTES
"     Follow Up Adult Note     Date:2024   Client Name: Angie Sutherland  : 1995   MRN: [id number]   Time IN: 11:15am    Time OUT: 12:02pm     Referring Provider: Gabby Fernandez PA-C    Chief Complaint:      ICD-10-CM ICD-9-CM   1. ADHD (attention deficit hyperactivity disorder) evaluation  Z13.39 V79.8   2. Encounter for therapeutic drug monitoring  Z51.81 V58.83   3. Severe episode of recurrent major depressive disorder, without psychotic features  F33.2 296.33        History of Present Illness:   Angie Sutherland is a 28 y.o. female who is being seen today for follow up individual psychotherapy counseling for ongoing depression and ADHD symptoms.  Pt reports that she has continued to struggle at home with lack of motivation and ability to focus on tasks that need to occur. Pt states that she feels overstimulated frequently with her children and is aware that it affects her level of irritability with her children. Pt states that she has missed the alarms for her daughter's school several days and feels \"at a loss\" about how to recapture her day.      Objective     PHQ-9 Depression Screening  Little interest or pleasure in doing things? 3-->nearly every day   Feeling down, depressed, or hopeless? 3-->nearly every day   Trouble falling or staying asleep, or sleeping too much? 1-->several days   Feeling tired or having little energy? 3-->nearly every day   Poor appetite or overeating? 1-->several days   Feeling bad about yourself - or that you are a failure or have let yourself or your family down? 2-->more than half the days   Trouble concentrating on things, such as reading the newspaper or watching television? 3-->nearly every day   Moving or speaking so slowly that other people could have noticed? Or the opposite - being so fidgety or restless that you have been moving around a lot more than usual? 2-->more than half the days   Thoughts that you would be better off dead, or of " hurting yourself in some way? 0-->not at all   PHQ-9 Total Score 18   If you checked off any problems, how difficult have these problems made it for you to do your work, take care of things at home, or get along with other people?        MANDIE-7  Feeling nervous, anxious or on edge: Nearly every day  Not being able to stop or control worrying: Nearly every day  Worrying too much about different things: Nearly every day  Trouble Relaxing: Nearly every day  Being so restless that it is hard to sit still: More than half the days  Feeling afraid as if something awful might happen: Several days  Becoming easily annoyed or irritable: Nearly every day  MANDIE 7 Total Score: 18      Medications:     Current Outpatient Medications:     lamoTRIgine (LaMICtal) 25 MG tablet, Take 1 tablet by mouth Daily for 14 days, THEN 2 tablets Daily for 16 days., Disp: 46 tablet, Rfl: 0    Allergies:   Allergies   Allergen Reactions    Benadryl [Diphenhydramine] Shortness Of Breath and Diarrhea    Diphenhydramine Hcl Diarrhea and Shortness Of Breath    Latex Unknown - Low Severity and Rash     HAS SOME IRRITATION AFTER CATHETER INSERTION         Subjective     Mental Status Exam:   MENTAL STATUS EXAM   General Appearance:  Cleanly groomed and dressed  Eye Contact:  Good eye contact  Attitude:  Cooperative and polite  Motor Activity:  Normal gait, posture  Speech:  Normal rate, tone, volume  Mood and affect:  Normal, pleasant  Thought Process:  Logical and goal-directed  Associations/ Thought Content:  No delusions  Hallucinations:  None  Suicidal Ideations:  Not present  Homicidal Ideation:  Not present  Sensorium:  Alert  Orientation:  Person, place, time and situation  Immediate Recall, Recent, and Remote Memory:  Intact  Attention Span/ Concentration:  Good  Insight:  Good  Judgement:  Good  Reliability:  Good  Impulse Control:  Good       Client's Support Network Includes:   Limited social support    Functional Status: Mild impairment      Progress toward goal: Not at goal    Prognosis: Good with Ongoing Treatment     Assessment / Plan / Progress     Visit Diagnosis/Orders Placed This Visit:    ICD-10-CM ICD-9-CM   1. ADHD (attention deficit hyperactivity disorder) evaluation  Z13.39 V79.8   2. Encounter for therapeutic drug monitoring  Z51.81 V58.83   3. Severe episode of recurrent major depressive disorder, without psychotic features  F33.2 296.33        SUICIDE RISK ASSESSMENT/CSSRS:  1. Does client have thoughts of suicide? Patient denies  2. Does client have intent for suicide? Patient denies  3. Does client have a current plan for suicide? Patient denies   4. History of suicide attempts: Patient denies   5. Family history of suicide or attempts: Patient denies   6. History of violent behaviors towards others or property or thoughts of committing suicide: Patient denies   7. History of sexual aggression toward others: Patient denies   8. Access to firearms or weapons: Patient denies     PLAN:  Safety: No acute safety concerns  Risk Assessment: Risk of self-harm acutely is low. Risk of self-harm chronically is also low, but could be further elevated in the event of treatment noncompliance and/or AODA.    Treatment Plan/Goals & Progress: Continue supportive psychotherapy efforts and medications as indicated. Treatment options discussed during today's visit. Client ackowledged and verbally consented to continue with current treatment plan and was educated on the importance of compliance with treatment and follow-up appointments. Client seems reasonably able to adhere to treatment plan.      Assisted client in processing above session content; acknowledged and normalized client’s thoughts, feelings, and concerns.  Rationalized client's thought process regarding awareness of depression and ADHD symptoms that impact daily living and functioning. Therapist inquired into pt's level of expectation for balance of life at this point. Pt acknowledged  "feelings of disappointment and discouragement felt by others when asking for support and not provided. Therapist affirmed pt's insight that \"things are overwhelming\" and attempted to assist pt in identifying smaller tasks to reach goals whether related to depression or executive functioning skill. Pt did not specify particular area at this time and therapist will continue to revisit. Therapist encouraged reflection on current perceptions felt from others and challenged insight into what is seen vs what has been established as a result of defense mechanisms from the past.     Allowed client to freely discuss issues without interruption or judgement with unconditional positive regard, active listening skills, and empathy. Clinician provided a safe, confidential environment to facilitate the development of a positive therapeutic relationship and encouraged open, honest communication. Assisted client in identifying risk factors which would indicate the need for higher level of care including thoughts to harm self or others and/or self-harming behavior and encouraged client to contact this office, call 911, or present to the nearest emergency room should any of these events occur. Discussed crisis intervention services and means to access. Client adamantly and convincingly denies current suicidal or homicidal ideation or perceptual disturbance. Assisted client in processing session content; acknowledged and normalized client’s thoughts, feelings, and concerns by utilizing a person-centered approach in efforts to build appropriate rapport and a positive therapeutic relationship with open and honest communication.     Quality Measures:     TOBACCO USE:  Tobacco Use: Low Risk  (4/28/2024)    Patient History     Smoking Tobacco Use: Never     Smokeless Tobacco Use: Never     Passive Exposure: Never      Never smoker    I advised Angie of the risks of tobacco use.     Follow Up:   Return in about 2 weeks (around " 5/13/2024).    Shadia Dumas LCSW  [facility] [address]

## 2024-05-02 LAB
1OH-MIDAZOLAM UR QL SCN: NOT DETECTED NG/MG CREAT
6MAM UR QL SCN: NEGATIVE NG/ML
7AMINOCLONAZEPAM/CREAT UR: NOT DETECTED NG/MG CREAT
A-OH ALPRAZ/CREAT UR: NOT DETECTED NG/MG CREAT
A-OH-TRIAZOLAM/CREAT UR CFM: NOT DETECTED NG/MG CREAT
ACP UR QL CFM: NOT DETECTED
ALPRAZ/CREAT UR CFM: NOT DETECTED NG/MG CREAT
AMPHETAMINES UR QL SCN: NEGATIVE NG/ML
APAP UR QL SCN: NEGATIVE UG/ML
BARBITURATES UR QL SCN: NEGATIVE NG/ML
BENZODIAZ SCN METH UR: NEGATIVE
BUPRENORPHINE UR QL SCN: NEGATIVE
BUPRENORPHINE/CREAT UR: NOT DETECTED NG/MG CREAT
CANNABINOIDS UR QL CFM: NORMAL
CANNABINOIDS UR QL SCN: NORMAL NG/ML
CARBOXYTHC UR CFM-MCNC: 324 NG/MG CREAT
CARISOPRODOL UR QL: NEGATIVE NG/ML
CLONAZEPAM/CREAT UR CFM: NOT DETECTED NG/MG CREAT
COCAINE+BZE UR QL SCN: NEGATIVE NG/ML
CREAT UR-MCNC: 109 MG/DL
D-METHORPHAN UR-MCNC: NOT DETECTED NG/ML
D-METHORPHAN+LEVORPHANOL UR QL: NOT DETECTED
DESALKYLFLURAZ/CREAT UR: NOT DETECTED NG/MG CREAT
DIAZEPAM/CREAT UR: NOT DETECTED NG/MG CREAT
ETHANOL UR QL SCN: NEGATIVE G/DL
ETHANOL UR QL SCN: NEGATIVE NG/ML
FENTANYL CTO UR SCN-MCNC: NEGATIVE NG/ML
FENTANYL/CREAT UR: NOT DETECTED NG/MG CREAT
FLUNITRAZEPAM UR QL SCN: NOT DETECTED NG/MG CREAT
GABAPENTIN UR-MCNC: NEGATIVE UG/ML
HYPNOTICS UR QL SCN: NEGATIVE
KETAMINE UR QL: NOT DETECTED
LORAZEPAM/CREAT UR: NOT DETECTED NG/MG CREAT
MEPERIDINE UR QL SCN: NEGATIVE NG/ML
METHADONE UR QL SCN: NEGATIVE NG/ML
METHADONE+METAB UR QL SCN: NEGATIVE NG/ML
MIDAZOLAM/CREAT UR CFM: NOT DETECTED NG/MG CREAT
MISCELLANEOUS, UR: NEGATIVE
NORBUPRENORPHINE/CREAT UR: NOT DETECTED NG/MG CREAT
NORDIAZEPAM/CREAT UR: NOT DETECTED NG/MG CREAT
NORFENTANYL/CREAT UR: NOT DETECTED NG/MG CREAT
NORFLUNITRAZEPAM UR-MCNC: NOT DETECTED NG/MG CREAT
NORKETAMINE UR-MCNC: NOT DETECTED UG/ML
OPIATES UR SCN-MCNC: NEGATIVE NG/ML
OTHER HALLUCINOGENS UR: NEGATIVE
OXAZEPAM/CREAT UR: NOT DETECTED NG/MG CREAT
OXYCODONE CTO UR SCN-MCNC: NEGATIVE NG/ML
PCP UR QL SCN: NEGATIVE NG/ML
PRESCRIBED MEDICATIONS: NORMAL
PROPOXYPH UR QL SCN: NEGATIVE NG/ML
TAPENTADOL CTO UR SCN-MCNC: NEGATIVE NG/ML
TEMAZEPAM/CREAT UR: NOT DETECTED NG/MG CREAT
TRAMADOL UR QL SCN: NEGATIVE NG/ML
ZALEPLON UR-MCNC: NOT DETECTED NG/ML
ZOLPIDEM PHENYL-4-CARB UR QL SCN: NOT DETECTED
ZOLPIDEM UR QL SCN: NOT DETECTED
ZOPICLONE-N-OXIDE UR-MCNC: NOT DETECTED NG/ML

## 2024-05-13 ENCOUNTER — OFFICE VISIT (OUTPATIENT)
Age: 29
End: 2024-05-13
Payer: MEDICARE

## 2024-05-13 DIAGNOSIS — F33.2 SEVERE EPISODE OF RECURRENT MAJOR DEPRESSIVE DISORDER, WITHOUT PSYCHOTIC FEATURES: Primary | ICD-10-CM

## 2024-05-13 DIAGNOSIS — F90.2 ATTENTION DEFICIT HYPERACTIVITY DISORDER (ADHD), COMBINED TYPE: ICD-10-CM

## 2024-05-13 PROCEDURE — 90834 PSYTX W PT 45 MINUTES: CPT

## 2024-05-13 NOTE — PROGRESS NOTES
"     Follow Up Adult Note     Date:2024   Client Name: Angie Sutherland  : 1995   MRN: [id number]   Time IN: 11:00 am     Time OUT: 11:46 am     Referring Provider: Gabby Fernandez PA-C    Chief Complaint:      ICD-10-CM ICD-9-CM   1. Severe episode of recurrent major depressive disorder, without psychotic features  F33.2 296.33   2. Attention deficit hyperactivity disorder (ADHD), combined type  F90.2 314.01        History of Present Illness:   Angie Sutherland is a 28 y.o. female who is being seen today for follow up individual psychotherapy counseling for  depression and ADHD.  Pt reports that depression remains unchanged and continues to experience lack of motivation and/or interest in usual daily activities.  Pt states that she is unable to focus on tasks and feels forgetful about several things throughout the day.  Pt states that she continues to notice ongoing irritation with other and feeling annoyed by \"small things.\"      Objective     PHQ-9 Depression Screening  Little interest or pleasure in doing things? 3-->nearly every day   Feeling down, depressed, or hopeless? 3-->nearly every day   Trouble falling or staying asleep, or sleeping too much? 1-->several days   Feeling tired or having little energy? 2-->more than half the days   Poor appetite or overeating?     Feeling bad about yourself - or that you are a failure or have let yourself or your family down? 3-->nearly every day   Trouble concentrating on things, such as reading the newspaper or watching television? 3-->nearly every day   Moving or speaking so slowly that other people could have noticed? Or the opposite - being so fidgety or restless that you have been moving around a lot more than usual? 3-->nearly every day   Thoughts that you would be better off dead, or of hurting yourself in some way? 0-->not at all   PHQ-9 Total Score 18   If you checked off any problems, how difficult have these problems made it for " you to do your work, take care of things at home, or get along with other people? extremely difficult      MANDIE-7  Feeling nervous, anxious or on edge: Nearly every day  Not being able to stop or control worrying: More than half the days  Worrying too much about different things: Nearly every day  Trouble Relaxing: Nearly every day  Being so restless that it is hard to sit still: Nearly every day  Feeling afraid as if something awful might happen: More than half the days  Becoming easily annoyed or irritable: Nearly every day  MANDIE 7 Total Score: 19  If you checked any problems, how difficult have these problems made it for you to do your work, take care of things at home, or get along with other people: Very difficult      Medications:     Current Outpatient Medications:     lamoTRIgine (LaMICtal) 25 MG tablet, Take 1 tablet by mouth Daily for 14 days, THEN 2 tablets Daily for 16 days., Disp: 46 tablet, Rfl: 0    Allergies:   Allergies   Allergen Reactions    Benadryl [Diphenhydramine] Shortness Of Breath and Diarrhea    Diphenhydramine Hcl Diarrhea and Shortness Of Breath    Latex Unknown - Low Severity and Rash     HAS SOME IRRITATION AFTER CATHETER INSERTION         Subjective     Mental Status Exam:   MENTAL STATUS EXAM   General Appearance:  Cleanly groomed and dressed  Eye Contact:  Good eye contact  Attitude:  Cooperative  Motor Activity:  Normal gait, posture  Speech:  Normal rate, tone, volume  Mood and affect:  Normal, pleasant  Thought Process:  Logical  Associations/ Thought Content:  No delusions  Hallucinations:  None  Suicidal Ideations:  Not present  Homicidal Ideation:  Not present  Sensorium:  Alert  Orientation:  Person, place, time and situation  Immediate Recall, Recent, and Remote Memory:  Intact  Attention Span/ Concentration:  Good  Insight:  Good  Judgement:  Good  Reliability:  Good  Impulse Control:  Good       Client's Support Network Includes:   friends    Functional Status: Mild  impairment     Progress toward goal: Not at goal    Prognosis: Good with Ongoing Treatment     Assessment / Plan / Progress     Visit Diagnosis/Orders Placed This Visit:    ICD-10-CM ICD-9-CM   1. Severe episode of recurrent major depressive disorder, without psychotic features  F33.2 296.33   2. Attention deficit hyperactivity disorder (ADHD), combined type  F90.2 314.01        SUICIDE RISK ASSESSMENT/CSSRS:  1. Does client have thoughts of suicide? Patient denies  2. Does client have intent for suicide? Patient denies  3. Does client have a current plan for suicide? Patient denies   4. History of suicide attempts: Patient denies   5. Family history of suicide or attempts: Patient denies   6. History of violent behaviors towards others or property or thoughts of committing suicide: Patient denies   7. History of sexual aggression toward others: Patient denies   8. Access to firearms or weapons: Patient denies     PLAN:  Safety: No acute safety concerns  Risk Assessment: Risk of self-harm acutely is low. Risk of self-harm chronically is also low, but could be further elevated in the event of treatment noncompliance and/or AODA.    Treatment Plan/Goals & Progress: Continue supportive psychotherapy efforts and medications as indicated. Treatment options discussed during today's visit. Client ackowledged and verbally consented to continue with current treatment plan and was educated on the importance of compliance with treatment and follow-up appointments. Client seems reasonably able to adhere to treatment plan.      Assisted client in processing above session content; acknowledged and normalized client’s thoughts, feelings, and concerns.  Rationalized client's thought process regarding awareness of mood symptoms and impact on daily activities. Therapist assisted pt in identifying aspects of life that she would like to see change. Therapist attempted to assist pt with identifying actions that could be taken or  "challenged throughout current daily routine that would set pt up for better success in completing tasks. Therapist discussed improved of behavioral activation and addressing motivation for change to coincide with additional treatment. Pt identified several barriers or conflicting experiences that created resistance to attempting to identify modifications of behaviors. Therapist encouraged pt to consider \"one small thing\" that could be incorporated into daily routine that would create aspect of control within her life and create self-encouragement and development. .      Allowed client to freely discuss issues without interruption or judgement with unconditional positive regard, active listening skills, and empathy. Clinician provided a safe, confidential environment to facilitate the development of a positive therapeutic relationship and encouraged open, honest communication. Assisted client in identifying risk factors which would indicate the need for higher level of care including thoughts to harm self or others and/or self-harming behavior and encouraged client to contact this office, call 911, or present to the nearest emergency room should any of these events occur. Discussed crisis intervention services and means to access. Client adamantly and convincingly denies current suicidal or homicidal ideation or perceptual disturbance. Assisted client in processing session content; acknowledged and normalized client’s thoughts, feelings, and concerns by utilizing a person-centered approach in efforts to build appropriate rapport and a positive therapeutic relationship with open and honest communication.     Quality Measures:     TOBACCO USE:  Tobacco Use: Low Risk  (4/28/2024)    Patient History     Smoking Tobacco Use: Never     Smokeless Tobacco Use: Never     Passive Exposure: Never      Never smoker    I advised Angie of the risks of tobacco use.     Follow Up:   Return in about 2 weeks (around 5/27/2024).    Shadia" POP Dumas  [facility] [address]

## 2024-05-15 ENCOUNTER — OFFICE VISIT (OUTPATIENT)
Dept: ONCOLOGY | Facility: CLINIC | Age: 29
End: 2024-05-15
Payer: MEDICARE

## 2024-05-15 VITALS
HEART RATE: 86 BPM | DIASTOLIC BLOOD PRESSURE: 60 MMHG | HEIGHT: 61 IN | TEMPERATURE: 98.4 F | OXYGEN SATURATION: 100 % | WEIGHT: 153 LBS | BODY MASS INDEX: 28.89 KG/M2 | SYSTOLIC BLOOD PRESSURE: 114 MMHG | RESPIRATION RATE: 16 BRPM

## 2024-05-15 DIAGNOSIS — C50.011 MALIGNANT NEOPLASM OF NIPPLE OF RIGHT BREAST IN FEMALE, UNSPECIFIED ESTROGEN RECEPTOR STATUS: Primary | ICD-10-CM

## 2024-05-15 PROCEDURE — 1126F AMNT PAIN NOTED NONE PRSNT: CPT | Performed by: INTERNAL MEDICINE

## 2024-05-15 PROCEDURE — 99214 OFFICE O/P EST MOD 30 MIN: CPT | Performed by: INTERNAL MEDICINE

## 2024-05-15 NOTE — PROGRESS NOTES
PROBLEM LIST:  1. [id number] (stage IIIB) invasive ductal carcinoma of the right breast, ER-, TN weakly +, Her2 3+.  A) presented with pain and swelling of the right breast after childbirth. Biopsy 5/8/17 showed high grade IDC, Right axillary LN biopsy positive for metastatic node involvement. Skin biopsy showed involvement of the dermis with lymphatic space involvement.  PET/CT on 5/24/17 showed hypermetabolic activity in the breast and axillary nodes,  No distant spread of disease.  B) neoadjuvant chemotherapy with TCHP started on 5/25/17.  Infusion reaction consisting of severe leg pain with the first dose of herceptin.  Taxotere dose reduced to 80% on cycle 5 for neuropathy.  C) bilateral mastectomy on 10/30/17.  Pathology showed multifocal < 1 mm high grade IDC with focal lymphovascular invasion.  4 sentinel lymph nodes with no malignancy.   ohG0yC2 (Stage yIA)  D) radiation therapy completed on 1/23/18.  Patient declined tamoxifen.  herceptin completed June 2018.  Neratinib started July 2018. Stopped Neratinib December 2018.   E) 2/11/2019 last dose of Lupron. She has declined any further Lupron as of 4/30/2019.   2. Migraines  3. Pre-diabetes    Subjective      CC: breast cancer    HISTORY OF PRESENT ILLNESS:   Angie Sutherland returns for follow-up.      In April she was admitted at [facility] and diagnosed with pericarditis.  Prior to this she had been having persistent right sided chest wall pain.  During her hospital stay she had more acute left sided pain which was associated with elevated troponin. She was prescribed colchicine but she never started this.  The left sided pain has resolved.  She did follow up with cardiology a few weeks ago.  She still has the right side discomfort which is located near the axilla, and is generally tender to touch.    She also reports pain and tingling in both hands, which sometimes wakes her up at night.      Objective      /60   Pulse 86   Temp 98.4 °F  "(36.9 °C)   Resp 16   Ht 154.9 cm (61\")   Wt 69.4 kg (153 lb)   SpO2 100%   BMI 28.91 kg/m²    Vitals:    05/15/24 1051   PainSc: 0-No pain               Performance Status: 0    General: well appearing female in no acute distress  Neuro: alert and oriented  HEENT: sclera anicteric, oropharynx clear  Extremeties: no lower extremity edema  Skin: no rashes, lesions, bruising, or petechiae  Psych: mood and affect appropriate    Lab Results   Component Value Date    WBC 5.63 03/25/2024    HGB 13.3 03/25/2024    HCT 41.4 03/25/2024    MCV 91.0 03/25/2024     03/25/2024     Lab Results   Component Value Date    GLUCOSE 122 (H) 03/25/2024    BUN 13 03/25/2024    CREATININE 0.71 03/25/2024    EGFRIFAFRI 111 11/11/2021    BCR 18.3 03/25/2024    K 3.7 03/25/2024    CO2 26.0 03/25/2024    CALCIUM 8.5 (L) 03/25/2024    ALBUMIN 4.0 03/25/2024    AST 18 03/25/2024    ALT 13 03/25/2024         Assessment & Plan   Angie Sutherland is a 28 y.o. year old female with stage IIIB HER-2 positive breast cancer who returns for follow-up.   She is now over 7 years out from her diagnosis.    Right chest wall pain: tender on exam, no palpable mass.  No obvious findings on CT chest.  Will order breast MRI for evaluation - if no worrisome findings, I would recommend physical therapy.    Pericarditits: symptoms seem to have resolved.      Carpal tunnel syndrome: bilateral.  Recommend wearing wrist splints at night.  If no improvement, consider referral to orthopedics.    Follow-up in 6 months.              Shadia Amos MD  [facility]    5/15/2024          CC:          "

## 2024-05-20 ENCOUNTER — OFFICE VISIT (OUTPATIENT)
Age: 29
End: 2024-05-20
Payer: MEDICARE

## 2024-05-20 VITALS
WEIGHT: 154.7 LBS | HEIGHT: 61 IN | BODY MASS INDEX: 29.21 KG/M2 | SYSTOLIC BLOOD PRESSURE: 108 MMHG | DIASTOLIC BLOOD PRESSURE: 68 MMHG | OXYGEN SATURATION: 99 % | HEART RATE: 91 BPM

## 2024-05-20 DIAGNOSIS — Z13.39 ADHD (ATTENTION DEFICIT HYPERACTIVITY DISORDER) EVALUATION: ICD-10-CM

## 2024-05-20 DIAGNOSIS — F33.2 SEVERE EPISODE OF RECURRENT MAJOR DEPRESSIVE DISORDER, WITHOUT PSYCHOTIC FEATURES: Primary | ICD-10-CM

## 2024-05-20 NOTE — PROGRESS NOTES
"     Office  Follow Up Visit      Patient Name: Angie Sutherland  : 1995   MRN: [id number]     Referring Provider: Gabby Fernandez PA-C    Chief Complaint:       ICD-10-CM ICD-9-CM   1. Severe episode of recurrent major depressive disorder, without psychotic features  F33.2 296.33   2. ADHD (attention deficit hyperactivity disorder) evaluation  Z13.39 V79.8        History of Present Illness:   Angie Sutherland is a 28 y.o. female who is here today for follow up related to depression, and impaired executive function. Intake was 2024 at which time, Lamictal was prescribed to help stabilize mood.     Pt appears depressed, quiet. withdrawn. Pt reports mood symptoms are the same. She gets random \"itchiness\" she feels her antihistamine treats. She denies any rashes or flu like symptoms. Pt states she feels \"stagnant\". PHQ is 20 indicating severe depression. She denies SI/HI/AVH. MANDIE score is 19. She feels anxious to some degree everyday and situations make it worse. She shares her kids found a bird they were tying to help and she was freaking out and panicking over there germs and making them wash their hands multiple times.      Provider discussed CPT scores which show some inattention.  Pt reports last smoking marijuana yesterday. States she smokes it to help her appetite and sleep. Pt is specifically seeking a stimulant to assist her with daily function, as this was previously effective for her. Provider advised her a controlled substance would not be prescribed by this provider as long as she is smoking marijuana daily because they work against each other. Provider discussed possibly starting Remeron, which would improve sleep and appetite and lessen her dependence on marijuana and open up an option for stimulant in the future. She politely declines states she used the stimulant  and marijuana  together previously because the marijuana calmed the jitteriness caused by the stimulant. " "Provider again explained that the two work oppositionally and this provider is not willing to do that. Provider then offered Straterra, to which pt also declined stating  she is \"tired of being guinea pig. \" Pt becomes visibly frustrated during discussion stating she can't find anyone to help her and becomes tearful. Provider offered support and again explained the rationale  for not prescribing a schedule II control substance while pt is using a substance that dulls executive function. Pt is not open to decreasing her dependence on marijuana at this time. Provider encouraged her to further reconsider and notify the office if she changes her mind. Pt states sleep is the same and depends on the day. She does not find sleep problematic because she uses the time to unwind after her kids are asleep. She got four hrs of sleep last night and she doesn't not feel tired. Pt states she crashes in the afternoon around 2-5 and she gets cranky. Appetite is ok.     Diagnosis:MDD, MANDIE, ADHD combined  Medications: none  Therapy: MARCELINA Dumas LCSW  Previous medication trials: 2019 Zyprexa, Topamax, Xanax, Lexapro Elavil. Adderal IR 10 bid-no benefit after 1 week, Vyvanse 30-ineffective , Vyvanse 50-effective , effexor-SE, Viibryd, Buspar,    Subjective      Review of Systems:   Review of Systems   Constitutional:  Positive for activity change, appetite change and fatigue.   Psychiatric/Behavioral:  Positive for decreased concentration, dysphoric mood and sleep disturbance. The patient is nervous/anxious.    All other systems reviewed and are negative.      PHQ-9 Depression Screening  Little interest or pleasure in doing things? 3-->nearly every day   Feeling down, depressed, or hopeless? 3-->nearly every day   Trouble falling or staying asleep, or sleeping too much? 1-->several days   Feeling tired or having little energy? 2-->more than half the days   Poor appetite or overeating? 2-->more than half the days   Feeling bad about " yourself - or that you are a failure or have let yourself or your family down? 3-->nearly every day   Trouble concentrating on things, such as reading the newspaper or watching television? 3-->nearly every day   Moving or speaking so slowly that other people could have noticed? Or the opposite - being so fidgety or restless that you have been moving around a lot more than usual? 3-->nearly every day   Thoughts that you would be better off dead, or of hurting yourself in some way? 0-->not at all   PHQ-9 Total Score 20   If you checked off any problems, how difficult have these problems made it for you to do your work, take care of things at home, or get along with other people? extremely difficult       MANDIE-7      Over the last two weeks, how often have you been bothered by the following problems?  Feeling nervous, anxious or on edge: Nearly every day  Not being able to stop or control worrying: More than half the days  Worrying too much about different things: Nearly every day  Trouble Relaxing: Nearly every day  Being so restless that it is hard to sit still: Nearly every day  Becoming easily annoyed or irritable: Nearly every day  Feeling afraid as if something awful might happen: More than half the days  MANDIE 7 Total Score: 19  If you checked any problems, how difficult have these problems made it for you to do your work, take care of things at home, or get along with other people: Extremely difficult    Patient History:   The following portions of the patient's history were reviewed and updated as appropriate: allergies, current medications, past family history, past medical history, past social history, past surgical history and problem list.     Social History     Socioeconomic History    Marital status: Single   Tobacco Use    Smoking status: Never     Passive exposure: Never    Smokeless tobacco: Never   Vaping Use    Vaping status: Never Used   Substance and Sexual Activity    Alcohol use: Yes      "Alcohol/week: 2.0 standard drinks of alcohol     Types: 2 Glasses of wine per week     Comment: occasional    Drug use: Yes     Types: Marijuana     Comment: OCCASIONALLY, last use 8-     Sexual activity: Defer       Medications:     Current Outpatient Medications:     lamoTRIgine (LaMICtal) 25 MG tablet, Take 1 tablet by mouth Daily for 14 days, THEN 2 tablets Daily for 16 days., Disp: 46 tablet, Rfl: 0    Objective     Physical Exam:  Vital Signs:   Vitals:    05/20/24 1210   BP: 108/68   Pulse: 91   SpO2: 99%   Weight: 70.2 kg (154 lb 11.2 oz)   Height: 154.9 cm (60.98\")     Body mass index is 29.25 kg/m².       Mental Status Exam:   MENTAL STATUS EXAM   General Appearance:  Cleanly groomed and dressed and well developed  Eye Contact:  Good eye contact  Attitude:  Cooperative  Motor Activity:  Normal gait, posture  Muscle Strength:  Normal  Speech:  Normal rate, tone, volume  Language:  Spontaneous  Mood and affect:  Depressed, frustrated and irritable  Hopelessness:  Denies  Loneliness: Denies  Thought Process:  Logical and goal-directed  Associations/ Thought Content:  No delusions  Hallucinations:  None  Suicidal Ideations:  Not present  Homicidal Ideation:  Not present  Sensorium:  Alert and clear  Orientation:  Person, place, time and situation  Immediate Recall, Recent, and Remote Memory:  Intact  Attention Span/ Concentration:  Good  Fund of Knowledge:  Appropriate for age and educational level  Intellectual Functioning:  Average range  Insight:  Limited  Judgement:  Limited  Reliability:  Good  Impulse Control:  Fair       @RESULASTCBCDIFFPANEL,TSH,LABLIPI,VDPSHJWY67,GIFOTUHN41,MG,FOLATE,PROLACTIN,CRPRESULT,CMP,V9IBNKOWAFL)@    Lab Results   Component Value Date    GLUCOSE 122 (H) 03/25/2024    BUN 13 03/25/2024    CREATININE 0.71 03/25/2024    EGFR 118.2 03/25/2024    BCR 18.3 03/25/2024    K 3.7 03/25/2024    CO2 26.0 03/25/2024    CALCIUM 8.5 (L) 03/25/2024    ALBUMIN 4.0 03/25/2024    BILITOT " "0.2 03/25/2024    AST 18 03/25/2024    ALT 13 03/25/2024       Lab Results   Component Value Date    WBC 5.63 03/25/2024    HGB 13.3 03/25/2024    HCT 41.4 03/25/2024    MCV 91.0 03/25/2024     03/25/2024       No results found for: \"CHOL\", \"CHLPL\", \"TRIG\", \"HDL\", \"LDL\"          Data reviewed : Cardiology studies 3/25/24 NSR rate 82 Qtc 429      Assessment / Plan      Visit Diagnosis/Orders Placed This Visit:  Diagnoses and all orders for this visit:    1. Severe episode of recurrent major depressive disorder, without psychotic features (Primary)    2. ADHD (attention deficit hyperactivity disorder) evaluation       Plan:   DC lamictal-pt preference  -Continue therapy  -Labs and Johan reviewed   -Work on prioritizing sleep  -The benefits  of consuming a healthy diet, minimizing caffeine intake and engaging in  daily exercise were discussed with patient. Patient encouraged to consider lifestyle modification regarding diet and exercise patterns to maximize results of mental health treatment.  -Patient advised to refrain from THC use. Negative long term effects reviewed including but not limited to:  potential interruption of brain connectivity,  poor memory, impaired cognition, psychosis fall, oversedation especially when used with opioids. Use of THC may predispose patient to psychosis and increase anxiety     CPT reveals 3 atypical T-scores associated with moderate likelihood of disorder such as ADHD.  Strong indication of inattentiveness.  No problems with impulsivity or sustained attention    Impression/Formulation: Patient appears alert and oriented. Very pleasant individual who  presents with complaints of significant depression and ADHD. She has multiple med failures in the past and may benefit from [facility] testing in the future.  She will need to significantly reduce marijuana use so executive impairment can be accurately assessed. Pt advised this provider will not prescribe stimulant with daily " marijuana use.  Potential benefits, risks SE of starting Lamictal discussed.      Continue supportive psychotherapy efforts and medications as indicated. Treatment and medication options discussed during today's visit. Patient ackowledged and verbally consented to continue with current treatment plan and was educated on the importance of compliance with treatment and follow-up appointments. Patient seems reasonably able to adhere to treatment plan.      Medication Considerations:  Discussed medication options and treatment plan of prescribed medication(s) as well as the risks, benefits, and potential side effects. Patient is agreeable to call the office with any worsening of symptoms or onset of side effects. Patient is agreeable to call 911 or go to the nearest ER should he/she begin having SI/HI.    Quality Measures:   Never smoker    I advised Angie Sutherland of the risks of tobacco use.     Follow Up:   Return if symptoms worsen or fail to improve.      UYEN Gilliland, PMHNP-BC

## 2024-05-28 ENCOUNTER — OFFICE VISIT (OUTPATIENT)
Age: 29
End: 2024-05-28
Payer: MEDICARE

## 2024-05-28 DIAGNOSIS — F33.2 SEVERE EPISODE OF RECURRENT MAJOR DEPRESSIVE DISORDER, WITHOUT PSYCHOTIC FEATURES: ICD-10-CM

## 2024-05-28 DIAGNOSIS — F90.2 ATTENTION DEFICIT HYPERACTIVITY DISORDER (ADHD), COMBINED TYPE: Primary | ICD-10-CM

## 2024-05-28 PROCEDURE — 90837 PSYTX W PT 60 MINUTES: CPT

## 2024-05-28 NOTE — PROGRESS NOTES
Follow Up Adult Note     Date:2024   Client Name: Angie Sutherlnad  : 1995   MRN: [id number]   Time IN: 11:00 am    Time OUT: 11:56 am      Referring Provider: Gabby Fernandez PA-C    Chief Complaint:      ICD-10-CM ICD-9-CM   1. Attention deficit hyperactivity disorder (ADHD), combined type  F90.2 314.01   2. Severe episode of recurrent major depressive disorder, without psychotic features  F33.2 296.33        History of Present Illness:   Angie Sutherland is a 28 y.o. female who is being seen today for follow up individual psychotherapy counseling for ongoing depression and executive functioning deficits. Pt reports increase of low mood since last session with provider due to ongoing frustrations with obtaining medication for ADHD.  Pt shared that she feels that she has been misunderstood and symptoms are not being treated.  Pt states uncertainty if mental health diagnosis are correct and if mood states are being taken seriously. Pt expressed personal frustration with therapy sessions that have been focused on executive functioning skill development and not regard other personal experiences or states that are impacting her on daily basis.       Objective     PHQ-9 Depression Screening  Little interest or pleasure in doing things? 3-->nearly every day   Feeling down, depressed, or hopeless? 3-->nearly every day   Trouble falling or staying asleep, or sleeping too much? 3-->nearly every day   Feeling tired or having little energy? 3-->nearly every day   Poor appetite or overeating? 2-->more than half the days   Feeling bad about yourself - or that you are a failure or have let yourself or your family down? 2-->more than half the days   Trouble concentrating on things, such as reading the newspaper or watching television? 3-->nearly every day   Moving or speaking so slowly that other people could have noticed? Or the opposite - being so fidgety or restless that you have been  moving around a lot more than usual? 3-->nearly every day   Thoughts that you would be better off dead, or of hurting yourself in some way? 0-->not at all   PHQ-9 Total Score 22   If you checked off any problems, how difficult have these problems made it for you to do your work, take care of things at home, or get along with other people? extremely difficult      MANDIE-7  Feeling nervous, anxious or on edge: Nearly every day  Not being able to stop or control worrying: Nearly every day  Worrying too much about different things: Nearly every day  Trouble Relaxing: Nearly every day  Being so restless that it is hard to sit still: Nearly every day  Feeling afraid as if something awful might happen: Nearly every day  Becoming easily annoyed or irritable: Nearly every day  MANDIE 7 Total Score: 21  If you checked any problems, how difficult have these problems made it for you to do your work, take care of things at home, or get along with other people: Extremely difficult      Medications:   No current outpatient medications on file.    Allergies:   Allergies   Allergen Reactions    Benadryl [Diphenhydramine] Shortness Of Breath and Diarrhea    Diphenhydramine Hcl Diarrhea and Shortness Of Breath    Latex Unknown - Low Severity and Rash     HAS SOME IRRITATION AFTER CATHETER INSERTION         Subjective     Mental Status Exam:   MENTAL STATUS EXAM   General Appearance:  Cleanly groomed and dressed  Eye Contact:  Good eye contact  Attitude:  Cooperative and polite  Motor Activity:  Normal gait, posture  Speech:  Normal rate, tone, volume  Mood and affect:  Normal, pleasant  Thought Process:  Logical and goal-directed  Associations/ Thought Content:  No delusions  Hallucinations:  None  Suicidal Ideations:  Not present  Homicidal Ideation:  Not present  Sensorium:  Alert  Orientation:  Person, place, situation and time  Immediate Recall, Recent, and Remote Memory:  Intact  Attention Span/ Concentration:  Good  Insight:   Good  Judgement:  Good  Reliability:  Good  Impulse Control:  Good       Client's Support Network Includes:   limited support, sister    Functional Status: Mild impairment     Progress toward goal: Not at goal    Prognosis: Good with Ongoing Treatment     Assessment / Plan / Progress     Visit Diagnosis/Orders Placed This Visit:    ICD-10-CM ICD-9-CM   1. Attention deficit hyperactivity disorder (ADHD), combined type  F90.2 314.01   2. Severe episode of recurrent major depressive disorder, without psychotic features  F33.2 296.33        SUICIDE RISK ASSESSMENT/CSSRS:  1. Does client have thoughts of suicide? Patient denies  2. Does client have intent for suicide? Patient denies  3. Does client have a current plan for suicide? Patient denies   4. History of suicide attempts: Patient denies   5. Family history of suicide or attempts: Patient denies   6. History of violent behaviors towards others or property or thoughts of committing suicide: Patient denies   7. History of sexual aggression toward others: Patient denies   8. Access to firearms or weapons: Patient denies     PLAN:  Safety: No acute safety concerns  Risk Assessment: Risk of self-harm acutely is low. Risk of self-harm chronically is also low, but could be further elevated in the event of treatment noncompliance and/or AODA.    Treatment Plan/Goals & Progress: Continue supportive psychotherapy efforts and medications as indicated. Treatment options discussed during today's visit. Client ackowledged and verbally consented to continue with current treatment plan and was educated on the importance of compliance with treatment and follow-up appointments. Client seems reasonably able to adhere to treatment plan.      Assisted client in processing above session content; acknowledged and normalized client’s thoughts, feelings, and concerns.  Rationalized client's thought process regarding awareness of frustrations with receiving help and advocating for herself.  "Therapist inquired further into areas of life that she would like to spend more time with therapy on. Therapist allowed pt time to process through recent experiences and frustrations with requesting a medication regimen that would assist pt in current daily functioning.  Therapist and pt further discussed pt's \"masking\" of symptoms to others in attempts to be perceived as strong and independent. Therapist inquired into pt's fears or worries if mask were to be removed. Therapist inquired into what pt would like for others to \"see\" when they look at pt.  Pt become emotional and expressed the desire to not be judged by her ADHD symptoms such as forgetful, impulsive, etc. But to be acknowledged for the difficulty that she is facing as a single mother. Therapist and pt discussed the importance of self-acceptance as precursor to successful relationships with others and explored ways to challenge outside thought perspectives and their personal impacts. Pt acknowledged that she \"doesn't want to change certain aspects of me but want to be in an environment that allows me to live my authentic self.\" Pt expressed desire to return to therapy to further explore personal self-development and skills to assist with challenging negative perceptions from others.       Allowed client to freely discuss issues without interruption or judgement with unconditional positive regard, active listening skills, and empathy. Clinician provided a safe, confidential environment to facilitate the development of a positive therapeutic relationship and encouraged open, honest communication. Assisted client in identifying risk factors which would indicate the need for higher level of care including thoughts to harm self or others and/or self-harming behavior and encouraged client to contact this office, call 911, or present to the nearest emergency room should any of these events occur. Discussed crisis intervention services and means to access. " Client adamantly and convincingly denies current suicidal or homicidal ideation or perceptual disturbance. Assisted client in processing session content; acknowledged and normalized client’s thoughts, feelings, and concerns by utilizing a person-centered approach in efforts to build appropriate rapport and a positive therapeutic relationship with open and honest communication.     Quality Measures:     TOBACCO USE:  Tobacco Use: Low Risk  (5/20/2024)    Patient History     Smoking Tobacco Use: Never     Smokeless Tobacco Use: Never     Passive Exposure: Never      Never smoker    I advised Angie of the risks of tobacco use.     Follow Up:   Return in about 2 weeks (around 6/11/2024).    Shadia Dumas LCSW  [facility] [address]

## 2024-06-11 ENCOUNTER — OFFICE VISIT (OUTPATIENT)
Age: 29
End: 2024-06-11
Payer: MEDICARE

## 2024-06-11 DIAGNOSIS — F90.2 ATTENTION DEFICIT HYPERACTIVITY DISORDER (ADHD), COMBINED TYPE: ICD-10-CM

## 2024-06-11 DIAGNOSIS — F33.2 SEVERE EPISODE OF RECURRENT MAJOR DEPRESSIVE DISORDER, WITHOUT PSYCHOTIC FEATURES: Primary | ICD-10-CM

## 2024-06-11 PROCEDURE — 90837 PSYTX W PT 60 MINUTES: CPT

## 2024-06-11 NOTE — PROGRESS NOTES
Follow Up Adult Note     Date:2024   Client Name: Angie Sutherland  : 1995   MRN: [id number]   Time IN: 11:05 am    Time OUT: 11:59 am     Referring Provider: Gabby Fernandez PA-C    Chief Complaint:      ICD-10-CM ICD-9-CM   1. Severe episode of recurrent major depressive disorder, without psychotic features  F33.2 296.33   2. Attention deficit hyperactivity disorder (ADHD), combined type  F90.2 314.01        History of Present Illness:   Angie Sutherland is a 28 y.o. female who is being seen today for follow up individual psychotherapy counseling for ongoing depression.  Pt reports that since last visit she has continued to feel higher levels of stress due to transportation issues and recent events with child's father. Pt acknowledged awareness that she tends to withdraw from engaging with others when feeling depressed but that having daughters home for the summer has been helpful in feeling more energized and motivated to get the girls out of the home.        Objective     Medications:   No current outpatient medications on file.    Allergies:   Allergies   Allergen Reactions    Benadryl [Diphenhydramine] Shortness Of Breath and Diarrhea    Diphenhydramine Hcl Diarrhea and Shortness Of Breath    Latex Unknown - Low Severity and Rash     HAS SOME IRRITATION AFTER CATHETER INSERTION       Mental Status Exam:   MENTAL STATUS EXAM   General Appearance:  Cleanly groomed and dressed  Eye Contact:  Good eye contact  Attitude:  Cooperative and polite  Motor Activity:  Normal gait, posture  Speech:  Normal rate, tone, volume  Mood and affect:  Normal, pleasant  Thought Process:  Logical and goal-directed  Associations/ Thought Content:  No delusions  Hallucinations:  None  Suicidal Ideations:  Not present  Homicidal Ideation:  Not present  Sensorium:  Alert and clear  Orientation:  Person, place, time and situation  Immediate Recall, Recent, and Remote Memory:  Intact  Attention Span/  Concentration:  Good  Insight:  Good  Judgement:  Good  Reliability:  Good  Impulse Control:  Good       SUICIDE RISK ASSESSMENT/CSSRS:  1. Does client have thoughts of suicide? Patient denies  2. Does client have intent for suicide? Patient denies  3. Does client have a current plan for suicide? Patient denies   4. History of suicide attempts: Patient denies   5. Family history of suicide or attempts: Patient denies   6. History of violent behaviors towards others or property or thoughts of committing suicide: Patient denies   7. History of sexual aggression toward others: Patient denies   8. Access to firearms or weapons: Patient denies     Subjective     PHQ-9 Depression Screening  Little interest or pleasure in doing things? 3-->nearly every day   Feeling down, depressed, or hopeless? 3-->nearly every day   Trouble falling or staying asleep, or sleeping too much? 1-->several days   Feeling tired or having little energy? 3-->nearly every day   Poor appetite or overeating? 3-->nearly every day   Feeling bad about yourself - or that you are a failure or have let yourself or your family down? 3-->nearly every day   Trouble concentrating on things, such as reading the newspaper or watching television? 3-->nearly every day   Moving or speaking so slowly that other people could have noticed? Or the opposite - being so fidgety or restless that you have been moving around a lot more than usual? 2-->more than half the days   Thoughts that you would be better off dead, or of hurting yourself in some way? 0-->not at all   PHQ-9 Total Score 21   If you checked off any problems, how difficult have these problems made it for you to do your work, take care of things at home, or get along with other people? extremely difficult      MANDIE-7  Feeling nervous, anxious or on edge: Nearly every day  Not being able to stop or control worrying: Nearly every day  Worrying too much about different things: Nearly every day  Trouble  Relaxing: Nearly every day  Being so restless that it is hard to sit still: Nearly every day  Feeling afraid as if something awful might happen: Nearly every day  Becoming easily annoyed or irritable: Nearly every day  MANDIE 7 Total Score: 21  If you checked any problems, how difficult have these problems made it for you to do your work, take care of things at home, or get along with other people: Extremely difficult      Client's Support Network Includes:   sister, friends    Functional Status: Mild impairment     Progress toward goal: Not at goal    Prognosis: Good with Ongoing Treatment     Assessment / Plan / Progress     Visit Diagnosis/Orders Placed This Visit:    ICD-10-CM ICD-9-CM   1. Severe episode of recurrent major depressive disorder, without psychotic features  F33.2 296.33   2. Attention deficit hyperactivity disorder (ADHD), combined type  F90.2 314.01          PLAN:  Safety: No acute safety concerns  Risk Assessment: Risk of self-harm acutely is low. Risk of self-harm chronically is also low, but could be further elevated in the event of treatment noncompliance and/or AODA.    Treatment Plan/Goals & Progress: Continue supportive psychotherapy efforts and medications as indicated. Treatment options discussed during today's visit. Client ackowledged and verbally consented to continue with current treatment plan and was educated on the importance of compliance with treatment and follow-up appointments. Client seems reasonably able to adhere to treatment plan.      Assisted client in processing above session content; acknowledged and normalized client’s thoughts, feelings, and concerns.  Rationalized client's thought process regarding awareness of ongoing depressive symptoms and impact on daily activities. Therapist assisted pt in reflecting on withdraw response and elicited discussion on protective factors engaged. Therapist and pt discussed insights obtained from past relationships and its impact on  "current decision making towards others or in regard to relationships. Therapist and pt discussed pt's comment from past session of not feeling as though her environment is conducive to being the person she wants to be. Therapist requested pt's engagement in [place] Mindfulness Meditation practice to assist with connection to inner self and to develop awareness of surroundings and impacts of \"weather surrounding the mountain.\" Therapist encouraged pt to continue mindfulness practice in her safe space to continue connection to self, environment and to develop awareness of emotions and thoughts.       Allowed client to freely discuss issues without interruption or judgement with unconditional positive regard, active listening skills, and empathy. Clinician provided a safe, confidential environment to facilitate the development of a positive therapeutic relationship and encouraged open, honest communication. Assisted client in identifying risk factors which would indicate the need for higher level of care including thoughts to harm self or others and/or self-harming behavior and encouraged client to contact this office, call 911, or present to the nearest emergency room should any of these events occur. Discussed crisis intervention services and means to access. Client adamantly and convincingly denies current suicidal or homicidal ideation or perceptual disturbance. Assisted client in processing session content; acknowledged and normalized client’s thoughts, feelings, and concerns by utilizing a person-centered approach in efforts to build appropriate rapport and a positive therapeutic relationship with open and honest communication.     Quality Measures:     TOBACCO USE:  Tobacco Use: Low Risk  (5/20/2024)    Patient History     Smoking Tobacco Use: Never     Smokeless Tobacco Use: Never     Passive Exposure: Never      Never smoker    I advised Angie of the risks of tobacco use.     Follow Up:   Return in about 2 " weeks (around 6/25/2024).    RADHA SalamancaW  [facility] [address]

## 2024-07-09 ENCOUNTER — OFFICE VISIT (OUTPATIENT)
Age: 29
End: 2024-07-09
Payer: MEDICARE

## 2024-07-09 DIAGNOSIS — F33.2 SEVERE EPISODE OF RECURRENT MAJOR DEPRESSIVE DISORDER, WITHOUT PSYCHOTIC FEATURES: Primary | ICD-10-CM

## 2024-07-09 DIAGNOSIS — F90.2 ATTENTION DEFICIT HYPERACTIVITY DISORDER (ADHD), COMBINED TYPE: ICD-10-CM

## 2024-07-09 PROCEDURE — 90832 PSYTX W PT 30 MINUTES: CPT

## 2024-07-09 NOTE — PROGRESS NOTES
"     Follow Up Adult Note     Date:2024   Client Name: Angie Sutherland  : 1995   MRN: [id number]   Time IN: 11:13 am    Time OUT: 11:47 am     Referring Provider: Gabby Fernandez PA-C    Chief Complaint:      ICD-10-CM ICD-9-CM   1. Severe episode of recurrent major depressive disorder, without psychotic features  F33.2 296.33   2. Attention deficit hyperactivity disorder (ADHD), combined type  F90.2 314.01        History of Present Illness:   Angie Sutherland is a 28 y.o. female who is being seen today for follow up individual psychotherapy counseling for ongoing depression and symptoms associated with ADHD. Pt shared that she has struggled recently with feeling overstimulated due to her children. Pt states that she often feels \"mom guilt\" associated with limited engagement or abilities with children due to mental or physical distress.        Objective     Medications:   No current outpatient medications on file.    Allergies:   Allergies   Allergen Reactions    Benadryl [Diphenhydramine] Shortness Of Breath and Diarrhea    Diphenhydramine Hcl Diarrhea and Shortness Of Breath    Latex Unknown - Low Severity and Rash     HAS SOME IRRITATION AFTER CATHETER INSERTION       Mental Status Exam:   MENTAL STATUS EXAM   General Appearance:  Cleanly groomed and dressed  Eye Contact:  Good eye contact  Attitude:  Cooperative and polite  Motor Activity:  Normal gait, posture  Speech:  Normal rate, tone, volume  Mood and affect:  Normal, pleasant  Thought Process:  Logical and goal-directed  Associations/ Thought Content:  No delusions  Hallucinations:  None  Suicidal Ideations:  Not present  Homicidal Ideation:  Not present  Sensorium:  Alert and clear  Orientation:  Person, place, time and situation  Immediate Recall, Recent, and Remote Memory:  Intact  Attention Span/ Concentration:  Good  Insight:  Good  Judgement:  Good  Reliability:  Good  Impulse Control:  Good       SUICIDE RISK " ASSESSMENT/CSSRS:  1. Does client have thoughts of suicide? Patient denies  2. Does client have intent for suicide? Patient denies  3. Does client have a current plan for suicide? Patient denies   4. History of suicide attempts: Patient denies   5. Family history of suicide or attempts: Patient denies   6. History of violent behaviors towards others or property or thoughts of committing suicide: Patient denies   7. History of sexual aggression toward others: Patient denies   8. Access to firearms or weapons: Patient denies     Subjective     PHQ-9 Depression Screening  Little interest or pleasure in doing things? 3-->nearly every day   Feeling down, depressed, or hopeless? 3-->nearly every day   Trouble falling or staying asleep, or sleeping too much? 1-->several days   Feeling tired or having little energy? 3-->nearly every day   Poor appetite or overeating? 1-->several days   Feeling bad about yourself - or that you are a failure or have let yourself or your family down? 2-->more than half the days   Trouble concentrating on things, such as reading the newspaper or watching television? 3-->nearly every day   Moving or speaking so slowly that other people could have noticed? Or the opposite - being so fidgety or restless that you have been moving around a lot more than usual? 3-->nearly every day   Thoughts that you would be better off dead, or of hurting yourself in some way? 0-->not at all   PHQ-9 Total Score 19   If you checked off any problems, how difficult have these problems made it for you to do your work, take care of things at home, or get along with other people? extremely difficult      MANDIE-7  Feeling nervous, anxious or on edge: Nearly every day  Not being able to stop or control worrying: Nearly every day  Worrying too much about different things: Nearly every day  Trouble Relaxing: Nearly every day  Being so restless that it is hard to sit still: Nearly every day  Feeling afraid as if something awful  might happen: Nearly every day  Becoming easily annoyed or irritable: Nearly every day  MANDIE 7 Total Score: 21  If you checked any problems, how difficult have these problems made it for you to do your work, take care of things at home, or get along with other people: Extremely difficult      Client's Support Network Includes:   friends    Functional Status: Mild impairment     Progress toward goal: Not at goal    Prognosis: Good with Ongoing Treatment     Assessment / Plan / Progress     Visit Diagnosis/Orders Placed This Visit:    ICD-10-CM ICD-9-CM   1. Severe episode of recurrent major depressive disorder, without psychotic features  F33.2 296.33   2. Attention deficit hyperactivity disorder (ADHD), combined type  F90.2 314.01          PLAN:  Safety: No acute safety concerns  Risk Assessment: Risk of self-harm acutely is low. Risk of self-harm chronically is also low, but could be further elevated in the event of treatment noncompliance and/or AODA.    Treatment Plan/Goals & Progress: Continue supportive psychotherapy efforts and medications as indicated. Treatment options discussed during today's visit. Client ackowledged and verbally consented to continue with current treatment plan and was educated on the importance of compliance with treatment and follow-up appointments. Client seems reasonably able to adhere to treatment plan.      Assisted client in processing above session content; acknowledged and normalized client’s thoughts, feelings, and concerns.  Rationalized client's thought process regarding awareness of emotions present during recent days and impact on daily activities and functioning. Therapist assisted pt in reflecting upon recent emotions and develop insight into foundation of emotions. Pt acknowledged that she feels that due to physical and mental limitations that she has not been able to create an environment that assists with expectations and structure. Therapist and pt discussed roots of  anxiety that can be aroused out of fear of structure and awareness of safety and comfort. Therapist and pt explored various ways to implement time limits, methods of reassurance and boundaries to assist with creating time for self and assisting children with their own emotional regulation. Pt additionally acknowledged challenge that is felt due to comparison of household duties and expectations with others. Therapist encouraged pt to not discount smaller efforts and to challenge all or nothing thinking. Therapist and pt discussed adjusting expectations for self that provide a more supportive thought processing of situation.       Allowed client to freely discuss issues without interruption or judgement with unconditional positive regard, active listening skills, and empathy. Clinician provided a safe, confidential environment to facilitate the development of a positive therapeutic relationship and encouraged open, honest communication. Assisted client in identifying risk factors which would indicate the need for higher level of care including thoughts to harm self or others and/or self-harming behavior and encouraged client to contact this office, call 911, or present to the nearest emergency room should any of these events occur. Discussed crisis intervention services and means to access. Client adamantly and convincingly denies current suicidal or homicidal ideation or perceptual disturbance. Assisted client in processing session content; acknowledged and normalized client’s thoughts, feelings, and concerns by utilizing a person-centered approach in efforts to build appropriate rapport and a positive therapeutic relationship with open and honest communication.     Quality Measures:     TOBACCO USE:  Tobacco Use: Low Risk  (5/20/2024)    Patient History     Smoking Tobacco Use: Never     Smokeless Tobacco Use: Never     Passive Exposure: Never      Never smoker    I advised Angie of the risks of tobacco use.      Follow Up:   Return in about 1 week (around 7/16/2024).    RADHA SalamancaW  [facility] [address]

## 2024-07-15 ENCOUNTER — LAB (OUTPATIENT)
Dept: LAB | Facility: HOSPITAL | Age: 29
End: 2024-07-15
Payer: MEDICARE

## 2024-07-15 ENCOUNTER — OFFICE VISIT (OUTPATIENT)
Dept: FAMILY MEDICINE CLINIC | Facility: CLINIC | Age: 29
End: 2024-07-15
Payer: MEDICARE

## 2024-07-15 VITALS
OXYGEN SATURATION: 98 % | HEIGHT: 61 IN | SYSTOLIC BLOOD PRESSURE: 112 MMHG | DIASTOLIC BLOOD PRESSURE: 70 MMHG | WEIGHT: 152 LBS | BODY MASS INDEX: 28.7 KG/M2 | HEART RATE: 82 BPM

## 2024-07-15 DIAGNOSIS — R73.03 PREDIABETES: Chronic | ICD-10-CM

## 2024-07-15 DIAGNOSIS — N92.1 MENORRHAGIA WITH IRREGULAR CYCLE: ICD-10-CM

## 2024-07-15 DIAGNOSIS — F33.2 SEVERE EPISODE OF RECURRENT MAJOR DEPRESSIVE DISORDER, WITHOUT PSYCHOTIC FEATURES: ICD-10-CM

## 2024-07-15 DIAGNOSIS — G56.03 BILATERAL CARPAL TUNNEL SYNDROME: Primary | ICD-10-CM

## 2024-07-15 DIAGNOSIS — N92.6 MENSTRUAL CHANGES: ICD-10-CM

## 2024-07-15 DIAGNOSIS — F90.2 ATTENTION DEFICIT HYPERACTIVITY DISORDER (ADHD), COMBINED TYPE: ICD-10-CM

## 2024-07-15 LAB
ALBUMIN SERPL-MCNC: 4.3 G/DL (ref 3.5–5.2)
ALBUMIN/GLOB SERPL: 1.3 G/DL
ALP SERPL-CCNC: 67 U/L (ref 39–117)
ALT SERPL W P-5'-P-CCNC: 16 U/L (ref 1–33)
ANION GAP SERPL CALCULATED.3IONS-SCNC: 12.9 MMOL/L (ref 5–15)
AST SERPL-CCNC: 20 U/L (ref 1–32)
BASOPHILS # BLD AUTO: 0.02 10*3/MM3 (ref 0–0.2)
BASOPHILS NFR BLD AUTO: 0.4 % (ref 0–1.5)
BILIRUB BLD-MCNC: NEGATIVE MG/DL
BILIRUB SERPL-MCNC: <0.2 MG/DL (ref 0–1.2)
BUN SERPL-MCNC: 16 MG/DL (ref 6–20)
BUN/CREAT SERPL: 18.8 (ref 7–25)
CALCIUM SPEC-SCNC: 9 MG/DL (ref 8.6–10.5)
CHLORIDE SERPL-SCNC: 101 MMOL/L (ref 98–107)
CLARITY, POC: CLEAR
CO2 SERPL-SCNC: 23.1 MMOL/L (ref 22–29)
COLOR UR: ABNORMAL
CREAT SERPL-MCNC: 0.85 MG/DL (ref 0.57–1)
DEPRECATED RDW RBC AUTO: 39.2 FL (ref 37–54)
EGFRCR SERPLBLD CKD-EPI 2021: 95.2 ML/MIN/1.73
EOSINOPHIL # BLD AUTO: 0.15 10*3/MM3 (ref 0–0.4)
EOSINOPHIL NFR BLD AUTO: 2.8 % (ref 0.3–6.2)
ERYTHROCYTE [DISTWIDTH] IN BLOOD BY AUTOMATED COUNT: 12.8 % (ref 12.3–15.4)
EXPIRATION DATE: ABNORMAL
FSH SERPL-ACNC: 6.97 MIU/ML
GLOBULIN UR ELPH-MCNC: 3.3 GM/DL
GLUCOSE SERPL-MCNC: 147 MG/DL (ref 65–99)
GLUCOSE UR STRIP-MCNC: NEGATIVE MG/DL
HBA1C MFR BLD: 6.1 % (ref 4.8–5.6)
HCT VFR BLD AUTO: 41.1 % (ref 34–46.6)
HGB BLD-MCNC: 13.2 G/DL (ref 12–15.9)
IMM GRANULOCYTES # BLD AUTO: 0.01 10*3/MM3 (ref 0–0.05)
IMM GRANULOCYTES NFR BLD AUTO: 0.2 % (ref 0–0.5)
KETONES UR QL: NEGATIVE
LEUKOCYTE EST, POC: NEGATIVE
LH SERPL-ACNC: 5.27 MIU/ML
LYMPHOCYTES # BLD AUTO: 1.88 10*3/MM3 (ref 0.7–3.1)
LYMPHOCYTES NFR BLD AUTO: 35.7 % (ref 19.6–45.3)
Lab: ABNORMAL
MCH RBC QN AUTO: 27.8 PG (ref 26.6–33)
MCHC RBC AUTO-ENTMCNC: 32.1 G/DL (ref 31.5–35.7)
MCV RBC AUTO: 86.7 FL (ref 79–97)
MONOCYTES # BLD AUTO: 0.39 10*3/MM3 (ref 0.1–0.9)
MONOCYTES NFR BLD AUTO: 7.4 % (ref 5–12)
NEUTROPHILS NFR BLD AUTO: 2.82 10*3/MM3 (ref 1.7–7)
NEUTROPHILS NFR BLD AUTO: 53.5 % (ref 42.7–76)
NITRITE UR-MCNC: NEGATIVE MG/ML
NRBC BLD AUTO-RTO: 0 /100 WBC (ref 0–0.2)
PH UR: 6 [PH] (ref 5–8)
PLATELET # BLD AUTO: 273 10*3/MM3 (ref 140–450)
PMV BLD AUTO: 10 FL (ref 6–12)
POTASSIUM SERPL-SCNC: 4 MMOL/L (ref 3.5–5.2)
PROT SERPL-MCNC: 7.6 G/DL (ref 6–8.5)
PROT UR STRIP-MCNC: NEGATIVE MG/DL
RBC # BLD AUTO: 4.74 10*6/MM3 (ref 3.77–5.28)
RBC # UR STRIP: ABNORMAL /UL
SODIUM SERPL-SCNC: 137 MMOL/L (ref 136–145)
SP GR UR: 1.02 (ref 1–1.03)
T4 FREE SERPL-MCNC: 1.05 NG/DL (ref 0.92–1.68)
TSH SERPL DL<=0.05 MIU/L-ACNC: 0.83 UIU/ML (ref 0.27–4.2)
UROBILINOGEN UR QL: NORMAL
WBC NRBC COR # BLD AUTO: 5.27 10*3/MM3 (ref 3.4–10.8)

## 2024-07-15 PROCEDURE — 87661 TRICHOMONAS VAGINALIS AMPLIF: CPT

## 2024-07-15 PROCEDURE — 1159F MED LIST DOCD IN RCRD: CPT

## 2024-07-15 PROCEDURE — 1160F RVW MEDS BY RX/DR IN RCRD: CPT

## 2024-07-15 PROCEDURE — 83036 HEMOGLOBIN GLYCOSYLATED A1C: CPT

## 2024-07-15 PROCEDURE — 87591 N.GONORRHOEAE DNA AMP PROB: CPT

## 2024-07-15 PROCEDURE — 80053 COMPREHEN METABOLIC PANEL: CPT

## 2024-07-15 PROCEDURE — 83002 ASSAY OF GONADOTROPIN (LH): CPT

## 2024-07-15 PROCEDURE — 81003 URINALYSIS AUTO W/O SCOPE: CPT

## 2024-07-15 PROCEDURE — 99214 OFFICE O/P EST MOD 30 MIN: CPT

## 2024-07-15 PROCEDURE — 85025 COMPLETE CBC W/AUTO DIFF WBC: CPT

## 2024-07-15 PROCEDURE — 87491 CHLMYD TRACH DNA AMP PROBE: CPT

## 2024-07-15 PROCEDURE — 1126F AMNT PAIN NOTED NONE PRSNT: CPT

## 2024-07-15 PROCEDURE — 84443 ASSAY THYROID STIM HORMONE: CPT

## 2024-07-15 PROCEDURE — 83001 ASSAY OF GONADOTROPIN (FSH): CPT

## 2024-07-15 PROCEDURE — 84439 ASSAY OF FREE THYROXINE: CPT

## 2024-07-15 NOTE — PROGRESS NOTES
"    Office Note     Name: Angie Sutherland    : 1995     MRN: [id number]     Chief Complaint  Prediabetes (F/u) and ADHD (F/u)    Subjective     History of Present Illness:  Angie Sutherland is a 28 y.o. female who presents today for follow-up of prediabetes and ADHD and depression.  Patient has a past medical history of malignant neoplasm of right breast, migraines, pericarditis, ADHD, major depression, peripheral neuropathy due to chemotherapy.  Patient presents today for follow-up of prediabetes and to discuss mental health.  Patient's last A1c was 6% 3 months ago.  Patient has not made any significant diet or exercise modifications.  She states she has felt exceptionally thirsty lately, but denies polyphasia or polyuria.  Patient states she would like a new referral to a new med management    Patient presents today complaints of possible carpal tunnel.  She states that she has been having a lot of pain in her bilateral wrists.  She states she does have a history of peripheral neuropathy due to chemotherapy, but this pain seems to be localized to her wrist.  She states that she saw her oncologist who stated that it might be carpal tunnel.  She had recommended splinting, but patient has not tried.  Patient complains of worsening pain when trying to  her children off the ground or repetitive motions with her hands.  She states it is \"debilitating.\"  Denies any swelling or redness to her wrist.  She states typing makes it worse as well.  She states sometimes things are so painful that she will drop them.    Patient also complains of heavier menstrual cycles that she has noticed in the past 6 months.  Patient is not currently on any birth control.  She states that she has noticed her cycles have been heavier and in the past 3 months have been more irregular timing.  Patient states that they have also been more painful.  Her last Pap smear was about 2 years ago and was normal, but does " have a history of abnormal Pap smears prior to that.  States that she does not have any dysuria or flank pain or hematuria, but does state that she feels like her discharge changes from week to week.  Denies any vaginal irritation or pain.  Denies pain with intercourse or intermenstrual bleeding. She is currently on her period.  She states she is recently started wearing a menstrual cup, it is slightly uncomfortable.      Review of Systems:   Review of Systems   Constitutional:  Negative for chills and fever.   Respiratory:  Negative for chest tightness and shortness of breath.    Cardiovascular:  Negative for chest pain and palpitations.   Gastrointestinal:  Negative for abdominal pain.   Genitourinary:  Positive for menstrual problem and vaginal bleeding. Negative for breast discharge, breast pain, dysuria, frequency, genital sores, pelvic pain, urgency, vaginal discharge and vaginal pain.       Past Medical History:   Past Medical History:   Diagnosis Date    Anxiety     Atypical squamous cell changes of undetermined significance (ASCUS) on cervical cytology with negative high risk human papilloma virus (HPV) test result 09/17/2015    Breast cancer 2017    [id number] (stage IIIB) invasive ductal carcinoma of the right breast, ER-, CT weakly +, Her2 3+.    Depression     Drug therapy 2018    Eczema     GERD (gastroesophageal reflux disease)     during chemo    Hx of radiation therapy 2018    right    Migraine     Ovarian cyst     Pleurisy     Prediabetes     Pregnancy, incidental     3/2/2016: 14 weeks pregnant. She was seen in 2013 during pregnancy and received counselling and glucometer. She was lost to f/u but states baby had no complications - weighed 7lb1oz    Wears glasses        Past Surgical History:   Past Surgical History:   Procedure Laterality Date    AUGMENTATION MAMMAPLASTY Bilateral 01/2019    with fat grafting    BREAST BIOPSY      BREAST CAPSULOTOMY, IMPLANT REVISION Bilateral 1/14/2019     Procedure: EXCHANGE BILATERAL EXPANDERS TO HIGHLY COHESIVE SILICONE IMPLANTS, BILATERAL CAPSULECTOMIES;  Surgeon: Delia Covarrubias MD;  Location:  [facility];  Service: Plastics    BREAST RECONSTRUCTION, BREAST TISSUE EXPANDER INSERTION Bilateral 10/30/2017    Procedure: BREAST RECONSTRUCTION WITH ALLODERM, BREAST TISSUE EXPANDER INSERTION BILATERAL;  Surgeon: Delia Covarrubias MD;  Location:  [facility];  Service:      SECTION       SECTION N/A 2016    Procedure:  SECTION REPEAT;  Surgeon: Malika Munoz MD;  Location:  [facility];  Service:      SECTION N/A 2022    Procedure:  SECTION REPEAT;  Surgeon: Carolina Amos MD;  Location:  [facility];  Service: Obstetrics/Gynecology;  Laterality: N/A;    FAT GRAFTING Bilateral 2019    Procedure: RECONSTRUCTED BREAST REVISION WITH FAT GRAFTING;  Surgeon: Edgar Block MD;  Location:  [facility];  Service: Plastics    MASTECTOMY      MASTECTOMY W/ SENTINEL NODE BIOPSY Bilateral 10/30/2017    Procedure: BREAST MASTECTOMY BILATERAL WITH RIGHT SENTINEL NODE BIOPSY;  Surgeon: Carlo Paulino MD;  Location:  [facility];  Service:     VENOUS ACCESS DEVICE (PORT) INSERTION  2017    VENOUS ACCESS DEVICE (PORT) REMOVAL         Family History:   Family History   Problem Relation Age of Onset    Arthritis Mother     Hypertension Mother     Diabetes Mother     Diabetes Brother     Ovarian cancer Maternal Grandmother     Breast cancer Neg Hx        Social History:   Social History     Socioeconomic History    Marital status: Single   Tobacco Use    Smoking status: Never     Passive exposure: Never    Smokeless tobacco: Never   Vaping Use    Vaping status: Never Used   Substance and Sexual Activity    Alcohol use: Yes     Alcohol/week: 2.0 standard drinks of alcohol     Types: 2 Glasses of wine per week     Comment: occasional    Drug use: Yes     Types: Marijuana     Comment: OCCASIONALLY, last  "use 8-     Sexual activity: Defer       Immunizations:   Immunization History   Administered Date(s) Administered    HPV Quadrivalent 08/13/2018    Tdap 08/13/2018        Medications:   No current outpatient medications on file.    Allergies:   Allergies   Allergen Reactions    Benadryl [Diphenhydramine] Shortness Of Breath and Diarrhea    Diphenhydramine Hcl Diarrhea and Shortness Of Breath    Other Hives     Sun allergy breaks out in hives    Latex Unknown - Low Severity and Rash     HAS SOME IRRITATION AFTER CATHETER INSERTION       Objective     Vital Signs  /70   Pulse 82   Ht 154.9 cm (61\")   Wt 68.9 kg (152 lb)   SpO2 98%   BMI 28.72 kg/m²   Estimated body mass index is 28.72 kg/m² as calculated from the following:    Height as of this encounter: 154.9 cm (61\").    Weight as of this encounter: 68.9 kg (152 lb).          Physical Exam  Vitals reviewed.   Constitutional:       Appearance: Normal appearance.   HENT:      Head: Normocephalic and atraumatic.   Eyes:      Pupils: Pupils are equal, round, and reactive to light.   Cardiovascular:      Rate and Rhythm: Normal rate and regular rhythm.      Pulses: Normal pulses.      Heart sounds: Normal heart sounds.   Pulmonary:      Effort: Pulmonary effort is normal.      Breath sounds: Normal breath sounds.   Abdominal:      General: Abdomen is flat. Bowel sounds are normal.   Musculoskeletal:      Comments: Positive Tinel's sign.  Positive Phalen sign.    Skin:     General: Skin is warm.   Neurological:      General: No focal deficit present.      Mental Status: She is alert and oriented to person, place, and time.   Psychiatric:         Mood and Affect: Mood normal.            Results:  Recent Results (from the past 24 hour(s))   POC Urinalysis Dipstick, Automated    Collection Time: 07/15/24 10:25 AM    Specimen: Urine   Result Value Ref Range    Color Dark Yellow Yellow, Straw, Dark Yellow, Destiny    Clarity, UA Clear Clear    Specific [place] "  1.020 1.005 - 1.030    pH, Urine 6.0 5.0 - 8.0    Leukocytes Negative Negative    Nitrite, UA Negative Negative    Protein, POC Negative Negative mg/dL    Glucose, UA Negative Negative mg/dL    Ketones, UA Negative Negative    Urobilinogen, UA Normal Normal, 0.2 E.U./dL    Bilirubin Negative Negative    Blood, UA 3+ (A) Negative    Lot Number [id number]     Expiration Date 01/13/2026         Assessment and Plan     Assessment/Plan:  Diagnoses and all orders for this visit:    1. Bilateral carpal tunnel syndrome (Primary)  -     Ambulatory Referral to Orthopedic Surgery    2. Menorrhagia with irregular cycle  -     CBC Auto Differential; Future  -     Comprehensive Metabolic Panel; Future  -     TSH; Future  -     T4, Free; Future  -     Follicle stimulating hormone; Future  -     Luteinizing hormone; Future  -     US Non-ob Transvaginal; Future    3. Menstrual changes  -     POC Urinalysis Dipstick, Automated  -     Chlamydia trachomatis, Neisseria gonorrhoeae, Trichomonas vaginalis, PCR - Urine, Urine, Clean Catch; Future  -     Chlamydia trachomatis, Neisseria gonorrhoeae, Trichomonas vaginalis, PCR - Urine, Urine, Clean Catch    4. Prediabetes  -     Hemoglobin A1c; Future    5. Attention deficit hyperactivity disorder (ADHD), combined type  -     Ambulatory Referral to Behavioral Health    6. Severe episode of recurrent major depressive disorder, without psychotic features  -     Ambulatory Referral to Behavioral Health    Believe patient likely has clinical carpal tunnel syndrome bilaterally.  Discussed wearing wrist splints at night.  Also discussed use of anti-inflammatories with the patient as she has no contraindication.  With failure of anti-inflammatories will go ahead and refer to orthopedics for further management discussion.    With irregular cycles and heavier menstrual period would like to order some blood work to evaluate for causes and make sure she is not anemic. Have also ordered  transvaginal US for eval of possible structural causes.  Patient to notify me if symptoms worsen.  She did have a normal Pap smear about 2 years ago with her OB/GYN.  Would like her to follow-up with them if our workup is normal.  Will also evaluate for any STIs with changes in discharge.  Patient to notify me of any new or worsening symptoms in the meantime.    Will recheck A1c today.  No UTI.  No glucose in her urine.  There is blood in the urine, but patient is on her menstrual cycle.    Will also place new referral for behavioral health psychiatric med management referral as patient would like a second opinion.    Follow Up  Return in about 3 months (around 10/15/2024).    Gabby Fernandez PA-C   [facility]

## 2024-07-16 ENCOUNTER — TELEPHONE (OUTPATIENT)
Dept: FAMILY MEDICINE CLINIC | Facility: CLINIC | Age: 29
End: 2024-07-16
Payer: MEDICARE

## 2024-07-16 DIAGNOSIS — N92.1 MENORRHAGIA WITH IRREGULAR CYCLE: Primary | ICD-10-CM

## 2024-07-17 LAB
C TRACH RRNA SPEC QL NAA+PROBE: NEGATIVE
N GONORRHOEA RRNA SPEC QL NAA+PROBE: NEGATIVE
T VAGINALIS RRNA SPEC QL NAA+PROBE: NEGATIVE

## 2024-07-25 ENCOUNTER — OFFICE VISIT (OUTPATIENT)
Dept: ORTHOPEDIC SURGERY | Facility: CLINIC | Age: 29
End: 2024-07-25
Payer: MEDICARE

## 2024-07-25 VITALS
WEIGHT: 147 LBS | DIASTOLIC BLOOD PRESSURE: 78 MMHG | BODY MASS INDEX: 27.75 KG/M2 | HEIGHT: 61 IN | SYSTOLIC BLOOD PRESSURE: 124 MMHG

## 2024-07-25 DIAGNOSIS — T45.1X5A PERIPHERAL NEUROPATHY DUE TO CHEMOTHERAPY: ICD-10-CM

## 2024-07-25 DIAGNOSIS — G62.0 PERIPHERAL NEUROPATHY DUE TO CHEMOTHERAPY: ICD-10-CM

## 2024-07-25 DIAGNOSIS — G56.03 BILATERAL CARPAL TUNNEL SYNDROME: Primary | ICD-10-CM

## 2024-07-25 NOTE — PROGRESS NOTES
[facility] Orthopedic     Office Visit       Date: 07/25/2024   Patient Name: Angie Sutherland  MRN: [id number]  YOB: 1995    Referring Physician: Gabby Fernandez PA-C     Chief Complaint:   Chief Complaint   Patient presents with    Left Hand - Initial Evaluation, Numbness, Pain    Right Hand - Initial Evaluation, Numbness, Pain     History of Present Illness:   Angie Sutherland is a 28 y.o. female right-hand-dominant clinically with complaints of bilateral hand numbness and tingling.  Patient has a history of breast cancer and underwent chemotherapy in 2019.  She states that from the chemotherapy she has had some peripheral neuropathy in her hands and feet.  She does state that within the past several months she has had increased numbness specifically in the thumb index and long fingers.  This began at night causing her to awaken.  She has not attempted any nighttime bracing.  Symptoms have become more progressive throughout the day.  No prior nerve studies or injections.  No other complaints or concerns.    Subjective   Review of Systems:   Review of Systems   Constitutional: Negative.  Negative for chills, fatigue and fever.   HENT: Negative.  Negative for congestion and dental problem.    Eyes: Negative.  Negative for blurred vision.   Respiratory: Negative.  Negative for shortness of breath.    Cardiovascular: Negative.  Negative for leg swelling.   Gastrointestinal: Negative.  Negative for abdominal pain.   Endocrine: Negative.  Negative for polyuria.   Genitourinary: Negative.  Negative for difficulty urinating.   Musculoskeletal:  Positive for arthralgias.   Skin: Negative.    Allergic/Immunologic: Negative.    Neurological: Negative.    Hematological: Negative.  Negative for adenopathy.   Psychiatric/Behavioral: Negative.  Negative for behavioral problems.       Pertinent review of systems per HPI    I  "reviewed the patient's chief complaint, history of present illness, review of systems, past medical history, surgical history, family history, social history, medications and allergy list in the EMR on 07/25/2024 and agree with the findings above.    Objective    Vital Signs:   Vitals:    07/25/24 1002   BP: 124/78   Weight: 66.7 kg (147 lb)   Height: 154.9 cm (61\")     BMI:      General: No acute distress. Alert and oriented.   Cardiovascular: Palpable radial pulse.   Respiratory: Breathing is nonlabored.   Ortho Exam:  Examination of bilateral upper extremities demonstrates no deformity.  No skin lesions or abrasions.  No swelling or atrophy.  The A1 pulleys are nontender to palpation.  She is able to make a full and composite fist.  Sensation is decreased throughout the median nerve distribution and intact light touch throughout the ulnar nerve distribution.  5/5 APB and FDI strength.  Positive Tinel, Durkan's, Phalen's on the right.  Negative Tinel's and positive Durkan's and Phalen's on the left.  Negative Tinel's at bilateral elbows.  2 point discrimination right long finger 6 mm, left long finger 5 mm.  Warm and well-perfused distally.    CTS-6 Questionnaire         Left Hand  Symptoms and History  Numbness in the median nerve territory  3.5 (3.5)   Nocturnal numbness     4 (4)   Physical Examination  Thenar atrophy and/or weakness   0 (5)    Positive Phalen's test     5 (5)   Loss of 2-point Discrimination >5 mm  0 (4.5)  Positive Tinel sign     0 (4)                Total    12.5 (26)           Right Hand  Symptoms and History  Numbness in the median nerve territory  3.5 (3.5)   Nocturnal numbness     4 (4)   Physical Examination  Thenar atrophy and/or weakness   0 (5)    Positive Phalen's test     5 (5)   Loss of 2-point Discrimination >5 mm  4.5 (4.5)  Positive Tinel sign     4 (4)     Total    21 (26)    A patient with a score of > 12 has an 80% probability of electrodiagnostically positive carpal tunnel " syndrome.     A patient with a score of > 5 has an 25% probability of electrodiagnostically positive carpal tunnel syndrome.                Imaging / Studies:    Imaging Results (Last 24 Hours)       ** No results found for the last 24 hours. **          Assessment / Plan    Assessment/Plan:   Angie Sutherland is a 28 y.o. female with bilateral carpal tunnel syndrome in the setting of peripheral neuropathy secondary to chemotherapy use.    I discussed with the patient their clinical findings demonstrate carpal tunnel syndrome.  The pathophysiology of the condition, including compression of the median nerve in the carpal tunnel, was explained in detail.  It was also discussed that with more severe symptomatology, such as persistent and worsening paresthesias, that permanent nerve damage may result, which may make improvement after surgery less predictable.  Both conservative and surgical options were discussed.  Conservative treatments in the form of: observation, gentle nerve gliding exercises, night time splinting, and injection were presented.  Operative treatment in the form of open carpal tunnel release was presented and reiterated that the goal of surgery is to prevent further compression and damage to the nerve. Further workup with electrodiagnostic studies was also discussed and recommended to determine severity of carpal tunnel compression and underlying peripheral neuropathy secondary to chemotherapy. After expressing understanding of all options, the patient elects to proceed with nighttime splinting, gentle nerve gliding exercises, and obtaining electrodiagnostic studies.  They were agreeable with the plan.  All questions and concerns were addressed.       ICD-10-CM ICD-9-CM   1. Bilateral carpal tunnel syndrome  G56.03 354.0   2. Peripheral neuropathy due to chemotherapy  G62.0 357.7    T45.1X5A E933.1     Follow Up:   Return for Follow Up- After Testing.      Vidya Cordero MD  [facility] [place]  Orthopedic & Hand Surgeon

## 2024-07-31 ENCOUNTER — HOSPITAL ENCOUNTER (OUTPATIENT)
Dept: ULTRASOUND IMAGING | Facility: HOSPITAL | Age: 29
Discharge: HOME | End: 2024-07-31
Payer: MEDICARE

## 2024-07-31 DIAGNOSIS — N92.1 MENORRHAGIA WITH IRREGULAR CYCLE: ICD-10-CM

## 2024-07-31 PROCEDURE — 76830 TRANSVAGINAL US NON-OB: CPT

## 2024-08-02 DIAGNOSIS — D25.1 INTRAMURAL LEIOMYOMA OF UTERUS: Primary | ICD-10-CM

## 2024-08-02 DIAGNOSIS — N92.1 MENORRHAGIA WITH IRREGULAR CYCLE: ICD-10-CM

## 2024-08-07 ENCOUNTER — TELEMEDICINE (OUTPATIENT)
Age: 29
End: 2024-08-07
Payer: MEDICARE

## 2024-08-07 DIAGNOSIS — F33.2 SEVERE EPISODE OF RECURRENT MAJOR DEPRESSIVE DISORDER, WITHOUT PSYCHOTIC FEATURES: Primary | ICD-10-CM

## 2024-08-07 DIAGNOSIS — F90.2 ATTENTION DEFICIT HYPERACTIVITY DISORDER (ADHD), COMBINED TYPE: ICD-10-CM

## 2024-08-07 PROCEDURE — 90834 PSYTX W PT 45 MINUTES: CPT

## 2024-08-07 NOTE — PROGRESS NOTES
Telehealth  (Video) Visit      Patient Name: Angie Sutherland  : 1995   MRN: [id number]   Time In: 10:17 am      Time Out: 10:54 am     Referring Physician: Gabby Fernandez PA-C    Chief Complaint:      ICD-10-CM ICD-9-CM   1. Severe episode of recurrent major depressive disorder, without psychotic features  F33.2 296.33   2. Attention deficit hyperactivity disorder (ADHD), combined type  F90.2 314.01        This provider is located at home address on file. This visit is being done on behalf of [facility] [address], [address], [address], [place]. [address], using a Zoom platform for a video visit in a secure and private environment. The Patient is seen remotely at their home address in [place], using a private computer, phone or tablet.  The patient is able to be seen through a MyChart Video Visit through [employer] at today's encounter. Patient is being evaluated/treated via telehealth by Zoom, and stated they are in a secure environment for this session. The patient's condition being diagnosed/treated is appropriate for telemedicine. The provider identified herself as well as her credentials.   The patient, and/or patient's guardian, consent to being seen remotely, and when consent is given they understand that the consent allows for patient identifiable information to be sent to a third party as needed.   They may refuse to be seen remotely at any time. The electronic data is encrypted and password protected, and the patient and/or guardian has been advised of the potential risks to privacy not withstanding such measures.    You have chosen to receive care through a video visit today. Do you consent to use a video visit for your medical care today? yes  This visit has been scheduled as a video visit to comply with patient safety concerns in accordance with CDC recommendations.    History of Present Illness: Angie Sutherland is a 28 y.o. female who I saw today  "thru a video visit for ongoing symptoms associated with depression. Pt reports that it has been a difficult week due to ongoing relationship issues. Pt shared that she feels more irritable and frustrated with others. Pt reported a decrease in appetite and finds herself \"just sleeping\" when feeling bad. Pt acknowledged excitement for upcoming birthday and plans for social engagement.     Objective      PHQ-9 Depression Screening  Little interest or pleasure in doing things? (P) 3-->nearly every day   Feeling down, depressed, or hopeless? (P) 3-->nearly every day   Trouble falling or staying asleep, or sleeping too much? (P) 1-->several days   Feeling tired or having little energy? (P) 3-->nearly every day   Poor appetite or overeating? (P) 3-->nearly every day   Feeling bad about yourself - or that you are a failure or have let yourself or your family down? (P) 3-->nearly every day   Trouble concentrating on things, such as reading the newspaper or watching television? (P) 3-->nearly every day   Moving or speaking so slowly that other people could have noticed? Or the opposite - being so fidgety or restless that you have been moving around a lot more than usual? (P) 2-->more than half the days   Thoughts that you would be better off dead, or of hurting yourself in some way? (P) 1-->several days   PHQ-9 Total Score (P) 22   If you checked off any problems, how difficult have these problems made it for you to do your work, take care of things at home, or get along with other people? (P) extremely difficult      [place] Suicide Severity Rating (C-SSRS)  In the past month, have you wished you were dead or wished you could go to sleep and not wake up? yes     In the past month, have you actually had any thoughts of killing yourself? no     Have you been thinking about how you might do this?       Have you had these thoughts and had some intention of acting on them?       Have you started to work out or have you worked " out the details of how to kill yourself?       Have you ever in your lifetime done anything, started to do anything, or prepared to do anything to end your life? no       Was this within the past three months?       Level of Risk per Screen low risk      MANDIE-7  Feeling nervous, anxious or on edge: (P) Nearly every day  Not being able to stop or control worrying: (P) More than half the days  Worrying too much about different things: (P) Nearly every day  Trouble Relaxing: (P) Nearly every day  Being so restless that it is hard to sit still: (P) Nearly every day  Feeling afraid as if something awful might happen: (P) Nearly every day  Becoming easily annoyed or irritable: (P) Nearly every day  MANDIE 7 Total Score: (P) 20  If you checked any problems, how difficult have these problems made it for you to do your work, take care of things at home, or get along with other people: (P) Extremely difficult    Interval History:   Deteriorated    Medications:   No current outpatient medications on file.      Subjective     Vital Signs:   Due to extenuating circumstances and possible current health risks associated with the patient being present in a clinical setting (with current health restrictions in place in regards to possible COVID 19 transmission/exposure), the patient was seen remotely today via a video visit. Unable to obtain vital signs due to nature of remote visit.    Mental Status Exam:   MENTAL STATUS EXAM   General Appearance:  Cleanly groomed and dressed  Eye Contact:  Good eye contact  Attitude:  Cooperative and polite  Motor Activity:  Normal gait, posture  Speech:  Normal rate, tone, volume  Mood and affect:  Normal, pleasant  Thought Process:  Logical, goal-directed and linear  Associations/ Thought Content:  No delusions  Hallucinations:  None  Suicidal Ideations:  Passive ideation  Homicidal Ideation:  Not present  Sensorium:  Alert and clear  Orientation:  Person, place, situation and time  Immediate  "Recall, Recent, and Remote Memory:  Intact  Attention Span/ Concentration:  Good  Insight:  Good  Judgement:  Good  Reliability:  Good  Impulse Control:  Good      Assessment / Plan      Visit Diagnosis/Orders Placed This Visit:    ICD-10-CM ICD-9-CM   1. Severe episode of recurrent major depressive disorder, without psychotic features  F33.2 296.33   2. Attention deficit hyperactivity disorder (ADHD), combined type  F90.2 314.01          PLAN:  Safety: No acute safety concerns  Risk Assessment: Risk of self-harm acutely is low. Risk of self-harm chronically is also low, but could be further elevated in the event of treatment noncompliance and/or AODA.    TREATMENT PLAN/GOALS: Continue supportive psychotherapy efforts and medications as indicated. Treatment options discussed during today's visit. Patient ackowledged and verbally consented to continue with current treatment plan and was educated on the importance of compliance with treatment and follow-up appointments. Patient seems reasonably able to adhere to treatment plan.      Therapist assisted pt in reflecting upon recent activities that have contributed to increase of depressive symptoms and impact on behaviors. Pt acknowledged that she has \"shut down\" due to ongoing reflection of relationships in her life that are not reciprocal. Pt reflected upon various relationships and experiences that have contributed to establishment of defense mechanisms and lack of desire to engage in asserting her needs or beliefs. Therapist assisted pt in reflecting upon how those relationships have impacted self-belief and self-talk patterns that can also contribute to mood changes. Pt acknowledged that it is difficult to have positive self-regard when she feels isolated or not valued by others. Pt expressed excitement for her upcoming birthday and desire to set goal for the year which may include development of a more positive environment and relationships for self. Therapist " "affirmed pt's insight and reflection into these relationship and impact on her behaviors. Therapist and pt discussed strategies to assist with decreasing depressive symptoms by engaging in activities that are meaningful and pleasurable, such as cleaning. Therapist encouraged pt to remain mindful of self talk practices and attempt to ensure that she is supporting herself with a positive mindset. Therapist inquired further into pt's self-report of thoughts of self-harm or death. Pt acknowledged that thoughts have been fleeting that \"things would be better\" however denied specific intent or plan to harm or kill self.     Allowed Patient to freely discuss issues  without interruption or judgement with unconditional positive regard, active listening skills, and empathy. Therapist provided a safe, confidential environment to facilitate the development of a positive therapeutic relationship and encouraged open, honest communication. Assisted Patient in identifying risk factors which would indicate the need for higher level of care including thoughts to harm self or others and/or self-harming behavior and encouraged Patient to contact this office, call 911, or present to the nearest emergency room should any of these events occur. Discussed crisis intervention services and means to access. Patient adamantly and convincingly denies current suicidal or homicidal ideation or perceptual disturbance. Assisted Patient in processing session content; acknowledged and normalized Patient’s thoughts, feelings, and concerns by utilizing a person-centered approach in efforts to build appropriate rapport and a positive therapeutic relationship with open and honest communication.     Quality Measures:     TOBACCO USE:  Tobacco Use: Low Risk  (7/25/2024)    Patient History     Smoking Tobacco Use: Never     Smokeless Tobacco Use: Never     Passive Exposure: Never      Never smoker    I advised Angie of the risks of tobacco use    Follow " Up:   Return in about 1 week (around 8/14/2024).      Shadia Dumas, RADHAW  [facility] [address]

## 2024-08-15 ENCOUNTER — OFFICE VISIT (OUTPATIENT)
Dept: OBSTETRICS AND GYNECOLOGY | Facility: CLINIC | Age: 29
End: 2024-08-15
Payer: MEDICARE

## 2024-08-15 VITALS
BODY MASS INDEX: 28.85 KG/M2 | HEIGHT: 61 IN | SYSTOLIC BLOOD PRESSURE: 128 MMHG | WEIGHT: 152.8 LBS | DIASTOLIC BLOOD PRESSURE: 70 MMHG

## 2024-08-15 DIAGNOSIS — Z01.419 WOMEN'S ANNUAL ROUTINE GYNECOLOGICAL EXAMINATION: Primary | ICD-10-CM

## 2024-08-15 NOTE — PROGRESS NOTES
"     Gynecologic Annual Exam Note        Gynecologic Exam, Menorrhagia, and Fibroids        Subjective     HPI  Angie Sutherland is a 29 y.o.  female who presents for annual well woman exam as a established patient. There were no changes to her medical or surgical history since her last visit.. Patient's last menstrual period was 2024. Her periods occur every 21-30, lasting 7-8 days.  States for the past year her cycles have become more irregular and unpredictable.  The flow is heavy for 3-4 days then moderate. She changes a large overnight pad every 2-3 hours on her heavy days.  She reports dysmenorrhea is severe occurring first 2-3 days of flow. Marital Status: single.  She is sexually active. She has not had new partners.. STD testing recommendations have been explained to the patient and she does desire STD testing.    The patient would like to discuss the following complaints today: menstrual changes within last year. See above. Her PCP ordered a TVUS on 24 to evaluate her heavy periods.  Results showed: \"A couple small cervical nabothian cysts are incidentally noted. Otherwise unremarkable appearance of the cervix. The uterus measures 7.9 x 4.3 x 6.0 cm in size. There is homogeneous echogenicity of the uterine myometrium. There is a very questionable rounded hypoechoic structure in the right uterine fundus measuring 1.7 cm in diameter, possibly reflecting intramural fibroid. The endometrial stripe measures 0.9 cm in thickness.\"    Additional OB/GYN History   contraceptive methods: Condoms  Desires to: do not start contraception  Thromboembolic Disease: family history- Maternal uncle- DVT  History of migraines: yes without aura      History of STD: yes  trich    Last Pap : 22. Results: negative. HPV:  not done .   Last Completed Pap Smear            Ordered - PAP SMEAR (Every 3 Years) Ordered on 8/15/2024      2022  SCANNED - PAP SMEAR    2015  Patient-Reported " (Performed Externally)                     History of abnormal Pap smear: yes - ASCUS , normal since  Gardasil status:completed  Family history of uterine, colon, breast, or ovarian cancer: yes - MGM- Colon cancer. Pt has a history of invasive ductal carcinoma of the right breast- double mastectomy and chemotherapy/radiation . Last MRI was in Dec 2023, Next scheduled later this month  Performs monthly Self-Breast Exam: no  Exercises Regularly:yes  Feelings of Anxiety or Depression: yes - she goes to therapy  Tobacco Usage?: No     No current outpatient medications on file.     Patient denies the need for medication refills today.    OB History          3    Para   3    Term   3       0    AB   0    Living   3         SAB   0    IAB   0    Ectopic   0    Molar        Multiple   0    Live Births   3                Health Maintenance   Topic Date Due    ANNUAL WELLNESS VISIT  Never done    Annual Gynecologic Pelvic and Breast Exam  2023    COVID-19 Vaccine ( - - season) Never done    INFLUENZA VACCINE  2024    BMI FOLLOWUP  2024    PAP SMEAR  2025    TDAP/TD VACCINES (2 - Td or Tdap) 2028    HEPATITIS C SCREENING  Completed    Pneumococcal Vaccine 0-64  Aged Out       Past Medical History:   Diagnosis Date    Anxiety     Atypical squamous cell changes of undetermined significance (ASCUS) on cervical cytology with negative high risk human papilloma virus (HPV) test result 2015    Breast cancer 2017    Depression     Drug therapy 2018    Eczema     GERD (gastroesophageal reflux disease)     during chemo    Hx of radiation therapy 2018    right    Migraine     Ovarian cyst     Pleurisy     Prediabetes     Pregnancy, incidental     3/2/2016: 14 weeks pregnant. She was seen in 2013 during pregnancy and received counselling and glucometer. She was lost to f/u but states baby had no complications - weighed 7lb1oz    Wears glasses         Past Surgical History:    Procedure Laterality Date    AUGMENTATION MAMMAPLASTY Bilateral 2019    with fat grafting    BREAST BIOPSY      BREAST CAPSULOTOMY, IMPLANT REVISION Bilateral 2019    Procedure: EXCHANGE BILATERAL EXPANDERS TO HIGHLY COHESIVE SILICONE IMPLANTS, BILATERAL CAPSULECTOMIES;  Surgeon: Delia Covarrubias MD;  Location: [facility] OR;  Service: Plastics    BREAST RECONSTRUCTION, BREAST TISSUE EXPANDER INSERTION Bilateral 10/30/2017    Procedure: BREAST RECONSTRUCTION WITH ALLODERM, BREAST TISSUE EXPANDER INSERTION BILATERAL;  Surgeon: Delia Covarrubias MD;  Location: [facility] OR;  Service:      SECTION  2013     SECTION N/A 2016    Procedure:  SECTION REPEAT;  Surgeon: Malika Munoz MD;  Location: [facility] LABOR DELIVERY;  Service:      SECTION N/A 2022    Procedure:  SECTION REPEAT;  Surgeon: Carolina Amos MD;  Location: [facility] LABOR DELIVERY;  Service: Obstetrics/Gynecology;  Laterality: N/A;    FAT GRAFTING Bilateral 2019    Procedure: RECONSTRUCTED BREAST REVISION WITH FAT GRAFTING;  Surgeon: Edgar Block MD;  Location: [facility] OR;  Service: Plastics    MASTECTOMY      MASTECTOMY W/ SENTINEL NODE BIOPSY Bilateral 10/30/2017    Procedure: BREAST MASTECTOMY BILATERAL WITH RIGHT SENTINEL NODE BIOPSY;  Surgeon: Carlo Paulino MD;  Location: [facility] OR;  Service:     VENOUS ACCESS DEVICE (PORT) INSERTION  2017    VENOUS ACCESS DEVICE (PORT) REMOVAL         The additional following portions of the patient's history were reviewed and updated as appropriate: allergies, current medications, past family history, past medical history, past social history, past surgical history, and problem list.    Review of Systems   Constitutional: Negative.    HENT: Negative.     Eyes: Negative.    Respiratory: Negative.     Cardiovascular: Negative.    Gastrointestinal: Negative.    Endocrine: Negative.    Genitourinary:  Positive for menstrual problem.  "  Musculoskeletal: Negative.    Skin: Negative.    Allergic/Immunologic: Negative.    Neurological: Negative.    Hematological: Negative.    Psychiatric/Behavioral: Negative.           I have reviewed and agree with the HPI, ROS, and historical information as entered above. Carolina Amos MD          Objective   /70   Ht 154.9 cm (61\")   Wt 69.3 kg (152 lb 12.8 oz)   LMP 08/03/2024   BMI 28.87 kg/m²     Physical Exam  Vitals and nursing note reviewed. Exam conducted with a chaperone present.   Constitutional:       Appearance: Normal appearance.   HENT:      Head: Normocephalic and atraumatic.   Eyes:      Pupils: Pupils are equal, round, and reactive to light.   Pulmonary:      Effort: Pulmonary effort is normal.   Abdominal:      General: Bowel sounds are normal. There is no distension.      Palpations: Abdomen is soft.      Tenderness: There is no abdominal tenderness.   Genitourinary:     General: Normal vulva.      Exam position: Lithotomy position.      Labia:         Right: No rash.         Left: No rash.       Urethra: No prolapse.      Vagina: Normal.      Cervix: No cervical motion tenderness, lesion or cervical bleeding.      Uterus: Normal.       Adnexa: Right adnexa normal and left adnexa normal.        Right: No tenderness.          Left: No tenderness.        Rectum: Normal.   Musculoskeletal:         General: Normal range of motion.      Cervical back: Normal range of motion and neck supple.   Skin:     General: Skin is warm and dry.   Neurological:      General: No focal deficit present.      Mental Status: She is alert and oriented to person, place, and time.   Psychiatric:         Mood and Affect: Mood normal.         Behavior: Behavior normal.     S/p bilateral mastectomy       Assessment and Plan    Problem List Items Addressed This Visit    None  Visit Diagnoses       Women's annual routine gynecological examination    -  Primary    Relevant Orders    LIQUID-BASED PAP SMEAR WITH HPV " GENOTYPING REGARDLESS OF INTERPRETATION (MARTI,COR,MAD)            GYN annual well woman exam. Discussed her recent TVUS. Reassurance given. She declines any interventaion for her periods at this time.   Reviewed pap guidelines.   Reviewed monthly self breast exams.  Instructed to call with lumps, pain, or breast discharge.    Reviewed exercise as a preventative health measures.   RTC in 1 year or PRN with problems  Return in about 1 year (around 8/15/2025) for Annual physical.    Carolina Amos MD  08/15/2024

## 2024-08-16 ENCOUNTER — OFFICE VISIT (OUTPATIENT)
Age: 29
End: 2024-08-16
Payer: MEDICARE

## 2024-08-16 DIAGNOSIS — F90.2 ATTENTION DEFICIT HYPERACTIVITY DISORDER (ADHD), COMBINED TYPE: ICD-10-CM

## 2024-08-16 DIAGNOSIS — F33.2 SEVERE EPISODE OF RECURRENT MAJOR DEPRESSIVE DISORDER, WITHOUT PSYCHOTIC FEATURES: Primary | ICD-10-CM

## 2024-08-16 NOTE — PROGRESS NOTES
Follow Up Adult Note     Date:2024   Client Name: Angie Sutherland  : 1995   MRN: [id number]   Time IN: 10:01 am    Time OUT: 10:44 am     Referring Provider: Gabby Fernandez PA-C    Chief Complaint:      ICD-10-CM ICD-9-CM   1. Severe episode of recurrent major depressive disorder, without psychotic features  F33.2 296.33   2. Attention deficit hyperactivity disorder (ADHD), combined type  F90.2 314.01        History of Present Illness:   Angie Sutherland is a 29 y.o. female who is being seen today for follow up individual psychotherapy counseling for ongoing depression. Pt reports that she had some positive experiences over her birthday weekends as well as disappointments. Pt reports that she feels that structure that has return to daily scheduled due to return of children to school has been helpful to maintain expectations for self.       Objective     Medications:   No current outpatient medications on file.    Allergies:   Allergies   Allergen Reactions    Benadryl [Diphenhydramine] Shortness Of Breath and Diarrhea    Diphenhydramine Hcl Diarrhea and Shortness Of Breath    Other Hives     Sun allergy breaks out in hives    Latex Unknown - Low Severity and Rash     HAS SOME IRRITATION AFTER CATHETER INSERTION       Mental Status Exam:   MENTAL STATUS EXAM   General Appearance:  Cleanly groomed and dressed  Eye Contact:  Good eye contact  Attitude:  Cooperative and polite  Motor Activity:  Normal gait, posture  Speech:  Normal rate, tone, volume  Mood and affect:  Normal, pleasant  Thought Process:  Logical, goal-directed and linear  Associations/ Thought Content:  No delusions  Hallucinations:  None  Suicidal Ideations:  Not present  Homicidal Ideation:  Not present  Sensorium:  Alert  Orientation:  Person, place, time and situation  Immediate Recall, Recent, and Remote Memory:  Intact  Attention Span/ Concentration:  Good  Insight:  Good  Judgement:  Good  Reliability:   Good  Impulse Control:  Good       SUICIDE RISK ASSESSMENT/CSSRS:  1. Does client have thoughts of suicide? Patient denies  2. Does client have intent for suicide? Patient denies  3. Does client have a current plan for suicide? Patient denies   4. History of suicide attempts: Patient denies   5. Family history of suicide or attempts: Patient denies   6. History of violent behaviors towards others or property or thoughts of committing suicide: Patient denies   7. History of sexual aggression toward others: Patient denies   8. Access to firearms or weapons: Patient denies     Subjective     PHQ-9 Depression Screening  Little interest or pleasure in doing things? 2-->more than half the days   Feeling down, depressed, or hopeless? 3-->nearly every day   Trouble falling or staying asleep, or sleeping too much? 2-->more than half the days   Feeling tired or having little energy? 3-->nearly every day   Poor appetite or overeating? 3-->nearly every day   Feeling bad about yourself - or that you are a failure or have let yourself or your family down? 3-->nearly every day   Trouble concentrating on things, such as reading the newspaper or watching television? 3-->nearly every day   Moving or speaking so slowly that other people could have noticed? Or the opposite - being so fidgety or restless that you have been moving around a lot more than usual? 2-->more than half the days   Thoughts that you would be better off dead, or of hurting yourself in some way? 0-->not at all   PHQ-9 Total Score 21   If you checked off any problems, how difficult have these problems made it for you to do your work, take care of things at home, or get along with other people? extremely difficult      MANDIE-7  Feeling nervous, anxious or on edge: Nearly every day  Not being able to stop or control worrying: Nearly every day  Worrying too much about different things: Nearly every day  Trouble Relaxing: Nearly every day  Being so restless that it is  "hard to sit still: Nearly every day  Feeling afraid as if something awful might happen: Several days  Becoming easily annoyed or irritable: Nearly every day  MANDIE 7 Total Score: 19  If you checked any problems, how difficult have these problems made it for you to do your work, take care of things at home, or get along with other people: Extremely difficult      Client's Support Network Includes:   sister, friends    Functional Status: Mild impairment     Progress toward goal: Not at goal    Prognosis: Good with Ongoing Treatment     Assessment / Plan / Progress     Visit Diagnosis/Orders Placed This Visit:    ICD-10-CM ICD-9-CM   1. Severe episode of recurrent major depressive disorder, without psychotic features  F33.2 296.33   2. Attention deficit hyperactivity disorder (ADHD), combined type  F90.2 314.01          PLAN:  Safety: No acute safety concerns  Risk Assessment: Risk of self-harm acutely is low. Risk of self-harm chronically is also low, but could be further elevated in the event of treatment noncompliance and/or AODA.    Treatment Plan/Goals & Progress: Continue supportive psychotherapy efforts and medications as indicated. Treatment options discussed during today's visit. Client ackowledged and verbally consented to continue with current treatment plan and was educated on the importance of compliance with treatment and follow-up appointments. Client seems reasonably able to adhere to treatment plan.      Assisted client in processing above session content; acknowledged and normalized client’s thoughts, feelings, and concerns.  Rationalized client's thought process regarding awareness of emotions associated with birthday activities. Therapist assisted pt in reflecting upon birthday activities. Pt acknowledged that she was disappointed when plans to go out did not happen. Pt acknowledged that she has struggled with feelings of being \"left out\" or \"forgotten\" since her childhood. Therapist and pt explored " "pt's thoughts and emotions as a result of this insight. Therapist and pt discussed ways to communicate our feelings and needs in a way that addresses inner wounds and advocates for self. Pt acknowledged ongoing struggle with \"people pleasing\" and tendency to avoid confrontation or uncomfortable situations. Therapist assisted pt in recognizing discrepancies between having high expectations for others yet unwilling to express our expectations or responses. Therapist encouraged pt to remain mindful of opportunities to challenge usual responses or interpretations of encounters that support self-awareness and confidence.       Allowed client to freely discuss issues without interruption or judgement with unconditional positive regard, active listening skills, and empathy. Clinician provided a safe, confidential environment to facilitate the development of a positive therapeutic relationship and encouraged open, honest communication. Assisted client in identifying risk factors which would indicate the need for higher level of care including thoughts to harm self or others and/or self-harming behavior and encouraged client to contact this office, call 911, or present to the nearest emergency room should any of these events occur. Discussed crisis intervention services and means to access. Client adamantly and convincingly denies current suicidal or homicidal ideation or perceptual disturbance. Assisted client in processing session content; acknowledged and normalized client’s thoughts, feelings, and concerns by utilizing a person-centered approach in efforts to build appropriate rapport and a positive therapeutic relationship with open and honest communication.     Quality Measures:     TOBACCO USE:  Tobacco Use: Low Risk  (8/15/2024)    Patient History     Smoking Tobacco Use: Never     Smokeless Tobacco Use: Never     Passive Exposure: Never      Never smoker    I advised Angie of the risks of tobacco use.     Follow Up: "   Return in about 1 week (around 8/23/2024).    RADHA SalamancaW  [facility] [address]

## 2024-08-20 ENCOUNTER — HOSPITAL ENCOUNTER (OUTPATIENT)
Dept: MRI IMAGING | Facility: HOSPITAL | Age: 29
Discharge: HOME | End: 2024-08-20
Admitting: INTERNAL MEDICINE
Payer: MEDICARE

## 2024-08-20 DIAGNOSIS — C50.011 MALIGNANT NEOPLASM OF NIPPLE OF RIGHT BREAST IN FEMALE, UNSPECIFIED ESTROGEN RECEPTOR STATUS: ICD-10-CM

## 2024-08-20 PROCEDURE — 0 GADOBENATE DIMEGLUMINE 529 MG/ML SOLUTION: Performed by: INTERNAL MEDICINE

## 2024-08-20 PROCEDURE — C8937 CAD BREAST MRI: HCPCS

## 2024-08-20 PROCEDURE — C8908 MRI W/O FOL W/CONT, BREAST,: HCPCS

## 2024-08-20 PROCEDURE — A9577 INJ MULTIHANCE: HCPCS | Performed by: INTERNAL MEDICINE

## 2024-08-20 RX ADMIN — GADOBENATE DIMEGLUMINE 14 ML: 529 INJECTION, SOLUTION INTRAVENOUS at 15:58

## 2024-08-21 ENCOUNTER — OFFICE VISIT (OUTPATIENT)
Age: 29
End: 2024-08-21
Payer: MEDICARE

## 2024-08-21 DIAGNOSIS — F33.2 SEVERE EPISODE OF RECURRENT MAJOR DEPRESSIVE DISORDER, WITHOUT PSYCHOTIC FEATURES: Primary | ICD-10-CM

## 2024-08-21 DIAGNOSIS — F90.2 ATTENTION DEFICIT HYPERACTIVITY DISORDER (ADHD), COMBINED TYPE: ICD-10-CM

## 2024-08-21 LAB — REF LAB TEST METHOD: NORMAL

## 2024-08-21 PROCEDURE — 90834 PSYTX W PT 45 MINUTES: CPT

## 2024-08-21 NOTE — TREATMENT PLAN
Multi-Disciplinary Problems (from Behavioral Health Treatment Plan)      Active Problems       Problem: Depression  Start Date: 08/21/24      Problem Details: The patient self-scales this problem as a 7 with 10 being the worst.          Goal Priority Start Date Expected End Date End Date    Patient will demonstrate the ability to initiate new constructive life skills outside of sessions on a consistent basis. -- 08/21/24 02/19/25 --    Goal Details: Progress toward goal:  Not appropriate to rate progress toward goal since this is the initial treatment plan.          Goal Intervention Frequency Start Date End Date    Assist patient in setting attainable activities of daily living goals. Q3 Months 08/21/24 --      Goal Intervention Frequency Start Date End Date    Provide education about depression Q3 Months 08/21/24 --    Intervention Details: Duration of treatment until discharged.          Goal Intervention Frequency Start Date End Date    Assist patient in developing healthy coping strategies. Q3 Months 08/21/24 --    Intervention Details: Duration of treatment until discharged.                  Problem: Mood Instability  Start Date: 08/21/24      Problem Details: The patient self-scales this problem as a 7 with 10 being the worst.          Goal Priority Start Date Expected End Date End Date    Patient will achieve mood stability as evidenced by controlled behavior and a more deliberate thought process -- 08/21/24 02/19/25 --    Goal Details: Progress toward goal:  Not appropriate to rate progress toward goal since this is the initial treatment plan.          Goal Intervention Frequency Start Date End Date    Provide structure and focus to patient's thoughts and actions by establishing plans and routine. Q3 Months 08/21/24 --    Intervention Details: Duration of treatment until discharged.          Goal Intervention Frequency Start Date End Date    Assist patient in setting responsible goals and limits in  behavior. Q3 Months 08/21/24 --    Intervention Details: Duration of treatment until discharged.                  Problem: ADHD (Adult)  Start Date: 08/21/24      Problem Details: The patient self-scales this problem as a 9 with 10 being the worst.        Goal Priority Start Date Expected End Date End Date    Patient will sustain attention and concentration to complete chores, and work responsibilites and increase positive interaction in all relationships. -- 08/21/24 02/19/25 --    Goal Details: Progress toward goal:  Not appropriate to rate progress toward goal since this is the initial treatment plan.        Goal Intervention Frequency Start Date End Date    Assist patient in setting responsible goals and breaking down large tasks. Q3 Months 08/21/24 --    Intervention Details: Duration of treatment until discharged.        Goal Intervention Frequency Start Date End Date    Assist patient in using self monitoring checklist to improve attention, work performance, and social skills. Q3 Months 08/21/24 --    Intervention Details: Duration of treatment until discharged.                        Reviewed By       Shadia Dumas LCSW 08/21/24 1424                     I have discussed and reviewed this treatment plan with the patient.

## 2024-08-21 NOTE — PROGRESS NOTES
Follow Up Adult Note     Date:2024   Client Name: Angie Sutherland  : 1995   MRN: [id number]   Time IN: 11:00 am    Time OUT: 11:37 am     Referring Provider: Gabby Fernandez PA-C    Chief Complaint:      ICD-10-CM ICD-9-CM   1. Severe episode of recurrent major depressive disorder, without psychotic features  F33.2 296.33   2. Attention deficit hyperactivity disorder (ADHD), combined type  F90.2 314.01        History of Present Illness:   Angie Sutherland is a 29 y.o. female who is being seen today for follow up individual psychotherapy counseling for ongoing depression. Pt reports that mood has improved since last session. Pt was pleased to share that she is looking forward to upcoming social engagement with friends. Pt feels that return to school of her children has also been beneficial in ability to maintain structure. Pt noted that she has been more intentional on establishing boundaries with children to uphold her values as a mother and to minimize her feelings of guilt or unnecessary engagement.     Objective     Medications:   No current outpatient medications on file.  No current facility-administered medications for this visit.    Allergies:   Allergies   Allergen Reactions    Benadryl [Diphenhydramine] Shortness Of Breath and Diarrhea    Diphenhydramine Hcl Diarrhea and Shortness Of Breath    Other Hives     Sun allergy breaks out in hives    Latex Unknown - Low Severity and Rash     HAS SOME IRRITATION AFTER CATHETER INSERTION       Mental Status Exam:   MENTAL STATUS EXAM   General Appearance:  Cleanly groomed and dressed  Eye Contact:  Good eye contact  Attitude:  Cooperative and polite  Motor Activity:  Normal gait, posture  Speech:  Normal rate, tone, volume  Mood and affect:  Normal, pleasant and happy  Thought Process:  Logical, goal-directed and linear  Associations/ Thought Content:  No delusions  Hallucinations:  None  Suicidal Ideations:  Not  present  Homicidal Ideation:  Not present  Sensorium:  Alert and clear  Orientation:  Place, time, situation and person  Immediate Recall, Recent, and Remote Memory:  Intact  Attention Span/ Concentration:  Good  Insight:  Good  Judgement:  Good  Reliability:  Good  Impulse Control:  Good       SUICIDE RISK ASSESSMENT/CSSRS:  1. Does client have thoughts of suicide? Patient denies  2. Does client have intent for suicide? Patient denies  3. Does client have a current plan for suicide? Patient denies   4. History of suicide attempts: Patient denies   5. Family history of suicide or attempts: Patient denies   6. History of violent behaviors towards others or property or thoughts of committing suicide: Patient denies   7. History of sexual aggression toward others: Patient denies   8. Access to firearms or weapons: Patient denies     Subjective     PHQ-9 Depression Screening  Little interest or pleasure in doing things? 3-->nearly every day   Feeling down, depressed, or hopeless? 2-->more than half the days   Trouble falling or staying asleep, or sleeping too much? 3-->nearly every day   Feeling tired or having little energy? 3-->nearly every day   Poor appetite or overeating? 1-->several days   Feeling bad about yourself - or that you are a failure or have let yourself or your family down? 2-->more than half the days   Trouble concentrating on things, such as reading the newspaper or watching television? 3-->nearly every day   Moving or speaking so slowly that other people could have noticed? Or the opposite - being so fidgety or restless that you have been moving around a lot more than usual? 2-->more than half the days   Thoughts that you would be better off dead, or of hurting yourself in some way? 0-->not at all   PHQ-9 Total Score 19   If you checked off any problems, how difficult have these problems made it for you to do your work, take care of things at home, or get along with other people? extremely difficult       MANDIE-7  Feeling nervous, anxious or on edge: Nearly every day  Not being able to stop or control worrying: Nearly every day  Worrying too much about different things: Nearly every day  Trouble Relaxing: Nearly every day  Being so restless that it is hard to sit still: More than half the days  Feeling afraid as if something awful might happen: Nearly every day  Becoming easily annoyed or irritable: Nearly every day  MANDIE 7 Total Score: 20  If you checked any problems, how difficult have these problems made it for you to do your work, take care of things at home, or get along with other people: Extremely difficult      Client's Support Network Includes:   friends, sister    Functional Status: Mild impairment     Progress toward goal: Not at goal    Prognosis: Good with Ongoing Treatment     Assessment / Plan / Progress     Visit Diagnosis/Orders Placed This Visit:    ICD-10-CM ICD-9-CM   1. Severe episode of recurrent major depressive disorder, without psychotic features  F33.2 296.33   2. Attention deficit hyperactivity disorder (ADHD), combined type  F90.2 314.01          PLAN:  Safety: No acute safety concerns  Risk Assessment: Risk of self-harm acutely is low. Risk of self-harm chronically is also low, but could be further elevated in the event of treatment noncompliance and/or AODA.    Treatment Plan/Goals & Progress: Continue supportive psychotherapy efforts and medications as indicated. Treatment options discussed during today's visit. Client ackowledged and verbally consented to continue with current treatment plan and was educated on the importance of compliance with treatment and follow-up appointments. Client seems reasonably able to adhere to treatment plan.      Assisted client in processing above session content; acknowledged and normalized client’s thoughts, feelings, and concerns.  Rationalized client's thought process regarding awareness of improve mood and ability to regain focus in daily  "activities. Therapist assisted pt in reflecting upon decision to become more socially engaged. Pt acknowledged that she often feels limited due to her children or financial situation but felt the desire to reach out to old friends for a gathering. Pt was very pleased with the response and acknowledged that she has \"more people in my life than I realized.\" Therapist affirmed and validated pt's reflection and willingness to engage in behavior to explore opportunities. Therapist and pt engaged in discussion regarding pt's identity as motherhood and decision to engage in more boundary setting with children. Pt acknowledged that she is aware of working through short term uncomfortableness to establish long term effects with children. Therapist and pt discussed pt's upbringing and relationship with siblings that are reflective in her own family. Pt acknowledged various similarities and differences. Therapist affirmed pt's insight and ability to connect and engage in attaching meaning and understanding to personal experiences to decision making and values alignment in her adulthood.       Allowed client to freely discuss issues without interruption or judgement with unconditional positive regard, active listening skills, and empathy. Clinician provided a safe, confidential environment to facilitate the development of a positive therapeutic relationship and encouraged open, honest communication. Assisted client in identifying risk factors which would indicate the need for higher level of care including thoughts to harm self or others and/or self-harming behavior and encouraged client to contact this office, call 911, or present to the nearest emergency room should any of these events occur. Discussed crisis intervention services and means to access. Client adamantly and convincingly denies current suicidal or homicidal ideation or perceptual disturbance. Assisted client in processing session content; acknowledged and " normalized client’s thoughts, feelings, and concerns by utilizing a person-centered approach in efforts to build appropriate rapport and a positive therapeutic relationship with open and honest communication.     Quality Measures:     TOBACCO USE:  Tobacco Use: Low Risk  (8/15/2024)    Patient History     Smoking Tobacco Use: Never     Smokeless Tobacco Use: Never     Passive Exposure: Never      Never smoker    I advised Angie of the risks of tobacco use.     Follow Up:   Return in about 1 week (around 8/28/2024).    Shadia Dumas LCSW  [facility] [address]

## 2024-08-21 NOTE — PLAN OF CARE
Goals of care were discussed to include development of skills and strategies to assist with symptoms of depression, mood instability and ADHD to support her role as single mother.

## 2024-08-28 ENCOUNTER — OFFICE VISIT (OUTPATIENT)
Age: 29
End: 2024-08-28
Payer: MEDICARE

## 2024-08-28 DIAGNOSIS — F90.2 ATTENTION DEFICIT HYPERACTIVITY DISORDER (ADHD), COMBINED TYPE: ICD-10-CM

## 2024-08-28 DIAGNOSIS — F33.2 SEVERE EPISODE OF RECURRENT MAJOR DEPRESSIVE DISORDER, WITHOUT PSYCHOTIC FEATURES: Primary | ICD-10-CM

## 2024-08-28 NOTE — PROGRESS NOTES
"     Follow Up Adult Note     Date:2024   Client Name: Angie Sutherland  : 1995   MRN: [id number]   Time IN: 10:55 am    Time OUT: 11:41 am     Referring Provider: Gabby Fernandez PA-C    Chief Complaint:      ICD-10-CM ICD-9-CM   1. Severe episode of recurrent major depressive disorder, without psychotic features  F33.2 296.33   2. Attention deficit hyperactivity disorder (ADHD), combined type  F90.2 314.01        History of Present Illness:   Angie Sutherland is a 29 y.o. female who is being seen today for follow up individual psychotherapy counseling for ongoing depression. Pt reports that mood has remained stable since last visit but is noticing an increase of anxious thoughts, particularly regarding the relationships with her children's fathers. Pt reports that she feels that she has been more \"lost\" in her thoughts recently and become very reflective.        Objective     Medications:   No current outpatient medications on file.    Allergies:   Allergies   Allergen Reactions    Benadryl [Diphenhydramine] Shortness Of Breath and Diarrhea    Diphenhydramine Hcl Diarrhea and Shortness Of Breath    Other Hives     Sun allergy breaks out in hives    Latex Unknown - Low Severity and Rash     HAS SOME IRRITATION AFTER CATHETER INSERTION       Mental Status Exam:   MENTAL STATUS EXAM   General Appearance:  Cleanly groomed and dressed  Eye Contact:  Good eye contact  Attitude:  Cooperative and polite  Motor Activity:  Normal gait, posture  Speech:  Normal rate, tone, volume  Mood and affect:  Normal, pleasant and happy  Thought Process:  Logical, goal-directed and linear  Associations/ Thought Content:  No delusions  Hallucinations:  None  Suicidal Ideations:  Not present  Homicidal Ideation:  Not present  Sensorium:  Alert  Orientation:  Person, time, situation and place  Immediate Recall, Recent, and Remote Memory:  Intact  Attention Span/ Concentration:  Good  Insight:  " Good  Judgement:  Good  Reliability:  Good  Impulse Control:  Good       SUICIDE RISK ASSESSMENT/CSSRS:  1. Does client have thoughts of suicide? Patient denies  2. Does client have intent for suicide? Patient denies  3. Does client have a current plan for suicide? Patient denies   4. History of suicide attempts: Patient denies   5. Family history of suicide or attempts: Patient denies   6. History of violent behaviors towards others or property or thoughts of committing suicide: Patient denies   7. History of sexual aggression toward others: Patient denies   8. Access to firearms or weapons: Patient denies     Subjective     PHQ-9 Depression Screening  Little interest or pleasure in doing things? 3-->nearly every day   Feeling down, depressed, or hopeless? 2-->more than half the days   Trouble falling or staying asleep, or sleeping too much? 2-->more than half the days   Feeling tired or having little energy? 3-->nearly every day   Poor appetite or overeating? 3-->nearly every day   Feeling bad about yourself - or that you are a failure or have let yourself or your family down? 3-->nearly every day   Trouble concentrating on things, such as reading the newspaper or watching television? 3-->nearly every day   Moving or speaking so slowly that other people could have noticed? Or the opposite - being so fidgety or restless that you have been moving around a lot more than usual? 3-->nearly every day   Thoughts that you would be better off dead, or of hurting yourself in some way? 0-->not at all   PHQ-9 Total Score 22   If you checked off any problems, how difficult have these problems made it for you to do your work, take care of things at home, or get along with other people? extremely difficult      MANDIE-7  Feeling nervous, anxious or on edge: Nearly every day  Not being able to stop or control worrying: Nearly every day  Worrying too much about different things: Nearly every day  Trouble Relaxing: Nearly every  day  Being so restless that it is hard to sit still: More than half the days  Feeling afraid as if something awful might happen: More than half the days  Becoming easily annoyed or irritable: Nearly every day  MANDIE 7 Total Score: 19  If you checked any problems, how difficult have these problems made it for you to do your work, take care of things at home, or get along with other people: Extremely difficult      Client's Support Network Includes:  extended family    Functional Status: Mild impairment     Progress toward goal: Not at goal    Prognosis: Good with Ongoing Treatment     Assessment / Plan / Progress     Visit Diagnosis/Orders Placed This Visit:    ICD-10-CM ICD-9-CM   1. Severe episode of recurrent major depressive disorder, without psychotic features  F33.2 296.33   2. Attention deficit hyperactivity disorder (ADHD), combined type  F90.2 314.01          PLAN:  Safety: No acute safety concerns  Risk Assessment: Risk of self-harm acutely is low. Risk of self-harm chronically is also low, but could be further elevated in the event of treatment noncompliance and/or AODA.    Treatment Plan/Goals & Progress: Continue supportive psychotherapy efforts and medications as indicated. Treatment options discussed during today's visit. Client ackowledged and verbally consented to continue with current treatment plan and was educated on the importance of compliance with treatment and follow-up appointments. Client seems reasonably able to adhere to treatment plan.      Assisted client in processing above session content; acknowledged and normalized client’s thoughts, feelings, and concerns.  Rationalized client's thought process regarding awareness of recent situations that have increased pt's anxious thoughts. Therapist assisted pt in reflecting upon thoughts/feelings associated with recent conversations and events. Pt acknowledged that she has been more curious regarding the changes in herself since she was a  teenager and the difference in the adult she has become. Pt acknowledged that there are aspects of her identity that she would like to change or incorporate and that she has begun this development by acknowledging practices that she engages in for acceptance of others. Therapist and pt explored ability to pt to share honest opinions or speak for herself in anticipation of rejection or disappointment. Therapist and pt explored impact of various relationships that have affected pt's ability to express or cope with emotions. Therapist affirmed pt's insight and ongoing reflection to make changes in self and become more confidence and self aware.       Allowed client to freely discuss issues without interruption or judgement with unconditional positive regard, active listening skills, and empathy. Clinician provided a safe, confidential environment to facilitate the development of a positive therapeutic relationship and encouraged open, honest communication. Assisted client in identifying risk factors which would indicate the need for higher level of care including thoughts to harm self or others and/or self-harming behavior and encouraged client to contact this office, call 911, or present to the nearest emergency room should any of these events occur. Discussed crisis intervention services and means to access. Client adamantly and convincingly denies current suicidal or homicidal ideation or perceptual disturbance. Assisted client in processing session content; acknowledged and normalized client’s thoughts, feelings, and concerns by utilizing a person-centered approach in efforts to build appropriate rapport and a positive therapeutic relationship with open and honest communication.     Quality Measures:     TOBACCO USE:  Tobacco Use: Low Risk  (8/15/2024)    Patient History     Smoking Tobacco Use: Never     Smokeless Tobacco Use: Never     Passive Exposure: Never      Never smoker    I advised Angie of the risks of  tobacco use.     Follow Up:   Return in about 1 week (around 9/4/2024).    Shadia Dumas LCSW  [facility] [address]

## 2024-09-04 ENCOUNTER — OFFICE VISIT (OUTPATIENT)
Age: 29
End: 2024-09-04
Payer: MEDICARE

## 2024-09-04 DIAGNOSIS — F33.2 SEVERE EPISODE OF RECURRENT MAJOR DEPRESSIVE DISORDER, WITHOUT PSYCHOTIC FEATURES: Primary | ICD-10-CM

## 2024-09-04 DIAGNOSIS — F90.2 ATTENTION DEFICIT HYPERACTIVITY DISORDER (ADHD), COMBINED TYPE: ICD-10-CM

## 2024-09-04 PROCEDURE — 90834 PSYTX W PT 45 MINUTES: CPT

## 2024-09-04 NOTE — PROGRESS NOTES
"     Follow Up Adult Note     Date:2024   Client Name: Angie Sutherland  : 1995   MRN: [id number]   Time IN: 11:00 am    Time OUT: 11:45 am     Referring Provider: Gabby Fernandez PA-C    Chief Complaint:      ICD-10-CM ICD-9-CM   1. Severe episode of recurrent major depressive disorder, without psychotic features  F33.2 296.33   2. Attention deficit hyperactivity disorder (ADHD), combined type  F90.2 314.01        History of Present Illness:   Angie Sutherland is a 29 y.o. female who is being seen today for follow up individual psychotherapy counseling for ongoing depression and symptoms of ADHD. Pt reports that anxiety has increased since last visit due to multiple social engagements and feeling \"out of the loop\" with many friends. Pt acknowledged that social engagement contributed to feelings of confusion and anxiousness regarding multiple relationships.        Objective     Medications:   No current outpatient medications on file.    Allergies:   Allergies   Allergen Reactions    Benadryl [Diphenhydramine] Shortness Of Breath and Diarrhea    Diphenhydramine Hcl Diarrhea and Shortness Of Breath    Other Hives     Sun allergy breaks out in hives    Latex Unknown - Low Severity and Rash     HAS SOME IRRITATION AFTER CATHETER INSERTION       Mental Status Exam:   MENTAL STATUS EXAM   General Appearance:  Cleanly groomed and dressed  Eye Contact:  Good eye contact  Attitude:  Cooperative and polite  Motor Activity:  Normal gait, posture  Speech:  Normal rate, tone, volume  Mood and affect:  Normal, pleasant  Thought Process:  Logical, goal-directed and linear  Associations/ Thought Content:  No delusions  Hallucinations:  None  Suicidal Ideations:  Not present  Homicidal Ideation:  Not present  Sensorium:  Alert and clear  Orientation:  Person, place, situation and time  Immediate Recall, Recent, and Remote Memory:  Intact  Attention Span/ Concentration:  Good  Insight:  " Good  Judgement:  Good  Reliability:  Good  Impulse Control:  Good       SUICIDE RISK ASSESSMENT/CSSRS:  1. Does client have thoughts of suicide? Patient denies  2. Does client have intent for suicide? Patient denies  3. Does client have a current plan for suicide? Patient denies   4. History of suicide attempts: Patient denies   5. Family history of suicide or attempts: Patient denies   6. History of violent behaviors towards others or property or thoughts of committing suicide: Patient denies   7. History of sexual aggression toward others: Patient denies   8. Access to firearms or weapons: Patient denies     Subjective     PHQ-9 Depression Screening  Little interest or pleasure in doing things? 3-->nearly every day   Feeling down, depressed, or hopeless? 3-->nearly every day   Trouble falling or staying asleep, or sleeping too much? 2-->more than half the days   Feeling tired or having little energy? 3-->nearly every day   Poor appetite or overeating? 2-->more than half the days   Feeling bad about yourself - or that you are a failure or have let yourself or your family down? 1-->several days   Trouble concentrating on things, such as reading the newspaper or watching television? 3-->nearly every day   Moving or speaking so slowly that other people could have noticed? Or the opposite - being so fidgety or restless that you have been moving around a lot more than usual? 2-->more than half the days   Thoughts that you would be better off dead, or of hurting yourself in some way? 0-->not at all   PHQ-9 Total Score 19   If you checked off any problems, how difficult have these problems made it for you to do your work, take care of things at home, or get along with other people? extremely difficult      MANDIE-7  Feeling nervous, anxious or on edge: Nearly every day  Not being able to stop or control worrying: Nearly every day  Worrying too much about different things: Nearly every day  Trouble Relaxing: Nearly every  "day  Being so restless that it is hard to sit still: More than half the days  Feeling afraid as if something awful might happen: Nearly every day  Becoming easily annoyed or irritable: Nearly every day  MANDIE 7 Total Score: 20  If you checked any problems, how difficult have these problems made it for you to do your work, take care of things at home, or get along with other people: Extremely difficult      Client's Support Network Includes:   friends    Functional Status: Mild impairment     Progress toward goal: Not at goal    Prognosis: Good with Ongoing Treatment     Assessment / Plan / Progress     Visit Diagnosis/Orders Placed This Visit:    ICD-10-CM ICD-9-CM   1. Severe episode of recurrent major depressive disorder, without psychotic features  F33.2 296.33   2. Attention deficit hyperactivity disorder (ADHD), combined type  F90.2 314.01          PLAN:  Safety: No acute safety concerns  Risk Assessment: Risk of self-harm acutely is low. Risk of self-harm chronically is also low, but could be further elevated in the event of treatment noncompliance and/or AODA.    Treatment Plan/Goals & Progress: Continue supportive psychotherapy efforts and medications as indicated. Treatment options discussed during today's visit. Client ackowledged and verbally consented to continue with current treatment plan and was educated on the importance of compliance with treatment and follow-up appointments. Client seems reasonably able to adhere to treatment plan.      Assisted client in processing above session content; acknowledged and normalized client’s thoughts, feelings, and concerns.  Rationalized client's thought process regarding awareness of increased anxiety as a result of social interactions. Therapist assisted pt in reflecting upon aspects of interactions that were most uncomfortable. Pt acknowledged that she had difficulty being vulnerable with other female friends and did not feel \"connected\" to others. Pt and " therapist discussed pt's boundaries that have been established as defensive measures and the importance of awareness of such boundaries to challenge for successful relationship. Therapist introduced awareness of attachments style that stem from childhood that impact awareness and tendencies towards relationships. Pt identified potentially with Anxious/Avoidant  tendencies within relationships. Therapist and pt discussed aspects of relationships that are difficult or place pt in situation of vulnerability and the resulting actions that present as a means to control relationship or fears of abandonment.     Allowed client to freely discuss issues without interruption or judgement with unconditional positive regard, active listening skills, and empathy. Clinician provided a safe, confidential environment to facilitate the development of a positive therapeutic relationship and encouraged open, honest communication. Assisted client in identifying risk factors which would indicate the need for higher level of care including thoughts to harm self or others and/or self-harming behavior and encouraged client to contact this office, call 911, or present to the nearest emergency room should any of these events occur. Discussed crisis intervention services and means to access. Client adamantly and convincingly denies current suicidal or homicidal ideation or perceptual disturbance. Assisted client in processing session content; acknowledged and normalized client’s thoughts, feelings, and concerns by utilizing a person-centered approach in efforts to build appropriate rapport and a positive therapeutic relationship with open and honest communication.     Quality Measures:     TOBACCO USE:  Tobacco Use: Low Risk  (8/15/2024)    Patient History     Smoking Tobacco Use: Never     Smokeless Tobacco Use: Never     Passive Exposure: Never      Never smoker    I advised Angie of the risks of tobacco use.     Follow Up:   Return in  about 1 week (around 9/11/2024).    RADHA SalamancaW  [facility] [address]

## 2024-09-11 ENCOUNTER — OFFICE VISIT (OUTPATIENT)
Age: 29
End: 2024-09-11
Payer: MEDICARE

## 2024-09-11 DIAGNOSIS — F33.2 SEVERE EPISODE OF RECURRENT MAJOR DEPRESSIVE DISORDER, WITHOUT PSYCHOTIC FEATURES: Primary | ICD-10-CM

## 2024-09-11 DIAGNOSIS — F90.2 ATTENTION DEFICIT HYPERACTIVITY DISORDER (ADHD), COMBINED TYPE: ICD-10-CM

## 2024-09-11 PROCEDURE — 90834 PSYTX W PT 45 MINUTES: CPT

## 2024-09-11 NOTE — PROGRESS NOTES
Follow Up Adult Note     Date:2024   Client Name: Angie Sutherland  : 1995   MRN: [id number]   Time IN: 11:00 am    Time OUT: 11:46 am     Referring Provider: Gabby Fernandez PA-C    Chief Complaint:      ICD-10-CM ICD-9-CM   1. Severe episode of recurrent major depressive disorder, without psychotic features  F33.2 296.33   2. Attention deficit hyperactivity disorder (ADHD), combined type  F90.2 314.01        History of Present Illness:   Angie Sutherland is a 29 y.o. female who is being seen today for follow up individual psychotherapy counseling for ongoing depression. Pt reports that mood has remained stable since last visit. Pt continues to attempt to engage more socially and focus on developing healthy relationships which has contributed to an improved mood.        Objective     Medications:   No current outpatient medications on file.    Allergies:   Allergies   Allergen Reactions    Benadryl [Diphenhydramine] Shortness Of Breath and Diarrhea    Diphenhydramine Hcl Diarrhea and Shortness Of Breath    Other Hives     Sun allergy breaks out in hives    Latex Unknown - Low Severity and Rash     HAS SOME IRRITATION AFTER CATHETER INSERTION       Mental Status Exam:   MENTAL STATUS EXAM   General Appearance:  Cleanly groomed and dressed  Eye Contact:  Good eye contact  Attitude:  Cooperative and polite  Motor Activity:  Normal gait, posture  Speech:  Normal rate, tone, volume  Mood and affect:  Normal, pleasant  Thought Process:  Logical, goal-directed and linear  Associations/ Thought Content:  No delusions  Hallucinations:  None  Suicidal Ideations:  Not present  Homicidal Ideation:  Not present  Sensorium:  Alert  Orientation:  Person, place, time and situation  Immediate Recall, Recent, and Remote Memory:  Intact  Attention Span/ Concentration:  Good  Insight:  Good  Judgement:  Good  Reliability:  Good  Impulse Control:  Good       SUICIDE RISK ASSESSMENT/CSSRS:  1. Does  client have thoughts of suicide? Patient denies  2. Does client have intent for suicide? Patient denies  3. Does client have a current plan for suicide? Patient denies   4. History of suicide attempts: Patient denies   5. Family history of suicide or attempts: Patient denies   6. History of violent behaviors towards others or property or thoughts of committing suicide: Patient denies   7. History of sexual aggression toward others: Patient denies   8. Access to firearms or weapons: Patient denies     Subjective     PHQ-9 Depression Screening  Little interest or pleasure in doing things? 3-->nearly every day   Feeling down, depressed, or hopeless? 3-->nearly every day   Trouble falling or staying asleep, or sleeping too much? 2-->more than half the days   Feeling tired or having little energy? 3-->nearly every day   Poor appetite or overeating? 2-->more than half the days   Feeling bad about yourself - or that you are a failure or have let yourself or your family down? 1-->several days   Trouble concentrating on things, such as reading the newspaper or watching television? 3-->nearly every day   Moving or speaking so slowly that other people could have noticed? Or the opposite - being so fidgety or restless that you have been moving around a lot more than usual? 2-->more than half the days   Thoughts that you would be better off dead, or of hurting yourself in some way? 0-->not at all   PHQ-9 Total Score 19   If you checked off any problems, how difficult have these problems made it for you to do your work, take care of things at home, or get along with other people? extremely difficult      MANDIE-7  Feeling nervous, anxious or on edge: Nearly every day  Not being able to stop or control worrying: Nearly every day  Worrying too much about different things: Nearly every day  Trouble Relaxing: Nearly every day  Being so restless that it is hard to sit still: More than half the days  Feeling afraid as if something awful  might happen: Nearly every day  Becoming easily annoyed or irritable: Nearly every day  MANDIE 7 Total Score: 20  If you checked any problems, how difficult have these problems made it for you to do your work, take care of things at home, or get along with other people: Extremely difficult      Client's Support Network Includes:   friends    Functional Status: Mild impairment     Progress toward goal: Not at goal    Prognosis: Good with Ongoing Treatment     Assessment / Plan / Progress     Visit Diagnosis/Orders Placed This Visit:    ICD-10-CM ICD-9-CM   1. Severe episode of recurrent major depressive disorder, without psychotic features  F33.2 296.33   2. Attention deficit hyperactivity disorder (ADHD), combined type  F90.2 314.01          PLAN:  Safety: No acute safety concerns  Risk Assessment: Risk of self-harm acutely is low. Risk of self-harm chronically is also low, but could be further elevated in the event of treatment noncompliance and/or AODA.    Treatment Plan/Goals & Progress: Continue supportive psychotherapy efforts and medications as indicated. Treatment options discussed during today's visit. Client ackowledged and verbally consented to continue with current treatment plan and was educated on the importance of compliance with treatment and follow-up appointments. Client seems reasonably able to adhere to treatment plan.      Assisted client in processing above session content; acknowledged and normalized client’s thoughts, feelings, and concerns.  Rationalized client's thought process regarding awareness of ongoing improved mood and impact on ability to manage life stressors. Pt provided update on recent social interactions which continue to build pt's social networks and strengthen friendships. Pt acknowledged that she often projects onto relationship out of fear but is attempting to enjoy the positive aspects of relationships rather than fearing or creating negative beliefs. Therapist provided  supportive listening as pt recalled recent conversations with male friends/partners in life and difficulty establishing boundaries within these relationships. Pt acknowledged feelings of guilt associated with habits of new friendships and being away from children but is attempting to challenge feelings with rationale perspective. Therapist encouraged pt to continue to be aware of creating personal thought patterns that conflict with pt's goals of establishing relationships and challenge with wise mind thinking process. Therapist affirmed pt's ongoing insight and willingness to engage in activities that are uncomfortable in effort to achieve goals of building a community of support.       Allowed client to freely discuss issues without interruption or judgement with unconditional positive regard, active listening skills, and empathy. Clinician provided a safe, confidential environment to facilitate the development of a positive therapeutic relationship and encouraged open, honest communication. Assisted client in identifying risk factors which would indicate the need for higher level of care including thoughts to harm self or others and/or self-harming behavior and encouraged client to contact this office, call 911, or present to the nearest emergency room should any of these events occur. Discussed crisis intervention services and means to access. Client adamantly and convincingly denies current suicidal or homicidal ideation or perceptual disturbance. Assisted client in processing session content; acknowledged and normalized client’s thoughts, feelings, and concerns by utilizing a person-centered approach in efforts to build appropriate rapport and a positive therapeutic relationship with open and honest communication.     Quality Measures:     TOBACCO USE:  Tobacco Use: Low Risk  (8/15/2024)    Patient History     Smoking Tobacco Use: Never     Smokeless Tobacco Use: Never     Passive Exposure: Never      Never  smoker    I advised Angie of the risks of tobacco use.     Follow Up:   Return in about 2 weeks (around 9/25/2024).    Shadia Dumas LCSW  [facility] [address]

## 2024-09-17 ENCOUNTER — OFFICE VISIT (OUTPATIENT)
Age: 29
End: 2024-09-17
Payer: MEDICARE

## 2024-09-17 VITALS
BODY MASS INDEX: 28.51 KG/M2 | OXYGEN SATURATION: 97 % | SYSTOLIC BLOOD PRESSURE: 114 MMHG | HEART RATE: 90 BPM | WEIGHT: 150.9 LBS | DIASTOLIC BLOOD PRESSURE: 62 MMHG

## 2024-09-17 DIAGNOSIS — R41.840 ATTENTION AND CONCENTRATION DEFICIT: ICD-10-CM

## 2024-09-17 DIAGNOSIS — Z51.81 THERAPEUTIC DRUG MONITORING: Primary | ICD-10-CM

## 2024-09-17 DIAGNOSIS — F32.A DEPRESSION, UNSPECIFIED DEPRESSION TYPE: ICD-10-CM

## 2024-09-17 DIAGNOSIS — F41.1 GENERALIZED ANXIETY DISORDER: ICD-10-CM

## 2024-09-17 PROCEDURE — 90792 PSYCH DIAG EVAL W/MED SRVCS: CPT

## 2024-09-18 ENCOUNTER — OFFICE VISIT (OUTPATIENT)
Age: 29
End: 2024-09-18
Payer: MEDICARE

## 2024-09-18 DIAGNOSIS — F90.2 ATTENTION DEFICIT HYPERACTIVITY DISORDER (ADHD), COMBINED TYPE: ICD-10-CM

## 2024-09-18 DIAGNOSIS — F41.1 GENERALIZED ANXIETY DISORDER: ICD-10-CM

## 2024-09-18 DIAGNOSIS — F33.2 SEVERE EPISODE OF RECURRENT MAJOR DEPRESSIVE DISORDER, WITHOUT PSYCHOTIC FEATURES: Primary | ICD-10-CM

## 2024-09-18 PROCEDURE — 90834 PSYTX W PT 45 MINUTES: CPT

## 2024-09-25 ENCOUNTER — OFFICE VISIT (OUTPATIENT)
Age: 29
End: 2024-09-25
Payer: MEDICARE

## 2024-09-25 DIAGNOSIS — F90.2 ATTENTION DEFICIT HYPERACTIVITY DISORDER (ADHD), COMBINED TYPE: ICD-10-CM

## 2024-09-25 DIAGNOSIS — F41.1 GENERALIZED ANXIETY DISORDER: ICD-10-CM

## 2024-09-25 DIAGNOSIS — F33.2 SEVERE EPISODE OF RECURRENT MAJOR DEPRESSIVE DISORDER, WITHOUT PSYCHOTIC FEATURES: Primary | ICD-10-CM

## 2024-09-25 PROCEDURE — 90834 PSYTX W PT 45 MINUTES: CPT

## 2024-10-10 ENCOUNTER — OFFICE VISIT (OUTPATIENT)
Age: 29
End: 2024-10-10
Payer: MEDICARE

## 2024-10-10 DIAGNOSIS — F41.1 GENERALIZED ANXIETY DISORDER: ICD-10-CM

## 2024-10-10 DIAGNOSIS — F90.2 ATTENTION DEFICIT HYPERACTIVITY DISORDER (ADHD), COMBINED TYPE: ICD-10-CM

## 2024-10-10 DIAGNOSIS — F33.2 SEVERE EPISODE OF RECURRENT MAJOR DEPRESSIVE DISORDER, WITHOUT PSYCHOTIC FEATURES: Primary | ICD-10-CM

## 2024-10-10 NOTE — PROGRESS NOTES
Follow Up Adult Note     Date:10/10/2024   Client Name: Angie Sutherland  : 1995   MRN: [id number]   Time IN: 10:00 am    Time OUT: 10:39 am     Referring Provider: Gabby Fernandez PA-C    Chief Complaint:      ICD-10-CM ICD-9-CM   1. Severe episode of recurrent major depressive disorder, without psychotic features  F33.2 296.33   2. Attention deficit hyperactivity disorder (ADHD), combined type  F90.2 314.01   3. Generalized anxiety disorder  F41.1 300.02        History of Present Illness:   Angie Sutherland is a 29 y.o. female who is being seen today for follow up individual psychotherapy counseling for ongoing depression and anxiety. Pt reports that mood has remained stable since last visit. Pt acknowledged that she is feeling tired today due to lack of sleep. Pt reported ongoing relationship complications with friends as primary source of emotional stress at this time.        Objective     Medications:     Current Outpatient Medications:     Cariprazine HCl (VRAYLAR) 3 MG capsule capsule, Take 1 capsule by mouth Daily for 60 days., Disp: 30 capsule, Rfl: 1    Allergies:   Allergies   Allergen Reactions    Benadryl [Diphenhydramine] Shortness Of Breath and Diarrhea    Diphenhydramine Hcl Diarrhea and Shortness Of Breath    Other Hives     Sun allergy breaks out in hives    Latex Unknown - Low Severity and Rash     HAS SOME IRRITATION AFTER CATHETER INSERTION       Mental Status Exam:   MENTAL STATUS EXAM   General Appearance:  Cleanly groomed and dressed  Eye Contact:  Good eye contact  Attitude:  Cooperative and polite  Motor Activity:  Normal gait, posture  Speech:  Normal rate, tone, volume  Mood and affect:  Normal, pleasant  Thought Process:  Logical, goal-directed and linear  Associations/ Thought Content:  No delusions  Hallucinations:  None  Suicidal Ideations:  Not present  Homicidal Ideation:  Not present  Sensorium:  Alert  Orientation:  Person, place, situation and  time  Immediate Recall, Recent, and Remote Memory:  Intact  Attention Span/ Concentration:  Good  Insight:  Good  Judgement:  Good  Reliability:  Good  Impulse Control:  Good       SUICIDE RISK ASSESSMENT/CSSRS:  1. Does client have thoughts of suicide? Patient denies  2. Does client have intent for suicide? Patient denies  3. Does client have a current plan for suicide? Patient denies   4. History of suicide attempts: Patient denies   5. Family history of suicide or attempts: Patient denies   6. History of violent behaviors towards others or property or thoughts of committing suicide: Patient denies   7. History of sexual aggression toward others: Patient denies   8. Access to firearms or weapons: Patient denies     Subjective     PHQ-9 Depression Screening  Little interest or pleasure in doing things? 3-->nearly every day   Feeling down, depressed, or hopeless? 3-->nearly every day   Trouble falling or staying asleep, or sleeping too much? 3-->nearly every day   Feeling tired or having little energy? 3-->nearly every day   Poor appetite or overeating? 1-->several days   Feeling bad about yourself - or that you are a failure or have let yourself or your family down? 2-->more than half the days   Trouble concentrating on things, such as reading the newspaper or watching television? 3-->nearly every day   Moving or speaking so slowly that other people could have noticed? Or the opposite - being so fidgety or restless that you have been moving around a lot more than usual? 3-->nearly every day   Thoughts that you would be better off dead, or of hurting yourself in some way? 0-->not at all   PHQ-9 Total Score 21   If you checked off any problems, how difficult have these problems made it for you to do your work, take care of things at home, or get along with other people? extremely difficult      MANDIE-7  Feeling nervous, anxious or on edge: Nearly every day  Not being able to stop or control worrying: Nearly every  day  Worrying too much about different things: Nearly every day  Trouble Relaxing: Nearly every day  Being so restless that it is hard to sit still: Nearly every day  Feeling afraid as if something awful might happen: Nearly every day  Becoming easily annoyed or irritable: Nearly every day  MANDIE 7 Total Score: 21  If you checked any problems, how difficult have these problems made it for you to do your work, take care of things at home, or get along with other people: Extremely difficult      Client's Support Network Includes:   friends    Functional Status: Mild impairment     Progress toward goal: Not at goal    Prognosis: Good with Ongoing Treatment     Assessment / Plan / Progress     Visit Diagnosis/Orders Placed This Visit:    ICD-10-CM ICD-9-CM   1. Severe episode of recurrent major depressive disorder, without psychotic features  F33.2 296.33   2. Attention deficit hyperactivity disorder (ADHD), combined type  F90.2 314.01   3. Generalized anxiety disorder  F41.1 300.02          PLAN:  Safety: No acute safety concerns  Risk Assessment: Risk of self-harm acutely is low. Risk of self-harm chronically is also low, but could be further elevated in the event of treatment noncompliance and/or AODA.    Treatment Plan/Goals & Progress: Continue supportive psychotherapy efforts and medications as indicated. Treatment options discussed during today's visit. Client ackowledged and verbally consented to continue with current treatment plan and was educated on the importance of compliance with treatment and follow-up appointments. Client seems reasonably able to adhere to treatment plan.      Assisted client in processing above session content; acknowledged and normalized client’s thoughts, feelings, and concerns.  Rationalized client's thought process regarding awareness of difficult feelings associated with developing relationships and impact on her behaviors within the relationship. Therapist assisted pt in reflecting  "upon recent encounters that are contributing to conflicting thoughts/feelings. Pt shared that she often feels \"dismissed\" and \"not heard\" by her friends while trying to share her experiences or provide advice. Therapist and pt explored interpersonal communication strategies to assist with improving articulating of feelings in addition to \"modeling\" behavior that she wishes to receive. Pt acknowledged that she has difficulty maintaining relationships due to \"not knowing how that do\" in times like this. Pt further shared that this transpires to romantic relationships as well. Therapist assisted pt in further exploring expectations for different relationships to ensure alignment with personal goals and values.        Allowed client to freely discuss issues without interruption or judgement with unconditional positive regard, active listening skills, and empathy. Clinician provided a safe, confidential environment to facilitate the development of a positive therapeutic relationship and encouraged open, honest communication. Assisted client in identifying risk factors which would indicate the need for higher level of care including thoughts to harm self or others and/or self-harming behavior and encouraged client to contact this office, call 911, or present to the nearest emergency room should any of these events occur. Discussed crisis intervention services and means to access. Client adamantly and convincingly denies current suicidal or homicidal ideation or perceptual disturbance. Assisted client in processing session content; acknowledged and normalized client’s thoughts, feelings, and concerns by utilizing a person-centered approach in efforts to build appropriate rapport and a positive therapeutic relationship with open and honest communication.     Quality Measures:     TOBACCO USE:  Tobacco Use: Low Risk  (9/17/2024)    Patient History     Smoking Tobacco Use: Never     Smokeless Tobacco Use: Never     Passive " Exposure: Never      Never smoker    I advised Angie of the risks of tobacco use.     Follow Up:   Return in about 1 week (around 10/17/2024).    Shadia Dumas LCSW  [facility]

## 2024-10-15 ENCOUNTER — OFFICE VISIT (OUTPATIENT)
Dept: FAMILY MEDICINE CLINIC | Facility: CLINIC | Age: 29
End: 2024-10-15
Payer: MEDICARE

## 2024-10-15 ENCOUNTER — PRIOR AUTHORIZATION (OUTPATIENT)
Dept: FAMILY MEDICINE CLINIC | Facility: CLINIC | Age: 29
End: 2024-10-15

## 2024-10-15 VITALS
RESPIRATION RATE: 16 BRPM | WEIGHT: 151 LBS | DIASTOLIC BLOOD PRESSURE: 70 MMHG | OXYGEN SATURATION: 98 % | HEIGHT: 61 IN | HEART RATE: 92 BPM | SYSTOLIC BLOOD PRESSURE: 108 MMHG | BODY MASS INDEX: 28.51 KG/M2

## 2024-10-15 DIAGNOSIS — F33.9 EPISODE OF RECURRENT MAJOR DEPRESSIVE DISORDER, UNSPECIFIED DEPRESSION EPISODE SEVERITY: ICD-10-CM

## 2024-10-15 DIAGNOSIS — M54.32 BILATERAL SCIATICA: Primary | ICD-10-CM

## 2024-10-15 DIAGNOSIS — M54.31 BILATERAL SCIATICA: Primary | ICD-10-CM

## 2024-10-15 PROBLEM — R07.89 OTHER CHEST PAIN: Status: RESOLVED | Noted: 2024-04-15 | Resolved: 2024-10-15

## 2024-10-15 PROBLEM — R07.9 RIGHT-SIDED CHEST PAIN: Status: RESOLVED | Noted: 2024-04-01 | Resolved: 2024-10-15

## 2024-10-15 PROCEDURE — 1160F RVW MEDS BY RX/DR IN RCRD: CPT

## 2024-10-15 PROCEDURE — 1159F MED LIST DOCD IN RCRD: CPT

## 2024-10-15 PROCEDURE — 1125F AMNT PAIN NOTED PAIN PRSNT: CPT

## 2024-10-15 PROCEDURE — 99214 OFFICE O/P EST MOD 30 MIN: CPT

## 2024-10-15 RX ORDER — METHYLPREDNISOLONE 4 MG
TABLET, DOSE PACK ORAL
Qty: 21 EACH | Refills: 0 | Status: SHIPPED | OUTPATIENT
Start: 2024-10-15

## 2024-10-15 RX ORDER — METHOCARBAMOL 500 MG/1
500 TABLET, FILM COATED ORAL 4 TIMES DAILY
Qty: 40 TABLET | Refills: 0 | Status: SHIPPED | OUTPATIENT
Start: 2024-10-15 | End: 2024-10-16

## 2024-10-15 NOTE — PROGRESS NOTES
Office Note     Name: Angie Sutherland    : 1995     MRN: [id number]     Chief Complaint  ADHD (Follow up)    Subjective     History of Present Illness:  Angie Sutherland is a 29 y.o. female who presents today for follow-up of anxiety and depression and ADHD.  Patient currently following with a counselor and psychiatrist.  States she was started on Vraylar and needs still complete Genesight testing. Has not felt improvement on medication yet, but has been on meds for 1 month now.     Would also like to discuss chronic bilateral leg pain.  Patient states that she has always struggled this mild tension in low back, but recently in the past 3 to 4 months has had pain radiating down into her right leg.  States that it is more in her legs than her back.  Denies redness or swelling or warmth to the leg.  States she will get it on her left side as well.  Has not tried taking any medication for.  States she will try stretching it and do stretches will sometimes hurt.  She states she originally thought she pulled a muscle or tendon. States normally the pain is tolerable, but will occasionally flareup if she lifts something heavy.  States prolonged sitting and standing can aggravate it.  Denies unexpected weight loss, fevers or night sweats.  Denies loss of bowel or bladder control or saddle anesthesia.  Denies any known injuries.    Review of Systems:   Review of Systems   Constitutional:  Negative for chills and fever.   Respiratory:  Negative for chest tightness and shortness of breath.    Cardiovascular:  Negative for chest pain and palpitations.   Gastrointestinal:  Negative for abdominal pain.   Neurological:  Negative for dizziness and light-headedness.       Past Medical History:   Past Medical History:   Diagnosis Date    Anxiety     Atypical squamous cell changes of undetermined significance (ASCUS) on cervical cytology with negative high risk human papilloma virus (HPV) test result  2015    Breast cancer 2017    Depression     Drug therapy 2018    Eczema     GERD (gastroesophageal reflux disease)     during chemo    Hx of radiation therapy 2018    right    Migraine     Ovarian cyst     Pleurisy     Prediabetes     Pregnancy, incidental     3/2/2016: 14 weeks pregnant. She was seen in 2013 during pregnancy and received counselling and glucometer. She was lost to f/u but states baby had no complications - weighed 7lb1oz    Wears glasses        Past Surgical History:   Past Surgical History:   Procedure Laterality Date    AUGMENTATION MAMMAPLASTY Bilateral 2019    with fat grafting    BREAST BIOPSY      BREAST CAPSULOTOMY, IMPLANT REVISION Bilateral 2019    Procedure: EXCHANGE BILATERAL EXPANDERS TO HIGHLY COHESIVE SILICONE IMPLANTS, BILATERAL CAPSULECTOMIES;  Surgeon: Delia Covarrubias MD;  Location:  [facility];  Service: Plastics    BREAST RECONSTRUCTION, BREAST TISSUE EXPANDER INSERTION Bilateral 10/30/2017    Procedure: BREAST RECONSTRUCTION WITH ALLODERM, BREAST TISSUE EXPANDER INSERTION BILATERAL;  Surgeon: Delia Covarrubias MD;  Location:  [facility];  Service:      SECTION       SECTION N/A 2016    Procedure:  SECTION REPEAT;  Surgeon: Malika Munoz MD;  Location:  [facility];  Service:      SECTION N/A 2022    Procedure:  SECTION REPEAT;  Surgeon: Carolina Amos MD;  Location:  [facility];  Service: Obstetrics/Gynecology;  Laterality: N/A;    FAT GRAFTING Bilateral 2019    Procedure: RECONSTRUCTED BREAST REVISION WITH FAT GRAFTING;  Surgeon: Edgar Block MD;  Location:  [facility];  Service: Plastics    MASTECTOMY      MASTECTOMY W/ SENTINEL NODE BIOPSY Bilateral 10/30/2017    Procedure: BREAST MASTECTOMY BILATERAL WITH RIGHT SENTINEL NODE BIOPSY;  Surgeon: Carlo Paulino MD;  Location:  [facility];  Service:     VENOUS ACCESS DEVICE (PORT) INSERTION  2017    VENOUS ACCESS DEVICE  "(PORT) REMOVAL         Family History:   Family History   Problem Relation Age of Onset    Arthritis Mother     Hypertension Mother     Diabetes Mother     Diabetes Brother     Ovarian cancer Maternal Grandmother     Colon cancer Maternal Grandmother     Breast cancer Neg Hx        Social History:   Social History     Socioeconomic History    Marital status: Single   Tobacco Use    Smoking status: Never     Passive exposure: Never    Smokeless tobacco: Never   Vaping Use    Vaping status: Never Used   Substance and Sexual Activity    Alcohol use: Not Currently     Alcohol/week: 2.0 standard drinks of alcohol     Comment: occasional    Drug use: Yes     Types: Marijuana     Comment: OCCASIONALLY, last use 8-     Sexual activity: Defer     Partners: Male       Immunizations:   Immunization History   Administered Date(s) Administered    HPV Quadrivalent 08/13/2018    Tdap 08/13/2018        Medications:     Current Outpatient Medications:     Cariprazine HCl (VRAYLAR) 3 MG capsule capsule, Take 1 capsule by mouth Daily for 60 days., Disp: 30 capsule, Rfl: 1    methocarbamol (ROBAXIN) 500 MG tablet, Take 1 tablet by mouth 4 (Four) Times a Day., Disp: 40 tablet, Rfl: 0    methylPREDNISolone (MEDROL) 4 MG dose pack, Take as directed on package instructions., Disp: 21 each, Rfl: 0    Allergies:   Allergies   Allergen Reactions    Benadryl [Diphenhydramine] Shortness Of Breath and Diarrhea    Diphenhydramine Hcl Diarrhea and Shortness Of Breath    Other Hives     Sun allergy breaks out in hives    Latex Unknown - Low Severity and Rash     HAS SOME IRRITATION AFTER CATHETER INSERTION       Objective     Vital Signs  /70 (BP Location: Left arm, Patient Position: Sitting, Cuff Size: Adult)   Pulse 92   Resp 16   Ht 154.9 cm (61\")   Wt 68.5 kg (151 lb)   SpO2 98%   BMI 28.53 kg/m²   Estimated body mass index is 28.53 kg/m² as calculated from the following:    Height as of this encounter: 154.9 cm (61\").    " Weight as of this encounter: 68.5 kg (151 lb).           Physical Exam  Vitals reviewed.   Constitutional:       Appearance: Normal appearance.   HENT:      Head: Normocephalic and atraumatic.   Eyes:      Pupils: Pupils are equal, round, and reactive to light.   Cardiovascular:      Rate and Rhythm: Normal rate and regular rhythm.      Pulses: Normal pulses.      Heart sounds: Normal heart sounds.   Pulmonary:      Effort: Pulmonary effort is normal.      Breath sounds: Normal breath sounds.   Abdominal:      General: Abdomen is flat. Bowel sounds are normal.   Musculoskeletal:        Back:       Comments: Recreate sensation with palpation to low back.   No obvious trauma or deformities to spine. No midline tenderness.    Skin:     General: Skin is warm.   Neurological:      General: No focal deficit present.      Mental Status: She is alert and oriented to person, place, and time.   Psychiatric:         Mood and Affect: Mood normal.            Results:  No results found for this or any previous visit (from the past 24 hours).     Assessment and Plan     Assessment/Plan:  Diagnoses and all orders for this visit:    1. Bilateral sciatica (Primary)  -     methylPREDNISolone (MEDROL) 4 MG dose pack; Take as directed on package instructions.  Dispense: 21 each; Refill: 0  -     methocarbamol (ROBAXIN) 500 MG tablet; Take 1 tablet by mouth 4 (Four) Times a Day.  Dispense: 40 tablet; Refill: 0    2. Episode of recurrent major depressive disorder, unspecified depression episode severity    Encouraged continued follow-up with psychiatry.  Encouraged her to complete Gene[employer]ight testing for more accurate medication management.    Would like to treat patient's low back pain with muscle relaxer and steroid.  Recommend use of muscle relaxer only as needed.  Discussed symptoms of somnolence and to avoid heavy machinery when taking the medication.  Discussed side effects and potential adverse reaction with steroids as well.  Has  tolerated in the past.  May take NSAIDs as needed.  Also encouraged application of heat to the area.  Have attached information on stretches I would encourage to her AVS.  Would like to see her back in about 6 weeks for follow-up or sooner as needed.  Consider physical therapy in the future.    Follow Up  Return in about 6 weeks (around 11/26/2024) for Recheck.    Gabby Fernandez PA-C   [facility]

## 2024-10-15 NOTE — TELEPHONE ENCOUNTER
(Key: [id number]) - [id number]  Methocarbamol 500MG tablets  status: PA Request  Created: October 15th, 2024 [phone]  Sent: October 15th, 2024    Denied on October 15 by [employer] NCPDP 2017  Your request has been denied    Why did we deny your request?  We denied this request under Medicare Part D because: The requested drug is not on your plan's formulary   (list of covered drugs). Your Medicare Part D drug plan was asked to cover a drug that is not on the formulary   (this is called a formulary exception). Your prescriber did not provide the detailed information that is required in   order to approve the request. To receive a formulary exception, per the Part D Coverage Determination   guidance Section 40.5.2 and 40.5.3, your prescriber must provide information that documents at least one of   the following has occurred:   - You have tried the formulary drugs for the treatment of your condition and they did not work for you.  OR  - The formulary drugs could cause adverse effects.  OR  - The formulary drugs would be less effective for your condition than the requested drug.   Talk to your prescriber to see if the following covered alternative(s) would be right for you:  cyclobenzaprine HCl 10 mg tablet (requires prior authorization for members of age 65 or greater) (quantity   limit of 90 tablets every 30 days)  cyclobenzaprine HCl 5 mg tablet (requires prior authorization for members of age 65 or greater) (quantity limit   Form CMS-99969 OMB Approval No. [id number] (Expires 12/31/2024)  of 90 tablets every 30 days)  tizanidine HCl capsule  tizanidine HCl tablet  chlorzoxazone 500 mg tablet (requires prior authorization for members of age 65 or greater) (quantity limit of   180 tablets every 30 days)  baclofen tablet. You should share a copy of this decision with your prescriber so you and your prescriber can discuss next steps.   If your prescriber requested coverage on your behalf, we have shared this  decision with your prescriber    APPEAL INFO  [facility]  [phone] Fax

## 2024-10-16 ENCOUNTER — OFFICE VISIT (OUTPATIENT)
Age: 29
End: 2024-10-16
Payer: MEDICARE

## 2024-10-16 DIAGNOSIS — M54.32 BILATERAL SCIATICA: Primary | ICD-10-CM

## 2024-10-16 DIAGNOSIS — F90.2 ATTENTION DEFICIT HYPERACTIVITY DISORDER (ADHD), COMBINED TYPE: ICD-10-CM

## 2024-10-16 DIAGNOSIS — M54.31 BILATERAL SCIATICA: Primary | ICD-10-CM

## 2024-10-16 DIAGNOSIS — F33.2 SEVERE EPISODE OF RECURRENT MAJOR DEPRESSIVE DISORDER, WITHOUT PSYCHOTIC FEATURES: Primary | ICD-10-CM

## 2024-10-16 RX ORDER — TIZANIDINE HYDROCHLORIDE 4 MG/1
4 CAPSULE, GELATIN COATED ORAL 3 TIMES DAILY
Qty: 30 CAPSULE | Refills: 0 | Status: SHIPPED | OUTPATIENT
Start: 2024-10-16 | End: 2024-10-16

## 2024-10-16 NOTE — PROGRESS NOTES
Follow Up Adult Note     Date:10/16/2024   Client Name: Angie Sutherland  : 1995   MRN: [id number]   Time IN: 10:55 am    Time OUT: 11:39 am     Referring Provider: Gabby Fernandez PA-C    Chief Complaint:      ICD-10-CM ICD-9-CM   1. Severe episode of recurrent major depressive disorder, without psychotic features  F33.2 296.33   2. Attention deficit hyperactivity disorder (ADHD), combined type  F90.2 314.01        History of Present Illness:   Angie Sutherland is a 29 y.o. female who is being seen today for ongoing depression. Pt reports that mood has continued to remain low since last visit. Pt attributed low mood and feelings of sadness to navigating complicated relationships and difficulties with acceptance with life positioning at this time. Pt continues to remain engaged in daily activities and social interactions.       Objective     Medications:     Current Outpatient Medications:     Cariprazine HCl (VRAYLAR) 3 MG capsule capsule, Take 1 capsule by mouth Daily for 60 days., Disp: 30 capsule, Rfl: 1    methylPREDNISolone (MEDROL) 4 MG dose pack, Take as directed on package instructions., Disp: 21 each, Rfl: 0    Allergies:   Allergies   Allergen Reactions    Benadryl [Diphenhydramine] Shortness Of Breath and Diarrhea    Diphenhydramine Hcl Diarrhea and Shortness Of Breath    Other Hives     Sun allergy breaks out in hives    Latex Unknown - Low Severity and Rash     HAS SOME IRRITATION AFTER CATHETER INSERTION       Mental Status Exam:   MENTAL STATUS EXAM   General Appearance:  Cleanly groomed and dressed  Eye Contact:  Good eye contact  Attitude:  Polite and cooperative  Motor Activity:  Normal gait, posture  Speech:  Normal rate, tone, volume  Mood and affect:  Normal, pleasant  Thought Process:  Logical, goal-directed and linear  Associations/ Thought Content:  No delusions  Hallucinations:  None  Suicidal Ideations:  Not present  Homicidal Ideation:  Not  present  Sensorium:  Alert  Orientation:  Person, place, time and situation  Immediate Recall, Recent, and Remote Memory:  Intact  Attention Span/ Concentration:  Good  Insight:  Good  Judgement:  Good  Reliability:  Good  Impulse Control:  Good       SUICIDE RISK ASSESSMENT/CSSRS:  1. Does client have thoughts of suicide? Patient denies  2. Does client have intent for suicide? Patient denies  3. Does client have a current plan for suicide? Patient denies   4. History of suicide attempts: Patient denies   5. Family history of suicide or attempts: Patient denies   6. History of violent behaviors towards others or property or thoughts of committing suicide: Patient denies   7. History of sexual aggression toward others: Patient denies   8. Access to firearms or weapons: Patient denies     Subjective     PHQ-9 Depression Screening  Little interest or pleasure in doing things? Almost all   Feeling down, depressed, or hopeless? Almost all   Trouble falling or staying asleep, or sleeping too much? Several days   Feeling tired or having little energy? Over half   Poor appetite or overeating? Over half   Feeling bad about yourself - or that you are a failure or have let yourself or your family down? Several days   Trouble concentrating on things, such as reading the newspaper or watching television? Almost all   Moving or speaking so slowly that other people could have noticed? Or the opposite - being so fidgety or restless that you have been moving around a lot more than usual? Almost all   Thoughts that you would be better off dead, or of hurting yourself in some way? Not at all   PHQ-9 Total Score 18   If you checked off any problems, how difficult have these problems made it for you to do your work, take care of things at home, or get along with other people? Extremely difficult          MANDIE-7  Feeling nervous, anxious or on edge: Nearly every day  Not being able to stop or control worrying: Nearly every day  Worrying  too much about different things: Nearly every day  Trouble Relaxing: Nearly every day  Being so restless that it is hard to sit still: Nearly every day  Feeling afraid as if something awful might happen: Nearly every day  Becoming easily annoyed or irritable: Nearly every day  MANDIE 7 Total Score: 21  If you checked any problems, how difficult have these problems made it for you to do your work, take care of things at home, or get along with other people: Extremely difficult      Client's Support Network Includes:   friends    Functional Status: Mild impairment     Progress toward goal: Not at goal    Prognosis: Good with Ongoing Treatment     Assessment / Plan / Progress     Visit Diagnosis/Orders Placed This Visit:    ICD-10-CM ICD-9-CM   1. Severe episode of recurrent major depressive disorder, without psychotic features  F33.2 296.33   2. Attention deficit hyperactivity disorder (ADHD), combined type  F90.2 314.01          PLAN:  Safety: No acute safety concerns  Risk Assessment: Risk of self-harm acutely is low. Risk of self-harm chronically is also low, but could be further elevated in the event of treatment noncompliance and/or AODA.    Treatment Plan/Goals & Progress: Continue supportive psychotherapy efforts and medications as indicated. Treatment options discussed during today's visit. Client ackowledged and verbally consented to continue with current treatment plan and was educated on the importance of compliance with treatment and follow-up appointments. Client seems reasonably able to adhere to treatment plan.      Assisted client in processing above session content; acknowledged and normalized client’s thoughts, feelings, and concerns.  Rationalized client's thought process regarding awareness of increased conflicting feelings and concerns regarding complicated relationships. Therapist provided supportive listening as pt recalled recent conversations and encounters with friends that contributed to  "feeling worried or confused about own feelings. Pt acknowledged attempting to \"let go\" of control with best friend and accepting her for herself and not attempt to compare friend to her own personal expectations. Pt shared that she continues to feel that relationship is not reciprocal and unsure if her needs/emotions are considered by other. Pt shared difficult emotions associated with seeing multiple friends get  or at a different point in their life from hers. Therapist supported pt as she expressed feelings of griefs associated with having multiple life occurrences that have changed her life. Therapist affirmed pt's insight and willingness to reflect upon personal challenges and interpretation of feelings. Therapist and pt explored aspects of acceptance that allow pt to hold challenges differently with values direction rather than blocking her view. Therapist encouraged ongoing engagement with social activities and self-care.       Allowed client to freely discuss issues without interruption or judgement with unconditional positive regard, active listening skills, and empathy. Clinician provided a safe, confidential environment to facilitate the development of a positive therapeutic relationship and encouraged open, honest communication. Assisted client in identifying risk factors which would indicate the need for higher level of care including thoughts to harm self or others and/or self-harming behavior and encouraged client to contact this office, call 911, or present to the nearest emergency room should any of these events occur. Discussed crisis intervention services and means to access. Client adamantly and convincingly denies current suicidal or homicidal ideation or perceptual disturbance. Assisted client in processing session content; acknowledged and normalized client’s thoughts, feelings, and concerns by utilizing a person-centered approach in efforts to build appropriate rapport and a positive " therapeutic relationship with open and honest communication.     Quality Measures:     TOBACCO USE:  Tobacco Use: Low Risk  (10/15/2024)    Patient History     Smoking Tobacco Use: Never     Smokeless Tobacco Use: Never     Passive Exposure: Never      Never smoker    I advised Angie of the risks of tobacco use.     Follow Up:   Return in about 1 week (around 10/23/2024).    Shadia Duams LCSW  [facility] [address]

## 2024-10-23 ENCOUNTER — OFFICE VISIT (OUTPATIENT)
Age: 29
End: 2024-10-23
Payer: MEDICARE

## 2024-10-23 DIAGNOSIS — F90.2 ATTENTION DEFICIT HYPERACTIVITY DISORDER (ADHD), COMBINED TYPE: ICD-10-CM

## 2024-10-23 DIAGNOSIS — F41.1 GENERALIZED ANXIETY DISORDER: ICD-10-CM

## 2024-10-23 DIAGNOSIS — F33.2 SEVERE EPISODE OF RECURRENT MAJOR DEPRESSIVE DISORDER, WITHOUT PSYCHOTIC FEATURES: Primary | ICD-10-CM

## 2024-10-23 NOTE — PROGRESS NOTES
Follow Up Adult Note     Date:10/23/2024   Client Name: Angie Sutherland  : 1995   MRN: [id number]   Time IN: 11:00 am    Time OUT: 11:48 am     Referring Provider: Gabby Fernandez PA-C    Chief Complaint:      ICD-10-CM ICD-9-CM   1. Severe episode of recurrent major depressive disorder, without psychotic features  F33.2 296.33   2. Attention deficit hyperactivity disorder (ADHD), combined type  F90.2 314.01   3. Generalized anxiety disorder  F41.1 300.02        History of Present Illness:   Angie Sutherland is a 29 y.o. female who is being seen today for follow up individual psychotherapy counseling for ongoing  depression and anxiety. Pt reports that mood remains stable but is noting an increase of low periods associated with life review and circumstances. Pt remains active with parenting and social activities. Pt reports ongoing relationship conflicts as primary stressor.       Objective     Medications:     Current Outpatient Medications:     Cariprazine HCl (VRAYLAR) 3 MG capsule capsule, Take 1 capsule by mouth Daily for 60 days., Disp: 30 capsule, Rfl: 1    methylPREDNISolone (MEDROL) 4 MG dose pack, Take as directed on package instructions., Disp: 21 each, Rfl: 0    Allergies:   Allergies   Allergen Reactions    Benadryl [Diphenhydramine] Shortness Of Breath and Diarrhea    Diphenhydramine Hcl Diarrhea and Shortness Of Breath    Other Hives     Sun allergy breaks out in hives    Latex Unknown - Low Severity and Rash     HAS SOME IRRITATION AFTER CATHETER INSERTION       Mental Status Exam:   MENTAL STATUS EXAM   General Appearance:  Cleanly groomed and dressed  Eye Contact:  Good eye contact  Attitude:  Cooperative and polite  Motor Activity:  Normal gait, posture  Speech:  Normal rate, tone, volume  Mood and affect:  Normal, pleasant  Thought Process:  Logical, goal-directed and linear  Associations/ Thought Content:  No delusions  Hallucinations:  None  Suicidal Ideations:   Not present  Homicidal Ideation:  Not present  Sensorium:  Alert and clear  Orientation:  Person, place, situation and time  Immediate Recall, Recent, and Remote Memory:  Intact  Attention Span/ Concentration:  Good  Insight:  Good  Judgement:  Good  Reliability:  Good  Impulse Control:  Good       SUICIDE RISK ASSESSMENT/CSSRS:  1. Does client have thoughts of suicide? Patient denies  2. Does client have intent for suicide? Patient denies  3. Does client have a current plan for suicide? Patient denies   4. History of suicide attempts: Patient denies   5. Family history of suicide or attempts: Patient denies   6. History of violent behaviors towards others or property or thoughts of committing suicide: Patient denies   7. History of sexual aggression toward others: Patient denies   8. Access to firearms or weapons: Patient denies     Subjective     PHQ-9 Depression Screening  Little interest or pleasure in doing things? Almost all   Feeling down, depressed, or hopeless? Almost all   PHQ-2 Total Score 6   Trouble falling or staying asleep, or sleeping too much? Almost all   Feeling tired or having little energy? Almost all   Poor appetite or overeating? Almost all   Feeling bad about yourself - or that you are a failure or have let yourself or your family down? Almost all   Trouble concentrating on things, such as reading the newspaper or watching television? Almost all   Moving or speaking so slowly that other people could have noticed? Or the opposite - being so fidgety or restless that you have been moving around a lot more than usual? Almost all   Thoughts that you would be better off dead, or of hurting yourself in some way? Not at all   PHQ-9 Total Score 24   If you checked off any problems, how difficult have these problems made it for you to do your work, take care of things at home, or get along with other people? Extremely difficult         MANDIE-7  Feeling nervous, anxious or on edge: Nearly every day  Not  being able to stop or control worrying: Nearly every day  Worrying too much about different things: Nearly every day  Trouble Relaxing: Nearly every day  Being so restless that it is hard to sit still: Nearly every day  Feeling afraid as if something awful might happen: Nearly every day  Becoming easily annoyed or irritable: Nearly every day  MANDIE 7 Total Score: 21  If you checked any problems, how difficult have these problems made it for you to do your work, take care of things at home, or get along with other people: Extremely difficult      Client's Support Network Includes:   friends    Functional Status: Mild impairment     Progress toward goal: Not at goal    Prognosis: Good with Ongoing Treatment     Assessment / Plan / Progress     Visit Diagnosis/Orders Placed This Visit:    ICD-10-CM ICD-9-CM   1. Severe episode of recurrent major depressive disorder, without psychotic features  F33.2 296.33   2. Attention deficit hyperactivity disorder (ADHD), combined type  F90.2 314.01   3. Generalized anxiety disorder  F41.1 300.02          PLAN:  Safety: No acute safety concerns  Risk Assessment: Risk of self-harm acutely is low. Risk of self-harm chronically is also low, but could be further elevated in the event of treatment noncompliance and/or AODA.    Treatment Plan/Goals & Progress: Continue supportive psychotherapy efforts and medications as indicated. Treatment options discussed during today's visit. Client ackowledged and verbally consented to continue with current treatment plan and was educated on the importance of compliance with treatment and follow-up appointments. Client seems reasonably able to adhere to treatment plan.      Assisted client in processing above session content; acknowledged and normalized client’s thoughts, feelings, and concerns.  Rationalized client's thought process regarding awareness of ongoing complications within relationships that impact mood and motivation.  Pt recalled recent  events with friend that created a sense of tension and awareness from pt that she has difficulty connecting with women. Pt acknowledged feelings of frustration associated with disconnection as a result of differing values from multiple friends. Therapist assisted pt in clarifying barriers to forming and maintaining healthy relationships. Therapist and pt engaged in discussion regarding fears and anxiety related to intimacy with male friends/partners. Therapist assisted pt with identifying and challenging negative thought patterns related to relationship and building resilience in the face of relationship difficulties; interpersonal therapy development.     Allowed client to freely discuss issues without interruption or judgement with unconditional positive regard, active listening skills, and empathy. Clinician provided a safe, confidential environment to facilitate the development of a positive therapeutic relationship and encouraged open, honest communication. Assisted client in identifying risk factors which would indicate the need for higher level of care including thoughts to harm self or others and/or self-harming behavior and encouraged client to contact this office, call 911, or present to the nearest emergency room should any of these events occur. Discussed crisis intervention services and means to access. Client adamantly and convincingly denies current suicidal or homicidal ideation or perceptual disturbance. Assisted client in processing session content; acknowledged and normalized client’s thoughts, feelings, and concerns by utilizing a person-centered approach in efforts to build appropriate rapport and a positive therapeutic relationship with open and honest communication.     Quality Measures:     TOBACCO USE:  Tobacco Use: Low Risk  (10/15/2024)    Patient History     Smoking Tobacco Use: Never     Smokeless Tobacco Use: Never     Passive Exposure: Never      Never smoker    I advised Angie of the  risks of tobacco use.     Follow Up:   Return in about 1 week (around 10/30/2024).    Shadia Dumas LCSW  [facility] [address]

## 2024-10-29 ENCOUNTER — OFFICE VISIT (OUTPATIENT)
Age: 29
End: 2024-10-29
Payer: MEDICARE

## 2024-10-29 VITALS
WEIGHT: 145 LBS | OXYGEN SATURATION: 96 % | SYSTOLIC BLOOD PRESSURE: 100 MMHG | DIASTOLIC BLOOD PRESSURE: 60 MMHG | BODY MASS INDEX: 27.4 KG/M2 | HEART RATE: 104 BPM

## 2024-10-29 DIAGNOSIS — F33.0 MILD EPISODE OF RECURRENT MAJOR DEPRESSIVE DISORDER: ICD-10-CM

## 2024-10-29 DIAGNOSIS — F41.1 GENERALIZED ANXIETY DISORDER: ICD-10-CM

## 2024-10-29 DIAGNOSIS — R41.840 ATTENTION AND CONCENTRATION DEFICIT: Primary | ICD-10-CM

## 2024-10-29 LAB
1OH-MIDAZOLAM UR QL SCN: NOT DETECTED NG/MG CREAT
6MAM UR QL SCN: NEGATIVE NG/ML
7AMINOCLONAZEPAM/CREAT UR: NOT DETECTED NG/MG CREAT
A-OH ALPRAZ/CREAT UR: NOT DETECTED NG/MG CREAT
A-OH-TRIAZOLAM/CREAT UR CFM: NOT DETECTED NG/MG CREAT
ACP UR QL CFM: NOT DETECTED
ALPRAZ/CREAT UR CFM: NOT DETECTED NG/MG CREAT
AMPHETAMINES UR QL SCN: NEGATIVE NG/ML
APAP UR QL SCN: NEGATIVE UG/ML
BARBITURATES UR QL SCN: NEGATIVE NG/ML
BENZODIAZ SCN METH UR: NEGATIVE
BUPRENORPHINE UR QL SCN: NEGATIVE
BUPRENORPHINE/CREAT UR: NOT DETECTED NG/MG CREAT
CANNABINOIDS UR QL CFM: NORMAL
CANNABINOIDS UR QL SCN: NORMAL NG/ML
CARBOXYTHC UR CFM-MCNC: 534 NG/MG CREAT
CARISOPRODOL UR QL: NEGATIVE NG/ML
CLONAZEPAM/CREAT UR CFM: NOT DETECTED NG/MG CREAT
COCAINE+BZE UR QL SCN: NEGATIVE NG/ML
CREAT UR-MCNC: 128 MG/DL
D-METHORPHAN UR-MCNC: NOT DETECTED NG/ML
D-METHORPHAN+LEVORPHANOL UR QL: NOT DETECTED
DESALKYLFLURAZ/CREAT UR: NOT DETECTED NG/MG CREAT
DIAZEPAM/CREAT UR: NOT DETECTED NG/MG CREAT
ETHANOL UR QL SCN: NEGATIVE G/DL
ETHANOL UR QL SCN: NEGATIVE NG/ML
FENTANYL CTO UR SCN-MCNC: NEGATIVE NG/ML
FENTANYL/CREAT UR: NOT DETECTED NG/MG CREAT
FLUNITRAZEPAM UR QL SCN: NOT DETECTED NG/MG CREAT
GABAPENTIN UR-MCNC: NEGATIVE UG/ML
HALLUCINOGENS UR: NEGATIVE
HYPNOTICS UR QL SCN: NEGATIVE
KETAMINE UR QL: NOT DETECTED
LORAZEPAM/CREAT UR: NOT DETECTED NG/MG CREAT
MEPERIDINE UR QL SCN: NEGATIVE NG/ML
METHADONE UR QL SCN: NEGATIVE NG/ML
METHADONE+METAB UR QL SCN: NEGATIVE NG/ML
MIDAZOLAM/CREAT UR CFM: NOT DETECTED NG/MG CREAT
MISCELLANEOUS, UR: NEGATIVE
NORBUPRENORPHINE/CREAT UR: NOT DETECTED NG/MG CREAT
NORDIAZEPAM/CREAT UR: NOT DETECTED NG/MG CREAT
NORFENTANYL/CREAT UR: NOT DETECTED NG/MG CREAT
NORFLUNITRAZEPAM UR-MCNC: NOT DETECTED NG/MG CREAT
NORKETAMINE UR-MCNC: NOT DETECTED UG/ML
OPIATES UR SCN-MCNC: NEGATIVE NG/ML
OXAZEPAM/CREAT UR: NOT DETECTED NG/MG CREAT
OXYCODONE CTO UR SCN-MCNC: NEGATIVE NG/ML
PCP UR QL SCN: NEGATIVE NG/ML
PRESCRIBED MEDICATIONS: NORMAL
PROPOXYPH UR QL SCN: NEGATIVE NG/ML
TAPENTADOL CTO UR SCN-MCNC: NEGATIVE NG/ML
TEMAZEPAM/CREAT UR: NOT DETECTED NG/MG CREAT
TRAMADOL UR QL SCN: NEGATIVE NG/ML
ZALEPLON UR-MCNC: NOT DETECTED NG/ML
ZOLPIDEM PHENYL-4-CARB UR QL SCN: NOT DETECTED
ZOLPIDEM UR QL SCN: NOT DETECTED
ZOPICLONE-N-OXIDE UR-MCNC: NOT DETECTED NG/ML

## 2024-10-29 RX ORDER — PAROXETINE 20 MG/1
20 TABLET, FILM COATED ORAL DAILY
Qty: 30 TABLET | Refills: 2 | Status: SHIPPED | OUTPATIENT
Start: 2024-10-29 | End: 2025-10-29

## 2024-10-29 NOTE — PROGRESS NOTES
Follow Up Office Visit      Patient Name: Angie Sutherland  : 1995   MRN: [id number]     Referring Provider: Gabby Fernandez PA-C    Chief Complaint:      ICD-10-CM ICD-9-CM   1. Attention and concentration deficit  R41.840 799.51   2. Generalized anxiety disorder  F41.1 300.02   3. Mild episode of recurrent major depressive disorder  F33.0 296.31        History of Present Illness:   Angie Sutherland is a 29 y.o. female who is here today for follow up and medication management.    Subjective      Patient Reports:   Patient reports no changes since starting Vraylar.     Patient rates anxiety 8/10. Patient reports when anxiety is increased, it causes her stomach issues. Patient reports increased frustrations and spouts of irritability. Patient reports recent daily panic attacks that come out of no where. Patient denies anger outburst and lashing out. Patient denies exaggerated sense of well being or self-confidence. Patient denies decreased need for sleep, but reports difficulty sleeping. Patient reports impulsive and risk-taking behaviors involving decision making and hypersexuality. Patient reports making careless mistakes when anxiety is high.    Patient rates depression 9/10. Patient reports lack of motivation. Patient reports having so many things she wants to do and needs to do, but can't do it. Patient reports feelings then exacerbated and things become severely frustrating and overwhelming. Patient denies SI/HI/AVH. Patients reports intrusive thoughts.  Patient reports being able to call 911, text [phone] or go to the nearest ER if thoughts, intent or plan develop.    PHQ-9= 25 increased score from previous visit  MANDIE-7= 21 increased score from previous visit    Review of Systems:   Review of Systems   Psychiatric/Behavioral:  Positive for agitation, decreased concentration and sleep disturbance. The patient is nervous/anxious.      Sleep pattern: 4-5 hours of inconsistent sleep    Appetite: varies with mood     PHQ-9 Depression Screening  Little interest or pleasure in doing things? Almost all   Feeling down, depressed, or hopeless? Almost all   PHQ-2 Total Score 6   Trouble falling or staying asleep, or sleeping too much? Almost all   Feeling tired or having little energy? Almost all   Poor appetite or overeating? Almost all   Feeling bad about yourself - or that you are a failure or have let yourself or your family down? Almost all   Trouble concentrating on things, such as reading the newspaper or watching television? Almost all   Moving or speaking so slowly that other people could have noticed? Or the opposite - being so fidgety or restless that you have been moving around a lot more than usual? Almost all   Thoughts that you would be better off dead, or of hurting yourself in some way? Several days   PHQ-9 Total Score 25   If you checked off any problems, how difficult have these problems made it for you to do your work, take care of things at home, or get along with other people? Extremely difficult       MANDIE-7 Anxiety Screening  Over the last two weeks, how often have you been bothered by the following problems?  Feeling nervous, anxious or on edge: Nearly every day  Not being able to stop or control worrying: Nearly every day  Worrying too much about different things: Nearly every day  Trouble Relaxing: Nearly every day  Being so restless that it is hard to sit still: Nearly every day  Becoming easily annoyed or irritable: Nearly every day  Feeling afraid as if something awful might happen: Nearly every day  MANDIE 7 Total Score: 21  If you checked any problems, how difficult have these problems made it for you to do your work, take care of things at home, or get along with other people: Extremely difficult    RISK ASSESSMENT:  Patient denies any thoughts or intent of suicide today. Patient denies any impulsive behavior today.     Medications:     Current Outpatient Medications:      methylPREDNISolone (MEDROL) 4 MG dose pack, Take as directed on package instructions., Disp: 21 each, Rfl: 0    PARoxetine (PAXIL) 20 MG tablet, Take 1 tablet by mouth Daily., Disp: 30 tablet, Rfl: 2    Medication Considerations:  PEDRO reviewed and appropriate.      Allergies:   Allergies   Allergen Reactions    Benadryl [Diphenhydramine] Shortness Of Breath and Diarrhea    Diphenhydramine Hcl Diarrhea and Shortness Of Breath    Other Hives     Sun allergy breaks out in hives    Latex Unknown - Low Severity and Rash     HAS SOME IRRITATION AFTER CATHETER INSERTION       Results Reviewed:      Objective     Physical Exam:  Vital Signs:   Vitals:    10/29/24 1227   BP: 100/60   Pulse: 104   SpO2: 96%   Weight: 65.8 kg (145 lb)     Body mass index is 27.4 kg/m².     Mental Status Exam:   MENTAL STATUS EXAM   General Appearance:  Cleanly groomed and dressed and well developed  Eye Contact:  Good eye contact  Attitude:  Cooperative  Motor Activity:  Normal gait, posture  Muscle Strength:  Normal  Speech:  Normal rate, tone, volume  Language:  Spontaneous  Mood and affect:  Anxious, depressed and frustrated  Hopelessness:  3  Loneliness: 8  Thought Process:  Logical  Associations/ Thought Content:  No delusions  Hallucinations:  None  Suicidal Ideations:  Not present  Homicidal Ideation:  Not present  Sensorium:  Alert and clear  Orientation:  Person, place, time and situation  Immediate Recall, Recent, and Remote Memory:  Intact  Attention Span/ Concentration:  Good  Fund of Knowledge:  Appropriate for age and educational level  Intellectual Functioning:  Average range  Insight:  Fair  Judgement:  Fair  Reliability:  Fair  Impulse Control:  Fair       @RESULASTCBCDIFFPANEL,TSH,LABLIPI,IVEDKAHE47,QDZDSQFK55,MG,FOLATE,PROLACTIN,CRPRESULT,CMP,[id number])@    Lab Results   Component Value Date    GLUCOSE 147 (H) 07/15/2024    BUN 16 07/15/2024    CREATININE 0.85 07/15/2024    EGFR 95.2 07/15/2024    BCR 18.8 07/15/2024     "K 4.0 07/15/2024    CO2 23.1 07/15/2024    CALCIUM 9.0 07/15/2024    ALBUMIN 4.3 07/15/2024    BILITOT <0.2 07/15/2024    AST 20 07/15/2024    ALT 16 07/15/2024       Lab Results   Component Value Date    WBC 5.27 07/15/2024    HGB 13.2 07/15/2024    HCT 41.1 07/15/2024    MCV 86.7 07/15/2024     07/15/2024       No results found for: \"CHOL\", \"CHLPL\", \"TRIG\", \"HDL\", \"LDL\"    Assessment / Plan      Visit Diagnosis/Orders Placed This Visit:  Diagnoses and all orders for this visit:    1. Attention and concentration deficit (Primary)  -     GeneSight - Swab,; Future    2. Generalized anxiety disorder  -     GeneSight - Swab,; Future  -     PARoxetine (PAXIL) 20 MG tablet; Take 1 tablet by mouth Daily.  Dispense: 30 tablet; Refill: 2    3. Mild episode of recurrent major depressive disorder  -     GeneSight - Swab,; Future  -     Folate RBC; Future  -     Vitamin B12 & Folate  -     Calcitriol (1,25 di-OH Vitamin D)  -     PARoxetine (PAXIL) 20 MG tablet; Take 1 tablet by mouth Daily.  Dispense: 30 tablet; Refill: 2       Functional Status: Mild impairment     Prognosis: Good with Ongoing Treatment     Impression/Formulation:  Patient appeared alert and oriented.  Patient is voluntarily requesting to continue outpatient psychiatric treatment at [facility] [address]  Patient is receptive to assistance with maintaining a stable lifestyle.  Patient presents with history of     ICD-10-CM ICD-9-CM   1. Attention and concentration deficit  R41.840 799.51   2. Generalized anxiety disorder  F41.1 300.02   3. Mild episode of recurrent major depressive disorder  F33.0 296.31   .    Reviewed patient's previous provider notes. Reviewed most recent labs. Patient meets DSM V diagnostic criteria for diagnoses. Diagnoses may be updated as more information becomes available.       Treatment Plan:   Discontinue Vryalar  Initiate Paxil 20mg qam  Complete GeneSight during visit  Complete lab draws " before follow up visit  Follow up in 6 weeks and as needed     Patient will continue supportive psychotherapy efforts and medications as indicated. Clinic will obtain release of information for current treatment team for continuity of care as needed. Patient will contact this office, call 911 or present to the nearest emergency room should suicidal or homicidal ideations occur.  Discussed medication options and treatment plan of prescribed medication(s) as well as the risks, benefits, and potential side effects. Patient acknowledged and verbally consented to continue with current treatment plan and was educated on the importance of compliance with treatment and follow-up appointments.     Quality Measures:   Former smoker    I advised Angie Sutherland of the risks of tobacco use.     Follow Up:   Return in about 6 weeks (around 12/10/2024).      UYEN Crowder  [facility] [address]

## 2024-10-30 ENCOUNTER — OFFICE VISIT (OUTPATIENT)
Age: 29
End: 2024-10-30
Payer: MEDICARE

## 2024-10-30 DIAGNOSIS — F90.2 ATTENTION DEFICIT HYPERACTIVITY DISORDER (ADHD), COMBINED TYPE: Primary | ICD-10-CM

## 2024-10-30 DIAGNOSIS — F33.2 SEVERE EPISODE OF RECURRENT MAJOR DEPRESSIVE DISORDER, WITHOUT PSYCHOTIC FEATURES: ICD-10-CM

## 2024-10-30 DIAGNOSIS — F41.1 GENERALIZED ANXIETY DISORDER: ICD-10-CM

## 2024-10-30 NOTE — PROGRESS NOTES
Follow Up Adult Note     Date:10/30/2024   Client Name: Angie Sutherland  : 1995   MRN: [id number]   Time IN: 10:07 am    Time OUT: 10:47 am     Referring Provider: Gabby Fernandez PA-C    Chief Complaint:      ICD-10-CM ICD-9-CM   1. Attention deficit hyperactivity disorder (ADHD), combined type  F90.2 314.01   2. Severe episode of recurrent major depressive disorder, without psychotic features  F33.2 296.33   3. Generalized anxiety disorder  F41.1 300.02        History of Present Illness:   Angie Sutherland is a 29 y.o. female who is being seen today for follow up individual psychotherapy counseling for ongoing anxiety and depression. Pt reports that she discussed medication changes with provider yesterday to assist with managing ongoing anxiety and depressive symptoms. Pt acknowledged that many symptoms are related to situational stress but continues to feel hopeless at time regarding positive occurrences in life.       Objective     Medications:     Current Outpatient Medications:     methylPREDNISolone (MEDROL) 4 MG dose pack, Take as directed on package instructions., Disp: 21 each, Rfl: 0    PARoxetine (PAXIL) 20 MG tablet, Take 1 tablet by mouth Daily., Disp: 30 tablet, Rfl: 2    Allergies:   Allergies   Allergen Reactions    Benadryl [Diphenhydramine] Shortness Of Breath and Diarrhea    Diphenhydramine Hcl Diarrhea and Shortness Of Breath    Other Hives     Sun allergy breaks out in hives    Latex Unknown - Low Severity and Rash     HAS SOME IRRITATION AFTER CATHETER INSERTION       Mental Status Exam:   MENTAL STATUS EXAM   General Appearance:  Cleanly groomed and dressed  Eye Contact:  Good eye contact  Attitude:  Cooperative and polite  Motor Activity:  Normal gait, posture  Speech:  Normal rate, tone, volume  Mood and affect:  Normal, pleasant  Thought Process:  Logical, goal-directed and linear  Associations/ Thought Content:  No delusions  Hallucinations:   None  Suicidal Ideations:  Not present  Homicidal Ideation:  Not present  Sensorium:  Alert and clear  Orientation:  Person, place, time and situation  Immediate Recall, Recent, and Remote Memory:  Intact  Attention Span/ Concentration:  Good  Insight:  Good  Judgement:  Good  Reliability:  Good  Impulse Control:  Good       SUICIDE RISK ASSESSMENT/CSSRS:  1. Does client have thoughts of suicide? Patient denies  2. Does client have intent for suicide? Patient denies  3. Does client have a current plan for suicide? Patient denies   4. History of suicide attempts: Patient denies   5. Family history of suicide or attempts: Patient denies   6. History of violent behaviors towards others or property or thoughts of committing suicide: Patient denies   7. History of sexual aggression toward others: Patient denies   8. Access to firearms or weapons: Patient denies     Subjective     PHQ-9 Depression Screening  Little interest or pleasure in doing things? Almost all   Feeling down, depressed, or hopeless? Almost all   PHQ-2 Total Score 6   Trouble falling or staying asleep, or sleeping too much? Almost all   Feeling tired or having little energy? Almost all   Poor appetite or overeating? Almost all   Feeling bad about yourself - or that you are a failure or have let yourself or your family down? Almost all   Trouble concentrating on things, such as reading the newspaper or watching television? Almost all   Moving or speaking so slowly that other people could have noticed? Or the opposite - being so fidgety or restless that you have been moving around a lot more than usual? Almost all   Thoughts that you would be better off dead, or of hurting yourself in some way? Not at all   PHQ-9 Total Score 24   If you checked off any problems, how difficult have these problems made it for you to do your work, take care of things at home, or get along with other people? Not difficult at all         MANDIE-7  Feeling nervous, anxious or on  edge: Nearly every day  Not being able to stop or control worrying: Nearly every day  Worrying too much about different things: Nearly every day  Trouble Relaxing: Nearly every day  Being so restless that it is hard to sit still: Nearly every day  Feeling afraid as if something awful might happen: Nearly every day  Becoming easily annoyed or irritable: Nearly every day  MANDIE 7 Total Score: 21  If you checked any problems, how difficult have these problems made it for you to do your work, take care of things at home, or get along with other people: Extremely difficult      Client's Support Network Includes:  significant other    Functional Status: Mild impairment     Progress toward goal: Not at goal    Prognosis: Good with Ongoing Treatment     Assessment / Plan / Progress     Visit Diagnosis/Orders Placed This Visit:    ICD-10-CM ICD-9-CM   1. Attention deficit hyperactivity disorder (ADHD), combined type  F90.2 314.01   2. Severe episode of recurrent major depressive disorder, without psychotic features  F33.2 296.33   3. Generalized anxiety disorder  F41.1 300.02          PLAN:  Safety: No acute safety concerns  Risk Assessment: Risk of self-harm acutely is low. Risk of self-harm chronically is also low, but could be further elevated in the event of treatment noncompliance and/or AODA.    Treatment Plan/Goals & Progress: Continue supportive psychotherapy efforts and medications as indicated. Treatment options discussed during today's visit. Client ackowledged and verbally consented to continue with current treatment plan and was educated on the importance of compliance with treatment and follow-up appointments. Client seems reasonably able to adhere to treatment plan.      Assisted client in processing above session content; acknowledged and normalized client’s thoughts, feelings, and concerns.  Rationalized client's thought process regarding awareness of situational stress on increased feelings of anxiety and  depression.  Therapist provided supportive, empathetic listening as pt recalled recent updates regarding relationships with friend, roommate and new boyfriend. Therapist encouraged insight into the pt's role in creating and maintaining healthy boundaries within relationships that respect pt's needs. Therapist worked with pt on improving pt's ability to navigate social situations and complex relationships with empathy and self-respect for own desires. Therapist and pt discussed barriers to forming and maintaining healthy relationships and potential concerns with current romantic partner.     Allowed client to freely discuss issues without interruption or judgement with unconditional positive regard, active listening skills, and empathy. Clinician provided a safe, confidential environment to facilitate the development of a positive therapeutic relationship and encouraged open, honest communication. Assisted client in identifying risk factors which would indicate the need for higher level of care including thoughts to harm self or others and/or self-harming behavior and encouraged client to contact this office, call 911, or present to the nearest emergency room should any of these events occur. Discussed crisis intervention services and means to access. Client adamantly and convincingly denies current suicidal or homicidal ideation or perceptual disturbance. Assisted client in processing session content; acknowledged and normalized client’s thoughts, feelings, and concerns by utilizing a person-centered approach in efforts to build appropriate rapport and a positive therapeutic relationship with open and honest communication.     Quality Measures:     TOBACCO USE:  Tobacco Use: Low Risk  (10/29/2024)    Patient History     Smoking Tobacco Use: Never     Smokeless Tobacco Use: Never     Passive Exposure: Never      Never smoker    I advised Angie of the risks of tobacco use.     Follow Up:   Return in about 1 week  (around 11/6/2024).    RADHA SalamancaW  [facility] [address]

## 2024-11-08 DIAGNOSIS — F41.1 GENERALIZED ANXIETY DISORDER: ICD-10-CM

## 2024-11-08 DIAGNOSIS — F33.0 MILD EPISODE OF RECURRENT MAJOR DEPRESSIVE DISORDER: ICD-10-CM

## 2024-11-08 DIAGNOSIS — R41.840 ATTENTION AND CONCENTRATION DEFICIT: ICD-10-CM

## 2024-11-13 ENCOUNTER — OFFICE VISIT (OUTPATIENT)
Age: 29
End: 2024-11-13
Payer: MEDICARE

## 2024-11-13 DIAGNOSIS — F33.2 SEVERE EPISODE OF RECURRENT MAJOR DEPRESSIVE DISORDER, WITHOUT PSYCHOTIC FEATURES: Primary | ICD-10-CM

## 2024-11-13 DIAGNOSIS — F41.1 GENERALIZED ANXIETY DISORDER: ICD-10-CM

## 2024-11-13 NOTE — PROGRESS NOTES
Follow Up Adult Note     Date:2024   Client Name: Angie Sutherland  : 1995   MRN: [id number]   Time IN: 10:03 am    Time OUT: 10:44 am     Referring Provider: Gabby Fernandez PA-C    Chief Complaint:      ICD-10-CM ICD-9-CM   1. Severe episode of recurrent major depressive disorder, without psychotic features  F33.2 296.33   2. Generalized anxiety disorder  F41.1 300.02        History of Present Illness:   Angie Sutherland is a 29 y.o. female who is being seen today for follow up individual psychotherapy counseling for ongoing depression and anxiety. Pt reports that mood has improved since last visit and attributes change to new medication. Pt acknowledged that she feels less anxious and is less irritable. Pt acknowledged awareness of several recent life stressors but has been able to manage symptoms more easily.        Objective     Medications:     Current Outpatient Medications:     methylPREDNISolone (MEDROL) 4 MG dose pack, Take as directed on package instructions., Disp: 21 each, Rfl: 0    PARoxetine (PAXIL) 20 MG tablet, Take 1 tablet by mouth Daily., Disp: 30 tablet, Rfl: 2    Allergies:   Allergies   Allergen Reactions    Benadryl [Diphenhydramine] Shortness Of Breath and Diarrhea    Diphenhydramine Hcl Diarrhea and Shortness Of Breath    Other Hives     Sun allergy breaks out in hives    Latex Unknown - Low Severity and Rash     HAS SOME IRRITATION AFTER CATHETER INSERTION       Mental Status Exam:   MENTAL STATUS EXAM   General Appearance:  Cleanly groomed and dressed  Eye Contact:  Good eye contact  Attitude:  Cooperative and polite  Motor Activity:  Normal gait, posture  Speech:  Normal rate, tone, volume  Mood and affect:  Normal, pleasant  Thought Process:  Logical, goal-directed and linear  Associations/ Thought Content:  No delusions  Hallucinations:  None  Suicidal Ideations:  Not present  Homicidal Ideation:  Not present  Sensorium:  Alert and  clear  Orientation:  Person, situation, time and place  Immediate Recall, Recent, and Remote Memory:  Intact  Attention Span/ Concentration:  Good  Insight:  Good  Judgement:  Good  Reliability:  Good  Impulse Control:  Good       SUICIDE RISK ASSESSMENT/CSSRS:  1. Does client have thoughts of suicide? Patient denies  2. Does client have intent for suicide? Patient denies  3. Does client have a current plan for suicide? Patient denies   4. History of suicide attempts: Patient denies   5. Family history of suicide or attempts: Patient denies   6. History of violent behaviors towards others or property or thoughts of committing suicide: Patient denies   7. History of sexual aggression toward others: Patient denies   8. Access to firearms or weapons: Patient denies     Subjective     PHQ-9 Depression Screening  Little interest or pleasure in doing things? Several days   Feeling down, depressed, or hopeless? Over half   PHQ-2 Total Score 3   Trouble falling or staying asleep, or sleeping too much? Several days   Feeling tired or having little energy? Over half   Poor appetite or overeating? Several days   Feeling bad about yourself - or that you are a failure or have let yourself or your family down? Over half   Trouble concentrating on things, such as reading the newspaper or watching television? Almost all   Moving or speaking so slowly that other people could have noticed? Or the opposite - being so fidgety or restless that you have been moving around a lot more than usual? Almost all   Thoughts that you would be better off dead, or of hurting yourself in some way? Not at all   PHQ-9 Total Score 15   If you checked off any problems, how difficult have these problems made it for you to do your work, take care of things at home, or get along with other people? Very difficult         MANDIE-7  Feeling nervous, anxious or on edge: More than half the days  Not being able to stop or control worrying: More than half the  days  Worrying too much about different things: More than half the days  Trouble Relaxing: Nearly every day  Being so restless that it is hard to sit still: Nearly every day  Feeling afraid as if something awful might happen: Several days  Becoming easily annoyed or irritable: More than half the days  MANDIE 7 Total Score: 15  If you checked any problems, how difficult have these problems made it for you to do your work, take care of things at home, or get along with other people: Very difficult      Client's Support Network Includes:  significant other    Functional Status: Mild impairment     Progress toward goal: Not at goal    Prognosis: Good with Ongoing Treatment     Assessment / Plan / Progress     Visit Diagnosis/Orders Placed This Visit:    ICD-10-CM ICD-9-CM   1. Severe episode of recurrent major depressive disorder, without psychotic features  F33.2 296.33   2. Generalized anxiety disorder  F41.1 300.02          PLAN:  Safety: No acute safety concerns  Risk Assessment: Risk of self-harm acutely is low. Risk of self-harm chronically is also low, but could be further elevated in the event of treatment noncompliance and/or AODA.    Treatment Plan/Goals & Progress: Continue supportive psychotherapy efforts and medications as indicated. Treatment options discussed during today's visit. Client ackowledged and verbally consented to continue with current treatment plan and was educated on the importance of compliance with treatment and follow-up appointments. Client seems reasonably able to adhere to treatment plan.      Assisted client in processing above session content; acknowledged and normalized client’s thoughts, feelings, and concerns.  Rationalized client's thought process regarding feelings of grief associated with recent loss of brother and complex feelings regarding the potential meeting of biological father at the . Therapist provided supportive, empathetic listening as pt recalled recent events  and concerns regarding uncertainty of meeting father. Pt additionally shared recent changes in living environment and moving of friends that have contributed to additional stress. Therapist encouraged pt to remain mindful of acceptant and nonjudgmental of emotions that may be experienced during this weekend. Pt acknowledged that she is considering potential outcomes and is even considering own personal actions or statements that she would like to consider. Therapist and pt discussed exploration of pt's identity and incorporation into this family and acceptance.     Allowed client to freely discuss issues without interruption or judgement with unconditional positive regard, active listening skills, and empathy. Clinician provided a safe, confidential environment to facilitate the development of a positive therapeutic relationship and encouraged open, honest communication. Assisted client in identifying risk factors which would indicate the need for higher level of care including thoughts to harm self or others and/or self-harming behavior and encouraged client to contact this office, call 911, or present to the nearest emergency room should any of these events occur. Discussed crisis intervention services and means to access. Client adamantly and convincingly denies current suicidal or homicidal ideation or perceptual disturbance. Assisted client in processing session content; acknowledged and normalized client’s thoughts, feelings, and concerns by utilizing a person-centered approach in efforts to build appropriate rapport and a positive therapeutic relationship with open and honest communication.     Quality Measures:     TOBACCO USE:  Tobacco Use: Low Risk  (10/29/2024)    Patient History     Smoking Tobacco Use: Never     Smokeless Tobacco Use: Never     Passive Exposure: Never      Never smoker    I advised Angie of the risks of tobacco use.     Follow Up:   Return in about 1 week (around 11/20/2024).    Shadia  POP Dumas  [facility] [address]

## 2024-11-20 ENCOUNTER — OFFICE VISIT (OUTPATIENT)
Dept: ONCOLOGY | Facility: CLINIC | Age: 29
End: 2024-11-20
Payer: MEDICARE

## 2024-11-20 VITALS
WEIGHT: 147 LBS | OXYGEN SATURATION: 97 % | DIASTOLIC BLOOD PRESSURE: 56 MMHG | RESPIRATION RATE: 16 BRPM | BODY MASS INDEX: 27.75 KG/M2 | SYSTOLIC BLOOD PRESSURE: 105 MMHG | TEMPERATURE: 98.2 F | HEART RATE: 88 BPM | HEIGHT: 61 IN

## 2024-11-20 DIAGNOSIS — C50.011 MALIGNANT NEOPLASM OF NIPPLE OF RIGHT BREAST IN FEMALE, UNSPECIFIED ESTROGEN RECEPTOR STATUS: Primary | ICD-10-CM

## 2024-11-20 PROCEDURE — 99213 OFFICE O/P EST LOW 20 MIN: CPT | Performed by: INTERNAL MEDICINE

## 2024-11-20 PROCEDURE — 1126F AMNT PAIN NOTED NONE PRSNT: CPT | Performed by: INTERNAL MEDICINE

## 2024-11-20 NOTE — PROGRESS NOTES
"      PROBLEM LIST:  1. [id number] (stage IIIB) invasive ductal carcinoma of the right breast, ER-, WA weakly +, Her2 3+.  A) presented with pain and swelling of the right breast after childbirth. Biopsy 5/8/17 showed high grade IDC, Right axillary LN biopsy positive for metastatic node involvement. Skin biopsy showed involvement of the dermis with lymphatic space involvement.  PET/CT on 5/24/17 showed hypermetabolic activity in the breast and axillary nodes,  No distant spread of disease.  B) neoadjuvant chemotherapy with TCHP started on 5/25/17.  Infusion reaction consisting of severe leg pain with the first dose of herceptin.  Taxotere dose reduced to 80% on cycle 5 for neuropathy.  C) bilateral mastectomy on 10/30/17.  Pathology showed multifocal < 1 mm high grade IDC with focal lymphovascular invasion.  4 sentinel lymph nodes with no malignancy.   [id number] (Stage yIA)  D) radiation therapy completed on 1/23/18.  Patient declined tamoxifen.  herceptin completed June 2018.  Neratinib started July 2018. Stopped Neratinib December 2018.   E) 2/11/2019 last dose of Lupron. She has declined any further Lupron as of 4/30/2019.   2. Migraines  3. Pre-diabetes    Subjective      CC: breast cancer    HISTORY OF PRESENT ILLNESS:   Angie Sutherland returns for follow-up.      She is doing well.  She lost her brother to cancer recently so that has been hard for her.  Her brother was in his 50s.  She is not sure what type of cancer he had.    Objective      /56   Pulse 88   Temp 98.2 °F (36.8 °C)   Resp 16   Ht 154.9 cm (61\")   Wt 66.7 kg (147 lb)   SpO2 97%   BMI 27.78 kg/m²    Vitals:    11/20/24 1033   PainSc: 0-No pain               Performance Status: 0    General: well appearing female in no acute distress  Neuro: alert and oriented  HEENT: sclera anicteric, oropharynx clear  Lymphatics: No cervical supraclavicular or axillary adenopathy  Cardiovascular: Regular rate and rhythm no murmurs  : Lungs: " Clear to auscultation bilaterally  Breast: Status post bilateral mastectomy with implant replacement.  No palpable masses  Extremeties: no lower extremity edema  Skin: no rashes, lesions, bruising, or petechiae  Psych: mood and affect appropriate    Lab Results   Component Value Date    WBC 5.27 07/15/2024    HGB 13.2 07/15/2024    HCT 41.1 07/15/2024    MCV 86.7 07/15/2024     07/15/2024     Lab Results   Component Value Date    GLUCOSE 147 (H) 07/15/2024    BUN 16 07/15/2024    CREATININE 0.85 07/15/2024    EGFRIFAFRI 111 11/11/2021    BCR 18.8 07/15/2024    K 4.0 07/15/2024    CO2 23.1 07/15/2024    CALCIUM 9.0 07/15/2024    ALBUMIN 4.3 07/15/2024    AST 20 07/15/2024    ALT 16 07/15/2024         Assessment & Plan   Angie Sutherland is a 29 y.o. year old female with stage IIIB HER-2 positive breast cancer who returns for follow-up.       She is doing well.  She had a breast MRI in August which was unremarkable.  FDA recommends screening for silicone implant rupture every 2 to 3 years with noncontrast imaging.  We will plan a noncontrast MRI in about 2 years.     Follow-up in 1 year.            Shadia Amos MD  [facility]    11/20/2024          CC:

## 2024-12-09 ENCOUNTER — TELEMEDICINE (OUTPATIENT)
Age: 29
End: 2024-12-09
Payer: MEDICARE

## 2024-12-09 DIAGNOSIS — F33.0 MILD EPISODE OF RECURRENT MAJOR DEPRESSIVE DISORDER: ICD-10-CM

## 2024-12-09 DIAGNOSIS — R41.840 ATTENTION AND CONCENTRATION DEFICIT: Primary | ICD-10-CM

## 2024-12-09 DIAGNOSIS — F41.1 GENERALIZED ANXIETY DISORDER: ICD-10-CM

## 2024-12-09 PROCEDURE — 99214 OFFICE O/P EST MOD 30 MIN: CPT

## 2024-12-09 PROCEDURE — 1160F RVW MEDS BY RX/DR IN RCRD: CPT

## 2024-12-09 PROCEDURE — 96127 BRIEF EMOTIONAL/BEHAV ASSMT: CPT

## 2024-12-09 PROCEDURE — 1159F MED LIST DOCD IN RCRD: CPT

## 2024-12-09 RX ORDER — PAROXETINE 30 MG/1
30 TABLET, FILM COATED ORAL EVERY MORNING
Qty: 30 TABLET | Refills: 1 | Status: SHIPPED | OUTPATIENT
Start: 2024-12-09

## 2024-12-09 NOTE — PROGRESS NOTES
Video Visit      Patient Name: Angie Sutherland  : 1995   MRN: [id number]     Referring Provider: Gabby Fernandez PA-C    Chief Complaint:      ICD-10-CM ICD-9-CM   1. Attention and concentration deficit  R41.840 799.51   2. Generalized anxiety disorder  F41.1 300.02   3. Mild episode of recurrent major depressive disorder  F33.0 296.31        This provider is located at the [facility] (through [facility]), [address] [place] [address] using a secure [employer] Video Visit through [employer]. Patient is being seen remotely via telehealth video visit at their home address in [place], and stated they are in a secure environment for this session. The patient's condition being diagnosed/treated is appropriate for telemedicine. The provider identified herself as well as her credentials. The patient, and/or patients guardian, consent to be seen remotely, and when consent is given they understand that the consent allows for patient identifiable information to be sent to a third party as needed. They may refuse to be seen remotely at any time. The electronic data is encrypted and password protected, and the patient and/or guardian has been advised of the potential risks to privacy not withstanding such measures.    The patient has chosen to receive care today through a telehealth video visit. Do you consent to use a video/audio connection for your medical care today? Yes    Mode of Visit: Video  Location of patient: -HOME-  Location of provider: [facility]  You have chosen to receive care through a telehealth visit.  The patient has signed the video visit consent form.  The visit included audio and video interaction. No technical issues occurred during this visit.      History of Present Illness:   Angie Sutherland is a 29 y.o. female who is being seen by a video visit today for follow up and medication management.     Subjective  "  Patient Reports:     Patient reports feeling a positive difference in mood with medication changes. Patient states \"I'm feeling better, but I was hoping to feel better enough to be productive and I am not\". Patient reports she told her therapist she noticed a difference with decreased irritability and frustrations.     Patient rates anxiety 8/10. Patient reports recent panic attacks. Patient reports her brother passed away since our previous visit.  Patient reports seeing her biological father at the  which caused increased stress. Patient reports outside of the situation everything has been manageable. Patient reports occasional mood swings, anger outburst and lashing out. Patient denies exaggerated sense of well being or self-confidence. Patient denies decreased need for sleep. Patient reports impulsive behaviors related to outbursts. Patient denies risk-taking behaviors.    Patient rates depression 8/10. Patient denies SI/HI/AVH.    Discussed GeneSight results during visit.  Patient reports previously trialing Adderall, Prozac, Zoloft, Wellbutrin and regular in the past.    PHQ-9= 15  MANDIE-7= 15    Review of Systems:   Review of Systems   Psychiatric/Behavioral:  Positive for agitation, decreased concentration, dysphoric mood and sleep disturbance. Negative for behavioral problems, confusion, hallucinations, self-injury and suicidal ideas. The patient is nervous/anxious. The patient is not hyperactive.    All other systems reviewed and are negative.  Sleep pattern: 5 hours per night  Appetite: varies with mood    PHQ-9 Depression Screening  Little interest or pleasure in doing things? Almost all   Feeling down, depressed, or hopeless? Several days   PHQ-2 Total Score 4   Trouble falling or staying asleep, or sleeping too much? Several days   Feeling tired or having little energy? Over half   Poor appetite or overeating? Over half   Feeling bad about yourself - or that you are a failure or have let " yourself or your family down? Over half   Trouble concentrating on things, such as reading the newspaper or watching television? Almost all   Moving or speaking so slowly that other people could have noticed? Or the opposite - being so fidgety or restless that you have been moving around a lot more than usual? Several days   Thoughts that you would be better off dead, or of hurting yourself in some way? Not at all   PHQ-9 Total Score 15   If you checked off any problems, how difficult have these problems made it for you to do your work, take care of things at home, or get along with other people? Very difficult       MANDIE-7 Anxiety Screening  Over the last two weeks, how often have you been bothered by the following problems?  Feeling nervous, anxious or on edge: Nearly every day  Not being able to stop or control worrying: More than half the days  Worrying too much about different things: Nearly every day  Trouble Relaxing: Nearly every day  Being so restless that it is hard to sit still: More than half the days  Becoming easily annoyed or irritable: Several days  Feeling afraid as if something awful might happen: Several days  MANDIE 7 Total Score: 15  If you checked any problems, how difficult have these problems made it for you to do your work, take care of things at home, or get along with other people: Very difficult    RISK ASSESSMENT:  Patient denies any thoughts of suicide or intent today. Patient denies any suicidal or homicidal ideation today. Patient denies any high risk factors today.     Medications:     Current Outpatient Medications:     methylPREDNISolone (MEDROL) 4 MG dose pack, Take as directed on package instructions., Disp: 21 each, Rfl: 0    PARoxetine (Paxil) 30 MG tablet, Take 1 tablet by mouth Every Morning., Disp: 30 tablet, Rfl: 1    Medication Considerations:  PEDRO reviewed and appropriate.      Allergies:   Allergies   Allergen Reactions    Benadryl [Diphenhydramine] Shortness Of Breath  "and Diarrhea    Diphenhydramine Hcl Diarrhea and Shortness Of Breath    Other Hives     Sun allergy breaks out in hives    Latex Unknown - Low Severity and Rash     HAS SOME IRRITATION AFTER CATHETER INSERTION       Objective     Physical Exam:  Vital Signs: There were no vitals filed for this visit.  There is no height or weight on file to calculate BMI.     Mental Status Exam:   Hygiene:   good  Cooperation:  Cooperative  Eye Contact:  Good  Psychomotor Behavior:  Appropriate  Affect:  Full range  Mood: anxious, depressed  Speech:  Normal  Thought Process:  Goal directed and Linear  Thought Content:  Normal  Suicidal:  None  Homicidal:  None  Hallucinations:  None  Delusion:  None  Memory:  Intact  Orientation:  Person, Place, Time, and Situation  Reliability:  good  Insight:  Good  Judgement:  Good  Impulse Control:  Fair  Physical/Medical Issues:  No    Hopelessness: 8/10  Loneliness: 10/10 \"feels misunderstood\"    Assessment / Plan      Visit Diagnosis/Orders Placed This Visit:  Diagnoses and all orders for this visit:    1. Attention and concentration deficit (Primary)    2. Generalized anxiety disorder    3. Mild episode of recurrent major depressive disorder    Other orders  -     PARoxetine (Paxil) 30 MG tablet; Take 1 tablet by mouth Every Morning.  Dispense: 30 tablet; Refill: 1       Functional Status: Mild impairment     Prognosis: Good with Ongoing Treatment     Impression/Formulation:  Patient appeared alert and oriented.  Patient is voluntarily requesting to continue outpatient psychiatric treatment at [facility].  Patient is receptive to assistance with maintaining a stable lifestyle.  Patient presents with history of     ICD-10-CM ICD-9-CM   1. Attention and concentration deficit  R41.840 799.51   2. Generalized anxiety disorder  F41.1 300.02   3. Mild episode of recurrent major depressive disorder  F33.0 296.31   . Reviewed patient's previous provider notes. Reviewed " most recent labs. Patient meets DSM V diagnostic criteria for diagnoses. Diagnoses may be updated as more information becomes available.     Treatment Plan:   Increase Paxil 30mg PO qd  Discussed GeneSight results during visit  Encouraged psychotherapy  Follow up in 6 weeks and as needed     Patient will continue supportive psychotherapy efforts and medications as indicated. Clinic will obtain release of information for current treatment team for continuity of care as needed. Patient will contact this office, call 911 or present to the nearest emergency room should suicidal or homicidal ideations occur. Discussed medication options and treatment plan of prescribed medication(s) as well as the risks, benefits, and potential side effects. Patient ackowledged and verbally consented to continue with current treatment plan and was educated on the importance of compliance with treatment and follow-up appointments.     Patient instructions:    Follow Up:   Return in about 6 weeks (around 1/20/2025).        UYEN Crowder, PMHNP-[facility] [address]

## 2024-12-09 NOTE — PROGRESS NOTES
Follow Up Office Visit      Patient Name: Angie Sutherland  : 1995   MRN: [id number]     Referring Provider: Gabby Fernandez PA-C    Chief Complaint:    No diagnosis found.     History of Present Illness:   Angie Sutherland is a 29 y.o. female who is here today for follow up and medication management.         Subjective      Patient Reports: ***  Patient reports    Patient rates anxiety  Patient panic attacks.  Patient mood swings, anger outburst and lashing out.  Patient exaggerated sense of well being or self-confidence.  Patient decreased need for sleep.  Patient impulsive and risk-taking behaviors.    Patient rates depression  Patient SI/HI/AVH.    PHQ-9=   MANDIE-7=     Review of Systems:   Review of Systems  Sleep pattern: ***  Appetite: ***     PHQ-9 Depression Screening  Little interest or pleasure in doing things?     Feeling down, depressed, or hopeless?     PHQ-2 Total Score     Trouble falling or staying asleep, or sleeping too much?     Feeling tired or having little energy?     Poor appetite or overeating?     Feeling bad about yourself - or that you are a failure or have let yourself or your family down?     Trouble concentrating on things, such as reading the newspaper or watching television?     Moving or speaking so slowly that other people could have noticed? Or the opposite - being so fidgety or restless that you have been moving around a lot more than usual?     Thoughts that you would be better off dead, or of hurting yourself in some way?     PHQ-9 Total Score     If you checked off any problems, how difficult have these problems made it for you to do your work, take care of things at home, or get along with other people?         MANDIE-7 Anxiety Screening       RISK ASSESSMENT:  Patient denies any thoughts or intent of suicide today. Patient denies any impulsive behavior today.     Medications:     Current Outpatient Medications:     methylPREDNISolone (MEDROL) 4 MG  "dose pack, Take as directed on package instructions., Disp: 21 each, Rfl: 0    PARoxetine (PAXIL) 20 MG tablet, Take 1 tablet by mouth Daily., Disp: 30 tablet, Rfl: 2    Medication Considerations:  PEDRO reviewed and appropriate.      Allergies:   Allergies   Allergen Reactions    Benadryl [Diphenhydramine] Shortness Of Breath and Diarrhea    Diphenhydramine Hcl Diarrhea and Shortness Of Breath    Other Hives     Sun allergy breaks out in hives    Latex Unknown - Low Severity and Rash     HAS SOME IRRITATION AFTER CATHETER INSERTION       Results Reviewed:   ***     Objective     Physical Exam:  Vital Signs: There were no vitals filed for this visit.  There is no height or weight on file to calculate BMI.     Mental Status Exam:   MENTAL STATUS EXAM     @RESULASTCBCDIFFPANEL,TSH,LABLIPI,GNGGDPJS22,QBPFXUBV04,MG,FOLATE,PROLACTIN,CRPRESULT,CMP,[id number])@    Lab Results   Component Value Date    GLUCOSE 147 (H) 07/15/2024    BUN 16 07/15/2024    CREATININE 0.85 07/15/2024    EGFR 95.2 07/15/2024    BCR 18.8 07/15/2024    K 4.0 07/15/2024    CO2 23.1 07/15/2024    CALCIUM 9.0 07/15/2024    ALBUMIN 4.3 07/15/2024    BILITOT <0.2 07/15/2024    AST 20 07/15/2024    ALT 16 07/15/2024       Lab Results   Component Value Date    WBC 5.27 07/15/2024    HGB 13.2 07/15/2024    HCT 41.1 07/15/2024    MCV 86.7 07/15/2024     07/15/2024       No results found for: \"CHOL\", \"CHLPL\", \"TRIG\", \"HDL\", \"LDL\"    Assessment / Plan      Visit Diagnosis/Orders Placed This Visit:  There are no diagnoses linked to this encounter.     Functional Status: {rsfunctionalstatus:25097}    Prognosis: {tnprognosis:62540}    Impression/Formulation:  Patient appeared alert and oriented.  Patient is voluntarily requesting to continue outpatient psychiatric treatment at [facility] [address] [facility].  Patient is receptive to assistance with maintaining a stable lifestyle.  Patient presents with history of No diagnosis " found..    Reviewed patient's previous provider notes. Reviewed most recent labs. Patient meets DSM V diagnostic criteria for diagnoses. Diagnoses may be updated as more information becomes available.       Treatment Plan:   Continue with medication regimen  ***  ***Encouraged psychotherapy  Follow-up in and as needed    Patient will continue supportive psychotherapy efforts and medications as indicated. Clinic will obtain release of information for current treatment team for continuity of care as needed. Patient will contact this office, call 911 or present to the nearest emergency room should suicidal or homicidal ideations occur.  Discussed medication options and treatment plan of prescribed medication(s) as well as the risks, benefits, and potential side effects. Patient acknowledged and verbally consented to continue with current treatment plan and was educated on the importance of compliance with treatment and follow-up appointments.     Quality Measures:   {[facility]_Smoking_Cessation:30174}    I advised Angie Sutherland of the risks of tobacco use.     Follow Up:   No follow-ups on file.      UYEN Crowder  [facility] [address] [address]

## 2024-12-11 ENCOUNTER — OFFICE VISIT (OUTPATIENT)
Age: 29
End: 2024-12-11
Payer: MEDICARE

## 2024-12-11 DIAGNOSIS — F33.0 MILD EPISODE OF RECURRENT MAJOR DEPRESSIVE DISORDER: ICD-10-CM

## 2024-12-11 DIAGNOSIS — F41.1 GENERALIZED ANXIETY DISORDER: Primary | ICD-10-CM

## 2024-12-11 NOTE — PROGRESS NOTES
Follow Up Adult Note     Date:2024   Client Name: Angie Sutherland  : 1995   MRN: [id number]   Time IN: 11:12 pm    Time OUT: 11: 50 pm    Referring Provider: Gbaby Fernandez PA-C    Chief Complaint:      ICD-10-CM ICD-9-CM   1. Generalized anxiety disorder  F41.1 300.02   2. Mild episode of recurrent major depressive disorder  F33.0 296.31        History of Present Illness:   Angie Sutherland is a 29 y.o. female who is being seen today for follow up individual psychotherapy counseling for ongoing symptoms of depression and anxiety. Pt reported that mood has been significantly lower since last visit. Pt states that relationship issues and lack of support continue to be primary stressors.       Objective     Medications:     Current Outpatient Medications:     methylPREDNISolone (MEDROL) 4 MG dose pack, Take as directed on package instructions., Disp: 21 each, Rfl: 0    PARoxetine (Paxil) 30 MG tablet, Take 1 tablet by mouth Every Morning., Disp: 30 tablet, Rfl: 1    Allergies:   Allergies   Allergen Reactions    Benadryl [Diphenhydramine] Shortness Of Breath and Diarrhea    Diphenhydramine Hcl Diarrhea and Shortness Of Breath    Other Hives     Sun allergy breaks out in hives    Latex Unknown - Low Severity and Rash     HAS SOME IRRITATION AFTER CATHETER INSERTION       Mental Status Exam:   MENTAL STATUS EXAM   General Appearance:  Cleanly groomed and dressed  Eye Contact:  Good eye contact  Attitude:  Cooperative and polite  Motor Activity:  Normal gait, posture  Speech:  Normal rate, tone, volume  Mood and affect:  Depressed  Thought Process:  Logical, goal-directed and linear  Associations/ Thought Content:  No delusions  Hallucinations:  None  Suicidal Ideations:  Not present  Homicidal Ideation:  Not present  Sensorium:  Alert and clear  Orientation:  Person, place, time and situation  Immediate Recall, Recent, and Remote Memory:  Intact  Attention Span/ Concentration:   Good  Insight:  Good  Judgement:  Good  Reliability:  Good  Impulse Control:  Good       SUICIDE RISK ASSESSMENT/CSSRS:  1. Does client have thoughts of suicide? Patient denies  2. Does client have intent for suicide? Patient denies  3. Does client have a current plan for suicide? Patient denies   4. History of suicide attempts: Patient denies   5. Family history of suicide or attempts: Patient denies   6. History of violent behaviors towards others or property or thoughts of committing suicide: Patient denies   7. History of sexual aggression toward others: Patient denies   8. Access to firearms or weapons: Patient denies     Subjective     PHQ-9 Depression Screening  Little interest or pleasure in doing things? Almost all   Feeling down, depressed, or hopeless? Over half   PHQ-2 Total Score 5   Trouble falling or staying asleep, or sleeping too much? Several days   Feeling tired or having little energy? Over half   Poor appetite or overeating? Several days   Feeling bad about yourself - or that you are a failure or have let yourself or your family down? Over half   Trouble concentrating on things, such as reading the newspaper or watching television? Almost all   Moving or speaking so slowly that other people could have noticed? Or the opposite - being so fidgety or restless that you have been moving around a lot more than usual? Over half   Thoughts that you would be better off dead, or of hurting yourself in some way? Not at all   PHQ-9 Total Score 16   If you checked off any problems, how difficult have these problems made it for you to do your work, take care of things at home, or get along with other people? Very difficult         MANDIE-7  Feeling nervous, anxious or on edge: More than half the days  Not being able to stop or control worrying: More than half the days  Worrying too much about different things: More than half the days  Trouble Relaxing: Nearly every day  Being so restless that it is hard to sit  still: More than half the days  Feeling afraid as if something awful might happen: Several days  Becoming easily annoyed or irritable: More than half the days  MANDIE 7 Total Score: 14  If you checked any problems, how difficult have these problems made it for you to do your work, take care of things at home, or get along with other people: Very difficult      Client's Support Network Includes:   friends    Functional Status: Mild impairment     Progress toward goal: Not at goal    Prognosis: Good with Ongoing Treatment     Assessment / Plan / Progress     Visit Diagnosis/Orders Placed This Visit:    ICD-10-CM ICD-9-CM   1. Generalized anxiety disorder  F41.1 300.02   2. Mild episode of recurrent major depressive disorder  F33.0 296.31          PLAN:  Safety: No acute safety concerns  Risk Assessment: Risk of self-harm acutely is low. Risk of self-harm chronically is also low, but could be further elevated in the event of treatment noncompliance and/or AODA.    Treatment Plan/Goals & Progress: Continue supportive psychotherapy efforts and medications as indicated. Treatment options discussed during today's visit. Client ackowledged and verbally consented to continue with current treatment plan and was educated on the importance of compliance with treatment and follow-up appointments. Client seems reasonably able to adhere to treatment plan.      Assisted client in processing above session content; acknowledged and normalized client’s thoughts, feelings, and concerns.  Rationalized client's thought process regarding awareness of decline of mood and increased depression. Therapist provided supportive, empathetic listening as pt recalled recent events. Pt shared conflicting feelings regarding her visit to brother's  and the lack of acknowledgment from her father. Pt states that return home has been difficult due to increasing awareness that current relationship is not meeting her needs. Pt identified behaviors and  "patterns that are \"red flags\" and expressed feelings of frustration and disappointment. Therapist assisted pt in reflecting upon own personal relationship patterns in effort to challenge potential red flags. Therapist and pt discussed impact of past relationships on current relationship. Therapist affirmed and validated pt's insight and reflection. Therapist and pt discussed self-care strategies and coping skills to assist with alleviating depressive symptoms and increasing self-awareness and self-compassion during this time.       Allowed client to freely discuss issues without interruption or judgement with unconditional positive regard, active listening skills, and empathy. Clinician provided a safe, confidential environment to facilitate the development of a positive therapeutic relationship and encouraged open, honest communication. Assisted client in identifying risk factors which would indicate the need for higher level of care including thoughts to harm self or others and/or self-harming behavior and encouraged client to contact this office, call 911, or present to the nearest emergency room should any of these events occur. Discussed crisis intervention services and means to access. Client adamantly and convincingly denies current suicidal or homicidal ideation or perceptual disturbance. Assisted client in processing session content; acknowledged and normalized client’s thoughts, feelings, and concerns by utilizing a person-centered approach in efforts to build appropriate rapport and a positive therapeutic relationship with open and honest communication.     Quality Measures:     TOBACCO USE:  Tobacco Use: Low Risk  (12/9/2024)    Patient History     Smoking Tobacco Use: Never     Smokeless Tobacco Use: Never     Passive Exposure: Never      Never smoker    I advised Angie of the risks of tobacco use.     Follow Up:   Return in about 2 weeks (around 12/25/2024).    Shadia Duams LCSW  [facility] " [facility]

## 2024-12-13 NOTE — PLAN OF CARE
Goals of care were discussed and updated to continue development of skills and strategies to assist with symptoms of depression, mood instability and ADHD to support her role as single mother.

## 2024-12-13 NOTE — TREATMENT PLAN
Multi-Disciplinary Problems (from Behavioral Health Treatment Plan)      Active Problems       Problem: Depression  Start Date: 12/13/24      Problem Details: The patient self-scales this problem as a 10 with 10 being the worst.          Goal Priority Start Date Expected End Date End Date    Patient will demonstrate the ability to initiate new constructive life skills outside of sessions on a consistent basis. -- 12/13/24 06/13/25 --    Goal Details: Progress toward goal:  The patient self-scales their progress related to this goal as a 8 with 10 being the worst.          Goal Intervention Frequency Start Date End Date    Assist patient in setting attainable activities of daily living goals. Q3 Months 12/13/24 --      Goal Intervention Frequency Start Date End Date    Provide education about depression Q3 Months 12/13/24 --    Intervention Details: Duration of treatment until discharged.          Goal Intervention Frequency Start Date End Date    Assist patient in developing healthy coping strategies. Q3 Months 12/13/24 --    Intervention Details: Duration of treatment until discharged.                  Problem: Mood Instability  Start Date: 12/13/24      Problem Details: The patient self-scales this problem as a 8 with 10 being the worst.          Goal Priority Start Date Expected End Date End Date    Patient will achieve mood stability as evidenced by controlled behavior and a more deliberate thought process -- 12/13/24 06/13/25 --    Goal Details: Progress toward goal:  The patient self-scales their progress related to this goal as a 8 with 10 being the worst.          Goal Intervention Frequency Start Date End Date    Provide structure and focus to patient's thoughts and actions by establishing plans and routine. Q3 Months 12/13/24 --    Intervention Details: Duration of treatment until discharged.          Goal Intervention Frequency Start Date End Date    Assist patient in setting responsible goals and limits  in behavior. Q3 Months 12/13/24 --    Intervention Details: Duration of treatment until discharged.                  Problem: ADHD (Adult)  Start Date: 12/13/24      Problem Details: The patient self-scales this problem as a 6 with 10 being the worst.        Goal Priority Start Date Expected End Date End Date    Patient will sustain attention and concentration to complete chores, and work responsibilites and increase positive interaction in all relationships. -- 12/13/24 06/13/25 --    Goal Details: Progress toward goal:  The patient self-scales their progress related to this goal as a 6 with 10 being the worst.        Goal Intervention Frequency Start Date End Date    Assist patient in setting responsible goals and breaking down large tasks. Q3 Months 12/13/24 --    Intervention Details: Duration of treatment until discharged.        Goal Intervention Frequency Start Date End Date    Assist patient in using self monitoring checklist to improve attention, work performance, and social skills. Q3 Months 12/13/24 --    Intervention Details: Duration of treatment until discharged.                        Reviewed By       Shadia Dumas LCSW 12/13/24 [id number]                     I have discussed and reviewed this treatment plan with the patient.

## 2024-12-18 ENCOUNTER — OFFICE VISIT (OUTPATIENT)
Age: 29
End: 2024-12-18
Payer: MEDICARE

## 2024-12-18 DIAGNOSIS — F41.1 GENERALIZED ANXIETY DISORDER: Primary | ICD-10-CM

## 2024-12-18 DIAGNOSIS — F33.0 MILD EPISODE OF RECURRENT MAJOR DEPRESSIVE DISORDER: ICD-10-CM

## 2024-12-18 DIAGNOSIS — F90.2 ATTENTION DEFICIT HYPERACTIVITY DISORDER (ADHD), COMBINED TYPE: ICD-10-CM

## 2024-12-18 NOTE — PROGRESS NOTES
Follow Up Adult Note     Date:2024   Client Name: Angie Sutherland  : 1995   MRN: [id number]   Time IN: 11:15 am    Time OUT: 11:46 am     Referring Provider: Gabby Fernandez PA-C    Chief Complaint:      ICD-10-CM ICD-9-CM   1. Generalized anxiety disorder  F41.1 300.02   2. Mild episode of recurrent major depressive disorder  F33.0 296.31   3. Attention deficit hyperactivity disorder (ADHD), combined type  F90.2 314.01        History of Present Illness:   Angie Sutherland is a 29 y.o. female who is being seen today for follow up individual psychotherapy counseling for ongoing depression and anxiety. Pt reports that mood has improved since last session. Pt states that she has more energy and is more engaged in life.        Objective     Medications:     Current Outpatient Medications:     methylPREDNISolone (MEDROL) 4 MG dose pack, Take as directed on package instructions., Disp: 21 each, Rfl: 0    PARoxetine (Paxil) 30 MG tablet, Take 1 tablet by mouth Every Morning., Disp: 30 tablet, Rfl: 1    Allergies:   Allergies   Allergen Reactions    Benadryl [Diphenhydramine] Shortness Of Breath and Diarrhea    Diphenhydramine Hcl Diarrhea and Shortness Of Breath    Other Hives     Sun allergy breaks out in hives    Latex Unknown - Low Severity and Rash     HAS SOME IRRITATION AFTER CATHETER INSERTION       Mental Status Exam:   MENTAL STATUS EXAM   General Appearance:  Cleanly groomed and dressed  Eye Contact:  Good eye contact  Attitude:  Cooperative and polite  Motor Activity:  Normal gait, posture  Speech:  Normal rate, tone, volume  Mood and affect:  Normal, pleasant  Thought Process:  Logical, goal-directed and linear  Associations/ Thought Content:  No delusions  Hallucinations:  None  Suicidal Ideations:  Not present  Homicidal Ideation:  Not present  Sensorium:  Alert and clear  Orientation:  Person, place, time and situation  Immediate Recall, Recent, and Remote Memory:   Intact  Attention Span/ Concentration:  Good  Insight:  Good  Judgement:  Good  Reliability:  Good  Impulse Control:  Good       SUICIDE RISK ASSESSMENT/CSSRS:  1. Does client have thoughts of suicide? Patient denies  2. Does client have intent for suicide? Patient denies  3. Does client have a current plan for suicide? Patient denies   4. History of suicide attempts: Patient denies   5. Family history of suicide or attempts: Patient denies   6. History of violent behaviors towards others or property or thoughts of committing suicide: Patient denies   7. History of sexual aggression toward others: Patient denies   8. Access to firearms or weapons: Patient denies     Subjective     PHQ-9 Depression Screening  Little interest or pleasure in doing things? Over half   Feeling down, depressed, or hopeless? Almost all   PHQ-2 Total Score 5   Trouble falling or staying asleep, or sleeping too much? Over half   Feeling tired or having little energy? Over half   Poor appetite or overeating? Over half   Feeling bad about yourself - or that you are a failure or have let yourself or your family down? Almost all   Trouble concentrating on things, such as reading the newspaper or watching television? Almost all   Moving or speaking so slowly that other people could have noticed? Or the opposite - being so fidgety or restless that you have been moving around a lot more than usual? Over half   Thoughts that you would be better off dead, or of hurting yourself in some way? Not at all   PHQ-9 Total Score 19   If you checked off any problems, how difficult have these problems made it for you to do your work, take care of things at home, or get along with other people? Very difficult         MANDIE-7  Feeling nervous, anxious or on edge: Nearly every day  Not being able to stop or control worrying: Nearly every day  Worrying too much about different things: More than half the days  Trouble Relaxing: Nearly every day  Being so restless  "that it is hard to sit still: More than half the days  Feeling afraid as if something awful might happen: Several days  Becoming easily annoyed or irritable: More than half the days  MANDIE 7 Total Score: 16  If you checked any problems, how difficult have these problems made it for you to do your work, take care of things at home, or get along with other people: Very difficult      Client's Support Network Includes:  extended family    Functional Status: Mild impairment     Progress toward goal: Not at goal    Prognosis: Good with Ongoing Treatment     Assessment / Plan / Progress     Visit Diagnosis/Orders Placed This Visit:    ICD-10-CM ICD-9-CM   1. Generalized anxiety disorder  F41.1 300.02   2. Mild episode of recurrent major depressive disorder  F33.0 296.31   3. Attention deficit hyperactivity disorder (ADHD), combined type  F90.2 314.01          PLAN:  Safety: No acute safety concerns  Risk Assessment: Risk of self-harm acutely is low. Risk of self-harm chronically is also low, but could be further elevated in the event of treatment noncompliance and/or AODA.    Treatment Plan/Goals & Progress: Continue supportive psychotherapy efforts and medications as indicated. Treatment options discussed during today's visit. Client ackowledged and verbally consented to continue with current treatment plan and was educated on the importance of compliance with treatment and follow-up appointments. Client seems reasonably able to adhere to treatment plan.      Assisted client in processing above session content; acknowledged and normalized client’s thoughts, feelings, and concerns.  Rationalized client's thought process regarding awareness of improved symptoms and ability to manage recent stressors. Therapist provided supportive, empathetic listening as pt recalled recent events associated with relationship and her decision to communicate her needs. Pt acknowledged that she felt that relationship was \"problematic\" and that " red flags continued to be evidence that their future was limited. Pt expressed initial feelings of grief associated with loss of relationship but states that now she is in a period of acceptance of situation. Therapist validated and affirmed pt's emotions and willingness to engage in uncomfortable situation to express needs and determine if they could be met. Therapist and pt discussed ongoing impact of past relationship experiences on current experiences and importance of recognizing own patterns of behavior within relationships.       Allowed client to freely discuss issues without interruption or judgement with unconditional positive regard, active listening skills, and empathy. Clinician provided a safe, confidential environment to facilitate the development of a positive therapeutic relationship and encouraged open, honest communication. Assisted client in identifying risk factors which would indicate the need for higher level of care including thoughts to harm self or others and/or self-harming behavior and encouraged client to contact this office, call 911, or present to the nearest emergency room should any of these events occur. Discussed crisis intervention services and means to access. Client adamantly and convincingly denies current suicidal or homicidal ideation or perceptual disturbance. Assisted client in processing session content; acknowledged and normalized client’s thoughts, feelings, and concerns by utilizing a person-centered approach in efforts to build appropriate rapport and a positive therapeutic relationship with open and honest communication.     Quality Measures:     TOBACCO USE:  Tobacco Use: Low Risk  (12/9/2024)    Patient History     Smoking Tobacco Use: Never     Smokeless Tobacco Use: Never     Passive Exposure: Never      Never smoker    I advised Angie of the risks of tobacco use.     Follow Up:   Return in about 2 weeks (around 1/1/2025).    Shadia Dumas [facility]  [facility]

## 2025-01-07 ENCOUNTER — TELEMEDICINE (OUTPATIENT)
Age: 30
End: 2025-01-07
Payer: MEDICARE

## 2025-01-07 DIAGNOSIS — F33.0 MILD EPISODE OF RECURRENT MAJOR DEPRESSIVE DISORDER: ICD-10-CM

## 2025-01-07 DIAGNOSIS — F41.1 GENERALIZED ANXIETY DISORDER: Primary | ICD-10-CM

## 2025-01-07 NOTE — PROGRESS NOTES
Telehealth  (Video) Visit      Patient Name: Angie Sutherland  : 1995   MRN: [id number]   Time In: 11:05 am      Time Out: 11:46 am     Referring Physician: Gabby Fernandez PA-C    Chief Complaint:      ICD-10-CM ICD-9-CM   1. Generalized anxiety disorder  F41.1 300.02   2. Mild episode of recurrent major depressive disorder  F33.0 296.31        This provider is located at home address on file. This visit is being done on behalf of [facility] [address], [address], [address], [place]. [address], using a [employer] platform for a video visit in a secure and private environment. The Patient is seen remotely at their home address in [place], using a private computer, phone or tablet.  The patient is able to be seen through a [employer] Video Visit through [employer] at today's encounter. Patient is being evaluated/treated via telehealth by Zoom, and stated they are in a secure environment for this session. The patient's condition being diagnosed/treated is appropriate for telemedicine. The provider identified herself as well as her credentials.   The patient, and/or patient's guardian, consent to being seen remotely, and when consent is given they understand that the consent allows for patient identifiable information to be sent to a third party as needed.   They may refuse to be seen remotely at any time. The electronic data is encrypted and password protected, and the patient and/or guardian has been advised of the potential risks to privacy not withstanding such measures.    You have chosen to receive care through a video visit today. Do you consent to use a video visit for your medical care today? yes  This visit has been scheduled as a video visit to comply with patient safety concerns in accordance with CDC recommendations.    Mode of Visit: Video  Location of patient: -HOME-  Location of provider: +[facility]  You have chosen to receive care through a telehealth visit.  The  patient has signed the video visit consent form.  The visit included audio and video interaction. No technical issues occurred during this visit.      History of Present Illness: Angie Sutherland is a 29 y.o. female who I saw today thru a video visit for ongoing anxiety and depression. Pt reports that mood has continued to decline since last visit. Pt attributes low mood and feelings of being down/depressed to multiple complex relationship issues and impact on feelings of self-worth and esteem.      Objective      PHQ-9 Depression Screening  Little interest or pleasure in doing things? (Patient-Rptd) Over half   Feeling down, depressed, or hopeless? (Patient-Rptd) Over half   PHQ-2 Total Score (Patient-Rptd) 4   Trouble falling or staying asleep, or sleeping too much? (Patient-Rptd) Several days   Feeling tired or having little energy? (Patient-Rptd) Several days   Poor appetite or overeating? (Patient-Rptd) Several days   Feeling bad about yourself - or that you are a failure or have let yourself or your family down? (Patient-Rptd) Over half   Trouble concentrating on things, such as reading the newspaper or watching television? (Patient-Rptd) Almost all   Moving or speaking so slowly that other people could have noticed? Or the opposite - being so fidgety or restless that you have been moving around a lot more than usual? (Patient-Rptd) Over half   Thoughts that you would be better off dead, or of hurting yourself in some way? (Patient-Rptd) Not at all   PHQ-9 Total Score (Patient-Rptd) 14   If you checked off any problems, how difficult have these problems made it for you to do your work, take care of things at home, or get along with other people? (Patient-Rptd) Somewhat difficult      Patient screened positive for depression based on a PHQ-9 score of 14 on 1/7/2025. Follow-up recommendations include:  continue therapy sessions .       MANDIE-7  Feeling nervous, anxious or on edge: (Patient-Rptd) Several  days  Not being able to stop or control worrying: (Patient-Rptd) Several days  Worrying too much about different things: (Patient-Rptd) More than half the days  Trouble Relaxing: (Patient-Rptd) Nearly every day  Being so restless that it is hard to sit still: (Patient-Rptd) Several days  Feeling afraid as if something awful might happen: (Patient-Rptd) Several days  Becoming easily annoyed or irritable: (Patient-Rptd) Several days  MANDIE 7 Total Score: (Patient-Rptd) 10  If you checked any problems, how difficult have these problems made it for you to do your work, take care of things at home, or get along with other people: (Patient-Rptd) Not difficult at all        Medications:     Current Outpatient Medications:     methylPREDNISolone (MEDROL) 4 MG dose pack, Take as directed on package instructions., Disp: 21 each, Rfl: 0    PARoxetine (Paxil) 30 MG tablet, Take 1 tablet by mouth Every Morning., Disp: 30 tablet, Rfl: 1      Subjective       Mental Status Exam:   MENTAL STATUS EXAM   General Appearance:  Cleanly groomed and dressed  Eye Contact:  Good eye contact  Attitude:  Cooperative and polite  Motor Activity:  Normal gait, posture  Speech:  Normal rate, tone, volume  Mood and affect:  Normal, pleasant  Thought Process:  Logical, goal-directed and linear  Associations/ Thought Content:  No delusions  Hallucinations:  None  Suicidal Ideations:  Not present  Homicidal Ideation:  Not present  Sensorium:  Alert and clear  Orientation:  Person, place, time and situation  Immediate Recall, Recent, and Remote Memory:  Intact  Attention Span/ Concentration:  Good  Insight:  Good  Judgement:  Good  Reliability:  Good  Impulse Control:  Good      Assessment / Plan      Visit Diagnosis/Orders Placed This Visit:    ICD-10-CM ICD-9-CM   1. Generalized anxiety disorder  F41.1 300.02   2. Mild episode of recurrent major depressive disorder  F33.0 296.31          PLAN:  Safety: No acute safety concerns  Risk Assessment: Risk  "of self-harm acutely is low. Risk of self-harm chronically is also low, but could be further elevated in the event of treatment noncompliance and/or AODA.    TREATMENT PLAN/GOALS: Continue supportive psychotherapy efforts and medications as indicated. Treatment options discussed during today's visit. Patient ackowledged and verbally consented to continue with current treatment plan and was educated on the importance of compliance with treatment and follow-up appointments. Patient seems reasonably able to adhere to treatment plan.      Therapist provided supportive, empathetic listening as pt reflected upon recent events and situations with current relationships. Pt acknowledged feeling low and depressed associated with impact of negative relationships. Pt continues to express desire for relationship where she valued and \"feel special\". Pt expressed frustration with feeling that her efforts in relationships are often not reciprocated. Therapist assisted pt in reflecting upon relationship and behavioral patterns. Therapist and pt engaged in discussion regarding value of defining specific relationships to assist with managing expectations with behaviors. Therapist and pt discussed tendencies of co-dependent relationships and importance of engaging in activities and reflection exercises to assist with regaining control of emotions and providing self-compassion and validation. Therapist encouraged pt to continue to be mindful of relationship expectations and willingness to balance consequences of relationships with their level of support in areas of her life.     Allowed Patient to freely discuss issues  without interruption or judgement with unconditional positive regard, active listening skills, and empathy. Therapist provided a safe, confidential environment to facilitate the development of a positive therapeutic relationship and encouraged open, honest communication. Assisted Patient in identifying risk factors which " would indicate the need for higher level of care including thoughts to harm self or others and/or self-harming behavior and encouraged Patient to contact this office, call 911, or present to the nearest emergency room should any of these events occur. Discussed crisis intervention services and means to access. Patient adamantly and convincingly denies current suicidal or homicidal ideation or perceptual disturbance. Assisted Patient in processing session content; acknowledged and normalized Patient’s thoughts, feelings, and concerns by utilizing a person-centered approach in efforts to build appropriate rapport and a positive therapeutic relationship with open and honest communication.     Quality Measures:     TOBACCO USE:  Tobacco Use: Low Risk  (12/9/2024)    Patient History     Smoking Tobacco Use: Never     Smokeless Tobacco Use: Never     Passive Exposure: Never      Never smoker    I advised Angie of the risks of tobacco use    Follow Up:   Return in about 1 week (around 1/14/2025).      Shadia Dumas LCSW  [facility] [address]

## 2025-01-15 ENCOUNTER — OFFICE VISIT (OUTPATIENT)
Age: 30
End: 2025-01-15
Payer: MEDICARE

## 2025-01-15 DIAGNOSIS — F33.0 MILD EPISODE OF RECURRENT MAJOR DEPRESSIVE DISORDER: ICD-10-CM

## 2025-01-15 DIAGNOSIS — F41.1 GENERALIZED ANXIETY DISORDER: Primary | ICD-10-CM

## 2025-01-15 NOTE — PROGRESS NOTES
Follow Up Adult Note     Date:01/15/2025   Client Name: Angie Sutherland  : 1995   MRN: [id number]   Time IN: 11:00 am    Time OUT: 11:43 am     Referring Provider: Gabby Fernandez PA-C    Chief Complaint:      ICD-10-CM ICD-9-CM   1. Generalized anxiety disorder  F41.1 300.02   2. Mild episode of recurrent major depressive disorder  F33.0 296.31        History of Present Illness:   Angie Sutherland is a 29 y.o. female who is being seen today for follow up individual psychotherapy counseling for ongoing symptoms of depression and anxiety. Pt reports that mood has improved since last visit. Pt acknowledged that ability to reflect and disconnect from certain stressors has allowed self to manage and regulate emotions more effectively.         Objective     Medications:     Current Outpatient Medications:     methylPREDNISolone (MEDROL) 4 MG dose pack, Take as directed on package instructions., Disp: 21 each, Rfl: 0    PARoxetine (Paxil) 30 MG tablet, Take 1 tablet by mouth Every Morning., Disp: 30 tablet, Rfl: 1    Allergies:   Allergies   Allergen Reactions    Benadryl [Diphenhydramine] Shortness Of Breath and Diarrhea    Diphenhydramine Hcl Diarrhea and Shortness Of Breath    Other Hives     Sun allergy breaks out in hives    Latex Unknown - Low Severity and Rash     HAS SOME IRRITATION AFTER CATHETER INSERTION       Mental Status Exam:   MENTAL STATUS EXAM   General Appearance:  Cleanly groomed and dressed  Eye Contact:  Good eye contact  Attitude:  Cooperative and polite  Motor Activity:  Normal gait, posture  Speech:  Normal rate, tone, volume  Mood and affect:  Normal, pleasant  Thought Process:  Logical, goal-directed and linear  Associations/ Thought Content:  No delusions  Hallucinations:  None  Suicidal Ideations:  Not present  Homicidal Ideation:  Not present  Sensorium:  Alert and clear  Orientation:  Person, place, time and situation  Immediate Recall, Recent, and Remote  Memory:  Intact  Attention Span/ Concentration:  Good  Insight:  Good  Judgement:  Good  Reliability:  Good  Impulse Control:  Good       SUICIDE RISK ASSESSMENT/CSSRS:  1. Does client have thoughts of suicide? Patient denies  2. Does client have intent for suicide? Patient denies  3. Does client have a current plan for suicide? Patient denies   4. History of suicide attempts: Patient denies   5. Family history of suicide or attempts: Patient denies   6. History of violent behaviors towards others or property or thoughts of committing suicide: Patient denies   7. History of sexual aggression toward others: Patient denies   8. Access to firearms or weapons: Patient denies     Subjective     PHQ-9 Depression Screening  Little interest or pleasure in doing things? Over half   Feeling down, depressed, or hopeless? Over half   PHQ-2 Total Score 4   Trouble falling or staying asleep, or sleeping too much? Several days   Feeling tired or having little energy? Over half   Poor appetite or overeating? Several days   Feeling bad about yourself - or that you are a failure or have let yourself or your family down? Over half   Trouble concentrating on things, such as reading the newspaper or watching television? Almost all   Moving or speaking so slowly that other people could have noticed? Or the opposite - being so fidgety or restless that you have been moving around a lot more than usual? Several days   Thoughts that you would be better off dead, or of hurting yourself in some way? Not at all   PHQ-9 Total Score 14   If you checked off any problems, how difficult have these problems made it for you to do your work, take care of things at home, or get along with other people? Very difficult      Patient screened positive for depression based on a PHQ-9 score of 14 on 1/15/2025. Follow-up recommendations include: Prescribed antidepressant medication treatment and continue therapy sessions .        MANDIE-7  Feeling nervous,  anxious or on edge: More than half the days  Not being able to stop or control worrying: More than half the days  Worrying too much about different things: More than half the days  Trouble Relaxing: Nearly every day  Being so restless that it is hard to sit still: Nearly every day  Feeling afraid as if something awful might happen: Several days  Becoming easily annoyed or irritable: Several days  MANDIE 7 Total Score: 14  If you checked any problems, how difficult have these problems made it for you to do your work, take care of things at home, or get along with other people: Very difficult        Client's Support Network Includes:   friends    Functional Status: Mild impairment     Progress toward goal: Not at goal    Prognosis: Good with Ongoing Treatment     Assessment / Plan / Progress     Visit Diagnosis/Orders Placed This Visit:    ICD-10-CM ICD-9-CM   1. Generalized anxiety disorder  F41.1 300.02   2. Mild episode of recurrent major depressive disorder  F33.0 296.31          PLAN:  Safety: No acute safety concerns  Risk Assessment: Risk of self-harm acutely is low. Risk of self-harm chronically is also low, but could be further elevated in the event of treatment noncompliance and/or AODA.    Treatment Plan/Goals & Progress: Continue supportive psychotherapy efforts and medications as indicated. Treatment options discussed during today's visit. Client ackowledged and verbally consented to continue with current treatment plan and was educated on the importance of compliance with treatment and follow-up appointments. Client seems reasonably able to adhere to treatment plan.      Assisted client in processing above session content; acknowledged and normalized client’s thoughts, feelings, and concerns.  Rationalized client's thought process regarding awareness of improved symptoms and ability to give self space to reflect and process situations and emotions. Theraist provided supportive, empathetic listening as pt  recalled recent events and ability to reflect upon patterns of behaviors that impact mood and thought processing. Therapist assisted pt in identifying patterns in pt's romantic relationships and their impact on mental health; CBT. Therapist assisted pt in reflecting upon ways to clarify expectations of and further defining relationships to avoid potential miscommunication or interpretations of mixed signals; Interpersonal Therapy. Therapist and pt discussed ways to set and maintain health boundaries with others in effort to protect from potential sabotaging or challenging behaviors. Therapist and pt reflected on growth towards therapy goals over past year and acknowledged pt's development of a social community; PCT.      Allowed client to freely discuss issues without interruption or judgement with unconditional positive regard, active listening skills, and empathy. Clinician provided a safe, confidential environment to facilitate the development of a positive therapeutic relationship and encouraged open, honest communication. Assisted client in identifying risk factors which would indicate the need for higher level of care including thoughts to harm self or others and/or self-harming behavior and encouraged client to contact this office, call 911, or present to the nearest emergency room should any of these events occur. Discussed crisis intervention services and means to access. Client adamantly and convincingly denies current suicidal or homicidal ideation or perceptual disturbance. Assisted client in processing session content; acknowledged and normalized client’s thoughts, feelings, and concerns by utilizing a person-centered approach in efforts to build appropriate rapport and a positive therapeutic relationship with open and honest communication.     Quality Measures:     TOBACCO USE:  Tobacco Use: Low Risk  (12/9/2024)    Patient History     Smoking Tobacco Use: Never     Smokeless Tobacco Use: Never      Passive Exposure: Never      Never smoker    I advised Angie of the risks of tobacco use.     Follow Up:   Return in about 1 week (around 1/22/2025).    Shadia Dumas LCSW  [facility]

## 2025-01-20 ENCOUNTER — OFFICE VISIT (OUTPATIENT)
Age: 30
End: 2025-01-20
Payer: MEDICARE

## 2025-01-20 VITALS
HEART RATE: 94 BPM | WEIGHT: 141 LBS | SYSTOLIC BLOOD PRESSURE: 112 MMHG | DIASTOLIC BLOOD PRESSURE: 64 MMHG | OXYGEN SATURATION: 98 % | BODY MASS INDEX: 26.62 KG/M2 | HEIGHT: 61 IN

## 2025-01-20 DIAGNOSIS — F33.0 MILD EPISODE OF RECURRENT MAJOR DEPRESSIVE DISORDER: ICD-10-CM

## 2025-01-20 DIAGNOSIS — F41.1 GENERALIZED ANXIETY DISORDER: Primary | ICD-10-CM

## 2025-01-20 DIAGNOSIS — R41.840 ATTENTION AND CONCENTRATION DEFICIT: ICD-10-CM

## 2025-01-20 PROCEDURE — 99214 OFFICE O/P EST MOD 30 MIN: CPT

## 2025-01-20 PROCEDURE — 1159F MED LIST DOCD IN RCRD: CPT

## 2025-01-20 PROCEDURE — 1160F RVW MEDS BY RX/DR IN RCRD: CPT

## 2025-01-20 PROCEDURE — 96127 BRIEF EMOTIONAL/BEHAV ASSMT: CPT

## 2025-01-20 RX ORDER — PAROXETINE 30 MG/1
30 TABLET, FILM COATED ORAL EVERY MORNING
Qty: 30 TABLET | Refills: 1 | Status: SHIPPED | OUTPATIENT
Start: 2025-01-20

## 2025-01-20 NOTE — PROGRESS NOTES
Follow Up Office Visit      Patient Name: Angie Sutherland  : 1995   MRN: [id number]     Referring Provider: Gabby Fernandez PA-C    Chief Complaint:      ICD-10-CM ICD-9-CM   1. Generalized anxiety disorder  F41.1 300.02   2. Mild episode of recurrent major depressive disorder  F33.0 296.31   3. Attention and concentration deficit  R41.840 799.51        History of Present Illness:   Angie Sutherland is a 29 y.o. female who is here today for follow up and medication management for anxiety, depression and difficulty with attention and concentration .  History of Present Illness      MEDICATIONS  Paxil    Subjective      Patient Reports:   Patient reports significant improvement in her condition following the increase in her Paxil dosage during the last visit. She perceives her emotional responses as appropriate to her circumstances, without any exaggeration. She has not experienced any panic attacks or heightened anger. Her sleep pattern involves early bedtime, intermittent awakenings at night, and resumption of sleep until morning. She attributes some gastrointestinal discomfort, including nausea, to her medication, which she occasionally takes with food. Despite these side effects, she maintains a regular eating pattern. She does not endorse any suicidal ideation, self-harm tendencies, or hallucinations. She rates her anxiety levels at 4 or 5 on a scale of 10. She experienced a depressive episode around the holiday season but has not had any since . She expresses frustration with her concentration and loneliness, but does not report any significant depressive symptoms. She reports no changes in her physical health.    She continues to struggle with ADHD symptoms, which she finds disruptive to her daily life and task management. This ongoing issue contributes to her feelings of hopelessness and self-frustration.    Martha CPT3 scores and response pattern indicates that she  may have issues related to inattentiveness (some indication) and impulsivity (some indication).    PHQ-9= 10 decreased score from previous visit  MANDIE-7= 7 decreased score from previous visit    Review of Systems:   Review of Systems   Constitutional:  Negative for appetite change.   Eyes:  Negative for visual disturbance.   Respiratory:  Negative for chest tightness and shortness of breath.    Cardiovascular:  Negative for chest pain and palpitations.   Gastrointestinal:  Negative for abdominal pain.   Musculoskeletal:  Negative for back pain.   Neurological:  Negative for dizziness, weakness and numbness.   Psychiatric/Behavioral:  Positive for decreased concentration. Negative for agitation, dysphoric mood, hallucinations, sleep disturbance and suicidal ideas. The patient is not nervous/anxious.    Sleep pattern: 6-8 hours per night  Appetite: normal varies    PHQ-9 Depression Screening  Little interest or pleasure in doing things? Several days   Feeling down, depressed, or hopeless? Several days   PHQ-2 Total Score 2   Trouble falling or staying asleep, or sleeping too much? Not at all   Feeling tired or having little energy? Several days   Poor appetite or overeating? Several days   Feeling bad about yourself - or that you are a failure or have let yourself or your family down? Several days   Trouble concentrating on things, such as reading the newspaper or watching television? Almost all   Moving or speaking so slowly that other people could have noticed? Or the opposite - being so fidgety or restless that you have been moving around a lot more than usual? Over half   Thoughts that you would be better off dead, or of hurting yourself in some way? Not at all   PHQ-9 Total Score 10   If you checked off any problems, how difficult have these problems made it for you to do your work, take care of things at home, or get along with other people? Somewhat difficult       MANDIE-7 Anxiety Screening  Over the last two  "weeks, how often have you been bothered by the following problems?  Feeling nervous, anxious or on edge: Several days  Not being able to stop or control worrying: Several days  Worrying too much about different things: Several days  Trouble Relaxing: Several days  Being so restless that it is hard to sit still: More than half the days  Becoming easily annoyed or irritable: Not at all  Feeling afraid as if something awful might happen: Several days  MANDIE 7 Total Score: 7  If you checked any problems, how difficult have these problems made it for you to do your work, take care of things at home, or get along with other people: Somewhat difficult    RISK ASSESSMENT:  Patient denies any thoughts or intent of suicide today. Patient denies any impulsive behavior today.     Medications:     Current Outpatient Medications:     PARoxetine (Paxil) 30 MG tablet, Take 1 tablet by mouth Every Morning., Disp: 30 tablet, Rfl: 1    methylPREDNISolone (MEDROL) 4 MG dose pack, Take as directed on package instructions., Disp: 21 each, Rfl: 0    Medication Considerations:  PEDRO reviewed and appropriate.      Allergies:   Allergies   Allergen Reactions    Benadryl [Diphenhydramine] Shortness Of Breath and Diarrhea    Diphenhydramine Hcl Diarrhea and Shortness Of Breath    Other Hives     Sun allergy breaks out in hives    Latex Unknown - Low Severity and Rash     HAS SOME IRRITATION AFTER CATHETER INSERTION       Results Reviewed:   Yes     Objective     Physical Exam:  Vital Signs:   Vitals:    01/20/25 1005   BP: 112/64   Pulse: 94   SpO2: 98%   Weight: 64 kg (141 lb)   Height: 154.9 cm (60.98\")     Body mass index is 26.66 kg/m².     Mental Status Exam:   MENTAL STATUS EXAM   General Appearance:  Cleanly groomed and dressed and well developed  Eye Contact:  Good eye contact  Attitude:  Cooperative  Motor Activity:  Normal gait, posture  Muscle Strength:  Normal  Speech:  Normal rate, tone, volume  Language:  Spontaneous  Mood and " "affect:  Normal, pleasant  Hopelessness:  2  Loneliness: 2  Thought Process:  Logical  Associations/ Thought Content:  No delusions  Hallucinations:  None  Suicidal Ideations:  Not present  Homicidal Ideation:  Not present  Sensorium:  Alert and clear  Orientation:  Person, place, time and situation  Immediate Recall, Recent, and Remote Memory:  Intact  Attention Span/ Concentration:  Good  Fund of Knowledge:  Appropriate for age and educational level  Intellectual Functioning:  Average range  Insight:  Good  Judgement:  Good  Reliability:  Good  Impulse Control:  Fair       @RESULASTCBCDIFFPANEL,TSH,LABLIPI,LUHMLEWC60,DUEQVASV71,MG,FOLATE,PROLACTIN,CRPRESULT,CMP,[id number])@    Lab Results   Component Value Date    GLUCOSE 147 (H) 07/15/2024    BUN 16 07/15/2024    CREATININE 0.85 07/15/2024    EGFR 95.2 07/15/2024    BCR 18.8 07/15/2024    K 4.0 07/15/2024    CO2 23.1 07/15/2024    CALCIUM 9.0 07/15/2024    ALBUMIN 4.3 07/15/2024    BILITOT <0.2 07/15/2024    AST 20 07/15/2024    ALT 16 07/15/2024       Lab Results   Component Value Date    WBC 5.27 07/15/2024    HGB 13.2 07/15/2024    HCT 41.1 07/15/2024    MCV 86.7 07/15/2024     07/15/2024       No results found for: \"CHOL\", \"CHLPL\", \"TRIG\", \"HDL\", \"LDL\"    Assessment / Plan      Visit Diagnosis/Orders Placed This Visit:  Diagnoses and all orders for this visit:    1. Generalized anxiety disorder (Primary)    2. Mild episode of recurrent major depressive disorder    3. Attention and concentration deficit  -     Ambulatory Referral to Behavioral Health    Other orders  -     PARoxetine (Paxil) 30 MG tablet; Take 1 tablet by mouth Every Morning.  Dispense: 30 tablet; Refill: 1       Assessment & Plan  1. Anxiety.  The patient reports a significant improvement in anxiety symptoms since the increase in Paxil dosage. She rates her current anxiety levels at 4-5/10, a notable decrease from previous levels. She is not experiencing panic attacks or heightened " anger. She will continue with the current Paxil regimen.    2. Depression.  The patient reports feeling better in terms of depression, with only one depressive episode around the holidays and none since then. She rates her depressive symptoms at 1-2/10. She is not experiencing suicidal ideation, self-harm, or hallucinations. She will continue with the current Paxil regimen.    3. Attention Deficit Hyperactivity Disorder (ADHD).  The patient continues to experience difficulties with concentration and daily tasks despite improvements in anxiety and depression. A Martha' test will be administered today to assess ADHD tendencies.      Functional Status: Mild impairment     Prognosis: Good with Ongoing Treatment     Impression/Formulation:  Patient appeared alert and oriented.  Patient is voluntarily requesting to continue outpatient psychiatric treatment at [facility] [address] [facility].  Patient is receptive to assistance with maintaining a stable lifestyle.  Patient presents with history of     ICD-10-CM ICD-9-CM   1. Generalized anxiety disorder  F41.1 300.02   2. Mild episode of recurrent major depressive disorder  F33.0 296.31   3. Attention and concentration deficit  R41.840 799.51   .    Reviewed patient's previous provider notes. Reviewed most recent labs. Patient meets DSM V diagnostic criteria for diagnoses. Diagnoses may be updated as more information becomes available.       Treatment Plan:   Continue paxil 30mg PO qd. Refilled prescription   Completed Martha CPT3  Referral for ADHD testing made  Encouraged to pursue psychotherapy  Follow-up in 2 months and as needed    Patient will continue supportive psychotherapy efforts and medications as indicated. Clinic will obtain release of information for current treatment team for continuity of care as needed. Patient will contact this office, call 911 or present to the nearest emergency room should suicidal or homicidal ideations occur.   Discussed medication options and treatment plan of prescribed medication(s) as well as the risks, benefits, and potential side effects. Patient acknowledged and verbally consented to continue with current treatment plan and was educated on the importance of compliance with treatment and follow-up appointments.     Medication risks and side effects discussed with patient including risk for worsening mood, changes in behavior, thoughts of suicide or homicide, induction of jennie, serotonin syndrome, rare hyponatremia, rare SIADH, withdrawal symptoms if abrupt withdrawal, sexual dysfunction.   If any thoughts of SI or HI, worsening mood or changes in behavior, call 911 or crisis line [phone], or go to nearest ER at once. Patient agrees to notify close family/friend of new trial of antidepressants/info above. Pt.verbalizes understanding and consents to treatment with this medication.     Quality Measures:   Former smoker    I advised Angie Sutherland of the risks of tobacco use.     Follow Up:   Return in about 2 months (around 3/20/2025).      Patient or patient representative verbalized consent for the use of Ambient Listening during the visit with  UYEN Crowder for chart documentation. 1/20/2025  10:53 EST    UYEN Crowder  [facility] [address]

## 2025-01-22 ENCOUNTER — OFFICE VISIT (OUTPATIENT)
Age: 30
End: 2025-01-22
Payer: MEDICARE

## 2025-01-22 DIAGNOSIS — F90.2 ATTENTION DEFICIT HYPERACTIVITY DISORDER (ADHD), COMBINED TYPE: ICD-10-CM

## 2025-01-22 DIAGNOSIS — F33.0 MILD EPISODE OF RECURRENT MAJOR DEPRESSIVE DISORDER: ICD-10-CM

## 2025-01-22 DIAGNOSIS — F41.1 GENERALIZED ANXIETY DISORDER: Primary | ICD-10-CM

## 2025-01-22 NOTE — PROGRESS NOTES
Follow Up Adult Note     Date:2025   Client Name: Angie Sutherland  : 1995   MRN: [id number]   Time IN: 10:04 am    Time OUT: 10:42 am     Referring Provider: Gabby Fernandez PA-C    Chief Complaint:      ICD-10-CM ICD-9-CM   1. Generalized anxiety disorder  F41.1 300.02   2. Mild episode of recurrent major depressive disorder  F33.0 296.31   3. Attention deficit hyperactivity disorder (ADHD), combined type  F90.2 314.01        History of Present Illness:   Angie Sutherland is a 29 y.o. female who is being seen today for follow up individual psychotherapy counseling for ongoing depression, anxiety and symptoms of mood disorder. Pt reports that mood is down today due to feelings of frustration and hopelessness regarding recent visit to discuss treatment for ADHD. Pt expressed frustration that she has been furthered referred for testing and continues to feel that medication would be beneficial to improve focus and ability to complete tasks as desired. Pt states that she continues to feel stress by ongoing complicated relationships and is in the process of addressing these stressors.       Objective     Medications:     Current Outpatient Medications:     methylPREDNISolone (MEDROL) 4 MG dose pack, Take as directed on package instructions., Disp: 21 each, Rfl: 0    PARoxetine (Paxil) 30 MG tablet, Take 1 tablet by mouth Every Morning., Disp: 30 tablet, Rfl: 1    Allergies:   Allergies   Allergen Reactions    Benadryl [Diphenhydramine] Shortness Of Breath and Diarrhea    Diphenhydramine Hcl Diarrhea and Shortness Of Breath    Other Hives     Sun allergy breaks out in hives    Latex Unknown - Low Severity and Rash     HAS SOME IRRITATION AFTER CATHETER INSERTION       Mental Status Exam:   MENTAL STATUS EXAM   General Appearance:  Cleanly groomed and dressed  Eye Contact:  Good eye contact  Attitude:  Cooperative and polite  Motor Activity:  Normal gait, posture  Speech:  Normal  rate, tone, volume  Mood and affect:  Normal, pleasant  Thought Process:  Logical, goal-directed and linear  Associations/ Thought Content:  No delusions  Hallucinations:  None  Suicidal Ideations:  Not present  Homicidal Ideation:  Not present  Sensorium:  Alert and clear  Orientation:  Person, place, time and situation  Immediate Recall, Recent, and Remote Memory:  Intact  Attention Span/ Concentration:  Good  Insight:  Good  Judgement:  Good  Reliability:  Good  Impulse Control:  Good       SUICIDE RISK ASSESSMENT/CSSRS:  1. Does client have thoughts of suicide? Patient denies  2. Does client have intent for suicide? Patient denies  3. Does client have a current plan for suicide? Patient denies   4. History of suicide attempts: Patient denies   5. Family history of suicide or attempts: Patient denies   6. History of violent behaviors towards others or property or thoughts of committing suicide: Patient denies   7. History of sexual aggression toward others: Patient denies   8. Access to firearms or weapons: Patient denies     Subjective     PHQ-9 Depression Screening  Little interest or pleasure in doing things? Over half   Feeling down, depressed, or hopeless? Almost all   PHQ-2 Total Score 5   Trouble falling or staying asleep, or sleeping too much? Several days   Feeling tired or having little energy? Over half   Poor appetite or overeating? Several days   Feeling bad about yourself - or that you are a failure or have let yourself or your family down? Several days   Trouble concentrating on things, such as reading the newspaper or watching television? Almost all   Moving or speaking so slowly that other people could have noticed? Or the opposite - being so fidgety or restless that you have been moving around a lot more than usual? Over half   Thoughts that you would be better off dead, or of hurting yourself in some way? Not at all   PHQ-9 Total Score 15   If you checked off any problems, how difficult have  these problems made it for you to do your work, take care of things at home, or get along with other people? Very difficult      Patient screened positive for depression based on a PHQ-9 score of 15 on 1/22/2025. Follow-up recommendations include: Prescribed antidepressant medication treatment and continue therapy sessions .        MADNIE-7  Feeling nervous, anxious or on edge: More than half the days  Not being able to stop or control worrying: More than half the days  Worrying too much about different things: More than half the days  Trouble Relaxing: Nearly every day  Being so restless that it is hard to sit still: Nearly every day  Feeling afraid as if something awful might happen: More than half the days  Becoming easily annoyed or irritable: Several days  MANDIE 7 Total Score: 15  If you checked any problems, how difficult have these problems made it for you to do your work, take care of things at home, or get along with other people: Somewhat difficult        Client's Support Network Includes:   friends    Functional Status: Mild impairment     Progress toward goal: Not at goal    Prognosis: Good with Ongoing Treatment     Assessment / Plan / Progress     Visit Diagnosis/Orders Placed This Visit:    ICD-10-CM ICD-9-CM   1. Generalized anxiety disorder  F41.1 300.02   2. Mild episode of recurrent major depressive disorder  F33.0 296.31   3. Attention deficit hyperactivity disorder (ADHD), combined type  F90.2 314.01          PLAN:  Safety: No acute safety concerns  Risk Assessment: Risk of self-harm acutely is low. Risk of self-harm chronically is also low, but could be further elevated in the event of treatment noncompliance and/or AODA.    Treatment Plan/Goals & Progress: Continue supportive psychotherapy efforts and medications as indicated. Treatment options discussed during today's visit. Client ackowledged and verbally consented to continue with current treatment plan and was educated on the importance of  compliance with treatment and follow-up appointments. Client seems reasonably able to adhere to treatment plan.      Assisted client in processing above session content; acknowledged and normalized client’s thoughts, feelings, and concerns.  Explored the impact of recent life events on the client's emotional well-being, specifically addressing frustration with desire for ADHD treatment and ongoing relationship stressors. Explored and processed barriers hindering progress towards having conversation with roommate to discuss moving out due to blurring of boundaries within the relationship and behaviors that are limiting pt's autonomy, focusing on overcoming obstacles such as fear of roommate's response and ability to manage household on her own. Explored coping and distress tolerance skills to address emotional regulation, especially in situations involving difficult conversations, including cope ahead strategies and role-playing conversation. and Utilized Interpersonal Therapy techniques to reflect upon client's pattern of accepting individuals into her household due to impulsive decisions  and its impact on relationships. Client demonstrated increased self-reflection, expressing insights into recurring themes, particularly regarding desire for relationships and difficulty with establishing clear relationship boundaries and expecations.     Client continues to show progress on current treatment plan and no adjustments are needed at this time. Therapist will continue to monitor. Therapist encouraged the client to practice newly acquired skills and coping strategies in real-life scenarios, reinforcing application when dealing with communicating and asserting needs in difficult conversations and preparing self for outcomes and self-reliance.       Allowed client to freely discuss issues without interruption or judgement with unconditional positive regard, active listening skills, and empathy. Clinician provided a safe,  confidential environment to facilitate the development of a positive therapeutic relationship and encouraged open, honest communication. Assisted client in identifying risk factors which would indicate the need for higher level of care including thoughts to harm self or others and/or self-harming behavior and encouraged client to contact this office, call 911, or present to the nearest emergency room should any of these events occur. Discussed crisis intervention services and means to access. Client adamantly and convincingly denies current suicidal or homicidal ideation or perceptual disturbance. Assisted client in processing session content; acknowledged and normalized client’s thoughts, feelings, and concerns by utilizing a person-centered approach in efforts to build appropriate rapport and a positive therapeutic relationship with open and honest communication.     Quality Measures:     TOBACCO USE:  Tobacco Use: Low Risk  (1/20/2025)    Patient History     Smoking Tobacco Use: Never     Smokeless Tobacco Use: Never     Passive Exposure: Never      Never smoker    I advised Angie of the risks of tobacco use.     Follow Up:   Return in about 1 week (around 1/29/2025).    Shadia Dumas LCSW  [facility] [address]

## 2025-01-31 ENCOUNTER — OFFICE VISIT (OUTPATIENT)
Age: 30
End: 2025-01-31
Payer: MEDICARE

## 2025-01-31 DIAGNOSIS — F33.0 MILD EPISODE OF RECURRENT MAJOR DEPRESSIVE DISORDER: ICD-10-CM

## 2025-01-31 DIAGNOSIS — F41.1 GENERALIZED ANXIETY DISORDER: Primary | ICD-10-CM

## 2025-01-31 DIAGNOSIS — F90.2 ATTENTION DEFICIT HYPERACTIVITY DISORDER (ADHD), COMBINED TYPE: ICD-10-CM

## 2025-01-31 NOTE — PROGRESS NOTES
"     Follow Up Adult Note     Date:2025   Client Name: Angie Sutherland  : 1995   MRN: [id number]   Time IN: 10:05 am    Time OUT: 10:45 am     Referring Provider: Gabby Fernandez PA-C    Chief Complaint:      ICD-10-CM ICD-9-CM   1. Generalized anxiety disorder  F41.1 300.02   2. Mild episode of recurrent major depressive disorder  F33.0 296.31   3. Attention deficit hyperactivity disorder (ADHD), combined type  F90.2 314.01        History of Present Illness:   Angie Sutherland is a 29 y.o. female who is being seen today for follow up individual psychotherapy counseling for ongoing depression and anxiety. Pt reports that mood has remained stable since last visit despite recent life stressors. Pt states that she is proud that she has engaged in activities that has assisted in feeling \"productive\" and more of herself.        Objective     Medications:     Current Outpatient Medications:     methylPREDNISolone (MEDROL) 4 MG dose pack, Take as directed on package instructions., Disp: 21 each, Rfl: 0    PARoxetine (Paxil) 30 MG tablet, Take 1 tablet by mouth Every Morning., Disp: 30 tablet, Rfl: 1    Allergies:   Allergies   Allergen Reactions    Benadryl [Diphenhydramine] Shortness Of Breath and Diarrhea    Diphenhydramine Hcl Diarrhea and Shortness Of Breath    Other Hives     Sun allergy breaks out in hives    Latex Unknown - Low Severity and Rash     HAS SOME IRRITATION AFTER CATHETER INSERTION       Mental Status Exam:   MENTAL STATUS EXAM   General Appearance:  Cleanly groomed and dressed  Eye Contact:  Good eye contact  Attitude:  Cooperative and polite  Motor Activity:  Normal gait, posture  Speech:  Normal rate, tone, volume  Mood and affect:  Normal, pleasant  Thought Process:  Logical, goal-directed and linear  Associations/ Thought Content:  No delusions  Hallucinations:  None  Suicidal Ideations:  Not present  Homicidal Ideation:  Not present  Sensorium:  Alert and " clear  Orientation:  Person, place, time and situation  Immediate Recall, Recent, and Remote Memory:  Intact  Attention Span/ Concentration:  Good  Insight:  Good  Judgement:  Good  Reliability:  Good  Impulse Control:  Good       SUICIDE RISK ASSESSMENT/CSSRS:  1. Does client have thoughts of suicide? Patient denies  2. Does client have intent for suicide? Patient denies  3. Does client have a current plan for suicide? Patient denies   4. History of suicide attempts: Patient denies   5. Family history of suicide or attempts: Patient denies   6. History of violent behaviors towards others or property or thoughts of committing suicide: Patient denies   7. History of sexual aggression toward others: Patient denies   8. Access to firearms or weapons: Patient denies     Subjective     PHQ-9 Depression Screening  Little interest or pleasure in doing things? Over half   Feeling down, depressed, or hopeless? Over half   PHQ-2 Total Score 4   Trouble falling or staying asleep, or sleeping too much? Several days   Feeling tired or having little energy? Over half   Poor appetite or overeating? Several days   Feeling bad about yourself - or that you are a failure or have let yourself or your family down? Several days   Trouble concentrating on things, such as reading the newspaper or watching television? Almost all   Moving or speaking so slowly that other people could have noticed? Or the opposite - being so fidgety or restless that you have been moving around a lot more than usual? Over half   Thoughts that you would be better off dead, or of hurting yourself in some way? Not at all   PHQ-9 Total Score 14   If you checked off any problems, how difficult have these problems made it for you to do your work, take care of things at home, or get along with other people? Very difficult      Patient screened positive for depression based on a PHQ-9 score of 14 on 1/31/2025. Follow-up recommendations include: Prescribed  antidepressant medication treatment and continue therapy sessions .        MANDIE-7  Feeling nervous, anxious or on edge: More than half the days  Not being able to stop or control worrying: Several days  Worrying too much about different things: More than half the days  Trouble Relaxing: Nearly every day  Being so restless that it is hard to sit still: More than half the days  Feeling afraid as if something awful might happen: More than half the days  Becoming easily annoyed or irritable: Several days  MANDIE 7 Total Score: 13  If you checked any problems, how difficult have these problems made it for you to do your work, take care of things at home, or get along with other people: Very difficult        Client's Support Network Includes:   friends    Functional Status: Mild impairment     Progress toward goal: Not at goal    Prognosis: Good with Ongoing Treatment     Assessment / Plan / Progress     Visit Diagnosis/Orders Placed This Visit:    ICD-10-CM ICD-9-CM   1. Generalized anxiety disorder  F41.1 300.02   2. Mild episode of recurrent major depressive disorder  F33.0 296.31   3. Attention deficit hyperactivity disorder (ADHD), combined type  F90.2 314.01          PLAN:  Safety: No acute safety concerns  Risk Assessment: Risk of self-harm acutely is low. Risk of self-harm chronically is also low, but could be further elevated in the event of treatment noncompliance and/or AODA.    Treatment Plan/Goals & Progress: Continue supportive psychotherapy efforts and medications as indicated. Treatment options discussed during today's visit. Client ackowledged and verbally consented to continue with current treatment plan and was educated on the importance of compliance with treatment and follow-up appointments. Client seems reasonably able to adhere to treatment plan.      Assisted client in processing above session content; acknowledged and normalized client’s thoughts, feelings, and concerns.  Explored the impact of recent  "life events on the client's emotional well-being, specifically addressing ending of various relationships and regaining of her home and personal space. Pt reports that incident recently occurred which has resulted in individual leaving the residence. Pt states that she has a sense of \"peace and relief\" regarding the outcome but has identified some struggling thoughts associated with guilt of how the incident occurred. . Client demonstrated increased self-awareness in identifying patterns of recognizing that multiple past relationships have not ended well when faced with \"the truth.\" Therapist assisted pt in reflecting upon her actions within these relationship that may have contributed to outcomes, such as withholding the truth. Utilized Solution Focused Therapy to emphasize client's strengths and resources to create positive change through the development of small, achievable goals, particularly addressing ability to maintain independence and receive support from individuals without allowing boundaries to be crossed. and Utilized Interpersonal Therapy techniques to reflect upon client's willingness to open up and support others and its impact on relationships. Client demonstrated increased self-reflection, expressing insights into recurring themes, particularly regarding easement of boundaries and recognizing needs/expectations within relationships.  Therapist encouraged pt to continue to engage in mindful awareness of emotions as she continues to process through recent events. Therapist encouraged pt to continue to reflect upon boundaries that she would like implement.  Client continues to show progress on current treatment plan and no adjustments are needed at this time. Therapist will continue to monitor.       Allowed client to freely discuss issues without interruption or judgement with unconditional positive regard, active listening skills, and empathy. Clinician provided a safe, confidential environment to " facilitate the development of a positive therapeutic relationship and encouraged open, honest communication. Assisted client in identifying risk factors which would indicate the need for higher level of care including thoughts to harm self or others and/or self-harming behavior and encouraged client to contact this office, call 911, or present to the nearest emergency room should any of these events occur. Discussed crisis intervention services and means to access. Client adamantly and convincingly denies current suicidal or homicidal ideation or perceptual disturbance. Assisted client in processing session content; acknowledged and normalized client’s thoughts, feelings, and concerns by utilizing a person-centered approach in efforts to build appropriate rapport and a positive therapeutic relationship with open and honest communication.     Quality Measures:     TOBACCO USE:  Tobacco Use: Low Risk  (1/20/2025)    Patient History     Smoking Tobacco Use: Never     Smokeless Tobacco Use: Never     Passive Exposure: Never      Never smoker    I advised Angie of the risks of tobacco use.     Follow Up:   Return in about 1 week (around 2/7/2025).    Shadia Dumas LCSW  [facility] [address]

## (undated) DEVICE — SKIN AFFIX SURG ADHESIVE 72/CS 0.55ML: Brand: MEDLINE

## (undated) DEVICE — SYR LUERLOK 20CC BX/50

## (undated) DEVICE — CANNULA,OXY,ADULT,SUPERSOFT,W/7'TUB,UC: Brand: MEDLINE

## (undated) DEVICE — DRSNG WND BORDR/ADHS NONADHR/GZ LF 2X2IN STRL

## (undated) DEVICE — ELECTRD BLD EDGE/INSUL1P 2.4X5.1MM STRL

## (undated) DEVICE — APPL CHLORAPREP W/TINT 26ML BLU

## (undated) DEVICE — JACKSON-PRATT 100CC BULB RESERVOIR: Brand: CARDINAL HEALTH

## (undated) DEVICE — GLV SURG BIOGEL LTX PF 7 1/2

## (undated) DEVICE — CAMERA/LASER ARM DRAPE: Brand: DEROYAL

## (undated) DEVICE — BANDAGE,GAUZE,BULKEE II,4.5"X4.1YD,STRL: Brand: MEDLINE

## (undated) DEVICE — DEV DEL BRST/IMP GEL KELLER2 FUNNEL

## (undated) DEVICE — 3M™ STERI-STRIP™ REINFORCED ADHESIVE SKIN CLOSURES, R1547, 1/2 IN X 4 IN (12 MM X 100 MM), 6 STRIPS/ENVELOPE: Brand: 3M™ STERI-STRIP™

## (undated) DEVICE — COVER,LIGHT HANDLE,FLX,1/PK: Brand: MEDLINE INDUSTRIES, INC.

## (undated) DEVICE — PK C/SECT 10

## (undated) DEVICE — DISH PETRI 3.5IN MD STRL LF

## (undated) DEVICE — PCH DRN BRST RECONSTRUCT BRA LXF

## (undated) DEVICE — GLV SURG BIOGEL LTX PF 6 1/2

## (undated) DEVICE — DRAPE,REIN 53X77,STERILE: Brand: MEDLINE

## (undated) DEVICE — SUT VIC 2/0 CT1 27IN J339H BX/36

## (undated) DEVICE — STPLR SKIN SUBCUTICULAR INSORB 2030

## (undated) DEVICE — SUT SILK 3/0 TIES 18IN A184H

## (undated) DEVICE — SOL IRR NACL 0.9PCT BT 1000ML

## (undated) DEVICE — ANTIBACTERIAL UNDYED BRAIDED (POLYGLACTIN 910), SYNTHETIC ABSORBABLE SUTURE: Brand: COATED VICRYL

## (undated) DEVICE — BLAKE SILICONE DRAIN, 15 FR ROUND, HUBLESS WITH 3/16" TROCAR: Brand: BLAKE

## (undated) DEVICE — SUT PLAIN  3/0 CT1 27IN 842H

## (undated) DEVICE — SYS FAT GRAFTING REVOLVE SGL

## (undated) DEVICE — BRA COMPR SURG STL958 LG

## (undated) DEVICE — INTENDED FOR TISSUE SEPARATION, AND OTHER PROCEDURES THAT REQUIRE A SHARP SURGICAL BLADE TO PUNCTURE OR CUT.: Brand: BARD-PARKER ® SAFETYLOCK CARBON RIB-BACK BLADES

## (undated) DEVICE — GLV SURG SENSICARE W/ALOE PF LF 6.5 STRL

## (undated) DEVICE — TBG SXN LIPECTOMY 108IN

## (undated) DEVICE — JP PERF DRN SIL FLT 10MM FULL: Brand: CARDINAL HEALTH

## (undated) DEVICE — Device

## (undated) DEVICE — MAT PREVALON MOBL TRANSFR AIR WO/PAD 39X80IN

## (undated) DEVICE — SUT SILK 2/0 PS 18IN 1588H

## (undated) DEVICE — COATED VICRYL  (POLYGLACTIN 910) SUTURE, VIOLET BRAIDED, STERILE, SYNTHETIC ABSORBABLE SUTURE: Brand: COATED VICRYL

## (undated) DEVICE — PROXIMATE RH ROTATING HEAD SKIN STAPLERS (35 WIDE) CONTAINS 35 STAINLESS STEEL STAPLES: Brand: PROXIMATE

## (undated) DEVICE — MEDI-VAC YANKAUER SUCTION HANDLE W/BULBOUS TIP: Brand: CARDINAL HEALTH

## (undated) DEVICE — SUT MNCRYL PLS ANTIB UD 4/0 PS2 18IN

## (undated) DEVICE — AIRWY 90MM NO9

## (undated) DEVICE — SUT GUT CHRM 1 CTX 36IN 905H

## (undated) DEVICE — SAFESECURE,SECUREMENT,FOLEY CATH,STERILE: Brand: MEDLINE

## (undated) DEVICE — SUT ETHLN 4/0 PC3 18IN 1864G

## (undated) DEVICE — SUT PDS 3/0 SH 27IN CLR PDP416H

## (undated) DEVICE — SUT SILK 2/0 TIES 18IN A185H

## (undated) DEVICE — BRA COMPR CURAD P/OP SM

## (undated) DEVICE — TOTAL TRAY, 16FR 10ML SIL FOLEY, URN: Brand: MEDLINE

## (undated) DEVICE — CLTH CLENS READYCLEANSE PERI CARE PK/5

## (undated) DEVICE — GAUZE FLUFF 1 PLY: Brand: DEROYAL

## (undated) DEVICE — GLV SURG BIOGEL LTX PF 6

## (undated) DEVICE — TRY SPINE BLCK WHITACRE 25G 3X5IN

## (undated) DEVICE — SOL IRR H2O BTL 1000ML STRL

## (undated) DEVICE — GLV SURG SENSICARE W/ALOE PF LF SZ6 STRL

## (undated) DEVICE — LINER CANSTR SXN HERCULES

## (undated) DEVICE — ADAPT ST INFUS ADMIN SYR 70IN

## (undated) DEVICE — LEX GENERAL BREAST: Brand: MEDLINE INDUSTRIES, INC.

## (undated) DEVICE — PK CHST BRST 10

## (undated) DEVICE — SUT MONOCRYL PLS ANTIB UND 3/0  PS1 27IN

## (undated) DEVICE — ELECTRD BLD EXT EDGE 1P COAT 4IN STRL

## (undated) DEVICE — STCKNT IMPERV 12IN STRL

## (undated) DEVICE — SOL LR 1000ML

## (undated) DEVICE — DISPOSABLE BIPOLAR FORCEPS 7 3/4" (19.7CM) SCOVILLE BAYONET, INSULATED, 1.5MM TIP AND 12 FT. (3.6M) CABLE: Brand: KIRWAN

## (undated) DEVICE — MEDI-VAC NON-CONDUCTIVE SUCTION TUBING: Brand: CARDINAL HEALTH

## (undated) DEVICE — SUT MNCRYL PLS ANTIB UD 3/0 PS2 27IN

## (undated) DEVICE — PK MINOR SPLT 10